# Patient Record
Sex: FEMALE | Race: BLACK OR AFRICAN AMERICAN | NOT HISPANIC OR LATINO | Employment: OTHER | URBAN - METROPOLITAN AREA
[De-identification: names, ages, dates, MRNs, and addresses within clinical notes are randomized per-mention and may not be internally consistent; named-entity substitution may affect disease eponyms.]

---

## 2018-04-19 ENCOUNTER — NURSE TRIAGE (OUTPATIENT)
Dept: ADMINISTRATIVE | Facility: CLINIC | Age: 58
End: 2018-04-19

## 2018-04-20 ENCOUNTER — HOSPITAL ENCOUNTER (INPATIENT)
Facility: HOSPITAL | Age: 58
LOS: 24 days | Discharge: NURSING, REHAB OR LONG-TERM CARE | DRG: 477 | End: 2018-05-14
Attending: EMERGENCY MEDICINE | Admitting: INTERNAL MEDICINE
Payer: MEDICAID

## 2018-04-20 DIAGNOSIS — E87.20 LACTIC ACIDEMIA: ICD-10-CM

## 2018-04-20 DIAGNOSIS — R53.1 WEAKNESS: ICD-10-CM

## 2018-04-20 DIAGNOSIS — J96.01 ACUTE HYPOXEMIC RESPIRATORY FAILURE: ICD-10-CM

## 2018-04-20 DIAGNOSIS — C50.919 METASTATIC BREAST CANCER: ICD-10-CM

## 2018-04-20 DIAGNOSIS — Z17.0 MALIGNANT NEOPLASM OF CENTRAL PORTION OF RIGHT BREAST IN FEMALE, ESTROGEN RECEPTOR POSITIVE: ICD-10-CM

## 2018-04-20 DIAGNOSIS — Z51.5 PALLIATIVE CARE ENCOUNTER: ICD-10-CM

## 2018-04-20 DIAGNOSIS — E43 SEVERE MALNUTRITION: ICD-10-CM

## 2018-04-20 DIAGNOSIS — R07.9 CHEST PAIN: ICD-10-CM

## 2018-04-20 DIAGNOSIS — I50.9 CONGESTIVE HEART FAILURE, UNSPECIFIED HF CHRONICITY, UNSPECIFIED HEART FAILURE TYPE: ICD-10-CM

## 2018-04-20 DIAGNOSIS — I49.9 CARDIAC RHYTHM DISORDER OR DISTURBANCE OR CHANGE: ICD-10-CM

## 2018-04-20 DIAGNOSIS — I50.9 CHF (CONGESTIVE HEART FAILURE): ICD-10-CM

## 2018-04-20 DIAGNOSIS — C50.111 MALIGNANT NEOPLASM OF CENTRAL PORTION OF RIGHT BREAST IN FEMALE, ESTROGEN RECEPTOR POSITIVE: ICD-10-CM

## 2018-04-20 DIAGNOSIS — R00.0 TACHYCARDIA: ICD-10-CM

## 2018-04-20 DIAGNOSIS — C80.1 ADENOCARCINOMA OF UNKNOWN PRIMARY: ICD-10-CM

## 2018-04-20 DIAGNOSIS — C79.51 BONY METASTASIS: ICD-10-CM

## 2018-04-20 DIAGNOSIS — C80.1 CANCER: Primary | ICD-10-CM

## 2018-04-20 DIAGNOSIS — I95.9 HYPOTENSION: ICD-10-CM

## 2018-04-20 DIAGNOSIS — Z99.11 ENCOUNTER FOR WEANING FROM VENTILATOR: ICD-10-CM

## 2018-04-20 DIAGNOSIS — I95.81 POSTPROCEDURAL HYPOTENSION: ICD-10-CM

## 2018-04-20 DIAGNOSIS — I49.9 CARDIAC ARRHYTHMIA, UNSPECIFIED CARDIAC ARRHYTHMIA TYPE: ICD-10-CM

## 2018-04-20 DIAGNOSIS — R57.9 SHOCK: ICD-10-CM

## 2018-04-20 LAB
ALBUMIN SERPL BCP-MCNC: 1.6 G/DL
ALP SERPL-CCNC: 534 U/L
ALT SERPL W/O P-5'-P-CCNC: 22 U/L
ANION GAP SERPL CALC-SCNC: 9 MMOL/L
ANISOCYTOSIS BLD QL SMEAR: SLIGHT
AST SERPL-CCNC: 81 U/L
BACTERIA #/AREA URNS AUTO: NORMAL /HPF
BASOPHILS # BLD AUTO: ABNORMAL K/UL
BASOPHILS NFR BLD: 0 %
BILIRUB SERPL-MCNC: 0.7 MG/DL
BILIRUB UR QL STRIP: NEGATIVE
BNP SERPL-MCNC: 726 PG/ML
BUN SERPL-MCNC: 27 MG/DL
CALCIUM SERPL-MCNC: 8.5 MG/DL
CHLORIDE SERPL-SCNC: 108 MMOL/L
CLARITY UR REFRACT.AUTO: CLEAR
CO2 SERPL-SCNC: 18 MMOL/L
COLOR UR AUTO: YELLOW
CREAT SERPL-MCNC: 1.1 MG/DL
DIFFERENTIAL METHOD: ABNORMAL
EOSINOPHIL # BLD AUTO: ABNORMAL K/UL
EOSINOPHIL NFR BLD: 0 %
ERYTHROCYTE [DISTWIDTH] IN BLOOD BY AUTOMATED COUNT: 15.5 %
EST. GFR  (AFRICAN AMERICAN): >60 ML/MIN/1.73 M^2
EST. GFR  (NON AFRICAN AMERICAN): 56.3 ML/MIN/1.73 M^2
GLUCOSE SERPL-MCNC: 72 MG/DL
GLUCOSE UR QL STRIP: NEGATIVE
HCT VFR BLD AUTO: 36 %
HGB BLD-MCNC: 11.4 G/DL
HGB UR QL STRIP: ABNORMAL
IMM GRANULOCYTES # BLD AUTO: ABNORMAL K/UL
IMM GRANULOCYTES NFR BLD AUTO: ABNORMAL %
INR PPP: 1
KETONES UR QL STRIP: NEGATIVE
LACTATE SERPL-SCNC: 2.8 MMOL/L
LEUKOCYTE ESTERASE UR QL STRIP: ABNORMAL
LYMPHOCYTES # BLD AUTO: ABNORMAL K/UL
LYMPHOCYTES NFR BLD: 22 %
MAGNESIUM SERPL-MCNC: 1.6 MG/DL
MCH RBC QN AUTO: 28.6 PG
MCHC RBC AUTO-ENTMCNC: 31.7 G/DL
MCV RBC AUTO: 91 FL
METAMYELOCYTES NFR BLD MANUAL: 1 %
MICROSCOPIC COMMENT: NORMAL
MONOCYTES # BLD AUTO: ABNORMAL K/UL
MONOCYTES NFR BLD: 6 %
NEUTROPHILS NFR BLD: 68 %
NEUTS BAND NFR BLD MANUAL: 3 %
NITRITE UR QL STRIP: NEGATIVE
NON-SQ EPI CELLS #/AREA URNS AUTO: <1 /HPF
NRBC BLD-RTO: 1 /100 WBC
PH UR STRIP: 5 [PH] (ref 5–8)
PLATELET # BLD AUTO: 76 K/UL
PMV BLD AUTO: ABNORMAL FL
POLYCHROMASIA BLD QL SMEAR: ABNORMAL
POTASSIUM SERPL-SCNC: 5.4 MMOL/L
PROCALCITONIN SERPL IA-MCNC: 0.38 NG/ML
PROT SERPL-MCNC: 6.2 G/DL
PROT UR QL STRIP: NEGATIVE
PROTHROMBIN TIME: 10.6 SEC
RBC # BLD AUTO: 3.98 M/UL
RBC #/AREA URNS AUTO: 1 /HPF (ref 0–4)
SODIUM SERPL-SCNC: 135 MMOL/L
SP GR UR STRIP: 1.01 (ref 1–1.03)
SQUAMOUS #/AREA URNS AUTO: 0 /HPF
URN SPEC COLLECT METH UR: ABNORMAL
UROBILINOGEN UR STRIP-ACNC: NEGATIVE EU/DL
WBC # BLD AUTO: 11.92 K/UL
WBC #/AREA URNS AUTO: 2 /HPF (ref 0–5)

## 2018-04-20 PROCEDURE — 12000002 HC ACUTE/MED SURGE SEMI-PRIVATE ROOM

## 2018-04-20 PROCEDURE — 85007 BL SMEAR W/DIFF WBC COUNT: CPT

## 2018-04-20 PROCEDURE — 99291 CRITICAL CARE FIRST HOUR: CPT

## 2018-04-20 PROCEDURE — 96365 THER/PROPH/DIAG IV INF INIT: CPT

## 2018-04-20 PROCEDURE — 85610 PROTHROMBIN TIME: CPT

## 2018-04-20 PROCEDURE — 99291 CRITICAL CARE FIRST HOUR: CPT | Mod: ,,, | Performed by: EMERGENCY MEDICINE

## 2018-04-20 PROCEDURE — 96361 HYDRATE IV INFUSION ADD-ON: CPT

## 2018-04-20 PROCEDURE — 80053 COMPREHEN METABOLIC PANEL: CPT

## 2018-04-20 PROCEDURE — 83605 ASSAY OF LACTIC ACID: CPT

## 2018-04-20 PROCEDURE — 87040 BLOOD CULTURE FOR BACTERIA: CPT

## 2018-04-20 PROCEDURE — 85027 COMPLETE CBC AUTOMATED: CPT

## 2018-04-20 PROCEDURE — 93010 ELECTROCARDIOGRAM REPORT: CPT | Mod: ,,, | Performed by: INTERNAL MEDICINE

## 2018-04-20 PROCEDURE — 96367 TX/PROPH/DG ADDL SEQ IV INF: CPT

## 2018-04-20 PROCEDURE — 82962 GLUCOSE BLOOD TEST: CPT

## 2018-04-20 PROCEDURE — 83880 ASSAY OF NATRIURETIC PEPTIDE: CPT

## 2018-04-20 PROCEDURE — 81001 URINALYSIS AUTO W/SCOPE: CPT

## 2018-04-20 PROCEDURE — 84145 PROCALCITONIN (PCT): CPT

## 2018-04-20 PROCEDURE — 25000003 PHARM REV CODE 250: Performed by: EMERGENCY MEDICINE

## 2018-04-20 PROCEDURE — 83735 ASSAY OF MAGNESIUM: CPT

## 2018-04-20 PROCEDURE — 96366 THER/PROPH/DIAG IV INF ADDON: CPT

## 2018-04-20 PROCEDURE — 93005 ELECTROCARDIOGRAM TRACING: CPT

## 2018-04-20 RX ORDER — MORPHINE SULFATE 15 MG/1
15 TABLET, FILM COATED, EXTENDED RELEASE ORAL 2 TIMES DAILY
Status: ON HOLD | COMMUNITY
End: 2018-05-14

## 2018-04-20 RX ORDER — MORPHINE SULFATE 15 MG/1
5 TABLET ORAL EVERY 4 HOURS PRN
Status: ON HOLD | COMMUNITY
End: 2018-05-14

## 2018-04-20 RX ORDER — ESCITALOPRAM OXALATE 5 MG/1
5 TABLET ORAL DAILY
Status: ON HOLD | COMMUNITY
End: 2018-05-14 | Stop reason: HOSPADM

## 2018-04-20 RX ORDER — ONDANSETRON 4 MG/1
8 TABLET, FILM COATED ORAL 2 TIMES DAILY
COMMUNITY

## 2018-04-20 RX ADMIN — SODIUM CHLORIDE 1000 ML: 0.9 INJECTION, SOLUTION INTRAVENOUS at 09:04

## 2018-04-20 NOTE — TELEPHONE ENCOUNTER
"  Reason for Disposition   General information question, no triage required and triager able to answer question    Answer Assessment - Initial Assessment Questions  1. REASON FOR CALL or QUESTION: "What is your reason for calling today?" or "How can I best help you?" or "What question do you have that I can help answer?"      Pt's son calling - pt is in hospital in [place]- dx with cancer- he wants her transferred to [facility]    Protocols used: ST INFORMATION ONLY CALL-A-AH    "

## 2018-04-21 PROBLEM — G93.41 ENCEPHALOPATHY, METABOLIC: Status: RESOLVED | Noted: 2018-04-21 | Resolved: 2018-04-21

## 2018-04-21 PROBLEM — G93.41 ENCEPHALOPATHY, METABOLIC: Status: ACTIVE | Noted: 2018-04-21

## 2018-04-21 PROBLEM — I95.9 HYPOTENSION: Status: ACTIVE | Noted: 2018-04-21

## 2018-04-21 PROBLEM — J90 PLEURAL EFFUSION: Status: ACTIVE | Noted: 2018-04-21

## 2018-04-21 PROBLEM — C79.51 BONY METASTASIS: Status: ACTIVE | Noted: 2018-04-21

## 2018-04-21 PROBLEM — C80.1 CANCER: Status: ACTIVE | Noted: 2018-04-21

## 2018-04-21 LAB
ALBUMIN SERPL BCP-MCNC: 1.4 G/DL
ALLENS TEST: ABNORMAL
ALP SERPL-CCNC: 449 U/L
ALT SERPL W/O P-5'-P-CCNC: 18 U/L
ANION GAP SERPL CALC-SCNC: 8 MMOL/L
ANISOCYTOSIS BLD QL SMEAR: SLIGHT
AST SERPL-CCNC: 56 U/L
BASOPHILS # BLD AUTO: ABNORMAL K/UL
BASOPHILS NFR BLD: 0 %
BILIRUB SERPL-MCNC: 0.5 MG/DL
BUN SERPL-MCNC: 24 MG/DL
CALCIUM SERPL-MCNC: 7.4 MG/DL
CHLORIDE SERPL-SCNC: 112 MMOL/L
CO2 SERPL-SCNC: 18 MMOL/L
CREAT SERPL-MCNC: 1 MG/DL
DELSYS: ABNORMAL
DIASTOLIC DYSFUNCTION: NO
DIFFERENTIAL METHOD: ABNORMAL
EOSINOPHIL # BLD AUTO: ABNORMAL K/UL
EOSINOPHIL NFR BLD: 0 %
ERYTHROCYTE [DISTWIDTH] IN BLOOD BY AUTOMATED COUNT: 15.4 %
ERYTHROCYTE [SEDIMENTATION RATE] IN BLOOD BY WESTERGREN METHOD: 28 MM/H
EST. GFR  (AFRICAN AMERICAN): >60 ML/MIN/1.73 M^2
EST. GFR  (NON AFRICAN AMERICAN): >60 ML/MIN/1.73 M^2
ESTIMATED PA SYSTOLIC PRESSURE: 40.21
FLOW: 4
GLUCOSE SERPL-MCNC: 99 MG/DL
HCO3 UR-SCNC: 19.8 MMOL/L (ref 24–28)
HCT VFR BLD AUTO: 34.1 %
HGB BLD-MCNC: 10.4 G/DL
IMM GRANULOCYTES # BLD AUTO: ABNORMAL K/UL
IMM GRANULOCYTES NFR BLD AUTO: ABNORMAL %
LACTATE SERPL-SCNC: 2 MMOL/L
LDH SERPL L TO P-CCNC: 1011 U/L
LYMPHOCYTES # BLD AUTO: ABNORMAL K/UL
LYMPHOCYTES NFR BLD: 21 %
MAGNESIUM SERPL-MCNC: 1.2 MG/DL
MCH RBC QN AUTO: 28.9 PG
MCHC RBC AUTO-ENTMCNC: 30.5 G/DL
MCV RBC AUTO: 95 FL
MODE: ABNORMAL
MONOCYTES # BLD AUTO: ABNORMAL K/UL
MONOCYTES NFR BLD: 2 %
MYELOCYTES NFR BLD MANUAL: 1 %
NEUTROPHILS NFR BLD: 74 %
NEUTS BAND NFR BLD MANUAL: 2 %
NRBC BLD-RTO: 1 /100 WBC
PCO2 BLDA: 30.8 MMHG (ref 35–45)
PH SMN: 7.42 [PH] (ref 7.35–7.45)
PHOSPHATE SERPL-MCNC: 3.2 MG/DL
PLATELET # BLD AUTO: 70 K/UL
PMV BLD AUTO: 13.2 FL
PO2 BLDA: 53 MMHG (ref 40–60)
POC BE: -5 MMOL/L
POC SATURATED O2: 88 % (ref 95–100)
POC TCO2: 21 MMOL/L (ref 24–29)
POCT GLUCOSE: 74 MG/DL (ref 70–110)
POLYCHROMASIA BLD QL SMEAR: ABNORMAL
POTASSIUM SERPL-SCNC: 4.5 MMOL/L
PROT SERPL-MCNC: 5.2 G/DL
RBC # BLD AUTO: 3.6 M/UL
RETIRED EF AND QEF - SEE NOTES: 60 (ref 55–65)
SAMPLE: ABNORMAL
SITE: ABNORMAL
SODIUM SERPL-SCNC: 138 MMOL/L
SP02: 98
TRICUSPID VALVE REGURGITATION: ABNORMAL
WBC # BLD AUTO: 9.96 K/UL

## 2018-04-21 PROCEDURE — 84100 ASSAY OF PHOSPHORUS: CPT

## 2018-04-21 PROCEDURE — 99223 1ST HOSP IP/OBS HIGH 75: CPT | Mod: AI,,, | Performed by: INTERNAL MEDICINE

## 2018-04-21 PROCEDURE — 94761 N-INVAS EAR/PLS OXIMETRY MLT: CPT

## 2018-04-21 PROCEDURE — 25000003 PHARM REV CODE 250: Performed by: HOSPITALIST

## 2018-04-21 PROCEDURE — 99233 SBSQ HOSP IP/OBS HIGH 50: CPT | Mod: ,,, | Performed by: INTERNAL MEDICINE

## 2018-04-21 PROCEDURE — 93306 TTE W/DOPPLER COMPLETE: CPT | Mod: 26,,, | Performed by: INTERNAL MEDICINE

## 2018-04-21 PROCEDURE — 85007 BL SMEAR W/DIFF WBC COUNT: CPT

## 2018-04-21 PROCEDURE — 99900035 HC TECH TIME PER 15 MIN (STAT)

## 2018-04-21 PROCEDURE — 63600175 PHARM REV CODE 636 W HCPCS: Performed by: EMERGENCY MEDICINE

## 2018-04-21 PROCEDURE — 25000003 PHARM REV CODE 250: Performed by: EMERGENCY MEDICINE

## 2018-04-21 PROCEDURE — 83735 ASSAY OF MAGNESIUM: CPT

## 2018-04-21 PROCEDURE — 83605 ASSAY OF LACTIC ACID: CPT

## 2018-04-21 PROCEDURE — 93306 TTE W/DOPPLER COMPLETE: CPT

## 2018-04-21 PROCEDURE — 80053 COMPREHEN METABOLIC PANEL: CPT

## 2018-04-21 PROCEDURE — 25000003 PHARM REV CODE 250: Performed by: INTERNAL MEDICINE

## 2018-04-21 PROCEDURE — 83615 LACTATE (LD) (LDH) ENZYME: CPT

## 2018-04-21 PROCEDURE — 82803 BLOOD GASES ANY COMBINATION: CPT

## 2018-04-21 PROCEDURE — 85027 COMPLETE CBC AUTOMATED: CPT

## 2018-04-21 PROCEDURE — 63600175 PHARM REV CODE 636 W HCPCS: Performed by: INTERNAL MEDICINE

## 2018-04-21 PROCEDURE — 11000001 HC ACUTE MED/SURG PRIVATE ROOM

## 2018-04-21 RX ORDER — SODIUM CHLORIDE 9 MG/ML
INJECTION, SOLUTION INTRAVENOUS CONTINUOUS
Status: DISCONTINUED | OUTPATIENT
Start: 2018-04-21 | End: 2018-04-22

## 2018-04-21 RX ORDER — ACETAMINOPHEN 325 MG/1
650 TABLET ORAL EVERY 4 HOURS PRN
Status: DISCONTINUED | OUTPATIENT
Start: 2018-04-21 | End: 2018-04-25

## 2018-04-21 RX ORDER — SODIUM CHLORIDE 0.9 % (FLUSH) 0.9 %
5 SYRINGE (ML) INJECTION
Status: DISCONTINUED | OUTPATIENT
Start: 2018-04-21 | End: 2018-05-14 | Stop reason: HOSPADM

## 2018-04-21 RX ORDER — ONDANSETRON 8 MG/1
8 TABLET, ORALLY DISINTEGRATING ORAL EVERY 8 HOURS PRN
Status: DISCONTINUED | OUTPATIENT
Start: 2018-04-21 | End: 2018-05-14 | Stop reason: HOSPADM

## 2018-04-21 RX ORDER — HYDROCODONE BITARTRATE AND ACETAMINOPHEN 5; 325 MG/1; MG/1
1 TABLET ORAL EVERY 6 HOURS PRN
Status: DISCONTINUED | OUTPATIENT
Start: 2018-04-21 | End: 2018-04-23

## 2018-04-21 RX ORDER — ENOXAPARIN SODIUM 100 MG/ML
40 INJECTION SUBCUTANEOUS EVERY 24 HOURS
Status: DISCONTINUED | OUTPATIENT
Start: 2018-04-21 | End: 2018-04-24

## 2018-04-21 RX ORDER — MAGNESIUM SULFATE HEPTAHYDRATE 40 MG/ML
2 INJECTION, SOLUTION INTRAVENOUS ONCE
Status: COMPLETED | OUTPATIENT
Start: 2018-04-21 | End: 2018-04-21

## 2018-04-21 RX ADMIN — PIPERACILLIN AND TAZOBACTAM 4.5 G: 4; .5 INJECTION, POWDER, LYOPHILIZED, FOR SOLUTION INTRAVENOUS; PARENTERAL at 12:04

## 2018-04-21 RX ADMIN — VANCOMYCIN HYDROCHLORIDE 1500 MG: 5 INJECTION, POWDER, LYOPHILIZED, FOR SOLUTION INTRAVENOUS at 01:04

## 2018-04-21 RX ADMIN — HYDROCODONE BITARTRATE AND ACETAMINOPHEN 1 TABLET: 5; 325 TABLET ORAL at 04:04

## 2018-04-21 RX ADMIN — VANCOMYCIN HYDROCHLORIDE 1250 MG: 1 INJECTION, POWDER, LYOPHILIZED, FOR SOLUTION INTRAVENOUS at 03:04

## 2018-04-21 RX ADMIN — SODIUM CHLORIDE 1000 ML: 0.9 INJECTION, SOLUTION INTRAVENOUS at 03:04

## 2018-04-21 RX ADMIN — PIPERACILLIN AND TAZOBACTAM 4.5 G: 4; .5 INJECTION, POWDER, LYOPHILIZED, FOR SOLUTION INTRAVENOUS; PARENTERAL at 08:04

## 2018-04-21 RX ADMIN — ENOXAPARIN SODIUM 40 MG: 100 INJECTION SUBCUTANEOUS at 04:04

## 2018-04-21 RX ADMIN — HYDROCODONE BITARTRATE AND ACETAMINOPHEN 1 TABLET: 5; 325 TABLET ORAL at 07:04

## 2018-04-21 RX ADMIN — SODIUM CHLORIDE: 0.9 INJECTION, SOLUTION INTRAVENOUS at 02:04

## 2018-04-21 RX ADMIN — MAGNESIUM SULFATE IN WATER 2 G: 40 INJECTION, SOLUTION INTRAVENOUS at 01:04

## 2018-04-21 RX ADMIN — PIPERACILLIN AND TAZOBACTAM 4.5 G: 4; .5 INJECTION, POWDER, LYOPHILIZED, FOR SOLUTION INTRAVENOUS; PARENTERAL at 04:04

## 2018-04-21 NOTE — H&P
[facility]  Critical Care Medicine  History & Physical    Patient Name: Kerline Grossman  MRN: [id number]  Admission Date: 4/20/2018  Hospital Length of Stay: 0 days  Code Status: Full Code  Attending Physician: Lili Allen MD   Primary Care Provider: No primary care provider on file.   Principal Problem: Hypotension    Subjective:     HPI:  Mrs. Grossman is a 59 yo female who was brought to [facility] ED this afternoon for second opinion regarding recently diagnosed likely metastatic malignancy with unknown primary. Per patient's daughter about a month ago her mother started to gradually decline secondary to diffuse pain. She tehn became progressively more altered and eventually not responsive. She could no longer walk and was urinating on herself. Her family brought her to ED at [facility]. They brought some records from her stay there which show that work up revealed a corrected calcium of 13 and ZACH. She had CT brain, chest and abdomen which showed diffuse lytic lesions and some pathological fractures in her skull, ribs, and hips but no lesion that pointed to primary tumor. Her calcium eventually trended down. SPEP and UPEP were negative. Her daughter states she had 2 BM biopsies which were unrevealing. She had CT guided biopsy of a lesion in her hip on 4/20 as well as diagnostic thoracentesis which removed only 50 ccs from her pleural effusion. Her daughter states the physicians at [facility] were recommending inpatient hospice for her mother as it seemed she most likely had widely metastatic disease with unknown primary tumor however the patient;s children wished to have a definitive diagnoses before making a decision. They initially attempted to transfer to [facility] but were denied and then decided to have her discharged from [facility] and brought her here themselves. They state she had become more interactive and alert int he last few days and was showing signs of improvement. Of note the daughter states  patient was on a morphine gtt at outside hospital and took and MS contin before being discharge. She states she was having some hypotension at OSH which she states the physicians attributed to the morphine gtt.     Critical care medicine was consulted overnight due to hypotension on arrival to ED. Patient had SBP ~ 80 which responded well to 1 L of NS and MAPs were then > 65 so decision initially was to admit to hospital medicine. However, blood pressure then dropped again to systolic of 80s and patient was accepted to critical care medicine.       Hospital/ICU Course:  No notes on file     Past Medical History:   Diagnosis Date    Cancer     Insomnia        Past Surgical History:   Procedure Laterality Date     SECTION      HYSTERECTOMY         Review of patient's allergies indicates:  No Known Allergies    Family History     None        Social History Main Topics    Smoking status: Former Smoker    Smokeless tobacco: Never Used    Alcohol use No    Drug use: No    Sexual activity: Not on file      Review of Systems   Unable to perform ROS: Mental status change     Objective:     Vital Signs (Most Recent):  Temp: 97.5 °F (36.4 °C) (18)  Pulse: 99 (18)  Resp: (!) 22 (18)  BP: 110/60 (18)  SpO2: 97 % (18) Vital Signs (24h Range):  Temp:  [97.5 °F (36.4 °C)-98.4 °F (36.9 °C)] 97.5 °F (36.4 °C)  Pulse:  [] 99  Resp:  [15-29] 22  SpO2:  [91 %-100 %] 97 %  BP: ()/(51-60) 110/60   Weight: 77.1 kg (170 lb)  Body mass index is 27.44 kg/m².      Intake/Output Summary (Last 24 hours) at 18 05  Last data filed at 18 2320   Gross per 24 hour   Intake                0 ml   Output              400 ml   Net             -400 ml       Physical Exam   Constitutional:   Appears ill and lethargic.    HENT:   Head: Normocephalic and atraumatic.   Eyes: EOM are normal. Pupils are equal, round, and reactive to light.   Neck: Normal range of  motion. No JVD present.   Cardiovascular: Normal rate, regular rhythm and normal heart sounds.    No murmur heard.  Pulmonary/Chest: Effort normal and breath sounds normal. No respiratory distress. She has no wheezes.   Abdominal: Soft. She exhibits no distension. There is no guarding.   Musculoskeletal: She exhibits no edema.   Neurological:   Patient is lethargic but is able to answer simple questions. Oriented to person.    Skin: Skin is warm. No erythema.       Vents:     Lines/Drains/Airways     Peripheral Intravenous Line                 Peripheral IV - Single Lumen 04/20/18 2136 Right Antecubital less than 1 day         Peripheral IV - Single Lumen 04/20/18 2205 Left Forearm less than 1 day              Significant Labs:    CBC/Anemia Profile:    Recent Labs  Lab 04/20/18 2152   WBC 11.92   HGB 11.4*   HCT 36.0*   PLT 76*   MCV 91   RDW 15.5*        Chemistries:    Recent Labs  Lab 04/20/18 2152   *   K 5.4*      CO2 18*   BUN 27*   CREATININE 1.1   CALCIUM 8.5*   ALBUMIN 1.6*   PROT 6.2   BILITOT 0.7   ALKPHOS 534*   ALT 22   AST 81*   MG 1.6       Significant Imaging: I have reviewed and interpreted all pertinent imaging results/findings within the past 24 hours.    Assessment/Plan:     Pulmonary   Pleural effusion    Patient with right sided pleural effusion seen on CXR. Possibly malignant.   Will check echo to rule out transudative process secondary to CHF.    She had diagnostic thoracentesis at OSH with 50 ccs removed. Will follow up with outside lab.         Cardiac/Vascular   * Hypotension    Patient found to have MAPS between 60 and 70 upon arrival to ED. She received 1 L of NS with improvements to MAPs > 65.   Likely secondary to combination of dehydration and morphine still wearing off. She may also have an infection although her WBC is normal and she is afebrile  BP now improved s/p 2 L of NS   Continue broad spectrum abx until cultures return.            Oncology   Bony metastasis     Patient with diffuse lytic lesions and pathological fractures throughout her skull, chest and hips which is seen on CT scans from OSH. Workup has not shown primary malignancy.   Will need to follow up of pathology reports from OSH from biopsies of BM and hip as well as cytology from pleural fluid.   Patient's family still want to pursue workup and possible treatment despite the extent of patient;s disease. They also want her to remain full code until they find out more information.            KITTY Ma  Critical Care Medicine  [facility]

## 2018-04-21 NOTE — ED NOTES
"Was about to insert catheter , when I removed the pure wick pt started to pee and states " I couldn't pee with that thing on"  "

## 2018-04-21 NOTE — SUBJECTIVE & OBJECTIVE
Past Medical History:   Diagnosis Date    Cancer     Insomnia        Past Surgical History:   Procedure Laterality Date     SECTION      HYSTERECTOMY         Review of patient's allergies indicates:  No Known Allergies    Family History     None        Social History Main Topics    Smoking status: Former Smoker    Smokeless tobacco: Never Used    Alcohol use No    Drug use: No    Sexual activity: Not on file      Review of Systems   Unable to perform ROS: Mental status change     Objective:     Vital Signs (Most Recent):  Temp: 98.4 °F (36.9 °C) (18 0021)  Pulse: 98 (18 0246)  Resp: 19 (18 0246)  BP: (!) 99/59 (18 0246)  SpO2: 98 % (18 024) Vital Signs (24h Range):  Temp:  [98.4 °F (36.9 °C)] 98.4 °F (36.9 °C)  Pulse:  [] 98  Resp:  [15-29] 19  SpO2:  [91 %-100 %] 98 %  BP: ()/(51-60) 99/59   Weight: 77.1 kg (170 lb)  Body mass index is 27.44 kg/m².    No intake or output data in the 24 hours ending 18 0325    Physical Exam   Constitutional:   Appears ill and lethargic.    HENT:   Head: Normocephalic and atraumatic.   Eyes: EOM are normal. Pupils are equal, round, and reactive to light.   Neck: Normal range of motion. No JVD present.   Cardiovascular: Normal rate, regular rhythm and normal heart sounds.    No murmur heard.  Pulmonary/Chest: Effort normal and breath sounds normal. No respiratory distress. She has no wheezes.   Abdominal: Soft. She exhibits no distension. There is no guarding.   Musculoskeletal: She exhibits no edema.   Neurological:   Patient is lethargic but is able to answer simple questions. Oriented to person.    Skin: Skin is warm. No erythema.       Vents:     Lines/Drains/Airways     Peripheral Intravenous Line                 Peripheral IV - Single Lumen 18 Right Antecubital less than 1 day         Peripheral IV - Single Lumen 18 220 Left Forearm less than 1 day              Significant Labs:    CBC/Anemia  Profile:    Recent Labs  Lab 04/20/18 2152   WBC 11.92   HGB 11.4*   HCT 36.0*   PLT 76*   MCV 91   RDW 15.5*        Chemistries:    Recent Labs  Lab 04/20/18 2152   *   K 5.4*      CO2 18*   BUN 27*   CREATININE 1.1   CALCIUM 8.5*   ALBUMIN 1.6*   PROT 6.2   BILITOT 0.7   ALKPHOS 534*   ALT 22   AST 81*   MG 1.6       Significant Imaging: I have reviewed and interpreted all pertinent imaging results/findings within the past 24 hours.

## 2018-04-21 NOTE — ED NOTES
Pt states she wants to pee but no urine coming out. Bladder scan shows >999. Critical care notified and ordered in and out

## 2018-04-21 NOTE — CARE UPDATE
Spoke with Inez from [facility] Medicine. Patient will be stepped down tomorrow at 7 AM.     Pretty Geiger MD PGY-3

## 2018-04-21 NOTE — SUBJECTIVE & OBJECTIVE
Past Medical History:   Diagnosis Date    Cancer     Insomnia        Past Surgical History:   Procedure Laterality Date     SECTION      HYSTERECTOMY         Review of patient's allergies indicates:  No Known Allergies    Family History     None        Social History Main Topics    Smoking status: Former Smoker    Smokeless tobacco: Never Used    Alcohol use No    Drug use: No    Sexual activity: Not on file      Review of Systems   Unable to perform ROS: Mental status change     Objective:     Vital Signs (Most Recent):  Temp: 97.5 °F (36.4 °C) (18)  Pulse: 99 (18)  Resp: (!) 22 (18)  BP: 110/60 (18)  SpO2: 97 % (18) Vital Signs (24h Range):  Temp:  [97.5 °F (36.4 °C)-98.4 °F (36.9 °C)] 97.5 °F (36.4 °C)  Pulse:  [] 99  Resp:  [15-29] 22  SpO2:  [91 %-100 %] 97 %  BP: ()/(51-60) 110/60   Weight: 77.1 kg (170 lb)  Body mass index is 27.44 kg/m².      Intake/Output Summary (Last 24 hours) at 18 0534  Last data filed at 18 2320   Gross per 24 hour   Intake                0 ml   Output              400 ml   Net             -400 ml       Physical Exam   Constitutional:   Appears ill and lethargic.    HENT:   Head: Normocephalic and atraumatic.   Eyes: EOM are normal. Pupils are equal, round, and reactive to light.   Neck: Normal range of motion. No JVD present.   Cardiovascular: Normal rate, regular rhythm and normal heart sounds.    No murmur heard.  Pulmonary/Chest: Effort normal and breath sounds normal. No respiratory distress. She has no wheezes.   Abdominal: Soft. She exhibits no distension. There is no guarding.   Musculoskeletal: She exhibits no edema.   Neurological:   Patient is lethargic but is able to answer simple questions. Oriented to person.    Skin: Skin is warm. No erythema.       Vents:     Lines/Drains/Airways     Peripheral Intravenous Line                 Peripheral IV - Single Lumen 18 2136 Right  Antecubital less than 1 day         Peripheral IV - Single Lumen 04/20/18 2205 Left Forearm less than 1 day              Significant Labs:    CBC/Anemia Profile:    Recent Labs  Lab 04/20/18  2152   WBC 11.92   HGB 11.4*   HCT 36.0*   PLT 76*   MCV 91   RDW 15.5*        Chemistries:    Recent Labs  Lab 04/20/18 2152   *   K 5.4*      CO2 18*   BUN 27*   CREATININE 1.1   CALCIUM 8.5*   ALBUMIN 1.6*   PROT 6.2   BILITOT 0.7   ALKPHOS 534*   ALT 22   AST 81*   MG 1.6       Significant Imaging: I have reviewed and interpreted all pertinent imaging results/findings within the past 24 hours.

## 2018-04-21 NOTE — ED PROVIDER NOTES
"Encounter Date: 2018    SCRIBE #1 NOTE: I, Feliciano Morin, am scribing for, and in the presence of,  Dr. Winters. I have scribed the entire note.       History     Chief Complaint   Patient presents with    Generalized Body Aches     Pt reports having all over body pain. Pt reports having cancer "all over". Pt reports care at [facility], had fluid taken off lungs this AM. O2 in triage 98%, denies SOB.     Time patient was seen by the provider: 9:35 PM      The patient is a 58 y.o. female former smoker, with co-morbidities including: cancer and insomnia, who presents to the ED with a complaint of generalized pain for the past two weeks. She was admitted to [facility] 11 days ago when her family reports that she was not getting up, was not talking, and was not eating. She had been feeling well until approximately 1 month ago. Her bone marrow biopsy results came back inconclusive, but the working diagnosis was multiple myeloma. The plan at the outside hospital had been for her to go home with hospice care, but the family did not agree with the plan without a diagnosis. The patient's condition then started to improve approximately 1 week ago, when she started eating, talking, laughing, and her vitals were improving. The outside hospital had started her on a morphine drip to keep her comfortable. She was denied a transfer to [facility], so the family had her discharged from the outside hospital 2 hours ago and drove her here.       The history is provided by the patient, a relative and medical records.     Review of patient's allergies indicates:  No Known Allergies  Past Medical History:   Diagnosis Date    Cancer     Insomnia      Past Surgical History:   Procedure Laterality Date     SECTION      HYSTERECTOMY       History reviewed. No pertinent family history.  Social History   Substance Use Topics    Smoking status: Former Smoker    Smokeless tobacco: Never Used    Alcohol use No     Review " of Systems   Constitutional: Positive for appetite change and fatigue. Negative for fever.   HENT: Negative for sore throat.    Eyes: Negative for visual disturbance.   Respiratory: Negative for shortness of breath.    Cardiovascular: Negative for chest pain.   Gastrointestinal: Negative for nausea.   Genitourinary: Negative for dysuria.   Musculoskeletal: Positive for myalgias. Negative for back pain.   Skin: Negative for rash.   Neurological: Negative for weakness.       Physical Exam     Initial Vitals   BP Pulse Resp Temp SpO2   04/20/18 2043 04/20/18 2043 04/20/18 2043 04/20/18 2042 04/20/18 2043   (!) 93/51 89 18 98.4 °F (36.9 °C) 98 %      MAP       04/20/18 2043       65         Physical Exam    Nursing note and vitals reviewed.  Constitutional: No distress.   Lethargic and ill appearing.   HENT:   Head: Normocephalic and atraumatic.   Mucus membranes dry.   Eyes: Conjunctivae and EOM are normal. Pupils are equal, round, and reactive to light.   Neck: Normal range of motion. Neck supple.   Cardiovascular: Normal rate and regular rhythm.   No murmur heard.  Pulmonary/Chest: Breath sounds normal. No respiratory distress. She has no wheezes. She has no rhonchi. She has no rales.   Abdominal: Soft. She exhibits no distension. There is no tenderness. There is no guarding.   Musculoskeletal: Normal range of motion. She exhibits no edema.   Neurological: She is alert and oriented to person, place, and time. No cranial nerve deficit.   Slightly lethargic but arousable to voice. Able to answer simple questions appropriately and to follow commands. No focal weakness.    Skin: Skin is warm and dry. No rash noted.         ED Course   Procedures  Labs Reviewed   CBC W/ AUTO DIFFERENTIAL - Abnormal; Notable for the following:        Result Value    RBC 3.98 (*)     Hemoglobin 11.4 (*)     Hematocrit 36.0 (*)     MCHC 31.7 (*)     RDW 15.5 (*)     Platelets 76 (*)     nRBC 1 (*)     All other components within normal  limits   COMPREHENSIVE METABOLIC PANEL - Abnormal; Notable for the following:     Sodium 135 (*)     Potassium 5.4 (*)     CO2 18 (*)     BUN, Bld 27 (*)     Calcium 8.5 (*)     Albumin 1.6 (*)     Alkaline Phosphatase 534 (*)     AST 81 (*)     eGFR if non  56.3 (*)     All other components within normal limits   LACTIC ACID, PLASMA - Abnormal; Notable for the following:     Lactate (Lactic Acid) 2.8 (*)     All other components within normal limits   URINALYSIS, REFLEX TO URINE CULTURE - Abnormal; Notable for the following:     Occult Blood UA 1+ (*)     Leukocytes, UA Trace (*)     All other components within normal limits    Narrative:     Preferred Collection Type->Urine, Clean Catch   B-TYPE NATRIURETIC PEPTIDE - Abnormal; Notable for the following:      (*)     All other components within normal limits   PROCALCITONIN - Abnormal; Notable for the following:     Procalcitonin 0.38 (*)     All other components within normal limits   COMPREHENSIVE METABOLIC PANEL - Abnormal; Notable for the following:     Chloride 112 (*)     CO2 18 (*)     BUN, Bld 24 (*)     Calcium 7.4 (*)     Total Protein 5.2 (*)     Albumin 1.4 (*)     Alkaline Phosphatase 449 (*)     AST 56 (*)     All other components within normal limits   MAGNESIUM - Abnormal; Notable for the following:     Magnesium 1.2 (*)     All other components within normal limits   LACTATE DEHYDROGENASE - Abnormal; Notable for the following:     LD 1,011 (*)     All other components within normal limits   ISTAT PROCEDURE - Abnormal; Notable for the following:     POC PCO2 30.8 (*)     POC HCO3 19.8 (*)     POC SATURATED O2 88 (*)     POC TCO2 21 (*)     All other components within normal limits   CULTURE, RESPIRATORY   MAGNESIUM   PROTIME-INR   URINALYSIS MICROSCOPIC    Narrative:     Preferred Collection Type->Urine, Clean Catch   LACTIC ACID, PLASMA   PHOSPHORUS   LACTATE DEHYDROGENASE   VANCOMYCIN, TROUGH   POCT GLUCOSE     EKG Readings:  (Independently Interpreted)   Sinus tachycardia. T wave inversions in the anterior leads.          Medical Decision Making:   History:   Old Medical Records: I decided to obtain old medical records.  Old Records Summarized: records from another hospital.       <> Summary of Records: Patient with recent prolonged admission to [facility] in [place]. Her family was trying to have her transferred to [facility] this week but the transfer was denied, so they checked her out of the hospital and brought her here themselves. It seems as though the patient was having a workup for a new onset malignancy. She had CTs of the chest, abdomen, and pelvis on April 13th, that showed innumerable lytic bony lesions consistent with multiple myeloma vs. mets, multiple pathologic subacute rib fractures, and large bilateral pleural effusions. CT head showed multiple lytic lesions in the skull. Patient had a bone marrow biopsy done, but the results are not in the paperwork provided by the family. Patient initially with hypercalcemia that was improved with treatment, and ZACH. There are limited provider notes in the patient's hospital records. There is a note from the  on the 18th of April that they were planning on discharging home with hospice care then it seemed that the patient was showing some signs of clinical improvement so they ultimately declined hospice.  Initial Assessment:   59 yo f, previously healthy, BIB family from [facility] after transfer denied, getting worked up for new onset malignancy (likely MM but other metastatic process possible), on morphine drip today with plans for hospice transfer but family now interested in 2nd opinion as have never received definitive dx.  No acute sx change today and overall family reports condition steadily improving over past week.  Pt c/o pain everywhere.   On exam, BP initially 80s systolic, , afebrile, low O2 sat.    Differential Diagnosis:   Advanced malignancy/MM  ?  "Sepsis vs dehydration today  Independently Interpreted Test(s):   I have ordered and independently interpreted EKG Reading(s) - see prior notes  Clinical Tests:   Lab Tests: Ordered and Reviewed  Radiological Study: Ordered and Reviewed  Medical Tests: Ordered and Reviewed  ED Management:  -labs/cultures  -IVF (gentle hydration as apparently also with h/o CHF?), s/p thoracentesis at OSH  -broad spectrum abx in case sepsis  -anticipate admit - floor vs ICU  Goals of care discussion  -pt with mild delirium during assessment so challenging to get full participation in conversation  -most important to her is learning definitively what is wrong, wants diagnosis, wants to "heal"  -when trying to engage pt in a hope for the best/plan for the worst conversation, reports that she would not consider long-term mechanical ventilation a QOL that is acceptable but seems ambivalent about short-term critical care trial  -for now, will leave full code and would benefit from ongoing d/w palliative care  -ultimately though, feel pt and family will have much difficulty making any EOL decisions until they have more input from oncology about diagnosis, prognosis, potential treatment options            Scribe Attestation:   Scribe #1: I performed the above scribed service and the documentation accurately describes the services I performed. I attest to the accuracy of the note.    Attending Attestation:         Attending Critical Care:   Critical Care Times:   ==============================================================  · Total Critical Care Time - exclusive of procedural time: 45 minutes.  ==============================================================  Critical care was necessary to treat or prevent imminent or life-threatening deterioration of the following conditions: sepsis.   Critical care was time spent personally by me on the following activities: obtaining history from patient or relative, examination of patient, review of " x-rays / CT sent with the patient, review of old charts, development of treatment plan with patient or relative, ordering lab, x-rays, and/or EKG, ordering and performing treatments and interventions, evaluation of patient's response to treatment, discussion with consultants, re-evaluation of patient's conition and end of life discussions.       Attending ED Notes:   11:05 PM  BP now 100s systolic s/p 1 L NS  Pt reports feeling better, asking to eat dinner  Will repeat lactate, if improving, will admit to medicine floor    2:06 AM  VBG, not lactate performed  ICU consulted given labile BPs             Clinical Impression:   The primary encounter diagnosis was Cancer. Diagnoses of Weakness and Shock were also pertinent to this visit.    Disposition:   Disposition: Admitted    I, Dr. Shakira Winters, personally performed the services described in this documentation. All medical record entries made by the scribe were at my direction and in my presence.  I have reviewed the chart and agree that the record reflects my personal performance and is accurate and complete. Shakira Winters MD.  11:04 PM 04/23/2018                        Shakira Winters MD  04/23/18 0825

## 2018-04-21 NOTE — RESIDENT HANDOFF
Handoff     Primary Team: Networked reference to record PCT  Room Number: 3091/3091 A     Patient Name: Kerline Grossman MRN: [id number]     Date of Birth: [id number] Allergies: Patient has no known allergies.     Age: 58 y.o. Admit Date: 4/20/2018     Sex: female  BMI: Body mass index is 27.44 kg/m².     Code Status: Full Code        Reason for Admission: Hypotension    Brief HPI:  Mrs. Grossman is a 57 yo female who was [place] [facility] [place] for second opinion regarding recently diagnosed likely metastatic malignancy with unknown primary. Per patient's daughter about a month ago her mother started to gradually decline. She became progressively more altered and eventually not responsive. She could no longer walk and was urinating on herself. Her family brought her to ED at [facility]. Records from [facility] work up revealed a corrected calcium of 13 and ZACH. She had CT brain, chest and abdomen which showed diffuse lytic lesions and some pathological fractures in her skull, ribs, and hips but no lesion that pointed to primary tumor. Her calcium eventually trended down. SPEP and UPEP were negative. Her daughter states she had a BM biopsies which was unrevealing. She had CT guided biopsy of a lesion in her hip on 4/20 as well as diagnostic thoracentesis which removed only 50 ccs from her pleural effusion. Her daughter states the physicians at [facility] were recommending inpatient hospice for her mother as it seemed she most likely had widely metastatic disease with unknown primary tumor however the patient;s children wished to have a definitive diagnoses before making a decision. They initially attempted to transfer to [facility] but were denied and then decided to have her discharged from [facility] and brought her here themselves. They state she had become more interactive and alert int he last few days and was showing signs of improvement. Of note the daughter states patient was on a morphine gtt at outside hospital and took and MS  contin before being discharge. She states she was having some hypotension at [facility] which she states the physicians attributed to the morphine gtt.      Critical care medicine was consulted in due to hypotension on arrival to ED. Patient had SBP ~ 80 which responded well to 1 L of NS and MAPs were then > 65 so decision initially was to admit to hospital medicine. However, blood pressure then dropped again to systolic of 80s and patient was accepted to critical care medicine. She then received an additional bolus and her MAP is consistently >65. Unclear etiology of hypotension but likely dehydration vs morphine gtt.     Tasks   -follow up pathology reports from OSH   -consider Heme-Onc consult for assistance with prognosis and palliative discussions with family.

## 2018-04-21 NOTE — ASSESSMENT & PLAN NOTE
Patient with right sided pleural effusion seen on CXR. Possibly malignant.   Will check echo to rule out transudative process secondary to CHF.    She had diagnostic thoracentesis at OSH with 50 ccs removed. Will follow up with outside lab.

## 2018-04-21 NOTE — CONSULTS
[facility]  Critical Care Medicine  Consult Note    Patient Name: Kerline Grossman  MRN: [id number]  Admission Date: 4/20/2018  Hospital Length of Stay: 0 days  Code Status: No Order  Attending Physician: No att. providers found   Primary Care Provider: No primary care provider on file.   Principal Problem: <principal problem not specified>    Inpatient consult to Critical Care Medicine  Consult performed by: MIKA DANIELLE  Consult ordered by: LUBNA PATEL  Reason for consult: hypotension        Subjective:     HPI:  Mrs. Grossman is a 59 yo female who was brought to [facility] ED this afternoon for second opinion regarding recently diagnosed likely metastatic malignancy with unknown primary. Per patient's daughter about a month ago her mother started to gradually decline secondary to diffuse pain. She tehn became progressively more altered and eventually not responsive. She could no longer walk and was urinating on herself. Her family brought her to ED at [facility]. They brought some records from her stay there which show that work up revealed a corrected calcium of 13 and ZACH. She had CT brain, chest and abdomen which showed diffuse lytic lesions and some pathological fractures in her skull, ribs, and hips but no lesion that pointed to primary tumor. Her calcium eventually trended down. SPEP and UPEP were negative. Her daughter states she had 2 BM biopsies which were unrevealing. She had CT guided biopsy of a lesion in her hip on 4/20 as well as diagnostic thoracentesis which removed only 50 ccs from her pleural effusion. Her daughter states the physicians at [facility] were recommending inpatient hospice for her mother as it seemed she most likely had widely metastatic disease with unknown primary tumor however the patient;s children wished to have a definitive diagnoses before making a decision. They initially attempted to transfer to [facility] but were denied and then decided to have her discharged  from [facility] and brought her here themselves. They state she had become more interactive and alert int he last few days and was showing signs of improvement. Of note the daughter states patient was on a morphine gtt at outside hospital and took and MS contin before being discharge. She states she was having some hypotension at OSH which she states the physicians attributed to the morphine gtt.     Critical care medicine was consulted overnight due to hypotension on arrival to ED. Patient had SBP ~ 80 which responded well to 1 L of NS and MAPs were then > 65. She had CXR which showed right sided pleural effusion with multiple lytic lesions and pathological rib fractures.        Hospital/ICU Course:  No notes on file    Past Medical History:   Diagnosis Date    Cancer     Insomnia        Past Surgical History:   Procedure Laterality Date     SECTION      HYSTERECTOMY         Review of patient's allergies indicates:  No Known Allergies    Family History     None        Social History Main Topics    Smoking status: Former Smoker    Smokeless tobacco: Never Used    Alcohol use No    Drug use: No    Sexual activity: Not on file      Review of Systems   Unable to perform ROS: Mental status change     Objective:     Vital Signs (Most Recent):  Temp: 98.4 °F (36.9 °C) (18 0021)  Pulse: 98 (18 0246)  Resp: 19 (18 0246)  BP: (!) 99/59 (18 0246)  SpO2: 98 % (18 0246) Vital Signs (24h Range):  Temp:  [98.4 °F (36.9 °C)] 98.4 °F (36.9 °C)  Pulse:  [] 98  Resp:  [15-29] 19  SpO2:  [91 %-100 %] 98 %  BP: ()/(51-60) 99/59   Weight: 77.1 kg (170 lb)  Body mass index is 27.44 kg/m².    No intake or output data in the 24 hours ending 18 0325    Physical Exam   Constitutional:   Appears ill and lethargic.    HENT:   Head: Normocephalic and atraumatic.   Eyes: EOM are normal. Pupils are equal, round, and reactive to light.   Neck: Normal range of motion. No JVD  present.   Cardiovascular: Normal rate, regular rhythm and normal heart sounds.    No murmur heard.  Pulmonary/Chest: Effort normal and breath sounds normal. No respiratory distress. She has no wheezes.   Abdominal: Soft. She exhibits no distension. There is no guarding.   Musculoskeletal: She exhibits no edema.   Neurological:   Patient is lethargic but is able to answer simple questions. Oriented to person.    Skin: Skin is warm. No erythema.       Vents:     Lines/Drains/Airways     Peripheral Intravenous Line                 Peripheral IV - Single Lumen 04/20/18 2136 Right Antecubital less than 1 day         Peripheral IV - Single Lumen 04/20/18 2205 Left Forearm less than 1 day              Significant Labs:    CBC/Anemia Profile:    Recent Labs  Lab 04/20/18 2152   WBC 11.92   HGB 11.4*   HCT 36.0*   PLT 76*   MCV 91   RDW 15.5*        Chemistries:    Recent Labs  Lab 04/20/18 2152   *   K 5.4*      CO2 18*   BUN 27*   CREATININE 1.1   CALCIUM 8.5*   ALBUMIN 1.6*   PROT 6.2   BILITOT 0.7   ALKPHOS 534*   ALT 22   AST 81*   MG 1.6       Significant Imaging: I have reviewed and interpreted all pertinent imaging results/findings within the past 24 hours.    Assessment/Plan:     Cardiac/Vascular   Hypotension    Patient found to have MAPS between 60 and 70 upon arrival to ED. She received 1 L of NS with improvements to MAPs > 65.   Likely secondary to combination of dehydration and morphine still wearing off. She may also have an infection although her WBC is normal and she is afebrile  Will give one more liter of NS.   Continue broad spectrum abx until cultures return.   Patient is currently stable for the floor with MAPS > 65.         Oncology   Bony metastasis    Patient with diffuse lytic lesions and pathological fractures throughout her skull, chest and hips which is seen on CT scans from OSH. Workup has not shown primary malignancy.   Will need to follow up of pathology reports from OSH from  biopsies of BM and hip as well as cytology from pleural fluid.   Patient's family still want to pursue workup and possible treatment despite the extent of patient;s disease. They also want her to remain full code until they find out more information.              Thank you for your consult. Patient is currently hemodynamically stable for the floor. If she is to decompensate in any way and MAPS are consistently below 65 please call [phone] for transfer to MICU.     Discussed with Critical Care Fellow Dr. Simon.      KITTY Ma  Critical Care Medicine  [facility]

## 2018-04-21 NOTE — ED TRIAGE NOTES
Pt was admitted to [facility] 11 days ago and left 2 hrs ago. Patient c/o generalized body aches but more in her sides. Pt was on morphine drip to control her pain and was removed prior to coming here. Denies fever, chestpain       LOC: The patient is a bit lethargic. Oriented x3  APPEARANCE: Pt resting comfortably, in no acute distress, pt is clean and well groomed, clothing properly fastened  SKIN:The skin is warm and dry, color consistent with ethnicity, patient has normal skin turgor and dry mucus membranes  RESPIRATORY:Airway is open and patent, respirations are spontaneous, patient has a normal effort and rate, no accessory muscle use noted.  CARDIAC: fast rate and rhythm, no peripheral edema noted, capillary refill < 3 seconds, bilateral radial pulses 2+.  NEUROLOGIC: PERRLA, facial expression is symmetrical, patient moving all extremities spontaneously, normal sensation in all extremities when touched with a finger.  Follows all commands appropriately  MUSCULOSKELETAL: Patient moving all extremities spontaneously, no obvious swelling or deformities noted.

## 2018-04-21 NOTE — ASSESSMENT & PLAN NOTE
Patient with diffuse lytic lesions and pathological fractures throughout her skull, chest and hips which is seen on CT scans from OSH. Workup has not shown primary malignancy.   Will need to follow up of pathology reports from OSH from biopsies of BM and hip as well as cytology from pleural fluid.   Patient's family still want to pursue workup and possible treatment despite the extent of patient;s disease. They also want her to remain full code until they find out more information.

## 2018-04-21 NOTE — HPI
Mrs. Grossman is a 57 yo female who was brought to [facility] ED this afternoon for second opinion regarding recently diagnosed likely metastatic malignancy with unknown primary. Per patient's daughter about a month ago her mother started to gradually decline secondary to diffuse pain. She tehn became progressively more altered and eventually not responsive. She could no longer walk and was urinating on herself. Her family brought her to ED at [facility]. They brought some records from her stay there which show that work up revealed a corrected calcium of 13 and ZACH. She had CT brain, chest and abdomen which showed diffuse lytic lesions and some pathological fractures in her skull, ribs, and hips but no lesion that pointed to primary tumor. Her calcium eventually trended down. SPEP and UPEP were negative. Her daughter states she had 2 BM biopsies which were unrevealing. She had CT guided biopsy of a lesion in her hip on 4/20 as well as diagnostic thoracentesis which removed only 50 ccs from her pleural effusion. Her daughter states the physicians at [facility]SH were recommending inpatient hospice for her mother as it seemed she most likely had widely metastatic disease with unknown primary tumor however the patient;s children wished to have a definitive diagnoses before making a decision. They initially attempted to transfer to [facility] but were denied and then decided to have her discharged from [facility] and brought her here themselves. They state she had become more interactive and alert int he last few days and was showing signs of improvement. Of note the daughter states patient was on a morphine gtt at outside hospital and took and MS contin before being discharge. She states she was having some hypotension at OSH which she states the physicians attributed to the morphine gtt.     Critical care medicine was consulted overnight due to hypotension on arrival to ED. Patient had SBP ~ 80 which responded well to 1 L of NS and  MAPs were then > 65 so decision initially was to admit to hospital medicine. However, blood pressure then dropped again to systolic of 80s and patient was accepted to critical care medicine.

## 2018-04-21 NOTE — ASSESSMENT & PLAN NOTE
Patient found to have MAPS between 60 and 70 upon arrival to ED. She received 1 L of NS with improvements to MAPs > 65.   Likely secondary to combination of dehydration and morphine still wearing off. She may also have an infection although her WBC is normal and she is afebrile  Will give one more liter of NS.   Continue broad spectrum abx until cultures return.   Patient is currently stable for the floor with MAPS > 65.

## 2018-04-21 NOTE — ASSESSMENT & PLAN NOTE
Patient found to have MAPS between 60 and 70 upon arrival to ED. She received 1 L of NS with improvements to MAPs > 65.   Likely secondary to combination of dehydration and morphine still wearing off. She may also have an infection although her WBC is normal and she is afebrile  BP now improved s/p 2 L of NS   Continue broad spectrum abx until cultures return.

## 2018-04-22 PROBLEM — D64.9 ANEMIA: Status: ACTIVE | Noted: 2018-04-22

## 2018-04-22 PROBLEM — E83.42 HYPOMAGNESEMIA: Status: ACTIVE | Noted: 2018-04-22

## 2018-04-22 PROBLEM — D69.6 THROMBOCYTOPENIA, UNSPECIFIED: Status: ACTIVE | Noted: 2018-04-22

## 2018-04-22 PROBLEM — E83.39 HYPOPHOSPHATEMIA: Status: ACTIVE | Noted: 2018-04-22

## 2018-04-22 LAB
ALBUMIN SERPL BCP-MCNC: 1.4 G/DL
ALP SERPL-CCNC: 419 U/L
ALT SERPL W/O P-5'-P-CCNC: 17 U/L
ANION GAP SERPL CALC-SCNC: 9 MMOL/L
ANISOCYTOSIS BLD QL SMEAR: SLIGHT
AST SERPL-CCNC: 56 U/L
BASOPHILS # BLD AUTO: ABNORMAL K/UL
BASOPHILS NFR BLD: 0 %
BILIRUB SERPL-MCNC: 0.5 MG/DL
BUN SERPL-MCNC: 19 MG/DL
CALCIUM SERPL-MCNC: 7.9 MG/DL
CHLORIDE SERPL-SCNC: 111 MMOL/L
CO2 SERPL-SCNC: 19 MMOL/L
CREAT SERPL-MCNC: 1 MG/DL
DIFFERENTIAL METHOD: ABNORMAL
EOSINOPHIL # BLD AUTO: ABNORMAL K/UL
EOSINOPHIL NFR BLD: 1 %
ERYTHROCYTE [DISTWIDTH] IN BLOOD BY AUTOMATED COUNT: 15.4 %
EST. GFR  (AFRICAN AMERICAN): >60 ML/MIN/1.73 M^2
EST. GFR  (NON AFRICAN AMERICAN): >60 ML/MIN/1.73 M^2
GLUCOSE SERPL-MCNC: 104 MG/DL
HCT VFR BLD AUTO: 30.5 %
HGB BLD-MCNC: 9.9 G/DL
IMM GRANULOCYTES # BLD AUTO: ABNORMAL K/UL
IMM GRANULOCYTES NFR BLD AUTO: ABNORMAL %
LYMPHOCYTES # BLD AUTO: ABNORMAL K/UL
LYMPHOCYTES NFR BLD: 44 %
MAGNESIUM SERPL-MCNC: 1.4 MG/DL
MCH RBC QN AUTO: 28.3 PG
MCHC RBC AUTO-ENTMCNC: 32.5 G/DL
MCV RBC AUTO: 87 FL
METAMYELOCYTES NFR BLD MANUAL: 3 %
MONOCYTES # BLD AUTO: ABNORMAL K/UL
MONOCYTES NFR BLD: 11 %
NEUTROPHILS NFR BLD: 38 %
NEUTS BAND NFR BLD MANUAL: 3 %
NRBC BLD-RTO: 0 /100 WBC
PHOSPHATE SERPL-MCNC: 2.5 MG/DL
PLATELET # BLD AUTO: 122 K/UL
PLATELET BLD QL SMEAR: ABNORMAL
PMV BLD AUTO: 12.8 FL
POLYCHROMASIA BLD QL SMEAR: ABNORMAL
POTASSIUM SERPL-SCNC: 4.9 MMOL/L
PROT SERPL-MCNC: 5.3 G/DL
RBC # BLD AUTO: 3.5 M/UL
SODIUM SERPL-SCNC: 139 MMOL/L
VANCOMYCIN TROUGH SERPL-MCNC: 37.1 UG/ML
WBC # BLD AUTO: 8.08 K/UL

## 2018-04-22 PROCEDURE — 11000001 HC ACUTE MED/SURG PRIVATE ROOM

## 2018-04-22 PROCEDURE — A9585 GADOBUTROL INJECTION: HCPCS | Performed by: HOSPITALIST

## 2018-04-22 PROCEDURE — 63600175 PHARM REV CODE 636 W HCPCS: Performed by: HOSPITALIST

## 2018-04-22 PROCEDURE — G8997 SWALLOW GOAL STATUS: HCPCS | Mod: CH

## 2018-04-22 PROCEDURE — 84100 ASSAY OF PHOSPHORUS: CPT

## 2018-04-22 PROCEDURE — 83735 ASSAY OF MAGNESIUM: CPT

## 2018-04-22 PROCEDURE — 25500020 PHARM REV CODE 255: Performed by: HOSPITALIST

## 2018-04-22 PROCEDURE — 25000003 PHARM REV CODE 250: Performed by: HOSPITALIST

## 2018-04-22 PROCEDURE — 36415 COLL VENOUS BLD VENIPUNCTURE: CPT

## 2018-04-22 PROCEDURE — 63600175 PHARM REV CODE 636 W HCPCS: Performed by: INTERNAL MEDICINE

## 2018-04-22 PROCEDURE — 80053 COMPREHEN METABOLIC PANEL: CPT

## 2018-04-22 PROCEDURE — 25500020 PHARM REV CODE 255: Performed by: STUDENT IN AN ORGANIZED HEALTH CARE EDUCATION/TRAINING PROGRAM

## 2018-04-22 PROCEDURE — G8998 SWALLOW D/C STATUS: HCPCS | Mod: CH

## 2018-04-22 PROCEDURE — 85027 COMPLETE CBC AUTOMATED: CPT

## 2018-04-22 PROCEDURE — 85007 BL SMEAR W/DIFF WBC COUNT: CPT

## 2018-04-22 PROCEDURE — 80202 ASSAY OF VANCOMYCIN: CPT

## 2018-04-22 PROCEDURE — 99233 SBSQ HOSP IP/OBS HIGH 50: CPT | Mod: ,,, | Performed by: HOSPITALIST

## 2018-04-22 PROCEDURE — 25000003 PHARM REV CODE 250: Performed by: INTERNAL MEDICINE

## 2018-04-22 PROCEDURE — G8996 SWALLOW CURRENT STATUS: HCPCS | Mod: CH

## 2018-04-22 PROCEDURE — 92610 EVALUATE SWALLOWING FUNCTION: CPT

## 2018-04-22 RX ORDER — SODIUM,POTASSIUM PHOSPHATES 280-250MG
1 POWDER IN PACKET (EA) ORAL
Status: COMPLETED | OUTPATIENT
Start: 2018-04-22 | End: 2018-04-22

## 2018-04-22 RX ORDER — SODIUM CHLORIDE, SODIUM LACTATE, POTASSIUM CHLORIDE, CALCIUM CHLORIDE 600; 310; 30; 20 MG/100ML; MG/100ML; MG/100ML; MG/100ML
INJECTION, SOLUTION INTRAVENOUS CONTINUOUS
Status: DISCONTINUED | OUTPATIENT
Start: 2018-04-22 | End: 2018-04-25

## 2018-04-22 RX ORDER — GADOBUTROL 604.72 MG/ML
8 INJECTION INTRAVENOUS
Status: COMPLETED | OUTPATIENT
Start: 2018-04-22 | End: 2018-04-22

## 2018-04-22 RX ORDER — AMOXICILLIN 250 MG
2 CAPSULE ORAL DAILY
Status: DISCONTINUED | OUTPATIENT
Start: 2018-04-22 | End: 2018-05-14 | Stop reason: HOSPADM

## 2018-04-22 RX ORDER — MAGNESIUM SULFATE HEPTAHYDRATE 40 MG/ML
2 INJECTION, SOLUTION INTRAVENOUS ONCE
Status: COMPLETED | OUTPATIENT
Start: 2018-04-22 | End: 2018-04-22

## 2018-04-22 RX ADMIN — POTASSIUM & SODIUM PHOSPHATES POWDER PACK 280-160-250 MG 1 PACKET: 280-160-250 PACK at 08:04

## 2018-04-22 RX ADMIN — HYDROCODONE BITARTRATE AND ACETAMINOPHEN 1 TABLET: 5; 325 TABLET ORAL at 08:04

## 2018-04-22 RX ADMIN — PIPERACILLIN AND TAZOBACTAM 4.5 G: 4; .5 INJECTION, POWDER, LYOPHILIZED, FOR SOLUTION INTRAVENOUS; PARENTERAL at 05:04

## 2018-04-22 RX ADMIN — MAGNESIUM SULFATE IN WATER 2 G: 40 INJECTION, SOLUTION INTRAVENOUS at 10:04

## 2018-04-22 RX ADMIN — HYDROCODONE BITARTRATE AND ACETAMINOPHEN 1 TABLET: 5; 325 TABLET ORAL at 10:04

## 2018-04-22 RX ADMIN — POTASSIUM & SODIUM PHOSPHATES POWDER PACK 280-160-250 MG 1 PACKET: 280-160-250 PACK at 05:04

## 2018-04-22 RX ADMIN — ENOXAPARIN SODIUM 40 MG: 100 INJECTION SUBCUTANEOUS at 05:04

## 2018-04-22 RX ADMIN — IOHEXOL 15 ML: 350 INJECTION, SOLUTION INTRAVENOUS at 11:04

## 2018-04-22 RX ADMIN — SODIUM CHLORIDE, POTASSIUM CHLORIDE, SODIUM LACTATE AND CALCIUM CHLORIDE: 600; 310; 30; 20 INJECTION, SOLUTION INTRAVENOUS at 10:04

## 2018-04-22 RX ADMIN — PIPERACILLIN AND TAZOBACTAM 4.5 G: 4; .5 INJECTION, POWDER, LYOPHILIZED, FOR SOLUTION INTRAVENOUS; PARENTERAL at 10:04

## 2018-04-22 RX ADMIN — IOHEXOL 75 ML: 350 INJECTION, SOLUTION INTRAVENOUS at 02:04

## 2018-04-22 RX ADMIN — POTASSIUM & SODIUM PHOSPHATES POWDER PACK 280-160-250 MG 1 PACKET: 280-160-250 PACK at 10:04

## 2018-04-22 RX ADMIN — VANCOMYCIN HYDROCHLORIDE 1250 MG: 1 INJECTION, POWDER, LYOPHILIZED, FOR SOLUTION INTRAVENOUS at 12:04

## 2018-04-22 RX ADMIN — PIPERACILLIN AND TAZOBACTAM 4.5 G: 4; .5 INJECTION, POWDER, LYOPHILIZED, FOR SOLUTION INTRAVENOUS; PARENTERAL at 12:04

## 2018-04-22 RX ADMIN — HYDROCODONE BITARTRATE AND ACETAMINOPHEN 1 TABLET: 5; 325 TABLET ORAL at 12:04

## 2018-04-22 RX ADMIN — GADOBUTROL 8 ML: 604.72 INJECTION INTRAVENOUS at 09:04

## 2018-04-22 NOTE — SUBJECTIVE & OBJECTIVE
"Past Medical History:   Diagnosis Date    Cancer     Insomnia        Past Surgical History:   Procedure Laterality Date     SECTION      HYSTERECTOMY         Review of patient's allergies indicates:  No Known Allergies    Current Facility-Administered Medications   Medication    acetaminophen tablet 650 mg    dextrose 50% injection 25 g    enoxaparin injection 40 mg    hydrocodone-acetaminophen 5-325mg per tablet 1 tablet    lactated ringers infusion    omnipaque oral solution 15 mL    ondansetron disintegrating tablet 8 mg    piperacillin-tazobactam 4.5 g in sodium chloride 0.9% 100 mL IVPB (ready to mix system)    potassium, sodium phosphates 280-160-250 mg packet 1 packet    sodium chloride 0.9% flush 5 mL    vancomycin (VANCOCIN) 1,250 mg in sodium chloride 0.9% 250 mL IVPB     Family History     None        Social History Main Topics    Smoking status: Former Smoker    Smokeless tobacco: Never Used    Alcohol use No    Drug use: No    Sexual activity: Not on file     ROS   Constitutional: negative for fevers  Eyes: no visual changes  ENT: negative for hearing loss  Respiratory: negative for dyspnea  Cardiovascular: negative for chest pain  Gastrointestinal: negative for abdominal pain  Genitourinary: negative for dysuria  Neurological: negative for headaches  Behavioral/Psych: negative for hallucinations  Endocrine: negative for temperature intolerance    Objective:     Vital Signs (Most Recent):  Temp: 97.7 °F (36.5 °C) (18 1242)  Pulse: 101 (18 1242)  Resp: 18 (18 1242)  BP: 110/74 (18 1242)  SpO2: (!) 92 % (18 1242) Vital Signs (24h Range):  Temp:  [97.6 °F (36.4 °C)-99.5 °F (37.5 °C)] 97.7 °F (36.5 °C)  Pulse:  [101-114] 101  Resp:  [18-19] 18  SpO2:  [91 %-96 %] 92 %  BP: ()/(52-75) 110/74     Weight: 77.1 kg (170 lb)  Height: 5' 6" (167.6 cm)  Body mass index is 27.44 kg/m².      Intake/Output Summary (Last 24 hours) at 18 1604  Last " data filed at 04/22/18 0054   Gross per 24 hour   Intake              645 ml   Output                0 ml   Net              645 ml       Ortho/SPM Exam  Vitals: Afebrile.  Vital signs stable.  General: No acute distress.  HEENT: Normocephalic. Atraumatic. Sclera anicteric. No tracheal deviation.  Cardio: Regular rhythm.  Tachycardic  Chest: No increased work of breathing.  Abdominal: Nondistended.  Extremities: No cyanosis.  No clubbing.  No edema.  Palpable pulses.  Skin: No generalized rash.  Neuro: Awake. Alert. Oriented to person, place, time, and situation.  Psych: Normal appearance. Cooperative.  Appropriate mood.  Appropriate affect.      Motor            RIGHT  LEFT  Deltoid(C5)        4/5    4/5  Biceps(C5)         4/5    4/5  Extensor Carpi Radialis Longus(C6)    4/5    4/5   Triceps(C7)       4/5    4/5     Flexor Carpi Radialis(C7)     4/5    4/5  Flexor Digitorum Superficialis(C8)    4/5    4/5  Interossei(T1)       4/5    4/5     Sensation (a=absent, i=impaired, n=normal)       RIGHT  LEFT  C5 dermatome       n     n  C6 dermatome       n     n  C7 dermatome       n     n  C8 dermatome       n     n  T1 dermatome       n     n    Reflexes       RIGHT  LEFT  Triceps        2+     2+  Biceps         2+     2+  Brachioradialis       2+           2+  Hoffmans's       neg    neg    Motor             RIGHT  LEFT  Iliopsoas (L2,3)       4/5    4/5  Quadriceps (L3,4)      3/5    3/5   Tibialis Anterior (L4.5)         4/5    4/5   Extensor Halucis Longus (L5)    5/5    5/5     Gastrocnemius/Soleus (S1)     5/5    5/5  Flexor Hallucis Longus(S2)     5/5    5/5    Sensation (a=absent, i=impaired, n=normal)       RIGHT   LEFT  L2 dermatome       n     n  L3 dermatome       n     n  L4 dermatome       n     n  L5 dermatome       n     n  S1 dermatome       n     n  S2 dermatome       n     n    Reflexes        RIGHT  LEFT  Patellar       1+     1+  Achilles       0     0  Babinski      neg    neg  Clonus           neg    neg    SILT and pinprick perianal; rectal tone normal; voluntary sphincter contraction intact    Significant Labs:   CBC:   Recent Labs  Lab 04/20/18 2152 04/21/18  0610 04/22/18  0446   WBC 11.92 9.96 8.08   HGB 11.4* 10.4* 9.9*   HCT 36.0* 34.1* 30.5*   PLT 76* 70* 122*     CMP:   Recent Labs  Lab 04/20/18 2152 04/21/18  0610 04/22/18  0446   * 138 139   K 5.4* 4.5 4.9    112* 111*   CO2 18* 18* 19*   GLU 72 99 104   BUN 27* 24* 19   CREATININE 1.1 1.0 1.0   CALCIUM 8.5* 7.4* 7.9*   PROT 6.2 5.2* 5.3*   ALBUMIN 1.6* 1.4* 1.4*   BILITOT 0.7 0.5 0.5   ALKPHOS 534* 449* 419*   AST 81* 56* 56*   ALT 22 18 17   ANIONGAP 9 8 9   EGFRNONAA 56.3* >60.0 >60.0     All pertinent labs within the past 24 hours have been reviewed.    Significant Imaging: I have reviewed all pertinent imaging results/findings.     MRI lspine showing diffuse enhancing lesions concerning for metastatic disease without significant canal stenosis.

## 2018-04-22 NOTE — MEDICAL/APP STUDENT
Hospital Medicine  Progress note    Team: [facility]  Admit Date: 4/20/2018  VIGNESH   Code status: Full Code    Principal Problem:  Hypotension    Interval hx:    4/22: Lower back ache, lumbar MRI with contrast done-> Abnormal marrow signal and diffuse nodular enhancing lesions throughout the lumbar spine and sacrum concerning for metastatic disease. CT chest, abdomen, and pelvis with contrast ordered for metastasis. Chest xray showed multiple lytic lesions or pathologic rib fractures. UA was negative. Awaiting results from BC and hip Bx from outside hospital.      ROS     Pain Scale: 7 /10 Lower back pain  Respiratory: no cough or shortness of breath  Cardiovascular: no chest pain or palpitations  Gastrointestinal: no nausea or vomiting, no abdominal pain or change in bowel habits  Behavioral/Psych: no depression or anxiety      PEx  Temp:  [97 °F (36.1 °C)-99.5 °F (37.5 °C)]   Pulse:  []   Resp:  [17-29]   BP: ()/(52-74)   SpO2:  [91 %-97 %]     Intake/Output Summary (Last 24 hours) at 04/22/18 0808  Last data filed at 04/22/18 0054   Gross per 24 hour   Intake             2105 ml   Output             1700 ml   Net              405 ml       General Appearance: no acute distress   Heart: regular rate and rhythm  Respiratory: Normal respiratory effort, no crackles   Abdomen: Soft, non-tender; bowel sounds active  Skin: intact. IV sites ok  Neurologic:  No focal numbness or weakness  Mental status: Alert, oriented x 3, affect appropriate   MSK: left perispinal tender. 4/5 upper limbs B/L, 2/5 lower limbs - patient can walk but has pain on ambulation in legs    Recent Labs  Lab 04/20/18 2152 04/21/18  0610 04/22/18  0446   WBC 11.92 9.96 8.08   HGB 11.4* 10.4* 9.9*   HCT 36.0* 34.1* 30.5*   PLT 76* 70* 122*       Recent Labs  Lab 04/20/18 2152 04/21/18  0610 04/22/18  0446   * 138 139   K 5.4* 4.5 4.9    112* 111*   CO2 18* 18* 19*   BUN 27* 24* 19   CREATININE 1.1 1.0 1.0   GLU  72 99 104   CALCIUM 8.5* 7.4* 7.9*   MG 1.6 1.2* 1.4*   PHOS  --  3.2 2.5*       Recent Labs  Lab 04/20/18  2152 04/21/18  0610 04/22/18  0446   ALKPHOS 534* 449* 419*   ALT 22 18 17   AST 81* 56* 56*   ALBUMIN 1.6* 1.4* 1.4*   PROT 6.2 5.2* 5.3*   BILITOT 0.7 0.5 0.5   INR 1.0  --   --         Recent Labs  Lab 04/21/18  0522   POCTGLUCOSE 74     No results for input(s): CPK, CPKMB, MB, TROPONINI in the last 72 hours.    Scheduled Meds:   enoxaparin  40 mg Subcutaneous Daily    magnesium sulfate IVPB  2 g Intravenous Once    piperacillin-tazobactam (ZOSYN) IVPB  4.5 g Intravenous Q8H    potassium, sodium phosphates  1 packet Oral QID (AC & HS)    vancomycin (VANCOCIN) IVPB  15 mg/kg Intravenous Q12H     Continuous Infusions:   lactated ringers       As Needed:  acetaminophen, dextrose 50%, hydrocodone-acetaminophen 5-325mg, omnipaque, ondansetron, sodium chloride 0.9%    Active Hospital Problems    Diagnosis  POA    *Hypotension [I95.9]  Yes    Anemia [D64.9]  Unknown    Hypomagnesemia [E83.42]  Unknown    Hypophosphatemia [E83.39]  Unknown    Thrombocytopenia, unspecified [D69.6]  Unknown    Bony metastasis [C79.51]  Yes    Cancer [C80.1]  Yes    Pleural effusion [J90]  Yes      Resolved Hospital Problems    Diagnosis Date Resolved POA    Encephalopathy, metabolic [G93.41] 04/21/2018 Yes       Overview Mrs. Grossman is a 59 yo female who was brought to [facility] this afternoon for second opinion regarding recently diagnosed likely metastatic malignancy with unknown primary.      Assessment and Plan for Problems addressed today:    Metastatic cancer of unknown primary  Patient with diffuse lytic lesions and pathological fractures throughout her skull, chest and hips which is seen on CT scans from OSH. Workup has not shown primary malignancy.   Will need to follow up of pathology reports from OSH from biopsies of BM and hip as well as cytology from pleural fluid.   Patient's family still want to pursue  workup and possible treatment despite the extent of patient;s disease. They also want her to remain full code until they find out more information.   - will consult heme onc for assistance with workup  - will also consult palliative care for assistance with goals of care discussion  4/22: MRI lumbar spine showed abnormal marrow signal and diffuse nodular enhancing lesions throughout the lumbar spine and sacrum concerning for metastatic disease.   - will f/u with OSH for bone bx of the hip and blood cultures    Anemia of chronic disease  4/22: Hgb 9.9  - Has been trending downward   - Will monitor with CBC daily    Hypotension  Patient found to have MAPS between 60 and 70 upon arrival to ED. She received 1 L of NS with improvements to MAPs > 65.   Likely secondary to combination of dehydration and morphine still wearing off. She may also have an infection although her WBC is normal and she is afebrile  BP now improved s/p 2 L of NS   Continue broad spectrum abx (Vanc Zosyn) until cultures return.     Pleural effusion  Patient with right sided pleural effusion seen on CXR. Possibly malignant.   Will check echo to rule out transudative process secondary to CHF.    She had diagnostic thoracentesis at OSH with 50 ccs removed. Follow up with outside lab    Hypomagnesemia  4/22: 1.4 - replaced  - Most likely 2/2 chronic disease     Hypophosphatemia   4/22: 2.5 - replaced  - Most likely 2/2 chronic disease    Thrombocytopenia  4/22: 122  - Most likely 2/2 chronic disease    DVT PPx: enoxaparin 40mg        Discharge plan and follow up        Provider    I personally scribed for David Cruz MD on 04/22/2018 at 1:15 PM. Electronically signed by kera Lipscomb III on 04/22/2018 at 1:15 PM

## 2018-04-22 NOTE — CONSULTS
[facility]  Hematology/Oncology  Consult Note    Patient Name: Kerline Grossman  MRN: [id number]  Admission Date: 4/20/2018  Hospital Length of Stay: 0 days  Code Status: Full Code   Attending Provider: Jersey Haney MD  Consulting Provider: Isauro Iyer MD  Primary Care Physician: No primary care provider on file.  Principal Problem:Hypotension    Inpatient consult to Hematology/Oncology  Consult performed by: ISAURO IYER  Consult ordered by: MALDONADO CURTIS        Subjective:     HPI:  History taken from patient who is not a precise historian and review of medical records that arrived with patient (only paper records available without actual imaging uploaded to our system)  57 yo woman with presumed no known past medical history as she doesn't have a PCP aside from psych history on psych meds ([facility] as [facility] is too [facility]) had presented to an OSH with progressive decline over the past month with weakness, which finally acutely worsened to what she described as urinary and fecal incontinence with bilateral lower extremity weakness for a few weeks before finally slipping and falling on her own soil and getting taken to a hospital by her fiance.  From review of records, she was admitted with ZACH and hypercalcemia and multiple lytic lesions of unknown origin.  Based on workup, suspicion may have been multiple myeloma as on 4/10 SPEP (pattern c/w acute phase reaction without M spike), beta 1 globulin 0.3, beta 2 globulin 0.4, kappa 95.9, lambda 82.3, k:l ratio 1.17.  She had a CT head done in 4/12/18 that showed multiple mixed sclerotic and lytic lesions that extend to the dura, extend to the inner and outer table with thin extra cranial soft tissue compoenent.  Mastoid effusion and fluid in her ear was also noted as well as a 4mm CSF hygroma.  4/13 CT chest/abdomen/pelvis without contrast showed numerable lytic lesions, pathologic subacute fracture of ribs, large  "bilateral effusions, but no masses were identified.  There were fluid filled, non dilated loops of small bowel but large bowel did have signs of air fluid levels (no mention of obstruction).  4/13 biopsy of ?right iliac crest, but path report unavailable to me at this time and patient's son reports that it was inconclusive so per the son and the patient, repeat biopsy of left iliac crest was done 4/20 in addition to diagnostic thoracentesis from right chest.  Other workup included 4/15 (ind: thrombocytopenia result: negative for schistocytes), 4/16 PTH-rp (15; WNL for reference range) and 4/17 UPEP negative for M spike.  ROS 2 weeks prior to admission:  Chills, nightsweats, decreased appetite with nausea and vomiting.  Denies early satiety.  Despite pending results of thoracentesis and bone marrow biopsy, son felt he needed another opinion regarding her care and wished for her to be transferred.  When he was told that transfer was denied, she was discharged and family brought the patient over to [facility] for further evaluation. Upon arrival to the ICU she was found to be hypotensive.  Per ICU note, "Patient had SBP ~ 80 which responded well to 1 L of NS and MAPs were then > 65 so decision initially was to admit to hospital medicine. However, blood pressure then dropped again to systolic of 80s and patient was accepted to critical care medicine. "  CXR here shows elevated right hemidiaphragm, bilateral rib fractures, and multiple lytic lesions. Right pleural effusion, and left lung with pleural thickening or parenchymal scarring with subsegmental atelectasis    She currently has pain in her chest bilaterally that is worsened when she tries to reposition herself, but denies fevers, cough, or worsening shortness of breath.  Denies abdominal pain at this time.  Sensation intact in her legs but she feels weak.  Reports during her hospital stay at OSH, she coughed up phlegm with blood tinged sputum.      Past medical history: " no PCP, never had mammogram, colonoscopy, or cervical cancer screening. Only significant for psych history, all medications from [facility] near [facility] (too late to obtain Rx list at this time).    Family history: Mother CAD, father prostate cancer.  13 siblings total with 1 sister breast cancer diagnosed in her 60s that recurred after mastectomy.  One sister and brother with sarcoidosis.  Social history: Former cook.  Denies environmental hazard exposure.  Lives with fiance.  Independent in all ADLs at baseline before recent decline in the past month or two.  Started smoking at age 16 and quit at 33, 0.5 ppd, 8.5 pack years. History of smoking cocaine.    Allergies: NKDA.      Oncology Treatment Plan:   [No treatment plan]    Medications:  Continuous Infusions:   sodium chloride 0.9% 50 mL/hr at 18 1400     Scheduled Meds:   enoxaparin  40 mg Subcutaneous Daily    piperacillin-tazobactam (ZOSYN) IVPB  4.5 g Intravenous Q8H    vancomycin (VANCOCIN) IVPB  15 mg/kg Intravenous Q12H     PRN Meds:acetaminophen, dextrose 50%, hydrocodone-acetaminophen 5-325mg, ondansetron, sodium chloride 0.9%     Review of patient's allergies indicates:  No Known Allergies     Past Medical History:   Diagnosis Date    Cancer     Insomnia      Past Surgical History:   Procedure Laterality Date     SECTION      HYSTERECTOMY       Family History     None        Social History Main Topics    Smoking status: Former Smoker    Smokeless tobacco: Never Used    Alcohol use No    Drug use: No    Sexual activity: Not on file       Review of Systems   Constitutional: Positive for activity change, appetite change, chills and fatigue. Negative for fever.   HENT: Negative for nosebleeds.    Eyes: Negative for visual disturbance.   Respiratory: Positive for cough. Negative for shortness of breath.    Cardiovascular: Positive for chest pain. Negative for leg swelling.   Gastrointestinal:  Positive for nausea and vomiting. Negative for abdominal distention, abdominal pain, blood in stool, constipation and diarrhea.   Genitourinary: Negative for dysuria.   Musculoskeletal: Positive for gait problem.   Skin: Negative for color change and rash.   Neurological: Positive for weakness.   Hematological: Does not bruise/bleed easily.   Psychiatric/Behavioral: Positive for confusion.     Objective:     Vital Signs (Most Recent):  Temp: 98.5 °F (36.9 °C) (04/21/18 2110)  Pulse: (!) 112 (04/21/18 2110)  Resp: 18 (04/21/18 2110)  BP: (!) 93/58 (04/21/18 2110)  SpO2: (!) 91 % (04/21/18 2110) Vital Signs (24h Range):  Temp:  [97 °F (36.1 °C)-98.5 °F (36.9 °C)] 98.5 °F (36.9 °C)  Pulse:  [] 112  Resp:  [15-31] 18  SpO2:  [91 %-100 %] 91 %  BP: ()/(51-67) 93/58     Weight: 77.1 kg (170 lb)  Body mass index is 27.44 kg/m².  Body surface area is 1.89 meters squared.      Intake/Output Summary (Last 24 hours) at 04/21/18 2139  Last data filed at 04/21/18 1600   Gross per 24 hour   Intake             3710 ml   Output             2100 ml   Net             1610 ml       Physical Exam   Constitutional: She appears well-developed.   HENT:   Head: Normocephalic.   Eyes: Pupils are equal, round, and reactive to light. No scleral icterus.   Neck: No tracheal deviation present.   Cardiovascular: Normal rate and regular rhythm.  Exam reveals no gallop and no friction rub.    No murmur heard.  Pulmonary/Chest: Effort normal. No respiratory distress. She has no wheezes. She has no rales. She exhibits tenderness. Right breast exhibits inverted nipple (chronic, several years). Right breast exhibits no mass, no nipple discharge, no skin change and no tenderness. Left breast exhibits no inverted nipple, no mass, no nipple discharge, no skin change and no tenderness.       Diminished breath sounds most at RLL but low air movement throughout    LLL  to palpation   Abdominal: Soft. Bowel sounds are normal. She  exhibits no distension and no mass. There is no tenderness. There is no guarding.   Musculoskeletal: She exhibits no edema.   Lymphadenopathy:     She has no cervical adenopathy.   Neurological: She is alert.   Skin: Skin is warm.   Psychiatric: She has a normal mood and affect.     Breast exam done with her niece as her chaperone.    Significant Labs:   CBC:   Recent Labs  Lab 04/20/18 2152 04/21/18  0610   WBC 11.92 9.96   HGB 11.4* 10.4*   HCT 36.0* 34.1*   PLT 76* 70*    and CMP:   Recent Labs  Lab 04/20/18  2152 04/21/18  0610   * 138   K 5.4* 4.5    112*   CO2 18* 18*   GLU 72 99   BUN 27* 24*   CREATININE 1.1 1.0   CALCIUM 8.5* 7.4*   PROT 6.2 5.2*   ALBUMIN 1.6* 1.4*   BILITOT 0.7 0.5   ALKPHOS 534* 449*   AST 81* 56*   ALT 22 18   ANIONGAP 9 8   EGFRNONAA 56.3* >60.0       Diagnostic Results:  I have reviewed all pertinent imaging results/findings within the past 24 hours.    Assessment/Plan:     Bony metastasis    Numerous lytic lesions in the setting of ZACH and hypercalcemia and the absence of obvious mass concerning for multiple myeloma as the leading differential.  However, cannot exclude metastatic solid malignancy.  Differentials include GI: No history of colonoscopy, nausea, vomiting, decreased po intake, OSH scans without contrast showing some evidence of possible GI obstruction with air fluid levels in large bowel, unexplained elevated hemidiphragm.  Other differentials include breast cancer given her lack of mammograms.  Other conditions such as Paget's disease can present with mixed sclerotic lytic picture in the skull.  -Spoke extensively to the family about the uncertain diagnosis at this point.  Told them pathology results are required before making any clinical decisions regarding prognosis and treatment.  Informed them there are few circumstances where inpatient chemotherapy is required and if this is a malignant condition, most likely treatment is to be done outpatient.  Son  informs me that they did not provide him with CDs of the imaging done.  -Informed them that metastatic disease is usually incurable in most cases, and that the emphasis of treatment would be to improve quality of life and comfort as agents such as chemotherapy can cause lots of morbidity to the patient.  Patient and family understand.  -Repeat imaging.  Given lower extremity weakness with urinary and bowel incontinence, concern for spinal cord involvement if metastatic.  Recommend priority of MRI L spine and if cord is involved, may need dexamethasone, neurosurgical consult, and/or radiation oncology consult.  Patient does not report any worsening progression of her symptoms, but since she has been hospitalized for over a week at OSH, any nerve compromise if found on MRI is potentially permanent.  -Needs CT chest, abdomen, and pelvis with contrast.  -Since bone marrow biopsy has been done likely to eval for MM, will defer at this time.  SPEP, UPEP, b2 microglobulin, and LDH done already, results in HPI.  -If bone marrow negative for plasma cell dyscrasia, next step may be a biopsy of a lytic lesion or most accessible lesion (ie lymph node, liver lesion, etc) if CT suggests a potential primary.          Primary team (ICU) notified of preliminary recs made in this note.  Aware we will staff consult with attending tomorrow.    Thank you for your consult. I will follow-up with patient. Please contact us if you have any additional questions.    Landon Larsen MD  Hematology/Oncology  [facility]    Attending Addendum:  The patient was seen, examined, and discussed on rounds with the team.  I agree with the assessment and plan as outlined for Kerline Grossman.  Patient with what appears to be a malignancy with widespread bone involvement.  We are awaiting complete records from outside hospitalization.  MRI findings today noted.  Will await CT of the chest, abdomen and pelvis.  Check tumor markers.  Discussed  with multiple family members at bedside today.    Marco Pruitt DO, FACP  Hematology & Oncology  [address] [place] [address]  ph. [phone]  Fax. [phone]

## 2018-04-22 NOTE — PT/OT/SLP EVAL
"Speech Language Pathology Evaluation  Bedside Swallow    Patient Name:  Kerline Grossman   MRN:  [id number]  Admitting Diagnosis: Hypotension    Recommendations:                 General Recommendations:  Cognitive-linguistic evaluation  Diet recommendations:  Regular, Thin   Aspiration Precautions: Standard aspiration precautions   General Precautions: Standard, aspiration, fall  Communication strategies:  none    History:     Past Medical History:   Diagnosis Date    Cancer     Insomnia        Past Surgical History:   Procedure Laterality Date     SECTION      HYSTERECTOMY         Social History: Patient lives with family.    Prior diet: reg/thin.    Subjective     "I'm hungry!" Pt required maximum encouragement to participate in minimal po trials    Pain/Comfort:  · Pain Rating 1:  ("hunger pain")  · Pain Addressed 1:  (cont c/o hunger pain)    Objective:     Oral Musculature Evaluation  · Oral Musculature: unable to assess due to poor participation/comprehension    Bedside Swallow Eval:   Consistencies Assessed:  · Thin liquids 1 oz via straw  · Solids small cracker portion x1     Oral Phase:   · WFL  · Excess chewing    Pharyngeal Phase:   · no overt clinical signs/symptoms of aspiration  · no overt clinical signs/symptoms of pharyngeal dysphagia    Compensatory Strategies  · None    Treatment: Education provided on role of SLP, diet recs and overt s/s aspiration.  Family indicated understanding. Pt's nurse alerted re: results and recs. White board updated.      Assessment:     Kerline Grossman is a 58 y.o. female with an SLP diagnosis of oropharyngeal phases of swallow WFL.  Some confusion evident.  SLP to follow.       Goals:    SLP Goals        Problem: SLP Goal    Goal Priority Disciplines Outcome   SLP Goal     SLP Ongoing (interventions implemented as appropriate)   Description:  Speech Language Pathology Goals  Goals expected to be met by   1. Pt will complete speech, language, cognitive evaluation " to determine need for tx.                         Plan:     · Patient to be seen:  4 x/week   · Plan of Care expires:  05/22/18  · Plan of Care reviewed with:  patient, family   · SLP Follow-Up:  Yes       Discharge recommendations:   (pending participation)   Barriers to Discharge:  Safety Awareness      Time Tracking:     SLP Treatment Date:   04/22/18  Speech Start Time:  1213  Speech Stop Time:  1221     Speech Total Time (min):  8 min    Billable Minutes: Eval Swallow and Oral Function 8    ALTAGRACIA Coulter, CCC-SLP  04/22/2018

## 2018-04-22 NOTE — PLAN OF CARE
Problem: Pressure Ulcer Risk (Tutu Scale) (Adult,Obstetrics,Pediatric)  Goal: Skin Integrity  Patient will demonstrate the desired outcomes by discharge/transition of care.   Outcome: Ongoing (interventions implemented as appropriate)   04/22/18 1847   Pressure Ulcer Risk (Tutu Scale) (Adult,Obstetrics,Pediatric)   Skin Integrity making progress toward outcome       Problem: Pain, Acute (Adult)  Goal: Acceptable Pain Control/Comfort Level  Patient will demonstrate the desired outcomes by discharge/transition of care.   Outcome: Ongoing (interventions implemented as appropriate)   04/22/18 1847   Pain, Acute (Adult)   Acceptable Pain Control/Comfort Level making progress toward outcome       Problem: Anxiety (Adult)  Goal: Reduction/Resolution  Patient will demonstrate the desired outcomes by discharge/transition of care.  Outcome: Ongoing (interventions implemented as appropriate)  Emotional support provided   04/22/18 1847   Anxiety (Adult)   Reduction/Resolution making progress toward outcome

## 2018-04-22 NOTE — ASSESSMENT & PLAN NOTE
Numerous lytic lesions in the setting of ZACH and hypercalcemia and the absence of obvious mass concerning for multiple myeloma as the leading differential.  However, cannot exclude metastatic solid malignancy.  Differentials include GI: No history of colonoscopy, nausea, vomiting, decreased po intake, OSH scans without contrast showing some evidence of possible GI obstruction with air fluid levels in large bowel, unexplained elevated hemidiphragm.  Other differentials include breast cancer given her lack of mammograms.  Other conditions such as Paget's disease can present with mixed sclerotic lytic picture in the skull.  -Spoke extensively to the family about the uncertain diagnosis at this point.  Told them pathology results are required before making any clinical decisions regarding prognosis and treatment.  Informed them there are few circumstances where inpatient chemotherapy is required and if this is a malignant condition, most likely treatment is to be done outpatient.  Son informs me that they did not provide him with CDs of the imaging done.  -Informed them that metastatic disease is usually incurable in most cases, and that the emphasis of treatment would be to improve quality of life and comfort as agents such as chemotherapy can cause lots of morbidity to the patient.  Patient and family understand.  -Repeat imaging.  Given lower extremity weakness with urinary and bowel incontinence, concern for spinal cord involvement if metastatic.  Recommend priority of MRI L spine and if cord is involved, may need dexamethasone, neurosurgical consult, and/or radiation oncology consult.  Patient does not report any worsening progression of her symptoms, but since she has been hospitalized for over a week at OSH, any nerve compromise if found on MRI is potentially permanent.  -Needs CT chest, abdomen, and pelvis with contrast.  -Since bone marrow biopsy has been done likely to eval for MM, will defer at this time.   SPEP, UPEP, b2 microglobulin, and LDH done already, results in HPI.  -If bone marrow negative for plasma cell dyscrasia, next step may be a biopsy of a lytic lesion or most accessible lesion (ie lymph node, liver lesion, etc) if CT suggests a potential primary.

## 2018-04-22 NOTE — CONSULTS
"[facility]  Orthopedics  Consult Note    Patient Name: Kerline Grossman  MRN: [id number]  Admission Date: 2018  Hospital Length of Stay: 1 days  Attending Provider: David Cruz MD  Primary Care Provider: No primary care provider on file.    Patient information was obtained from patient, relative(s), past medical records and ER records.     Inpatient consult to Orthopedics  Consult performed by: AYSE AN  Consult ordered by: DAVID CRUZ        Subjective:     Principal Problem:Hypotension    Chief Complaint:   Chief Complaint   Patient presents with    Generalized Body Aches     Pt reports having all over body pain. Pt reports having cancer "all over". Pt reports care at [facility], had fluid taken off lungs this AM. O2 in triage 98%, denies SOB.        HPI: 58F presented to ED 18 for second opinion regarding likely metastatic disease.    Patient was in usual state of health until 3 weeks ago when she begin experiencing diffuse weakness with bowel and bladder incontinence.  This has waxed and waned.  She reports being continent for a few days before becoming incontinent again today.  She reports neck, back, and bilateral thigh pain and multiple falls.  She was taken to OSH and admitted for ZACH, hypercalcemia, multiple lytic lesions of unknown origin.  SPEP and UPEP without M spike.  Reports chills, night sweats, decreased appetite, n/v.  Denies numbness, tingling, radicular symptoms.  Orthopedics consulted for evaluation of multiple lytic lesions.    Past Medical History:   Diagnosis Date    Cancer     Insomnia        Past Surgical History:   Procedure Laterality Date     SECTION      HYSTERECTOMY         Review of patient's allergies indicates:  No Known Allergies    Current Facility-Administered Medications   Medication    acetaminophen tablet 650 mg    dextrose 50% injection 25 g    enoxaparin injection 40 mg    hydrocodone-acetaminophen 5-325mg per " "tablet 1 tablet    lactated ringers infusion    omnipaque oral solution 15 mL    ondansetron disintegrating tablet 8 mg    piperacillin-tazobactam 4.5 g in sodium chloride 0.9% 100 mL IVPB (ready to mix system)    potassium, sodium phosphates 280-160-250 mg packet 1 packet    sodium chloride 0.9% flush 5 mL    vancomycin (VANCOCIN) 1,250 mg in sodium chloride 0.9% 250 mL IVPB     Family History     None        Social History Main Topics    Smoking status: Former Smoker    Smokeless tobacco: Never Used    Alcohol use No    Drug use: No    Sexual activity: Not on file     ROS   Constitutional: negative for fevers  Eyes: no visual changes  ENT: negative for hearing loss  Respiratory: negative for dyspnea  Cardiovascular: negative for chest pain  Gastrointestinal: negative for abdominal pain  Genitourinary: negative for dysuria  Neurological: negative for headaches  Behavioral/Psych: negative for hallucinations  Endocrine: negative for temperature intolerance    Objective:     Vital Signs (Most Recent):  Temp: 97.7 °F (36.5 °C) (04/22/18 1242)  Pulse: 101 (04/22/18 1242)  Resp: 18 (04/22/18 1242)  BP: 110/74 (04/22/18 1242)  SpO2: (!) 92 % (04/22/18 1242) Vital Signs (24h Range):  Temp:  [97.6 °F (36.4 °C)-99.5 °F (37.5 °C)] 97.7 °F (36.5 °C)  Pulse:  [101-114] 101  Resp:  [18-19] 18  SpO2:  [91 %-96 %] 92 %  BP: ()/(52-75) 110/74     Weight: 77.1 kg (170 lb)  Height: 5' 6" (167.6 cm)  Body mass index is 27.44 kg/m².      Intake/Output Summary (Last 24 hours) at 04/22/18 1604  Last data filed at 04/22/18 0054   Gross per 24 hour   Intake              645 ml   Output                0 ml   Net              645 ml       Ortho/SPM Exam  Vitals: Afebrile.  Vital signs stable.  General: No acute distress.  HEENT: Normocephalic. Atraumatic. Sclera anicteric. No tracheal deviation.  Cardio: Regular rhythm.  Tachycardic  Chest: No increased work of breathing.  Abdominal: Nondistended.  Extremities: No cyanosis. "  No clubbing.  No edema.  Palpable pulses.  Skin: No generalized rash.  Neuro: Awake. Alert. Oriented to person, place, time, and situation.  Psych: Normal appearance. Cooperative.  Appropriate mood.  Appropriate affect.      Motor            RIGHT  LEFT  Deltoid(C5)        4/5    4/5  Biceps(C5)         4/5    4/5  Extensor Carpi Radialis Longus(C6)    4/5    4/5   Triceps(C7)       4/5    4/5     Flexor Carpi Radialis(C7)     4/5    4/5  Flexor Digitorum Superficialis(C8)    4/5    4/5  Interossei(T1)       4/5    4/5     Sensation (a=absent, i=impaired, n=normal)       RIGHT  LEFT  C5 dermatome       n     n  C6 dermatome       n     n  C7 dermatome       n     n  C8 dermatome       n     n  T1 dermatome       n     n    Reflexes       RIGHT  LEFT  Triceps        2+     2+  Biceps         2+     2+  Brachioradialis       2+           2+  Hoffmans's       neg    neg    Motor             RIGHT  LEFT  Iliopsoas (L2,3)       4/5    4/5  Quadriceps (L3,4)      3/5    3/5   Tibialis Anterior (L4.5)         4/5    4/5   Extensor Halucis Longus (L5)    5/5    5/5     Gastrocnemius/Soleus (S1)     5/5    5/5  Flexor Hallucis Longus(S2)     5/5    5/5    Sensation (a=absent, i=impaired, n=normal)       RIGHT   LEFT  L2 dermatome       n     n  L3 dermatome       n     n  L4 dermatome       n     n  L5 dermatome       n     n  S1 dermatome       n     n  S2 dermatome       n     n    Reflexes        RIGHT  LEFT  Patellar       1+     1+  Achilles       0     0  Babinski      neg    neg  Clonus          neg    neg    SILT and pinprick perianal; rectal tone normal; voluntary sphincter contraction intact    Significant Labs:   CBC:   Recent Labs  Lab 04/20/18 2152 04/21/18  0610 04/22/18  0446   WBC 11.92 9.96 8.08   HGB 11.4* 10.4* 9.9*   HCT 36.0* 34.1* 30.5*   PLT 76* 70* 122*     CMP:   Recent Labs  Lab 04/20/18 2152 04/21/18  0610 04/22/18  0446   * 138 139   K 5.4* 4.5 4.9    112* 111*   CO2 18* 18* 19*    GLU 72 99 104   BUN 27* 24* 19   CREATININE 1.1 1.0 1.0   CALCIUM 8.5* 7.4* 7.9*   PROT 6.2 5.2* 5.3*   ALBUMIN 1.6* 1.4* 1.4*   BILITOT 0.7 0.5 0.5   ALKPHOS 534* 449* 419*   AST 81* 56* 56*   ALT 22 18 17   ANIONGAP 9 8 9   EGFRNONAA 56.3* >60.0 >60.0     All pertinent labs within the past 24 hours have been reviewed.    Significant Imaging: I have reviewed all pertinent imaging results/findings.     MRI lspine showing diffuse enhancing lesions concerning for metastatic disease without significant canal stenosis.    Assessment/Plan:     Bony metastasis    58 y.o. female with diffuse bony mets of unknown origin    Pain control  PT/OT: NWB BLE for now  DVT PPx: lovenox, FCDs at all times when not ambulating  Abx: vanc, zosyn  Labs: Hb 9.9  Drain: none  Ponce: none    Dispo: Complete metastatic survey (ordered), MRI c/tspine w wo (ordered).  Will discuss results with spine and tumor staff.            Thank you for your consult.     Frank Argueta MD  Orthopedics  [facility]

## 2018-04-22 NOTE — PLAN OF CARE
Problem: SLP Goal  Goal: SLP Goal  Speech Language Pathology Goals  Goals expected to be met by 4/29  1. Pt will complete speech, language, cognitive evaluation to determine need for tx.       Outcome: Ongoing (interventions implemented as appropriate)  Bedside swallow assessment completed.  Rec regular diet with thin liquids with strict aspiration precautions.  SLP to follow to complete speech, language, cognitive evaluation. ALTAGRACIA Coulter, JOHN/SLP  4/22/2018

## 2018-04-22 NOTE — PLAN OF CARE
Problem: Fall Risk (Adult)  Goal: Absence of Falls  Patient will demonstrate the desired outcomes by discharge/transition of care.   Outcome: Ongoing (interventions implemented as appropriate)   04/22/18 1100   Fall Risk (Adult)   Absence of Falls making progress toward outcome       Problem: Patient Care Overview  Goal: Plan of Care Review  Outcome: Ongoing (interventions implemented as appropriate)  Questions answered, concerns addressed. New procedures explained to patient and family.   04/22/18 1100   Coping/Psychosocial   Plan Of Care Reviewed With patient;family       Problem: Coping, Compromised Individual (Adult,Obstetrics,Pediatric)  Goal: Effective Coping  Patient will demonstrate the desired outcomes by discharge/transition of care.   Outcome: Ongoing (interventions implemented as appropriate)  Relaxation techniques promoted.   04/22/18 1100   Coping, Compromised Individual (Adult,Obstetrics,Pediatric)   Effective Coping making progress toward outcome       Problem: Pain, Acute (Adult)  Goal: Acceptable Pain Control/Comfort Level  Patient will demonstrate the desired outcomes by discharge/transition of care.  Outcome: Ongoing (interventions implemented as appropriate)   04/22/18 1100   Pain, Acute (Adult)   Acceptable Pain Control/Comfort Level making progress toward outcome

## 2018-04-22 NOTE — HPI
58F presented to ED 4/20/18 for second opinion regarding likely metastatic disease.    Patient was in usual state of health until 3 weeks ago when she begin experiencing diffuse weakness with bowel and bladder incontinence.  This has waxed and waned.  She reports being continent for a few days before becoming incontinent again today.  She reports neck, back, and bilateral thigh pain and multiple falls.  She was taken to OSH and admitted for ZACH, hypercalcemia, multiple lytic lesions of unknown origin.  SPEP and UPEP without M spike.  Reports chills, night sweats, decreased appetite, n/v.  Denies numbness, tingling, radicular symptoms.  Orthopedics consulted for evaluation of multiple lytic lesions.

## 2018-04-22 NOTE — PROGRESS NOTES
Hospital Medicine  Progress note     Team: [facility]OSP MED B Dr Cruz  Admit Date: 4/20/2018  VIGNESH   Code status: Full Code     Principal Problem:  Hypotension     Interval hx:    4/22: Lower back ache, lumbar MRI with contrast done-> Abnormal marrow signal and diffuse nodular enhancing lesions throughout the lumbar spine and sacrum concerning for metastatic disease. CT chest, abdomen, and pelvis with contrast ordered for metastasis. Chest xray showed multiple lytic lesions or pathologic rib fractures. UA was negative. Awaiting results from BC and hip Bx from outside hospital.  ortho spine consulted      ROS      Pain Scale: 7 /10 Lower back pain  Respiratory: no cough or shortness of breath  Cardiovascular: no chest pain or palpitations  Gastrointestinal: no nausea or vomiting, no abdominal pain or change in bowel habits  Behavioral/Psych: no depression or anxiety        PEx  Temp:  [97 °F (36.1 °C)-99.5 °F (37.5 °C)]   Pulse:  []   Resp:  [17-29]   BP: ()/(52-74)   SpO2:  [91 %-97 %]      Intake/Output Summary (Last 24 hours) at 04/22/18 0808  Last data filed at 04/22/18 0054    Gross per 24 hour   Intake             2105 ml   Output             1700 ml   Net              405 ml         General Appearance: no acute distress   Heart: regular rate and rhythm  Respiratory: Normal respiratory effort, no crackles   Abdomen: Soft, non-tender; bowel sounds active  Skin: intact. IV sites ok  Neurologic:  No focal numbness or weakness  Mental status: Alert, oriented x 3, affect appropriate   MSK: left perispinal tender. 4/5 upper limbs B/L, moving lower extremitis. unable to lift exrtremities off the bed (attributes to pain)     Recent Labs  Lab 04/20/18 2152 04/21/18  0610 04/22/18  0446   WBC 11.92 9.96 8.08   HGB 11.4* 10.4* 9.9*   HCT 36.0* 34.1* 30.5*   PLT 76* 70* 122*         Recent Labs  Lab 04/20/18 2152 04/21/18  0610 04/22/18  0446   * 138 139   K 5.4* 4.5 4.9    112* 111*   CO2 18* 18*  19*   BUN 27* 24* 19   CREATININE 1.1 1.0 1.0   GLU 72 99 104   CALCIUM 8.5* 7.4* 7.9*   MG 1.6 1.2* 1.4*   PHOS  --  3.2 2.5*         Recent Labs  Lab 04/20/18  2152 04/21/18  0610 04/22/18  0446   ALKPHOS 534* 449* 419*   ALT 22 18 17   AST 81* 56* 56*   ALBUMIN 1.6* 1.4* 1.4*   PROT 6.2 5.2* 5.3*   BILITOT 0.7 0.5 0.5   INR 1.0  --   --          Recent Labs  Lab 04/21/18  0522   POCTGLUCOSE 74      No results for input(s): CPK, CPKMB, MB, TROPONINI in the last 72 hours.     Scheduled Meds:   enoxaparin  40 mg Subcutaneous Daily    magnesium sulfate IVPB  2 g Intravenous Once    piperacillin-tazobactam (ZOSYN) IVPB  4.5 g Intravenous Q8H    potassium, sodium phosphates  1 packet Oral QID (AC & HS)    vancomycin (VANCOCIN) IVPB  15 mg/kg Intravenous Q12H      Continuous Infusions:   lactated ringers        As Needed:  acetaminophen, dextrose 50%, hydrocodone-acetaminophen 5-325mg, omnipaque, ondansetron, sodium chloride 0.9%           Active Hospital Problems     Diagnosis   POA    *Hypotension [I95.9]   Yes    Anemia [D64.9]   Unknown    Hypomagnesemia [E83.42]   Unknown    Hypophosphatemia [E83.39]   Unknown    Thrombocytopenia, unspecified [D69.6]   Unknown    Bony metastasis [C79.51]   Yes    Cancer [C80.1]   Yes    Pleural effusion [J90]   Yes       Resolved Hospital Problems     Diagnosis Date Resolved POA    Encephalopathy, metabolic [G93.41] 04/21/2018 Yes         Overview Mrs. Grossman is a 57 yo female who was [place] [facility] [place] for second opinion regarding recently diagnosed likely metastatic malignancy with unknown primary.        Assessment and Plan for Problems addressed today:     Metastatic cancer of unknown primary  Patient with diffuse lytic lesions and pathological fractures throughout her skull, chest and hips which is seen on CT scans from OSH. Workup has not shown primary malignancy.   Will need to follow up of pathology reports from OSH from biopsies of BM and hip as  well as cytology from pleural fluid.   Patient's family still want to pursue workup and possible treatment despite the extent of patient;s disease. They also want her to remain full code until they find out more information.   If bone marrow negative for plasma cell dyscrasia, next step may be a biopsy of a lytic lesion or most accessible lesion (ie lymph node, liver lesion, etc) if CT suggests a potential primary  - will consult heme onc for assistance with workup  - will also consult palliative care for assistance with goals of care discussion  4/22: MRI lumbar spine showed abnormal marrow signal and diffuse nodular enhancing lesions throughout the lumbar spine and sacrum concerning for metastatic disease.   - will f/u with OSH for bone bx of the hip and blood cultures     Anemia of chronic disease  4/22: Hgb 9.9  - Has been trending downward   - Will monitor with CBC daily     Hypotension  Patient found to have MAPS between 60 and 70 upon arrival to ED. She received 1 L of NS with improvements to MAPs > 65.   Likely secondary to combination of dehydration and morphine still wearing off. She may also have an infection although her WBC is normal and she is afebrile  BP now improved s/p 2 L of NS   Continue broad spectrum abx (Vanc Zosyn) until cultures return.      Pleural effusion  Patient with right sided pleural effusion seen on CXR. Possibly malignant.   Will check echo to rule out transudative process secondary to CHF.    She had diagnostic thoracentesis at OSH with 50 ccs removed. Follow up with outside lab     Hypomagnesemia  4/22: 1.4 - replaced  - Most likely 2/2 chronic disease      Hypophosphatemia   4/22: 2.5 - replaced  - Most likely 2/2 chronic disease     Thrombocytopenia  4/22: 122  - Most likely 2/2 chronic disease     DVT PPx: enoxaparin 40mg           Discharge plan and follow up           Provider     I personally scribed for David Cruz MD on 04/22/2018 at 1:15 PM. Electronically signed  by kera Lipscomb III on 04/22/2018 at 1:15 PM  The documentation recorded by the scribe accurately reflects service I personally performed and the decisions made by me.  David Cruz MD  Attending Staff Physician  [facility] Medicine  pager- [phone] Vvskdrnpaka - [phone]

## 2018-04-22 NOTE — NURSING
Call received from lab, vancomycin trough 37.1;  notified and current dose that was infusing discontinued.

## 2018-04-22 NOTE — SUBJECTIVE & OBJECTIVE
Oncology Treatment Plan:   [No treatment plan]    Medications:  Continuous Infusions:   sodium chloride 0.9% 50 mL/hr at 18 1400     Scheduled Meds:   enoxaparin  40 mg Subcutaneous Daily    piperacillin-tazobactam (ZOSYN) IVPB  4.5 g Intravenous Q8H    vancomycin (VANCOCIN) IVPB  15 mg/kg Intravenous Q12H     PRN Meds:acetaminophen, dextrose 50%, hydrocodone-acetaminophen 5-325mg, ondansetron, sodium chloride 0.9%     Review of patient's allergies indicates:  No Known Allergies     Past Medical History:   Diagnosis Date    Cancer     Insomnia      Past Surgical History:   Procedure Laterality Date     SECTION      HYSTERECTOMY       Family History     None        Social History Main Topics    Smoking status: Former Smoker    Smokeless tobacco: Never Used    Alcohol use No    Drug use: No    Sexual activity: Not on file       Review of Systems   Constitutional: Positive for activity change, appetite change, chills and fatigue. Negative for fever.   HENT: Negative for nosebleeds.    Eyes: Negative for visual disturbance.   Respiratory: Positive for cough. Negative for shortness of breath.    Cardiovascular: Positive for chest pain. Negative for leg swelling.   Gastrointestinal: Positive for nausea and vomiting. Negative for abdominal distention, abdominal pain, blood in stool, constipation and diarrhea.   Genitourinary: Negative for dysuria.   Musculoskeletal: Positive for gait problem.   Skin: Negative for color change and rash.   Neurological: Positive for weakness.   Hematological: Does not bruise/bleed easily.   Psychiatric/Behavioral: Positive for confusion.     Objective:     Vital Signs (Most Recent):  Temp: 98.5 °F (36.9 °C) (18)  Pulse: (!) 112 (18)  Resp: 18 (18)  BP: (!) 93/58 (18)  SpO2: (!) 91 % (18) Vital Signs (24h Range):  Temp:  [97 °F (36.1 °C)-98.5 °F (36.9 °C)] 98.5 °F (36.9 °C)  Pulse:  [] 112  Resp:  [15-31]  18  SpO2:  [91 %-100 %] 91 %  BP: ()/(51-67) 93/58     Weight: 77.1 kg (170 lb)  Body mass index is 27.44 kg/m².  Body surface area is 1.89 meters squared.      Intake/Output Summary (Last 24 hours) at 04/21/18 2139  Last data filed at 04/21/18 1600   Gross per 24 hour   Intake             3710 ml   Output             2100 ml   Net             1610 ml       Physical Exam   Constitutional: She appears well-developed.   HENT:   Head: Normocephalic.   Eyes: Pupils are equal, round, and reactive to light. No scleral icterus.   Neck: No tracheal deviation present.   Cardiovascular: Normal rate and regular rhythm.  Exam reveals no gallop and no friction rub.    No murmur heard.  Pulmonary/Chest: Effort normal. No respiratory distress. She has no wheezes. She has no rales. She exhibits tenderness. Right breast exhibits inverted nipple (chronic, several years). Right breast exhibits no mass, no nipple discharge, no skin change and no tenderness. Left breast exhibits no inverted nipple, no mass, no nipple discharge, no skin change and no tenderness.       Diminished breath sounds most at RLL but low air movement throughout    LLL  to palpation   Abdominal: Soft. Bowel sounds are normal. She exhibits no distension and no mass. There is no tenderness. There is no guarding.   Musculoskeletal: She exhibits no edema.   Lymphadenopathy:     She has no cervical adenopathy.   Neurological: She is alert.   Skin: Skin is warm.   Psychiatric: She has a normal mood and affect.     Breast exam done with her niece as her chaperone.    Significant Labs:   CBC:   Recent Labs  Lab 04/20/18 2152 04/21/18  0610   WBC 11.92 9.96   HGB 11.4* 10.4*   HCT 36.0* 34.1*   PLT 76* 70*    and CMP:   Recent Labs  Lab 04/20/18 2152 04/21/18  0610   * 138   K 5.4* 4.5    112*   CO2 18* 18*   GLU 72 99   BUN 27* 24*   CREATININE 1.1 1.0   CALCIUM 8.5* 7.4*   PROT 6.2 5.2*   ALBUMIN 1.6* 1.4*   BILITOT 0.7 0.5   ALKPHOS 534*  449*   AST 81* 56*   ALT 22 18   ANIONGAP 9 8   EGFRNONAA 56.3* >60.0       Diagnostic Results:  I have reviewed all pertinent imaging results/findings within the past 24 hours.

## 2018-04-22 NOTE — HPI
History taken from patient who is not a precise historian and review of medical records that arrived with patient (only paper records available without actual imaging uploaded to our system)  59 yo woman with presumed no known past medical history as she doesn't have a PCP aside from psych history on psych meds ([facility] as [facility]) had presented to an OSH with progressive decline over the past month with weakness, which finally acutely worsened to what she described as urinary and fecal incontinence with bilateral lower extremity weakness for a few weeks before finally slipping and falling on her own soil and getting taken to a hospital by her fiance.  From review of records, she was admitted with ZACH and hypercalcemia and multiple lytic lesions of unknown origin.  Based on workup, suspicion may have been multiple myeloma as on 4/10 SPEP (pattern c/w acute phase reaction without M spike), beta 1 globulin 0.3, beta 2 globulin 0.4, kappa 95.9, lambda 82.3, k:l ratio 1.17.  She had a CT head done in 4/12/18 that showed multiple mixed sclerotic and lytic lesions that extend to the dura, extend to the inner and outer table with thin extra cranial soft tissue compoenent.  Mastoid effusion and fluid in her ear was also noted as well as a 4mm CSF hygroma.  4/13 CT chest/abdomen/pelvis without contrast showed numerable lytic lesions, pathologic subacute fracture of ribs, large bilateral effusions, but no masses were identified.  There were fluid filled, non dilated loops of small bowel but large bowel did have signs of air fluid levels (no mention of obstruction).  4/13 biopsy of ?right iliac crest, but path report unavailable to me at this time and patient's son reports that it was inconclusive so per the son and the patient, repeat biopsy of left iliac crest was done 4/20 in addition to diagnostic thoracentesis from right chest.  Other workup included 4/15 (ind:  "thrombocytopenia result: negative for schistocytes), 4/16 PTH-rp (15; WNL for reference range) and 4/17 UPEP negative for M spike.  ROS 2 weeks prior to admission:  Chills, nightsweats, decreased appetite with nausea and vomiting.  Denies early satiety.  Despite pending results of thoracentesis and bone marrow biopsy, son felt he needed another opinion regarding her care and wished for her to be transferred.  When he was told that transfer was denied, she was discharged and family brought the patient over to [facility] for further evaluation. Upon arrival to the ICU she was found to be hypotensive.  Per ICU note, "Patient had SBP ~ 80 which responded well to 1 L of NS and MAPs were then > 65 so decision initially was to admit to hospital medicine. However, blood pressure then dropped again to systolic of 80s and patient was accepted to critical care medicine. "  CXR here shows elevated right hemidiaphragm, bilateral rib fractures, and multiple lytic lesions. Right pleural effusion, and left lung with pleural thickening or parenchymal scarring with subsegmental atelectasis    She currently has pain in her chest bilaterally that is worsened when she tries to reposition herself, but denies fevers, cough, or worsening shortness of breath.  Denies abdominal pain at this time.  Sensation intact in her legs but she feels weak.  Reports during her hospital stay at OSH, she coughed up phlegm with blood tinged sputum.      Past medical history: no PCP, never had mammogram, colonoscopy, or cervical cancer screening. Only significant for psych history, all medications from [facility] near [facility] (too late to obtain Rx list at this time).    Family history: Mother CAD, father prostate cancer.  13 siblings total with 1 sister breast cancer diagnosed in her 60s that recurred after mastectomy.  One sister and brother with sarcoidosis.  Social history: Former cook.  Denies environmental hazard exposure.  " Lives with fiance.  Independent in all ADLs at baseline before recent decline in the past month or two.  Started smoking at age 16 and quit at 33, 0.5 ppd, 8.5 pack years. History of smoking cocaine.    Allergies: NKDA.

## 2018-04-22 NOTE — PLAN OF CARE
Problem: Patient Care Overview  Goal: Plan of Care Review  Outcome: Ongoing (interventions implemented as appropriate)  Questions answered, concerns addressed; new procedures explained.

## 2018-04-22 NOTE — PT/OT/SLP PROGRESS
Physical Therapy      Patient Name:  Kerline Grossman   MRN:  [id number]    Patient not seen today. Attempt in AM patient with RN drinking contrasts for procedure in PM. Writing therapist unable to return for PM attempt. PT team will attempt evaluation 4/22/18    Jd Markham III, PT   4/22/2018

## 2018-04-23 PROBLEM — C79.51 PAIN DUE TO MALIGNANT NEOPLASM METASTATIC TO BONE: Status: ACTIVE | Noted: 2018-04-23

## 2018-04-23 PROBLEM — Z51.5 PALLIATIVE CARE ENCOUNTER: Status: ACTIVE | Noted: 2018-04-23

## 2018-04-23 PROBLEM — G89.3 PAIN DUE TO MALIGNANT NEOPLASM METASTATIC TO BONE: Status: ACTIVE | Noted: 2018-04-23

## 2018-04-23 LAB
ALBUMIN SERPL BCP-MCNC: 1.4 G/DL
ALP SERPL-CCNC: 418 U/L
ALT SERPL W/O P-5'-P-CCNC: 17 U/L
ANION GAP SERPL CALC-SCNC: 10 MMOL/L
ANISOCYTOSIS BLD QL SMEAR: SLIGHT
AST SERPL-CCNC: 49 U/L
BASOPHILS NFR BLD: 0 %
BILIRUB SERPL-MCNC: 0.5 MG/DL
BUN SERPL-MCNC: 17 MG/DL
CALCIUM SERPL-MCNC: 7.7 MG/DL
CANCER AG125 SERPL-ACNC: 66 U/ML
CANCER AG19-9 SERPL-ACNC: 14 U/ML
CEA SERPL-MCNC: 37.8 NG/ML
CHLORIDE SERPL-SCNC: 107 MMOL/L
CO2 SERPL-SCNC: 20 MMOL/L
CREAT SERPL-MCNC: 1.1 MG/DL
DIFFERENTIAL METHOD: ABNORMAL
EOSINOPHIL NFR BLD: 1 %
ERYTHROCYTE [DISTWIDTH] IN BLOOD BY AUTOMATED COUNT: 15.7 %
EST. GFR  (AFRICAN AMERICAN): >60 ML/MIN/1.73 M^2
EST. GFR  (NON AFRICAN AMERICAN): 56.3 ML/MIN/1.73 M^2
GIANT PLATELETS BLD QL SMEAR: PRESENT
GLUCOSE SERPL-MCNC: 96 MG/DL
HCT VFR BLD AUTO: 31.3 %
HGB BLD-MCNC: 9.9 G/DL
IMM GRANULOCYTES # BLD AUTO: ABNORMAL K/UL
IMM GRANULOCYTES NFR BLD AUTO: ABNORMAL %
LYMPHOCYTES NFR BLD: 27 %
MAGNESIUM SERPL-MCNC: 1.8 MG/DL
MCH RBC QN AUTO: 28.3 PG
MCHC RBC AUTO-ENTMCNC: 31.6 G/DL
MCV RBC AUTO: 89 FL
METAMYELOCYTES NFR BLD MANUAL: 1 %
MONOCYTES NFR BLD: 6 %
NEUTROPHILS NFR BLD: 65 %
NRBC BLD-RTO: 1 /100 WBC
PHOSPHATE SERPL-MCNC: 3.1 MG/DL
PLATELET # BLD AUTO: 165 K/UL
PLATELET BLD QL SMEAR: ABNORMAL
PMV BLD AUTO: 12.4 FL
POLYCHROMASIA BLD QL SMEAR: ABNORMAL
POTASSIUM SERPL-SCNC: 4.2 MMOL/L
PROCALCITONIN SERPL IA-MCNC: 0.34 NG/ML
PROT SERPL-MCNC: 5.4 G/DL
RBC # BLD AUTO: 3.5 M/UL
SODIUM SERPL-SCNC: 137 MMOL/L
TROPONIN I SERPL DL<=0.01 NG/ML-MCNC: 0.02 NG/ML
VANCOMYCIN TROUGH SERPL-MCNC: 21.9 UG/ML
WBC # BLD AUTO: 8.25 K/UL

## 2018-04-23 PROCEDURE — 93005 ELECTROCARDIOGRAM TRACING: CPT

## 2018-04-23 PROCEDURE — 84145 PROCALCITONIN (PCT): CPT

## 2018-04-23 PROCEDURE — 80053 COMPREHEN METABOLIC PANEL: CPT

## 2018-04-23 PROCEDURE — 82378 CARCINOEMBRYONIC ANTIGEN: CPT

## 2018-04-23 PROCEDURE — 25000003 PHARM REV CODE 250: Performed by: HOSPITALIST

## 2018-04-23 PROCEDURE — 80202 ASSAY OF VANCOMYCIN: CPT

## 2018-04-23 PROCEDURE — 86334 IMMUNOFIX E-PHORESIS SERUM: CPT | Mod: 26,,, | Performed by: PATHOLOGY

## 2018-04-23 PROCEDURE — 84134 ASSAY OF PREALBUMIN: CPT

## 2018-04-23 PROCEDURE — 36415 COLL VENOUS BLD VENIPUNCTURE: CPT

## 2018-04-23 PROCEDURE — 84165 PROTEIN E-PHORESIS SERUM: CPT

## 2018-04-23 PROCEDURE — A9585 GADOBUTROL INJECTION: HCPCS | Performed by: HOSPITALIST

## 2018-04-23 PROCEDURE — 63600175 PHARM REV CODE 636 W HCPCS: Performed by: INTERNAL MEDICINE

## 2018-04-23 PROCEDURE — 63600175 PHARM REV CODE 636 W HCPCS: Performed by: HOSPITALIST

## 2018-04-23 PROCEDURE — 86334 IMMUNOFIX E-PHORESIS SERUM: CPT

## 2018-04-23 PROCEDURE — 83520 IMMUNOASSAY QUANT NOS NONAB: CPT | Mod: 59

## 2018-04-23 PROCEDURE — 83735 ASSAY OF MAGNESIUM: CPT

## 2018-04-23 PROCEDURE — 86301 IMMUNOASSAY TUMOR CA 19-9: CPT

## 2018-04-23 PROCEDURE — 11000001 HC ACUTE MED/SURG PRIVATE ROOM

## 2018-04-23 PROCEDURE — 86300 IMMUNOASSAY TUMOR CA 15-3: CPT

## 2018-04-23 PROCEDURE — 25000003 PHARM REV CODE 250: Performed by: INTERNAL MEDICINE

## 2018-04-23 PROCEDURE — 99233 SBSQ HOSP IP/OBS HIGH 50: CPT | Mod: ,,, | Performed by: EMERGENCY MEDICINE

## 2018-04-23 PROCEDURE — 84165 PROTEIN E-PHORESIS SERUM: CPT | Mod: 26,,, | Performed by: PATHOLOGY

## 2018-04-23 PROCEDURE — 99233 SBSQ HOSP IP/OBS HIGH 50: CPT | Mod: ,,, | Performed by: HOSPITALIST

## 2018-04-23 PROCEDURE — 93010 ELECTROCARDIOGRAM REPORT: CPT | Mod: ,,, | Performed by: INTERNAL MEDICINE

## 2018-04-23 PROCEDURE — 86304 IMMUNOASSAY TUMOR CA 125: CPT

## 2018-04-23 PROCEDURE — 25500020 PHARM REV CODE 255: Performed by: HOSPITALIST

## 2018-04-23 PROCEDURE — 84484 ASSAY OF TROPONIN QUANT: CPT

## 2018-04-23 PROCEDURE — 84100 ASSAY OF PHOSPHORUS: CPT

## 2018-04-23 RX ORDER — IBUPROFEN 200 MG
400 TABLET ORAL EVERY 8 HOURS
Status: DISCONTINUED | OUTPATIENT
Start: 2018-04-23 | End: 2018-04-25

## 2018-04-23 RX ORDER — LIDOCAINE 50 MG/G
1 PATCH TOPICAL
Status: DISCONTINUED | OUTPATIENT
Start: 2018-04-23 | End: 2018-05-14 | Stop reason: HOSPADM

## 2018-04-23 RX ORDER — OXYCODONE HYDROCHLORIDE 5 MG/1
10 TABLET ORAL EVERY 6 HOURS PRN
Status: DISCONTINUED | OUTPATIENT
Start: 2018-04-23 | End: 2018-04-23

## 2018-04-23 RX ORDER — GADOBUTROL 604.72 MG/ML
8 INJECTION INTRAVENOUS
Status: COMPLETED | OUTPATIENT
Start: 2018-04-23 | End: 2018-04-23

## 2018-04-23 RX ORDER — OXYCODONE HYDROCHLORIDE 5 MG/1
5 TABLET ORAL EVERY 6 HOURS PRN
Status: DISCONTINUED | OUTPATIENT
Start: 2018-04-23 | End: 2018-04-23

## 2018-04-23 RX ORDER — OXYCODONE HYDROCHLORIDE 5 MG/1
5 TABLET ORAL EVERY 4 HOURS PRN
Status: DISCONTINUED | OUTPATIENT
Start: 2018-04-23 | End: 2018-04-25

## 2018-04-23 RX ORDER — MORPHINE SULFATE 4 MG/ML
3 INJECTION, SOLUTION INTRAMUSCULAR; INTRAVENOUS ONCE
Status: COMPLETED | OUTPATIENT
Start: 2018-04-23 | End: 2018-04-23

## 2018-04-23 RX ADMIN — GADOBUTROL 8 ML: 604.72 INJECTION INTRAVENOUS at 10:04

## 2018-04-23 RX ADMIN — PIPERACILLIN AND TAZOBACTAM 4.5 G: 4; .5 INJECTION, POWDER, LYOPHILIZED, FOR SOLUTION INTRAVENOUS; PARENTERAL at 11:04

## 2018-04-23 RX ADMIN — MORPHINE SULFATE 3 MG: 4 INJECTION INTRAVENOUS at 11:04

## 2018-04-23 RX ADMIN — IBUPROFEN 400 MG: 400 TABLET, FILM COATED ORAL at 09:04

## 2018-04-23 RX ADMIN — ENOXAPARIN SODIUM 40 MG: 100 INJECTION SUBCUTANEOUS at 06:04

## 2018-04-23 RX ADMIN — PIPERACILLIN AND TAZOBACTAM 4.5 G: 4; .5 INJECTION, POWDER, LYOPHILIZED, FOR SOLUTION INTRAVENOUS; PARENTERAL at 02:04

## 2018-04-23 RX ADMIN — PIPERACILLIN AND TAZOBACTAM 4.5 G: 4; .5 INJECTION, POWDER, LYOPHILIZED, FOR SOLUTION INTRAVENOUS; PARENTERAL at 07:04

## 2018-04-23 RX ADMIN — LIDOCAINE 1 PATCH: 50 PATCH CUTANEOUS at 07:04

## 2018-04-23 RX ADMIN — HYDROCODONE BITARTRATE AND ACETAMINOPHEN 1 TABLET: 5; 325 TABLET ORAL at 02:04

## 2018-04-23 RX ADMIN — OXYCODONE HYDROCHLORIDE 5 MG: 5 TABLET ORAL at 07:04

## 2018-04-23 RX ADMIN — OXYCODONE HYDROCHLORIDE 10 MG: 5 TABLET ORAL at 03:04

## 2018-04-23 RX ADMIN — STANDARDIZED SENNA CONCENTRATE AND DOCUSATE SODIUM 2 TABLET: 8.6; 5 TABLET, FILM COATED ORAL at 11:04

## 2018-04-23 NOTE — CONSULTS
Palliative Care Acknowledgement of Consult - 04/23/2018    Consult received. Palliative Care Provider: LEBRON Mena will touch base with team prior to seeing patient. Full consult to follow.    Thank you for allowing us to be a part of the care of this patient.          Kelly Trotter LCSW, ACHP-SW

## 2018-04-23 NOTE — PROGRESS NOTES
Hospital Medicine  Progress note     Team: [facility] Cruz  Admit Date: 4/20/2018  VIGNESH   Code status: Full Code     Principal Problem:  Hypotension     Interval hx:    4/22: Lower back ache, lumbar MRI with contrast done-> Abnormal marrow signal and diffuse nodular enhancing lesions throughout the lumbar spine and sacrum concerning for metastatic disease. CT chest, abdomen, and pelvis with contrast ordered for metastasis. Chest xray showed multiple lytic lesions or pathologic rib fractures. UA was negative. Awaiting results from BC and hip Bx from outside hospital.  ortho spine consulted   4/23: CT Thoracic spine shows diffuse bone marrow abnormality with enhancing osseous lesions. Multilevel neural foraminal narrowing with spinal canal stenosis. Bilateral pleural effusions with compressive atelectasis. Waiting for OSH bone bx of hip and BC results. Pain in neck, chest and lower back not controlled, given 3mg morphine and oxy raised to 5 and 10mg Q6h for 7-10 pain. No weight bearing.troponin and EKG . Cytology from thoracentesis 4/20/18 is negative.Final report from bone marrow pending initial impression is a differentiated metastatic carcinoma      ROS      Pain Scale: 8 /10 Lower back, neck and chest pain  Respiratory: no cough or shortness of breath  Cardiovascular: no chest pain or palpitations  Gastrointestinal: no nausea or vomiting, no abdominal pain or change in bowel habits  Behavioral/Psych: no depression or anxiety        PEx  Temp:  [97.5 °F (36.4 °C)-99 °F (37.2 °C)]   Pulse:  []   Resp:  [18-19]   BP: ()/(50-80)   SpO2:  [90 %-93 %]      Intake/Output Summary (Last 24 hours) at 04/23/18 0712  Last data filed at 04/23/18 0600    Gross per 24 hour   Intake           3557.5 ml   Output             1251 ml   Net           2306.5 ml         General Appearance: no acute distress   Heart: regular rate and rhythm  Respiratory: Normal respiratory effort, no crackles   Abdomen: Soft,  non-tender; bowel sounds active  Skin: intact. IV sites ok  Neurologic:  No focal numbness or weakness  Mental status: Alert, oriented x 3, affect appropriate   MSK: left perispinal tender. 4/5 upper limbs B/L, 4/5 lower limbs - unable to lift LE off the bed (Attributes to pain)      Recent Labs  Lab 04/21/18  0610 04/22/18  0446 04/23/18  0357   WBC 9.96 8.08 8.25   HGB 10.4* 9.9* 9.9*   HCT 34.1* 30.5* 31.3*   PLT 70* 122* 165         Recent Labs  Lab 04/21/18  0610 04/22/18  0446 04/23/18  0357    139 137   K 4.5 4.9 4.2   * 111* 107   CO2 18* 19* 20*   BUN 24* 19 17   CREATININE 1.0 1.0 1.1   GLU 99 104 96   CALCIUM 7.4* 7.9* 7.7*   MG 1.2* 1.4* 1.8   PHOS 3.2 2.5* 3.1         Recent Labs  Lab 04/20/18  2152 04/21/18  0610 04/22/18  0446 04/23/18  0357   ALKPHOS 534* 449* 419* 418*   ALT 22 18 17 17   AST 81* 56* 56* 49*   ALBUMIN 1.6* 1.4* 1.4* 1.4*   PROT 6.2 5.2* 5.3* 5.4*   BILITOT 0.7 0.5 0.5 0.5   INR 1.0  --   --   --          Recent Labs  Lab 04/21/18  0522   POCTGLUCOSE 74      No results for input(s): CPK, CPKMB, MB, TROPONINI in the last 72 hours.     Scheduled Meds:   enoxaparin  40 mg Subcutaneous Daily    piperacillin-tazobactam (ZOSYN) IVPB  4.5 g Intravenous Q8H    senna-docusate 8.6-50 mg  2 tablet Oral Daily    vancomycin (VANCOCIN) IVPB  15 mg/kg Intravenous Q12H      Continuous Infusions:   lactated ringers 50 mL/hr at 04/22/18 1016      As Needed:  acetaminophen, dextrose 50%, hydrocodone-acetaminophen 5-325mg, omnipaque, ondansetron, sodium chloride 0.9%           Active Hospital Problems     Diagnosis   POA    *Hypotension [I95.9]   Yes    Anemia [D64.9]   Yes    Hypomagnesemia [E83.42]   Yes    Hypophosphatemia [E83.39]   Yes    Thrombocytopenia, unspecified [D69.6]   Yes    Bony metastasis [C79.51]   Yes    Cancer [C80.1]   Yes    Pleural effusion [J90]   Yes       Resolved Hospital Problems     Diagnosis Date Resolved POA    Encephalopathy, metabolic [G93.41]  04/21/2018 Yes         Overview Mrs. Grossman is a 57 yo female who was brought to [facility] ED this afternoon for second opinion regarding recently diagnosed likely metastatic malignancy with unknown primary.        Assessment and Plan for Problems addressed today:     Metastatic cancer of unknown primary  Patient with diffuse lytic lesions and pathological fractures throughout her skull, chest and hips which is seen on CT scans from OSH. Workup has not shown primary malignancy.   Will need to follow up of pathology reports from OSH from biopsies of BM and hip as well as cytology from pleural fluid.   Patient's family still want to pursue workup and possible treatment despite the extent of patient;s disease. They also want her to remain full code until they find out more information.   If bone marrow negative for plasma cell dyscrasia, next step may be a biopsy of a lytic lesion or most accessible lesion (ie lymph node, liver lesion, etc) if CT suggests a potential primary  - will consult heme onc for assistance with workup  - will also consult palliative care for assistance with goals of care discussion  4/22: MRI lumbar spine showed abnormal marrow signal and diffuse nodular enhancing lesions throughout the lumbar spine and sacrum concerning for metastatic disease.   - will f/u with OSH for bone bx of the hip and blood cultures  4/23: CT thoracic spine shows diffuse bone marrow abnormality with enhancing osseous lesions. Multilevel neural foraminal narrowing with spinal canal stenosis. Bilateral pleural effusions with compressive atelectasis. Palliative care consulted. and CEA elevated . Cytology from thoracentesis 4/20/18 is negative.Final report from bone marrow pending initial impression is a differentiated metastatic carcinoma     Pain due to malignat neoplasm metastatic to bone - s/p palliative care consult - started on oxycodone 5mg q4h prn, ibuprofen 400mg TID and lidocaine patch 5%     Anemia of chronic  disease  4/22: Hgb 9.9  - Has been trending downward   - Will monitor with CBC daily     Hypotension  Patient found to have MAPS between 60 and 70 upon arrival to ED. She received 1 L of NS with improvements to MAPs > 65.   Likely secondary to combination of dehydration and morphine still wearing off. She may also have an infection although her WBC is normal and she is afebrile  BP now improved s/p 2 L of NS   Continue broad spectrum abx (Zosyn) until cultures return. Vancomycin discontinued      Pleural effusion  Patient with right sided pleural effusion seen on CXR. Possibly malignant.   Will check echo to rule out transudative process secondary to CHF.    She had diagnostic thoracentesis at OSH with 50 ccs removed. Follow up with outside lab     Hypomagnesemia  4/22: 1.4 - replaced  - Most likely 2/2 chronic disease      Hypophosphatemia   4/22: 2.5 - replaced  - Most likely 2/2 chronic disease     Thrombocytopenia  4/22: 122--> 165 resolved   - Most likely 2/2 chronic disease     DVT PPx: enoxaparin 40mg        Discharge plan and follow up           Provider     I personally scribed for David Cruz MD on 04/23/2018 at 12:14 PM. Electronically signed by kera Lipscomb III on 04/23/2018 at 12:14 PM  The documentation recorded by the scribe accurately reflects service I personally performed and the decisions made by me.  David Cruz MD  Attending Staff Physician  [facility] Medicine  pager- [phone]  [employer] - [phone]

## 2018-04-23 NOTE — PT/OT/SLP PROGRESS
Occupational Therapy      Patient Name:  Kerline Grossman   MRN:  [id number]    Patient not seen today secondary to pt in procedures x2 and with fatigue and unable to tolerate after procedures.  Family at bedside.   . Will follow-up as able     Ami Cannon, TOVA  4/23/2018

## 2018-04-23 NOTE — HPI
57 yo woman with medical history significant for chronic mood disorder who presented to OSH with ZACH, hypercalcemia, and lytic lesions concerning for MM.  Workup for MM negative thus far.  Her family wanted to transfer her to [facility], but it was denied, so the family had her discharged and transported her to [facility] themselves.  She arrived hypotensive, which may have been 2/2 opiates (received morphine at OSH).  Hypotension resolved with IVF and only required brief stay under ICU care before being transferred to the floor.    Palliative care consulted for goals of care.  See clinical review 4/23/18 with Dr. Rosario regarding preliminary pathology findings.     She states she is in pain, 8/10, primarily in her back.  When she takes pain medications she says she falls asleep only for 15-20 minutes before waking up with pain 8/10.  Family at bedside confirms she does this throughout the day even without pain medications.  She reports pain level remains at an 8 despite taking meds.    Last filled prescriptions [facility] on [address] 3/2017  paliperidone ER 6mg tabs (2 tabs qAM)  oxcarbazepime 300mg 2 tabs bid  Levothyroxine 25mcg qday  Benztropine 1mg bid  Trazodone 100mg 2 tabs qhs

## 2018-04-23 NOTE — NURSING
returned from test and positioned for comfort. C/o pain. Will medicate. Assessment on going. SCDs applied to ble. Tolerated well.

## 2018-04-23 NOTE — CONSULTS
visited patient in order to respond to palliative care consult.   provided a compassionate presence and prayer support for patient and family.

## 2018-04-23 NOTE — NURSING
Lab drawn and patient refused to go to xray for metastatic survey complete at this time. Assessment on going.

## 2018-04-23 NOTE — NURSING
Patient had just returned from test. Will monitor pulse . Alert , denied chest pain, wanted jello and given jello. Family at bedside. Denied heart issues.

## 2018-04-23 NOTE — NURSING
Patient left unit per stretcher alert and no complaints voiced at this time. Instructed it is for her MRI of her spine. She agreed to go. Respirations even and unlabored. Will continue to monitor on return to unit.

## 2018-04-23 NOTE — PLAN OF CARE
04/23/18 1000   Discharge Assessment   Assessment Type Discharge Planning Assessment   Confirmed/corrected address and phone number on facesheet? Yes   Assessment information obtained from? Caregiver   Expected Length of Stay (days) 3   Communicated expected length of stay with patient/caregiver yes   Prior to hospitilization cognitive status: Alert/Oriented   Prior to hospitalization functional status: Needs Assistance   Current cognitive status: Alert/Oriented   Current Functional Status: Needs Assistance   Lives With child(saritha), adult   Able to Return to Prior Arrangements yes   Is patient able to care for self after discharge? Unable to determine at this time (comments)   Patient's perception of discharge disposition home or selfcare   Readmission Within The Last 30 Days no previous admission in last 30 days   Patient currently being followed by outpatient case management? No   Patient currently receives any other outside agency services? No   Do you have any problems affording any of your prescribed medications? No   Is the patient taking medications as prescribed? yes   Does the patient have transportation home? Yes   Transportation Available family or friend will provide   Discharge Plan A Home with family   Discharge Plan B Hospice/home   Patient/Family In Agreement With Plan yes   Patient off unit for testing. CM met with patient's son and explained role of CM. He states patient had been living in [place] but plan is for her to stay with family in [place] at discharge. Will follow.

## 2018-04-23 NOTE — PT/OT/SLP PROGRESS
Speech Language Pathology      Kerline Grossman  MRN: [id number]    Attempted to see pt this am for continued ST.  Pt away for test upon attempt. Family member present at bedside and reporting good tolerance of diet .  ST will continue to follow as appropriate for further assessment of speech/language/cognitive skills and monitoring of diet tolerance .     ALTAGRACIA Lundberg, CCC-SLP   4/23/2018

## 2018-04-23 NOTE — SUBJECTIVE & OBJECTIVE
Interval History: see hpi    Past Medical History:   Diagnosis Date    Cancer     Insomnia        Past Surgical History:   Procedure Laterality Date     SECTION      HYSTERECTOMY         Review of patient's allergies indicates:  No Known Allergies    Medications:  Continuous Infusions:   lactated ringers 50 mL/hr at 18 1016     Scheduled Meds:   enoxaparin  40 mg Subcutaneous Daily    piperacillin-tazobactam (ZOSYN) IVPB  4.5 g Intravenous Q8H    senna-docusate 8.6-50 mg  2 tablet Oral Daily     PRN Meds:acetaminophen, dextrose 50%, omnipaque, ondansetron, oxyCODONE, oxyCODONE, sodium chloride 0.9%    Family History     None        Social History Main Topics    Smoking status: Former Smoker    Smokeless tobacco: Never Used    Alcohol use No    Drug use: No    Sexual activity: Not on file       Review of Systems   Constitutional: Positive for activity change, appetite change, chills and fatigue. Negative for fever.   HENT: Negative for nosebleeds.    Eyes: Negative for visual disturbance.   Respiratory: Positive for cough. Negative for shortness of breath.    Cardiovascular: Positive for chest pain. Negative for leg swelling.   Gastrointestinal: Negative for abdominal distention, abdominal pain, blood in stool, constipation, diarrhea, nausea and vomiting.   Genitourinary: Negative for dysuria.   Musculoskeletal: Positive for gait problem.   Skin: Negative for color change and rash.   Neurological: Positive for weakness.   Hematological: Does not bruise/bleed easily.   Psychiatric/Behavioral: Positive for confusion.     Objective:     Vital Signs (Most Recent):  Temp: 98.3 °F (36.8 °C) (18 1130)  Pulse: 109 (18 1130)  Resp: 20 (18 1130)  BP: 130/83 (18 1130)  SpO2: 96 % (18 1130) Vital Signs (24h Range):  Temp:  [97.5 °F (36.4 °C)-99 °F (37.2 °C)] 98.3 °F (36.8 °C)  Pulse:  [100-122] 109  Resp:  [18-20] 20  SpO2:  [90 %-96 %] 96 %  BP: ()/(50-83) 130/83      Weight: 77.1 kg (170 lb)  Body mass index is 27.44 kg/m².    Review of Symptoms  Symptom Assessment (ESAS 0-10 scale)   ESAS 0 1 2 3 4 5 6 7 8 9 10   Pain              Dyspnea              Anxiety              Nausea              Depression               Anorexia              Fatigue              Insomnia              Restlessness               Agitation              CAM / Delirium __ --  ___+   Constipation     __ --  ___+   Diarrhea           __ --  ___+  Bowel Management Plan (BMP): Yes    Comments: senna-colace    Pain Assessment: 8/10    OME in 24 hours: 3x 5-325mg hydrocodone-acetaminophen    Performance Status: 50    ECOG Performance Status thGthrthathdtheth:th th5th Physical Exam   Constitutional: She appears well-developed.   HENT:   Head: Normocephalic.   Eyes: Pupils are equal, round, and reactive to light. No scleral icterus.   Neck: No tracheal deviation present.   Cardiovascular: Normal rate and regular rhythm.  Exam reveals no gallop and no friction rub.    No murmur heard.  Pulmonary/Chest: Effort normal. No respiratory distress. She has no wheezes. She has no rales. Right breast exhibits no inverted nipple.   Diminished breath sounds most at RLL but low air movement throughout    LLL  to palpation   Abdominal: Soft. Bowel sounds are normal. She exhibits no distension and no mass. There is no tenderness. There is no guarding.   Musculoskeletal: She exhibits no edema.   Lymphadenopathy:     She has no cervical adenopathy.   Neurological: She is alert.   Skin: Skin is warm.   Psychiatric: She has a normal mood and affect.       Significant Labs:   CBC:   Recent Labs  Lab 04/22/18  0446 04/23/18  0357   WBC 8.08 8.25   HGB 9.9* 9.9*   HCT 30.5* 31.3*   * 165     CMP:   Recent Labs  Lab 04/22/18  0446 04/23/18  0357    137   K 4.9 4.2   * 107   CO2 19* 20*    96   BUN 19 17   CREATININE 1.0 1.1   CALCIUM 7.9* 7.7*   PROT 5.3* 5.4*   ALBUMIN 1.4* 1.4*   BILITOT 0.5 0.5   ALKPHOS  419* 418*   AST 56* 49*   ALT 17 17   ANIONGAP 9 10   EGFRNONAA >60.0 56.3*     CBC:     Recent Labs  Lab 04/23/18 0357   WBC 8.25   HGB 9.9*   HCT 31.3*   MCV 89        BMP:    Recent Labs  Lab 04/23/18 0357   GLU 96      K 4.2      CO2 20*   BUN 17   CREATININE 1.1   CALCIUM 7.7*   MG 1.8     LFT:  Lab Results   Component Value Date    AST 49 (H) 04/23/2018    ALKPHOS 418 (H) 04/23/2018    BILITOT 0.5 04/23/2018     Albumin:   Albumin   Date Value Ref Range Status   04/23/2018 1.4 (L) 3.5 - 5.2 g/dL Final     Protein:   Total Protein   Date Value Ref Range Status   04/23/2018 5.4 (L) 6.0 - 8.4 g/dL Final     Lactic acid:   Lab Results   Component Value Date    LACTATE 2.0 04/21/2018    LACTATE 2.8 (H) 04/20/2018       Significant Imaging: I have reviewed all pertinent imaging results/findings within the past 24 hours.    Advanced Directives::  Living Will: No  LaPOST: No  Do Not Resuscitate Status: Yes; Full code  Medical Power of : No    Decision-Making Capacity: Patient answered questions, Family answered questions    Living Arrangements: Lives with fiance    Psychosocial/Cultural:  Patient's most important priorities:  N/A    Patient's biggest concerns/fears:  N/A  Previous death/end of life care history:  N/A    Patient's goals/hopes:  N/A    Spiritual:     F- Dora and Belief: N/A    I - Importance: N/A  .  C - Community: N/A    A - Address in Care: N/A

## 2018-04-23 NOTE — CARE UPDATE
Rapid Response Follow-up Note    Followed up with patient for proactive rounding.   No acute issues at this time. Vital signs within normal limits  Reviewed plan of care with primary RN.   Please call Rapid Response RN with any questions or concerns at  [phone]

## 2018-04-23 NOTE — ASSESSMENT & PLAN NOTE
Pathology report pending with preliminary concerning for metastatic carcinoma.  Cytology from 4/20 pleural fluid negative.  Prognosis and treatment options pending official diagnosis.  Pain constantly 8/10.  Reports discomfort of posterior portion of upper back when she has to reposition herself.  Known subacute rib fractures on CXR and CT scan.  4 doses of 5-235mg hydrocodone in the past 24 hours.  Received x1 dose 10mg prn oxycodone and x1 dose morphine 3mg.  High risk for oversedation.  -Recommend oxycodone 5mg q4h prn pain for now as she does not appear to be in significant distress.  -Recommend standing ibuprofen 400mg TID and lidocaine patch 5% to rib areas that bother her the most  --Alternatively if pain medications only causing sedation instead of pain relief AND if pain is most bothersome to her in her chest, 2nd line therapy may consider pain management for intercostal block  -continue with senna-colace

## 2018-04-23 NOTE — MEDICAL/APP STUDENT
Hospital Medicine  Progress note    Team: [facility] Cruz  Admit Date: 4/20/2018  VIGNESH   Code status: Full Code    Principal Problem:  Hypotension    Interval hx:    4/22: Lower back ache, lumbar MRI with contrast done-> Abnormal marrow signal and diffuse nodular enhancing lesions throughout the lumbar spine and sacrum concerning for metastatic disease. CT chest, abdomen, and pelvis with contrast ordered for metastasis. Chest xray showed multiple lytic lesions or pathologic rib fractures. UA was negative. Awaiting results from BC and hip Bx from outside hospital.  ortho spine consulted   4/23: CT Thoracic spine shows diffuse bone marrow abnormality with enhancing osseous lesions. Multilevel neural foraminal narrowing with spinal canal stenosis. Bilateral pleural effusions with compressive atelectasis. Waiting for OSH bone bx of hip and BC results. Pain in neck, chest and lower back not controlled, given 3mg morphine and oxy raised to 10mg Q6h for 7-10 pain. No weight bearing.    ROS     Pain Scale: 8 /10 Lower back, neck and chest pain  Respiratory: no cough or shortness of breath  Cardiovascular: no chest pain or palpitations  Gastrointestinal: no nausea or vomiting, no abdominal pain or change in bowel habits  Behavioral/Psych: no depression or anxiety      PEx  Temp:  [97.5 °F (36.4 °C)-99 °F (37.2 °C)]   Pulse:  []   Resp:  [18-19]   BP: ()/(50-80)   SpO2:  [90 %-93 %]     Intake/Output Summary (Last 24 hours) at 04/23/18 0712  Last data filed at 04/23/18 0600   Gross per 24 hour   Intake           3557.5 ml   Output             1251 ml   Net           2306.5 ml       General Appearance: no acute distress   Heart: regular rate and rhythm  Respiratory: Normal respiratory effort, no crackles   Abdomen: Soft, non-tender; bowel sounds active  Skin: intact. IV sites ok  Neurologic:  No focal numbness or weakness  Mental status: Alert, oriented x 3, affect appropriate   MSK: left perispinal  tender. 4/5 upper limbs B/L, 2/5 lower limbs - patient can walk but has pain on ambulation in legs    Recent Labs  Lab 04/21/18  0610 04/22/18  0446 04/23/18  0357   WBC 9.96 8.08 8.25   HGB 10.4* 9.9* 9.9*   HCT 34.1* 30.5* 31.3*   PLT 70* 122* 165       Recent Labs  Lab 04/21/18  0610 04/22/18  0446 04/23/18  0357    139 137   K 4.5 4.9 4.2   * 111* 107   CO2 18* 19* 20*   BUN 24* 19 17   CREATININE 1.0 1.0 1.1   GLU 99 104 96   CALCIUM 7.4* 7.9* 7.7*   MG 1.2* 1.4* 1.8   PHOS 3.2 2.5* 3.1       Recent Labs  Lab 04/20/18 2152 04/21/18  0610 04/22/18  0446 04/23/18  0357   ALKPHOS 534* 449* 419* 418*   ALT 22 18 17 17   AST 81* 56* 56* 49*   ALBUMIN 1.6* 1.4* 1.4* 1.4*   PROT 6.2 5.2* 5.3* 5.4*   BILITOT 0.7 0.5 0.5 0.5   INR 1.0  --   --   --         Recent Labs  Lab 04/21/18  0522   POCTGLUCOSE 74     No results for input(s): CPK, CPKMB, MB, TROPONINI in the last 72 hours.    Scheduled Meds:   enoxaparin  40 mg Subcutaneous Daily    piperacillin-tazobactam (ZOSYN) IVPB  4.5 g Intravenous Q8H    senna-docusate 8.6-50 mg  2 tablet Oral Daily    vancomycin (VANCOCIN) IVPB  15 mg/kg Intravenous Q12H     Continuous Infusions:   lactated ringers 50 mL/hr at 04/22/18 1016     As Needed:  acetaminophen, dextrose 50%, hydrocodone-acetaminophen 5-325mg, omnipaque, ondansetron, sodium chloride 0.9%    Active Hospital Problems    Diagnosis  POA    *Hypotension [I95.9]  Yes    Anemia [D64.9]  Yes    Hypomagnesemia [E83.42]  Yes    Hypophosphatemia [E83.39]  Yes    Thrombocytopenia, unspecified [D69.6]  Yes    Bony metastasis [C79.51]  Yes    Cancer [C80.1]  Yes    Pleural effusion [J90]  Yes      Resolved Hospital Problems    Diagnosis Date Resolved POA    Encephalopathy, metabolic [G93.41] 04/21/2018 Yes       Overview Mrs. Grossman is a 59 yo female who was brought to [facility] ED this afternoon for second opinion regarding recently diagnosed likely metastatic malignancy with unknown  primary.        Assessment and Plan for Problems addressed today:     Metastatic cancer of unknown primary  Patient with diffuse lytic lesions and pathological fractures throughout her skull, chest and hips which is seen on CT scans from OSH. Workup has not shown primary malignancy.   Will need to follow up of pathology reports from OSH from biopsies of BM and hip as well as cytology from pleural fluid.   Patient's family still want to pursue workup and possible treatment despite the extent of patient;s disease. They also want her to remain full code until they find out more information.   If bone marrow negative for plasma cell dyscrasia, next step may be a biopsy of a lytic lesion or most accessible lesion (ie lymph node, liver lesion, etc) if CT suggests a potential primary  - will consult heme onc for assistance with workup  - will also consult palliative care for assistance with goals of care discussion  4/22: MRI lumbar spine showed abnormal marrow signal and diffuse nodular enhancing lesions throughout the lumbar spine and sacrum concerning for metastatic disease.   - will f/u with OSH for bone bx of the hip and blood cultures  4/23: CT thoracic spine shows diffuse bone marrow abnormality with enhancing osseous lesions. Multilevel neural foraminal narrowing with spinal canal stenosis. Bilateral pleural effusions with compressive atelectasis.      Anemia of chronic disease  4/22: Hgb 9.9  - Has been trending downward   - Will monitor with CBC daily     Hypotension  Patient found to have MAPS between 60 and 70 upon arrival to ED. She received 1 L of NS with improvements to MAPs > 65.   Likely secondary to combination of dehydration and morphine still wearing off. She may also have an infection although her WBC is normal and she is afebrile  BP now improved s/p 2 L of NS   Continue broad spectrum abx (Vanc Zosyn) until cultures return.      Pleural effusion  Patient with right sided pleural effusion seen on CXR.  Possibly malignant.   Will check echo to rule out transudative process secondary to CHF.    She had diagnostic thoracentesis at OSH with 50 ccs removed. Follow up with outside lab     Hypomagnesemia  4/22: 1.4 - replaced  - Most likely 2/2 chronic disease      Hypophosphatemia   4/22: 2.5 - replaced  - Most likely 2/2 chronic disease     Thrombocytopenia  4/22: 122  - Most likely 2/2 chronic disease     DVT PPx: enoxaparin 40mg      Discharge plan and follow up        Provider    I personally scribed for David Cruz MD on 04/23/2018 at 12:14 PM. Electronically signed by kera Lipscomb III on 04/23/2018 at 12:14 PM

## 2018-04-23 NOTE — NURSING
Left for xray at this time alert and medicated for pain per patient request. Family going with transport to follow patient down. Assessment on going.

## 2018-04-24 ENCOUNTER — ANESTHESIA EVENT (OUTPATIENT)
Dept: SURGERY | Facility: HOSPITAL | Age: 58
DRG: 477 | End: 2018-04-24
Payer: MEDICAID

## 2018-04-24 PROBLEM — C40.22: Status: ACTIVE | Noted: 2018-04-24

## 2018-04-24 PROBLEM — C40.21: Status: ACTIVE | Noted: 2018-04-24

## 2018-04-24 LAB
ABO + RH BLD: NORMAL
ALBUMIN SERPL BCP-MCNC: 1.5 G/DL
ALBUMIN SERPL ELPH-MCNC: 1.57 G/DL
ALP SERPL-CCNC: 410 U/L
ALPHA1 GLOB SERPL ELPH-MCNC: 0.63 G/DL
ALPHA2 GLOB SERPL ELPH-MCNC: 0.77 G/DL
ALT SERPL W/O P-5'-P-CCNC: 13 U/L
ANION GAP SERPL CALC-SCNC: 10 MMOL/L
ANISOCYTOSIS BLD QL SMEAR: SLIGHT
APTT BLDCRRT: 25.7 SEC
AST SERPL-CCNC: 47 U/L
B-GLOBULIN SERPL ELPH-MCNC: 0.4 G/DL
BASOPHILS # BLD AUTO: ABNORMAL K/UL
BASOPHILS NFR BLD: 0 %
BILIRUB SERPL-MCNC: 0.5 MG/DL
BLD GP AB SCN CELLS X3 SERPL QL: NORMAL
BUN SERPL-MCNC: 16 MG/DL
CALCIUM SERPL-MCNC: 7.8 MG/DL
CANCER AG27-29 SERPL-ACNC: 897 U/ML
CHLORIDE SERPL-SCNC: 107 MMOL/L
CO2 SERPL-SCNC: 21 MMOL/L
CREAT SERPL-MCNC: 1.2 MG/DL
DIFFERENTIAL METHOD: ABNORMAL
EOSINOPHIL # BLD AUTO: ABNORMAL K/UL
EOSINOPHIL NFR BLD: 2 %
ERYTHROCYTE [DISTWIDTH] IN BLOOD BY AUTOMATED COUNT: 15.7 %
EST. GFR  (AFRICAN AMERICAN): 58.4 ML/MIN/1.73 M^2
EST. GFR  (NON AFRICAN AMERICAN): 50.6 ML/MIN/1.73 M^2
GAMMA GLOB SERPL ELPH-MCNC: 1.64 G/DL
GLUCOSE SERPL-MCNC: 68 MG/DL
HCT VFR BLD AUTO: 31.6 %
HGB BLD-MCNC: 10.2 G/DL
HYPOCHROMIA BLD QL SMEAR: ABNORMAL
IMM GRANULOCYTES # BLD AUTO: ABNORMAL K/UL
IMM GRANULOCYTES NFR BLD AUTO: ABNORMAL %
INR PPP: 0.9
INTERPRETATION SERPL IFE-IMP: NORMAL
KAPPA LC SER QL IA: 9.55 MG/DL
KAPPA LC/LAMBDA SER IA: 1.09
LAMBDA LC SER QL IA: 8.76 MG/DL
LYMPHOCYTES # BLD AUTO: ABNORMAL K/UL
LYMPHOCYTES NFR BLD: 18 %
MAGNESIUM SERPL-MCNC: 1.5 MG/DL
MCH RBC QN AUTO: 28.6 PG
MCHC RBC AUTO-ENTMCNC: 32.3 G/DL
MCV RBC AUTO: 89 FL
METAMYELOCYTES NFR BLD MANUAL: 2 %
MONOCYTES # BLD AUTO: ABNORMAL K/UL
MONOCYTES NFR BLD: 3 %
NEUTROPHILS NFR BLD: 75 %
NRBC BLD-RTO: 1 /100 WBC
PHOSPHATE SERPL-MCNC: 3.1 MG/DL
PLATELET # BLD AUTO: 202 K/UL
PLATELET BLD QL SMEAR: ABNORMAL
PMV BLD AUTO: 12 FL
POTASSIUM SERPL-SCNC: 4.1 MMOL/L
PREALB SERPL-MCNC: 4 MG/DL
PROCALCITONIN SERPL IA-MCNC: 0.51 NG/ML
PROT SERPL-MCNC: 5 G/DL
PROT SERPL-MCNC: 5.7 G/DL
PROTHROMBIN TIME: 9.9 SEC
RBC # BLD AUTO: 3.57 M/UL
SODIUM SERPL-SCNC: 138 MMOL/L
TRANSFERRIN SERPL-MCNC: 128 MG/DL
TROPONIN I SERPL DL<=0.01 NG/ML-MCNC: 0.01 NG/ML
TROPONIN I SERPL DL<=0.01 NG/ML-MCNC: 0.02 NG/ML
TROPONIN I SERPL DL<=0.01 NG/ML-MCNC: <0.006 NG/ML
WBC # BLD AUTO: 8.26 K/UL

## 2018-04-24 PROCEDURE — 25000003 PHARM REV CODE 250: Performed by: HOSPITALIST

## 2018-04-24 PROCEDURE — 85027 COMPLETE CBC AUTOMATED: CPT

## 2018-04-24 PROCEDURE — 84145 PROCALCITONIN (PCT): CPT

## 2018-04-24 PROCEDURE — 97166 OT EVAL MOD COMPLEX 45 MIN: CPT

## 2018-04-24 PROCEDURE — 83735 ASSAY OF MAGNESIUM: CPT

## 2018-04-24 PROCEDURE — 86901 BLOOD TYPING SEROLOGIC RH(D): CPT

## 2018-04-24 PROCEDURE — 85007 BL SMEAR W/DIFF WBC COUNT: CPT

## 2018-04-24 PROCEDURE — 85730 THROMBOPLASTIN TIME PARTIAL: CPT

## 2018-04-24 PROCEDURE — 80053 COMPREHEN METABOLIC PANEL: CPT

## 2018-04-24 PROCEDURE — 85610 PROTHROMBIN TIME: CPT

## 2018-04-24 PROCEDURE — 93005 ELECTROCARDIOGRAM TRACING: CPT

## 2018-04-24 PROCEDURE — 86920 COMPATIBILITY TEST SPIN: CPT

## 2018-04-24 PROCEDURE — 11000001 HC ACUTE MED/SURG PRIVATE ROOM

## 2018-04-24 PROCEDURE — 84466 ASSAY OF TRANSFERRIN: CPT

## 2018-04-24 PROCEDURE — 99233 SBSQ HOSP IP/OBS HIGH 50: CPT | Mod: 57,,, | Performed by: ORTHOPAEDIC SURGERY

## 2018-04-24 PROCEDURE — 63600175 PHARM REV CODE 636 W HCPCS: Performed by: HOSPITALIST

## 2018-04-24 PROCEDURE — 84100 ASSAY OF PHOSPHORUS: CPT

## 2018-04-24 PROCEDURE — 63600175 PHARM REV CODE 636 W HCPCS: Performed by: INTERNAL MEDICINE

## 2018-04-24 PROCEDURE — 84484 ASSAY OF TROPONIN QUANT: CPT

## 2018-04-24 PROCEDURE — 99233 SBSQ HOSP IP/OBS HIGH 50: CPT | Mod: ,,, | Performed by: HOSPITALIST

## 2018-04-24 PROCEDURE — 99233 SBSQ HOSP IP/OBS HIGH 50: CPT | Mod: ,,, | Performed by: EMERGENCY MEDICINE

## 2018-04-24 PROCEDURE — 25000003 PHARM REV CODE 250: Performed by: INTERNAL MEDICINE

## 2018-04-24 PROCEDURE — 97162 PT EVAL MOD COMPLEX 30 MIN: CPT

## 2018-04-24 PROCEDURE — 36415 COLL VENOUS BLD VENIPUNCTURE: CPT

## 2018-04-24 PROCEDURE — 93010 ELECTROCARDIOGRAM REPORT: CPT | Mod: ,,, | Performed by: INTERNAL MEDICINE

## 2018-04-24 RX ORDER — CETIRIZINE HYDROCHLORIDE 5 MG/1
5 TABLET ORAL ONCE
Status: COMPLETED | OUTPATIENT
Start: 2018-04-24 | End: 2018-04-24

## 2018-04-24 RX ORDER — MAGNESIUM SULFATE HEPTAHYDRATE 40 MG/ML
2 INJECTION, SOLUTION INTRAVENOUS ONCE
Status: COMPLETED | OUTPATIENT
Start: 2018-04-24 | End: 2018-04-24

## 2018-04-24 RX ADMIN — OXYCODONE HYDROCHLORIDE 5 MG: 5 TABLET ORAL at 07:04

## 2018-04-24 RX ADMIN — IBUPROFEN 400 MG: 400 TABLET, FILM COATED ORAL at 05:04

## 2018-04-24 RX ADMIN — PIPERACILLIN AND TAZOBACTAM 4.5 G: 4; .5 INJECTION, POWDER, LYOPHILIZED, FOR SOLUTION INTRAVENOUS; PARENTERAL at 07:04

## 2018-04-24 RX ADMIN — STANDARDIZED SENNA CONCENTRATE AND DOCUSATE SODIUM 2 TABLET: 8.6; 5 TABLET, FILM COATED ORAL at 08:04

## 2018-04-24 RX ADMIN — SODIUM CHLORIDE, POTASSIUM CHLORIDE, SODIUM LACTATE AND CALCIUM CHLORIDE: 600; 310; 30; 20 INJECTION, SOLUTION INTRAVENOUS at 03:04

## 2018-04-24 RX ADMIN — OXYCODONE HYDROCHLORIDE 5 MG: 5 TABLET ORAL at 08:04

## 2018-04-24 RX ADMIN — PIPERACILLIN AND TAZOBACTAM 4.5 G: 4; .5 INJECTION, POWDER, LYOPHILIZED, FOR SOLUTION INTRAVENOUS; PARENTERAL at 12:04

## 2018-04-24 RX ADMIN — MAGNESIUM SULFATE IN WATER 2 G: 40 INJECTION, SOLUTION INTRAVENOUS at 08:04

## 2018-04-24 RX ADMIN — OXYCODONE HYDROCHLORIDE 5 MG: 5 TABLET ORAL at 03:04

## 2018-04-24 RX ADMIN — CETIRIZINE HYDROCHLORIDE 5 MG: 5 TABLET, FILM COATED ORAL at 05:04

## 2018-04-24 RX ADMIN — PIPERACILLIN AND TAZOBACTAM 4.5 G: 4; .5 INJECTION, POWDER, LYOPHILIZED, FOR SOLUTION INTRAVENOUS; PARENTERAL at 03:04

## 2018-04-24 RX ADMIN — LIDOCAINE 1 PATCH: 50 PATCH CUTANEOUS at 05:04

## 2018-04-24 RX ADMIN — IBUPROFEN 400 MG: 400 TABLET, FILM COATED ORAL at 01:04

## 2018-04-24 NOTE — PLAN OF CARE
Problem: Occupational Therapy Goal  Goal: Occupational Therapy Goal  Goals to be met by: 5/10/18    Patient will increase functional independence with ADLs by performing:    UE Dressing with [place].  Grooming while seated with Set-up Assistance.  Toileting from bedside commode with Contact Guard Assistance for hygiene and clothing management.   Sitting at edge of bed x10 minutes with Supervision.    Outcome: Ongoing (interventions implemented as appropriate)  Evaluation complete and goals set.  Cont with POC  Ami Cannon, OT  4/24/2018

## 2018-04-24 NOTE — PROGRESS NOTES
"[facility]  Palliative Medicine  Progress Note    Patient Name: Kerline Grossman  MRN: [id number]  Admission Date: 4/20/2018  Hospital Length of Stay: 3 days  Code Status: Full Code   Attending Provider: David Cruz MD  Consulting Provider: Landon Larsen MD  Primary Care Physician: Primary Doctor No  Principal Problem:Hypotension    Patient information was obtained from patient and spouse/SO.      Assessment/Plan:     Palliative care encounter    Pathology report pending with preliminary concerning for metastatic carcinoma.  Cytology from 4/20 pleural fluid negative.  Prognosis and treatment options pending official diagnosis.  Pain constantly 8/10.  Reports discomfort of posterior portion of upper back when she has to reposition herself.  Known subacute rib fractures on CXR and CT scan.  4 doses of 5-235mg hydrocodone in the past 24 hours.  Received x1 dose 10mg prn oxycodone and x1 dose morphine 3mg.  High risk for oversedation.  -Recommend continuing oxycodone 5mg q4h prn pain for now as she does not appear to be in significant distress.  -Recommend  Continuing standing ibuprofen 400mg TID and lidocaine patch 5% to rib areas that bother her the most  --Patient may prefer lidocaine patch on during day and off at night.  --Alternatively if pain medications only causing sedation instead of pain relief AND if pain is most bothersome to her in her chest, 2nd line therapy may consider pain management for intercostal block  -continue with senna-colace  -Will follow up with pathology from OSH            I will follow-up with patient. Please contact us if you have any additional questions.    Subjective:     Chief Complaint:   Chief Complaint   Patient presents with    Generalized Body Aches     Pt reports having all over body pain. Pt reports having cancer "all over". Pt reports care at [facility], had fluid taken off lungs this AM. O2 in triage 98%, denies SOB.       HPI:   57 yo woman with medical " history significant for chronic mood disorder who presented to OSH with ZACH, hypercalcemia, and lytic lesions concerning for MM.  Workup for MM negative thus far.  Her family wanted to transfer her to [facility], but it was denied, so the family had her discharged and transported her to [facility] themselves.  She arrived hypotensive, which may have been 2/2 opiates (received morphine at OSH).  Hypotension resolved with IVF and only required brief stay under ICU care before being transferred to the floor.    Palliative care consulted for goals of care.  See clinical review 4/23/18 with Dr. Rosario regarding preliminary pathology findings.     She states she is in pain, 8/10, primarily in her back.  When she takes pain medications she says she falls asleep only for 15-20 minutes before waking up with pain 8/10.  Family at bedside confirms she does this throughout the day even without pain medications.  She reports pain level remains at an 8 despite taking meds.    Last filled prescriptions [facility] on [address] 3/2017  paliperidone ER 6mg tabs (2 tabs qAM)  oxcarbazepime 300mg 2 tabs bid  Levothyroxine 25mcg qday  Benztropine 1mg bid  Trazodone 100mg 2 tabs qhs    Hospital Course:  No notes on file    Interval History: Appears more alert and comfortable today.  She says she feels better with the lidocaine patch and is able to move around better.  Still reports sleeping shortly after getting oxycodone but wakes up again in 20 minutes.  She says her pain is better but still rates it an 8/10.  Reports working with rehab for mobilization.    Medications:  Continuous Infusions:   lactated ringers 50 mL/hr at 04/24/18 0348     Scheduled Meds:   enoxaparin  40 mg Subcutaneous Daily    ibuprofen  400 mg Oral Q8H    lidocaine  1 patch Transdermal Q24H    piperacillin-tazobactam (ZOSYN) IVPB  4.5 g Intravenous Q8H    senna-docusate 8.6-50 mg  2 tablet Oral Daily     PRN Meds:acetaminophen, dextrose 50%,  omnipaque, ondansetron, oxyCODONE, sodium chloride 0.9%    Objective:     Vital Signs (Most Recent):  Temp: 97.6 °F (36.4 °C) (04/24/18 1127)  Pulse: 103 (04/24/18 1127)  Resp: 20 (04/24/18 1127)  BP: 133/85 (04/24/18 1127)  SpO2: (!) 89 % (04/24/18 1127) Vital Signs (24h Range):  Temp:  [97.6 °F (36.4 °C)-99 °F (37.2 °C)] 97.6 °F (36.4 °C)  Pulse:  [103-121] 103  Resp:  [16-20] 20  SpO2:  [89 %-95 %] 89 %  BP: (106-133)/(62-85) 133/85     Weight: 75.3 kg (166 lb 0.1 oz)  Body mass index is 26.79 kg/m².    Review of Symptoms  Symptom Assessment (ESAS 0-10 scale)  ESAS 0 1 2 3 4 5 6 7 8 9 10   Pain              Dyspnea              Anxiety              Nausea              Depression               Anorexia              Fatigue              Insomnia              Restlessness               Agitation              CAM / Delirium __ --  ___+   Constipation     __ --  ___+   Diarrhea           __ --  ___+  Bowel Management Plan (BMP): yes    Comments: senna-docusate    Pain Assessment: 8/10    OME in 24 hours: 3x hydrocodone-acet, oxycodone 25mg (total)    Performance Status: 50    ECOG Performance Status thGthrthathdtheth:th th5th Physical Exam   Constitutional: She appears well-developed.   HENT:   Head: Normocephalic.   Eyes: Pupils are equal, round, and reactive to light. No scleral icterus.   Neck: No tracheal deviation present.   Cardiovascular: Normal rate and regular rhythm.  Exam reveals no gallop and no friction rub.    No murmur heard.  Pulmonary/Chest: Effort normal. No respiratory distress. She has no wheezes. She has no rales. Right breast exhibits no inverted nipple.   Diminished breath sounds most at RLL but low air movement throughout    LLL  to palpation   Abdominal: Soft. Bowel sounds are normal. She exhibits no distension and no mass. There is no tenderness. There is no guarding.   Musculoskeletal: She exhibits no edema.   Lymphadenopathy:     She has no cervical adenopathy.   Neurological: She is alert.    Skin: Skin is warm.   Psychiatric: She has a normal mood and affect.       Significant Labs:   CBC:   Recent Labs  Lab 04/23/18  0357 04/24/18  0510   WBC 8.25 8.26   HGB 9.9* 10.2*   HCT 31.3* 31.6*    202     CMP:   Recent Labs  Lab 04/23/18  0357 04/24/18  0510    138   K 4.2 4.1    107   CO2 20* 21*   GLU 96 68*   BUN 17 16   CREATININE 1.1 1.2   CALCIUM 7.7* 7.8*   PROT 5.4* 5.7*   ALBUMIN 1.4* 1.5*   BILITOT 0.5 0.5   ALKPHOS 418* 410*   AST 49* 47*   ALT 17 13   ANIONGAP 10 10   EGFRNONAA 56.3* 50.6*     CBC:     Recent Labs  Lab 04/24/18  0510   WBC 8.26   HGB 10.2*   HCT 31.6*   MCV 89        BMP:    Recent Labs  Lab 04/24/18  0510   GLU 68*      K 4.1      CO2 21*   BUN 16   CREATININE 1.2   CALCIUM 7.8*   MG 1.5*     LFT:  Lab Results   Component Value Date    AST 47 (H) 04/24/2018    ALKPHOS 410 (H) 04/24/2018    BILITOT 0.5 04/24/2018     Albumin:   Albumin   Date Value Ref Range Status   04/24/2018 1.5 (L) 3.5 - 5.2 g/dL Final     Protein:   Total Protein   Date Value Ref Range Status   04/24/2018 5.7 (L) 6.0 - 8.4 g/dL Final     Lactic acid:   Lab Results   Component Value Date    LACTATE 2.0 04/21/2018    LACTATE 2.8 (H) 04/20/2018       Significant Imaging: I have reviewed all pertinent imaging results/findings within the past 24 hours.    Advanced Directives::  Living Will: No  LaPOST: No  Do Not Resuscitate Status: Yes  Medical Power of : No    Decision-Making Capacity: Patient answered questions, Family answered questions    Living Arrangements: Lives with spouse    Psychosocial/Cultural:  Patient's most important priorities:  n/a    Patient's biggest concerns/fears:  n/a    Previous death/end of life care history:  n/a    Patient's goals/hopes:  n/a    Spiritual:     F- Dora and Belief: n/a    I - Importance: n/a  .  C - Community: n/a    A - Address in Care: n/a      > 50% of 30 min visit spent in chart review, face to face discussion of goals of  care,  symptom assessment, coordination of care and emotional support.    Landon Larsen MD  Palliative Medicine  [facility]

## 2018-04-24 NOTE — PT/OT/SLP EVAL
Occupational Therapy   Evaluation    Name: Kerline Grossman  MRN: [id number]  Admitting Diagnosis:  Hypotension      Recommendations:     Discharge Recommendations: nursing facility, skilled  Discharge Equipment Recommendations:   (TBD)  Barriers to discharge:  Inaccessible home environment (2 story home )    History:     Occupational Profile:  Living Environment: Pt lives with spouse in 2 story house.  Pt concerned about her ability to get up stairs   Previous level of function: Pt I with self care and home management   Equipment Owned:  none  Assistance upon Discharge: Pt spouse and family available to provide assist     Past Medical History:   Diagnosis Date    Cancer     Insomnia        Past Surgical History:   Procedure Laterality Date     SECTION      HYSTERECTOMY         Subjective     Chief Complaint: pain with sitting   Patient/Family stated goals: return to home   Communicated with: RN prior to session.  Pain/Comfort:  · Pain Rating 1: 8/10 (worse with sitting edge of bed )  · Location 1: back    Patients cultural, spiritual, Jain conflicts given the current situation: none stated     Objective:     Patient found with:      General Precautions: Standard, fall   Orthopedic Precautions: (NWB for now)   Braces: N/A     Occupational Performance:    Bed Mobility:    · Patient completed Rolling/Turning to Left with  maximal assistance  · Patient completed Scooting/Bridging with maximal assistance  · Patient completed Supine to Sit with maximal assistance  · Patient completed Sit to Supine with moderate assistance    Functional Mobility/Transfers:  · Functional Mobility: Pt unable to tolerate    Activities of Daily Living:  · Pt with poor tolerance of self care and pain and weakness inhibiting performance    Cognitive/Visual Perceptual:  Cognitive/Psychosocial Skills:     -       Oriented to: Person, Place, Time and Situation   -       Follows Commands/attention:Follows two-step commands  -        "Communication: clear/fluent  -       Memory: No Deficits noted  -       Safety awareness/insight to disability: intact   -       Mood/Affect/Coping skills/emotional control: Anxious    Physical Exam:  Upper Extremity Range of Motion:     -       Right Upper Extremity: WFL  -       Left Upper Extremity: WFL  Upper Extremity Strength:    -       Right Upper Extremity: WFL  -       Left Upper Extremity: WFL    Patient left supine with all lines intact    AMPAC 6 Click:  AMPAC Total Score: 15    Treatment & Education:  Evaluation completed.  Pt educated on safety, role of OT, importance of increased participation in self care for gains , expectations for participation, expectations for gains, POC, energy conservation, caregiver strain. White board updated.   Education:    Assessment:     Kerline Grossman is a 58 y.o. female with a medical diagnosis of Hypotension.  She presents with pain and poor tolerance of bed mobility.  Anxiety noted and patient provided with deep breathing and calming techniques for improved tolerance of functional tasks   Performance deficits affecting function are pain, impaired self care skills, impaired functional mobilty, decreased lower extremity function.      Rehab Prognosis:  Fair ; patient would benefit from acute skilled OT services to address these deficits and reach maximum level of function.         Clinical Decision Makin.  OT Mod:  "Pt evaluation falls under moderate complexity for evaluation coding due to identification of 3-5 performance deficits noted as stated above. Eval required Min/Mod assistance to complete on this date and detailed assessment(s) were utilized. Moreover, an expanded review of history and occupational profile obtained with additional review of cognitive, physical and psychosocial hx."     Plan:     Patient to be seen 4 x/week to address the above listed problems via self-care/home management, therapeutic activities, therapeutic exercises  · Plan of Care " Expires: 05/24/18  · Plan of Care Reviewed with: patient    This Plan of care has been discussed with the patient who was involved in its development and understands and is in agreement with the identified goals and treatment plan    GOALS:    Occupational Therapy Goals        Problem: Occupational Therapy Goal    Goal Priority Disciplines Outcome Interventions   Occupational Therapy Goal     OT, PT/OT Ongoing (interventions implemented as appropriate)    Description:  Goals to be met by: 5/10/18    Patient will increase functional independence with ADLs by performing:    UE Dressing with [place].  Grooming while seated with Set-up Assistance.  Toileting from bedside commode with Contact Guard Assistance for hygiene and clothing management.   Sitting at edge of bed x10 minutes with Supervision.                      Time Tracking:     OT Date of Treatment: 04/24/18  OT Start Time: 0830  OT Stop Time: 0848  OT Total Time (min): 18 min    Billable Minutes:Evaluation 18    Ami Cannon OT  4/24/2018

## 2018-04-24 NOTE — PLAN OF CARE
Problem: Patient Care Overview  Goal: Plan of Care Review  Plan is for surgery in am to do left leg to stablize leg. Coping adequately , good family support. Skin dry and new orders for itching. Received zyertec for itching. Medicated for pain . Tolerating well. Does not get out of bed. Fall risk.safety maintained.

## 2018-04-24 NOTE — SUBJECTIVE & OBJECTIVE
"Principal Problem:Hypotension    Principal Orthopedic Problem: B femur impending fractures    Interval History: Patient seen and examined at bedside.  No acute events overnight.  Pain controlled.    Review of patient's allergies indicates:  No Known Allergies    Current Facility-Administered Medications   Medication    acetaminophen tablet 650 mg    dextrose 50% injection 25 g    enoxaparin injection 40 mg    ibuprofen tablet 400 mg    lactated ringers infusion    lidocaine 5 % patch 1 patch    omnipaque oral solution 15 mL    ondansetron disintegrating tablet 8 mg    oxyCODONE immediate release tablet 5 mg    piperacillin-tazobactam 4.5 g in sodium chloride 0.9% 100 mL IVPB (ready to mix system)    senna-docusate 8.6-50 mg per tablet 2 tablet    sodium chloride 0.9% flush 5 mL     Objective:     Vital Signs (Most Recent):  Temp: 97.9 °F (36.6 °C) (04/24/18 1154)  Pulse: 104 (04/24/18 1154)  Resp: 20 (04/24/18 1154)  BP: 130/79 (04/24/18 1154)  SpO2: (!) 84 % (04/24/18 1154) Vital Signs (24h Range):  Temp:  [97.6 °F (36.4 °C)-99 °F (37.2 °C)] 97.9 °F (36.6 °C)  Pulse:  [103-121] 104  Resp:  [16-20] 20  SpO2:  [84 %-95 %] 84 %  BP: (106-133)/(62-85) 130/79     Weight: 75.3 kg (166 lb 0.1 oz)  Height: 5' 6" (167.6 cm)  Body mass index is 26.79 kg/m².      Intake/Output Summary (Last 24 hours) at 04/24/18 1513  Last data filed at 04/24/18 1500   Gross per 24 hour   Intake             1240 ml   Output                0 ml   Net             1240 ml       Ortho/SPM Exam  AAOx4  NAD  RRR  No increased WOB  SILT and motor intact T/SP/DP  WWP extremities  FCDs in place and functioning    Significant Labs:   CBC:   Recent Labs  Lab 04/23/18  0357 04/24/18  0510   WBC 8.25 8.26   HGB 9.9* 10.2*   HCT 31.3* 31.6*    202     CMP:   Recent Labs  Lab 04/23/18  0357 04/24/18  0510    138   K 4.2 4.1    107   CO2 20* 21*   GLU 96 68*   BUN 17 16   CREATININE 1.1 1.2   CALCIUM 7.7* 7.8*   PROT 5.4* 5.7* "   ALBUMIN 1.4* 1.5*   BILITOT 0.5 0.5   ALKPHOS 418* 410*   AST 49* 47*   ALT 17 13   ANIONGAP 10 10   EGFRNONAA 56.3* 50.6*     All pertinent labs within the past 24 hours have been reviewed.    Significant Imaging: I have reviewed all pertinent imaging results/findings.

## 2018-04-24 NOTE — PT/OT/SLP PROGRESS
Speech Language Pathology      Kerline Grossman  MRN: [id number]    Patient not seen today secondary to Unavailable (In discussion with team regarding diagnoses and POC). Will follow-up tomorrow, 4/25.    ALTAGRACIA Coulter, CCC-SLP

## 2018-04-24 NOTE — PT/OT/SLP EVAL
"Physical Therapy Evaluation    Patient Name:  Kerline Grossman   MRN:  [id number]    Recommendations:     Discharge Recommendations:  nursing facility, skilled   Discharge Equipment Recommendations:  (TBD)   Barriers to discharge: None    Assessment:     Kerline Grossman is a 58 y.o. female admitted with a medical diagnosis of Hypotension.  She presents with the following impairments/functional limitations:  weakness, impaired endurance, gait instability, decreased coordination, decreased safety awareness, impaired coordination, impaired cardiopulmonary response to activity, decreased upper extremity function, decreased lower extremity function, impaired balance. Pt with significant pain with movement and prolonged sitting with incr anxiety demonstrated with attempt for progression with mobility. Pt reports being (I) prior to admit. Appropriate for SNF placement to assist with maximizing abilities to assist with decr of caregiver burden and (I) with activity prior to discharge home. Per recent orthopedic note, pt with (B) femur fractures with plans for surgical intervention for L femur.     Rehab Prognosis:  Fair; patient would benefit from acute skilled PT services to address these deficits and reach maximum level of function.      Recent Surgery: * No surgery found *      Plan:     During this hospitalization, patient to be seen 4 x/week to address the above listed problems via gait training, therapeutic activities, therapeutic exercises, neuromuscular re-education  · Plan of Care Expires:  05/24/18   Plan of Care Reviewed with: patient    Subjective     Communicated with nsg prior to session.  Patient found supine in bed upon PT entry to room, agreeable to evaluation.      Chief Complaint: pain with movement  Patient comments/goals: "I just can't do it right now" per pt during session.   Pain/Comfort:  · Pain Rating 1: 8/10  · Location - Side 1: Bilateral  · Location - Orientation 1: lower  · Location 1: back  · Pain " Addressed 1: Distraction, Reposition, Nurse notified  · Pain Rating Post-Intervention 1: 8/10    Patients cultural, spiritual, Voodoo conflicts given the current situation:      Living Environment:  Pt lives with spouse in 2 story home c/ bed and bathroom on 2nd level; reports able to live on 1st level if needed. (I) with mobility and self care prior to admit. Owns no DME. Family and spouse able to assist. .    Objective:     Patient found with: peripheral IV     General Precautions: Standard, fall   Orthopedic Precautions:RLE non weight bearing, LLE non weight bearing ((B) LE NWB )   Braces: N/A       Exams:  Cognitive Exam  Patient is oriented to Person, Place, Time and Situation and follows 100% of one-step commands; noticeably anxious with EOB activity   Fine Motor Coordination Did not assess   Postural Exam Patient presented with the following abnormalities:    -       Rounded shoulders  -       Forward head   Sensation    -       Intact  light/touch (B) LE   Skin Integrity/Edema     -       Skin integrity: Thin  -       Edema: None noted in (B) LE   R LE ROM WFL   R LE Strength  4/5 hip flexion, knee ext/flex and ankle DF   L LE ROM WFL   L LE Strength  4/5 hip flexion, knee ext/flex and ankle DF       Functional Mobility  Bed Mobility  Scooting: contact guard assistance  Supine to Sit: maximal assistance   Sit to Supine: moderate assistance   Attempted scooting along edge of bed with pt unable to perform due to incr pain         Balance   Static Sitting contact guard assistance   Dynamic Sitting contact guard assistance         AM-PAC 6 CLICK MOBILITY  Total Score:12       Therapeutic Activities and Exercises:    PT educated pt on the following  - role of PT  - PT POC (including frequency and duration while in hospital)  - discharge recommendation (SNF) and equipment needs (TBD)  - level of assistance currently req (2 person via drawsheet transfer)and safety precautions with [facility] staff   All questions and  concerns answered and addressed. White board updated with pertinent information. Nsg notified.       Patient left supine with all lines intact and call button in reach.    GOALS:    Physical Therapy Goals        Problem: Physical Therapy Goal    Goal Priority Disciplines Outcome Goal Variances Interventions   Physical Therapy Goal     PT/OT, PT Ongoing (interventions implemented as appropriate)     Description:  Goals to be met by: 10 days ( 5/3)    Patient will increase functional independence with mobility by performin. Supine to sit with Stand-by Assistance  2. Sit to supine with Stand-by Assistance  3. Sit to stand transfer with Minimal Assistance using RW  4. Gait  x 10 feet with Minimal Assistance using Rolling Walker.   5. Lower extremity exercise program x20 reps per handout, with independence to improve muscular strength and endurance.                       History:     Past Medical History:   Diagnosis Date    Cancer     Insomnia        Past Surgical History:   Procedure Laterality Date     SECTION      HYSTERECTOMY         Clinical Decision Making:     History  Co-morbidities and personal factors that may impact the plan of care Examination  Body Structures and Functions, activity limitations and participation restrictions that may impact the plan of care Clinical Presentation   Decision Making/ Complexity Score   Co-morbidities:   [x] Time since onset of injury / illness / exacerbation  [x] Status of current condition  []Patient's cognitive status and safety concerns    [x] Multiple Medical Problems (see med hx)  Personal Factors:   [] Patient's age  [] Prior Level of function   [] Patient's home situation (environment and family support)  [] Patient's level of motivation  [x] Expected progression of patient      HISTORY:(criteria)    [] 07805 - no personal factors/history    [] 67591 - has 1-2 personal factor/comorbidity     [x] 03653 - has >3 personal factor/comorbidity     Body  Regions:  [] Objective examination findings  [] Head     []  Neck  [x] Trunk   [] Upper Extremity  [x] Lower Extremity    Body Systems:  [x] For communication ability, affect, cognition, language, and learning style: the assessment of the ability to make needs known, consciousness, orientation (person, place, and time), expected emotional /behavioral responses, and learning preferences (eg, learning barriers, education  needs)  [x] For the neuromuscular system: a general assessment of gross coordinated movement (eg, balance, gait, locomotion, transfers, and transitions) and motor function  (motor control and motor learning)  [] For the musculoskeletal system: the assessment of gross symmetry, gross range of motion, gross strength, height, and weight  [] For the integumentary system: the assessment of pliability(texture), presence of scar formation, skin color, and skin integrity  [x] For cardiovascular/pulmonary system: the assessment of heart rate, respiratory rate, blood pressure, and edema     Activity limitations:    [] Patient's cognitive status and saf ety concerns          [x] Status of current condition      [] Weight bearing restriction  [] Cardiopulmunary Restriction    Participation Restrictions:   [] Goals and goal agreement with the patient     [] Rehab potential (prognosis) and probable outcome      Examination of Body System: (criteria)    [] 83791 - addressing 1-2 elements    [] 64790 - addressing a total of 3 or more elements     [x] 32886 -  Addressing a total of 4 or more elements         Clinical Presentation: (criteria)  Evolving - 81721     On examination of body system using standardized tests and measures patient presents with 3 or more elements from any of the following: body structures and functions, activity limitations, and/or participation restrictions.  Leading to a clinical presentation that is considered evolving with changing characteristics                              Clinical  Decision Making  (Eval Complexity):  Moderate - 36141     Time Tracking:     PT Received On: 04/24/18  PT Start Time: 0830     PT Stop Time: 0855  PT Total Time (min): 25 min     Billable Minutes: Evaluation 25      Dorothy Escalera, PT , DPT  04/24/2018

## 2018-04-24 NOTE — SUBJECTIVE & OBJECTIVE
Interval History: Appears more alert and comfortable today.  She says she feels better with the lidocaine patch and is able to move around better.  Still reports sleeping shortly after getting oxycodone but wakes up again in 20 minutes.  She says her pain is better but still rates it an 8/10.  Reports working with rehab for mobilization.    Medications:  Continuous Infusions:   lactated ringers 50 mL/hr at 04/24/18 0348     Scheduled Meds:   enoxaparin  40 mg Subcutaneous Daily    ibuprofen  400 mg Oral Q8H    lidocaine  1 patch Transdermal Q24H    piperacillin-tazobactam (ZOSYN) IVPB  4.5 g Intravenous Q8H    senna-docusate 8.6-50 mg  2 tablet Oral Daily     PRN Meds:acetaminophen, dextrose 50%, omnipaque, ondansetron, oxyCODONE, sodium chloride 0.9%    Objective:     Vital Signs (Most Recent):  Temp: 97.6 °F (36.4 °C) (04/24/18 1127)  Pulse: 103 (04/24/18 1127)  Resp: 20 (04/24/18 1127)  BP: 133/85 (04/24/18 1127)  SpO2: (!) 89 % (04/24/18 1127) Vital Signs (24h Range):  Temp:  [97.6 °F (36.4 °C)-99 °F (37.2 °C)] 97.6 °F (36.4 °C)  Pulse:  [103-121] 103  Resp:  [16-20] 20  SpO2:  [89 %-95 %] 89 %  BP: (106-133)/(62-85) 133/85     Weight: 75.3 kg (166 lb 0.1 oz)  Body mass index is 26.79 kg/m².    Review of Symptoms  Symptom Assessment (ESAS 0-10 scale)  ESAS 0 1 2 3 4 5 6 7 8 9 10   Pain              Dyspnea              Anxiety              Nausea              Depression               Anorexia              Fatigue              Insomnia              Restlessness               Agitation              CAM / Delirium __ --  ___+   Constipation     __ --  ___+   Diarrhea           __ --  ___+  Bowel Management Plan (BMP): yes    Comments: senna-docusate    Pain Assessment: 8/10    OME in 24 hours: 3x hydrocodone-acet, oxycodone 25mg (total)    Performance Status: 50    ECOG Performance Status thGthrthathdtheth:th th5th Physical Exam   Constitutional: She appears well-developed.   HENT:   Head: Normocephalic.   Eyes: Pupils are  equal, round, and reactive to light. No scleral icterus.   Neck: No tracheal deviation present.   Cardiovascular: Normal rate and regular rhythm.  Exam reveals no gallop and no friction rub.    No murmur heard.  Pulmonary/Chest: Effort normal. No respiratory distress. She has no wheezes. She has no rales. Right breast exhibits no inverted nipple.   Diminished breath sounds most at RLL but low air movement throughout    LLL  to palpation   Abdominal: Soft. Bowel sounds are normal. She exhibits no distension and no mass. There is no tenderness. There is no guarding.   Musculoskeletal: She exhibits no edema.   Lymphadenopathy:     She has no cervical adenopathy.   Neurological: She is alert.   Skin: Skin is warm.   Psychiatric: She has a normal mood and affect.       Significant Labs:   CBC:   Recent Labs  Lab 04/23/18 0357 04/24/18  0510   WBC 8.25 8.26   HGB 9.9* 10.2*   HCT 31.3* 31.6*    202     CMP:   Recent Labs  Lab 04/23/18  0357 04/24/18  0510    138   K 4.2 4.1    107   CO2 20* 21*   GLU 96 68*   BUN 17 16   CREATININE 1.1 1.2   CALCIUM 7.7* 7.8*   PROT 5.4* 5.7*   ALBUMIN 1.4* 1.5*   BILITOT 0.5 0.5   ALKPHOS 418* 410*   AST 49* 47*   ALT 17 13   ANIONGAP 10 10   EGFRNONAA 56.3* 50.6*     CBC:     Recent Labs  Lab 04/24/18  0510   WBC 8.26   HGB 10.2*   HCT 31.6*   MCV 89        BMP:    Recent Labs  Lab 04/24/18  0510   GLU 68*      K 4.1      CO2 21*   BUN 16   CREATININE 1.2   CALCIUM 7.8*   MG 1.5*     LFT:  Lab Results   Component Value Date    AST 47 (H) 04/24/2018    ALKPHOS 410 (H) 04/24/2018    BILITOT 0.5 04/24/2018     Albumin:   Albumin   Date Value Ref Range Status   04/24/2018 1.5 (L) 3.5 - 5.2 g/dL Final     Protein:   Total Protein   Date Value Ref Range Status   04/24/2018 5.7 (L) 6.0 - 8.4 g/dL Final     Lactic acid:   Lab Results   Component Value Date    LACTATE 2.0 04/21/2018    LACTATE 2.8 (H) 04/20/2018       Significant Imaging: I have  reviewed all pertinent imaging results/findings within the past 24 hours.    Advanced Directives::  Living Will: No  LaPOST: No  Do Not Resuscitate Status: Yes  Medical Power of : No    Decision-Making Capacity: Patient answered questions, Family answered questions    Living Arrangements: Lives with spouse    Psychosocial/Cultural:  Patient's most important priorities:  n/a    Patient's biggest concerns/fears:  n/a    Previous death/end of life care history:  n/a    Patient's goals/hopes:  n/a    Spiritual:     F- Dora and Belief: n/a    I - Importance: n/a  .  C - Community: n/a    A - Address in Care: n/a

## 2018-04-24 NOTE — ANESTHESIA PREPROCEDURE EVALUATION
2018  Kerline Grossman is a 58 y.o., female who presented from OSH for second opinion about widely metastatic malignancy of unknown primary origin. Pt is currently scheduled for below listed procedure but has not had opportunity to discuss options with orthopedic surgery and so currently does not wish to proceed with surgery until things have been explained for fully.  Patient has also been reporting chest pain that has been ongoing for the past 3 days that is relieved by opiate medication.  Serial EKGs show one result with t wave inversions that have since resolved.  Troponin's have remained normal throughout admission. Unclear on etiology of chest pain. May be due to metatstatic disease.    Pt and family have also been having discussions with palliative care but refuse to make patient comfort care only until a firm diagnosis is made, pt has undergone biopsy with path reports pending.    Pre-operative evaluation for IM NAILING OF FEMUR (Left)    LDA:  22G R forearm    Past Surgical History:   Procedure Laterality Date     SECTION      HYSTERECTOMY           Vital Signs Range (Last 24H):  Temp:  [36.4 °C (97.6 °F)-37.2 °C (99 °F)]   Pulse:  [103-121]   Resp:  [16-20]   BP: (106-133)/(62-85)   SpO2:  [84 %-95 %]       CBC:     Recent Labs  Lab 1810 18  0510   WBC 11.92 9.96 8.08 8.25 8.26   RBC 3.98* 3.60* 3.50* 3.50* 3.57*   HGB 11.4* 10.4* 9.9* 9.9* 10.2*   HCT 36.0* 34.1* 30.5* 31.3* 31.6*   PLT 76* 70* 122* 165 202   MCV 91 95 87 89 89   MCH 28.6 28.9 28.3 28.3 28.6   MCHC 31.7* 30.5* 32.5 31.6* 32.3       CMP:   Recent Labs  Lab 18  0610 186 18  0357 18  0510   * 138 139 137 138   K 5.4* 4.5 4.9 4.2 4.1    112* 111* 107 107   CO2 18* 18* 19* 20* 21*   BUN 27* 24* 19 17 16    CREATININE 1.1 1.0 1.0 1.1 1.2   GLU 72 99 104 96 68*   MG 1.6 1.2* 1.4* 1.8 1.5*   PHOS  --  3.2 2.5* 3.1 3.1   CALCIUM 8.5* 7.4* 7.9* 7.7* 7.8*   ALBUMIN 1.6* 1.4* 1.4* 1.4* 1.5*   PROT 6.2 5.2* 5.3* 5.4* 5.7*   ALKPHOS 534* 449* 419* 418* 410*   ALT 22 18 17 17 13   AST 81* 56* 56* 49* 47*   BILITOT 0.7 0.5 0.5 0.5 0.5       INR:    Recent Labs  Lab 18  2152 18  1549   INR 1.0 0.9   APTT  --  25.7         Diagnostic Studies:      EK18:  Nonspecific T wave abnormality  Abnormal ECG  When compared with ECG of 2018 12:37,  T wave inversion no longer evident in Lateral leads    2D Echo:  18:  CONCLUSIONS     1 - Normal left ventricular systolic function (EF 60-65%).     2 - Right ventricular enlargement with moderately depressed systolic function.     3 - Normal left ventricular diastolic function.     4 - The estimated PA systolic pressure is 40 mmHg.     5 - Low central venous pressure.     Anesthesia Evaluation    I have reviewed the Patient Summary Reports.     I have reviewed the Medications.     Review of Systems  Anesthesia Hx:  No problems with previous Anesthesia    Cardiovascular:   Denies MI.  Denies CAD.    Denies CABG/stent.     Pulmonary:  Pulmonary Normal    Renal/:  Renal/ Normal     Hepatic/GI:  Hepatic/GI Normal    Neurological:  Neurology Normal    Endocrine:  Endocrine Normal        Physical Exam  General:  Obesity    Airway/Jaw/Neck:  Airway Findings: Mouth Opening: Small, but > 3cm Tongue: Large  Mallampati: III  Improves to II with phonation.     Eyes/Ears/Nose:  EYES/EARS/NOSE FINDINGS: Normal   Dental:  Dental Findings: Periodontal disease, Severe    Chest/Lungs:  Chest/Lungs Findings: Normal Respiratory Rate     Heart/Vascular:  Heart Findings: Rate: Normal  Rhythm: Regular Rhythm        Mental Status:  Mental Status Findings: Normal        Anesthesia Plan  Type of Anesthesia, risks & benefits discussed:  Anesthesia Type:  general, regional  Patient's  Preference:   Intra-op Monitoring Plan: standard ASA monitors  Intra-op Monitoring Plan Comments:   Post Op Pain Control Plan:   Post Op Pain Control Plan Comments:   Induction:   IV  Beta Blocker:  Patient is not currently on a Beta-Blocker (No further documentation required).       Informed Consent: Patient understands risks and agrees with Anesthesia plan.  Questions answered. Anesthesia consent signed with patient.  ASA Score: 4     Day of Surgery Review of History & Physical:    H&P update referred to the surgeon.         Ready For Surgery From Anesthesia Perspective.

## 2018-04-24 NOTE — PLAN OF CARE
Problem: Pain, Acute (Adult)  Goal: Acceptable Pain Control/Comfort Level  Patient will demonstrate the desired outcomes by discharge/transition of care.   Outcome: Ongoing (interventions implemented as appropriate)  Care plan discussed with pt and family. Plan for pain management discussed with pt, denies pain at this time. Frequent position adjustments pencouraged for comfort and minimization of skin breakdown. Pt encouraged to  turn every 2 hours, heels elevated off of mattress. Pt remains free from skin breakdown this shift. Fall precautions explained to pt. Pt verbalized  understanding of precautions and safety instructions. Bed in low, locked position, call light within reach, bed alarm active and audible, family remains at bedside. Personal items within a safe distance for reach. Pt remains free from falls this shift.

## 2018-04-24 NOTE — ASSESSMENT & PLAN NOTE
Pathology report pending with preliminary concerning for metastatic carcinoma.  Cytology from 4/20 pleural fluid negative.  Prognosis and treatment options pending official diagnosis.  Pain constantly 8/10.  Reports discomfort of posterior portion of upper back when she has to reposition herself.  Known subacute rib fractures on CXR and CT scan.  4 doses of 5-235mg hydrocodone in the past 24 hours.  Received x1 dose 10mg prn oxycodone and x1 dose morphine 3mg.  High risk for oversedation.  -Recommend continuing oxycodone 5mg q4h prn pain for now as she does not appear to be in significant distress.  -Recommend  Continuing standing ibuprofen 400mg TID and lidocaine patch 5% to rib areas that bother her the most  --Patient may prefer lidocaine patch on during day and off at night.  --Alternatively if pain medications only causing sedation instead of pain relief AND if pain is most bothersome to her in her chest, 2nd line therapy may consider pain management for intercostal block  -continue with senna-colace  -Will follow up with pathology from OSH

## 2018-04-24 NOTE — PROGRESS NOTES
"Pt c/o chest pain, denies radiation to shoulder and back. States "it feels like a crook in my neck but it isn't". Denied chills, no diaphoresis at this time. Vitals are as charted.  Boston Charles NP notified of new onset of chest pain. Labs and EKG ordered. Will continue to monitor   "

## 2018-04-24 NOTE — NURSING
Called to room and patient complaining of itching. Noted very dry skin. Patient instructed to try not to scratch and call placed to physician . Awaiting return call..

## 2018-04-24 NOTE — MEDICAL/APP STUDENT
Hospital Medicine  Progress note    Team: [facility] MED B Dr Cruz  Admit Date: 4/20/2018  VIGNESH   Code status: Full Code    Principal Problem:  Hypotension    Interval hx:    4/22: Lower back ache, lumbar MRI with contrast done-> Abnormal marrow signal and diffuse nodular enhancing lesions throughout the lumbar spine and sacrum concerning for metastatic disease. CT chest, abdomen, and pelvis with contrast ordered for metastasis. Chest xray showed multiple lytic lesions or pathologic rib fractures. UA was negative. Awaiting results from BC and hip Bx from outside hospital.  ortho spine consulted   4/23: CT Thoracic spine shows diffuse bone marrow abnormality with enhancing osseous lesions. Multilevel neural foraminal narrowing with spinal canal stenosis. Bilateral pleural effusions with compressive atelectasis. Waiting for OSH bone bx of hip and BC results. Pain in neck, chest and lower back not controlled, given 3mg morphine and oxy raised to 5 and 10mg Q6h for 7-10 pain. No weight bearing.troponin and EKG . Cytology from thoracentesis 4/20/18 is negative.Final report from bone marrow pending initial impression is a differentiated metastatic carcinoma   4/24: Xray metastatic survey shows lytic lesions of skull, lytic spine and rib lesions with pathologic fractures. Diffuse lytic lesions throughout entire central axial red marrow skeleton. Lytic lesions of proximal long bones and pathological fractures of ribs and right superior and inferior pubic ramus. Waiting on OSH final bx. Ortho considering seeing patient for femoral b/l lytic lesions and worry of pathological fracture, currently non-ambulatory. Pain has been well controlled today.      ROS     Pain Scale: 2 /10 Lower back, neck and chest pain  Respiratory: no cough or shortness of breath  Cardiovascular: no chest pain or palpitations  Gastrointestinal: no nausea or vomiting, no abdominal pain or change in bowel habits  Behavioral/Psych: no depression or  anxiety      PEx  Temp:  [97.8 °F (36.6 °C)-99 °F (37.2 °C)]   Pulse:  [109-121]   Resp:  [16-20]   BP: (106-130)/(62-83)   SpO2:  [92 %-96 %]     Intake/Output Summary (Last 24 hours) at 04/24/18 0810  Last data filed at 04/23/18 1830   Gross per 24 hour   Intake              580 ml   Output                0 ml   Net              580 ml       General Appearance: no acute distress   Heart: regular rate and rhythm  Respiratory: Normal respiratory effort, no crackles   Abdomen: Soft, non-tender; bowel sounds active  Skin: intact. IV sites ok  Neurologic:  No focal numbness or weakness  Mental status: Alert, oriented x 3, affect appropriate   MSK: left perispinal tender. 4/5 upper limbs B/L, 2/5 lower limbs - patient can walk but has pain on ambulation in legs    Recent Labs  Lab 04/22/18 0446 04/23/18  0357 04/24/18  0510   WBC 8.08 8.25 8.26   HGB 9.9* 9.9* 10.2*   HCT 30.5* 31.3* 31.6*   * 165 202       Recent Labs  Lab 04/22/18 0446 04/23/18  0357 04/24/18  0510    137 138   K 4.9 4.2 4.1   * 107 107   CO2 19* 20* 21*   BUN 19 17 16   CREATININE 1.0 1.1 1.2    96 68*   CALCIUM 7.9* 7.7* 7.8*   MG 1.4* 1.8 1.5*   PHOS 2.5* 3.1 3.1       Recent Labs  Lab 04/20/18 2152 04/22/18 0446 04/23/18  0357 04/24/18  0510   ALKPHOS 534*  < > 419* 418* 410*   ALT 22  < > 17 17 13   AST 81*  < > 56* 49* 47*   ALBUMIN 1.6*  < > 1.4* 1.4* 1.5*   PROT 6.2  < > 5.3* 5.4* 5.7*   BILITOT 0.7  < > 0.5 0.5 0.5   INR 1.0  --   --   --   --    < > = values in this interval not displayed.     Recent Labs  Lab 04/21/18  0522   POCTGLUCOSE 74     Recent Labs      04/23/18   1251  04/24/18   0646   TROPONINI  0.023  0.018       Scheduled Meds:   enoxaparin  40 mg Subcutaneous Daily    ibuprofen  400 mg Oral Q8H    lidocaine  1 patch Transdermal Q24H    magnesium sulfate IVPB  2 g Intravenous Once    piperacillin-tazobactam (ZOSYN) IVPB  4.5 g Intravenous Q8H    senna-docusate 8.6-50 mg  2 tablet Oral Daily      Continuous Infusions:   lactated ringers 50 mL/hr at 04/24/18 0348     As Needed:  acetaminophen, dextrose 50%, omnipaque, ondansetron, oxyCODONE, sodium chloride 0.9%    Active Hospital Problems    Diagnosis  POA    *Hypotension [I95.9]  Yes    Palliative care encounter [Z51.5]  Not Applicable    Pain due to malignant neoplasm metastatic to bone [G89.3, C79.51]  Yes    Anemia [D64.9]  Yes    Hypomagnesemia [E83.42]  Yes    Hypophosphatemia [E83.39]  Yes    Thrombocytopenia, unspecified [D69.6]  Yes    Bony metastasis [C79.51]  Yes    Cancer [C80.1]  Yes    Pleural effusion [J90]  Yes      Resolved Hospital Problems    Diagnosis Date Resolved POA    Encephalopathy, metabolic [G93.41] 04/21/2018 Yes       Overview Mrs. Grossman is a 57 yo female who was brought to [facility] ED this afternoon for second opinion regarding recently diagnosed likely metastatic malignancy with unknown primary.        Assessment and Plan for Problems addressed today:     Metastatic cancer of unknown primary  Patient with diffuse lytic lesions and pathological fractures throughout her skull, chest and hips which is seen on CT scans from OSH. Workup has not shown primary malignancy.   Will need to follow up of pathology reports from OSH from biopsies of BM and hip as well as cytology from pleural fluid.   Patient's family still want to pursue workup and possible treatment despite the extent of patient;s disease. They also want her to remain full code until they find out more information.   If bone marrow negative for plasma cell dyscrasia, next step may be a biopsy of a lytic lesion or most accessible lesion (ie lymph node, liver lesion, etc) if CT suggests a potential primary  - will consult heme onc for assistance with workup  - will also consult palliative care for assistance with goals of care discussion  4/22: MRI lumbar spine showed abnormal marrow signal and diffuse nodular enhancing lesions throughout the lumbar spine and  sacrum concerning for metastatic disease.   - will f/u with OSH for bone bx of the hip and blood cultures  4/23: CT thoracic spine shows diffuse bone marrow abnormality with enhancing osseous lesions. Multilevel neural foraminal narrowing with spinal canal stenosis. Bilateral pleural effusions with compressive atelectasis.    4/24: Xray metastatic survey shows lytic lesions of skull, lytic spine and rib lesions with pathologic fractures. Diffuse lytic lesions throughout entire central axial red marrow skeleton. Lytic lesions of proximal long bones and pathological fractures of ribs and right superior and inferior pubic ramus.     Anemia of chronic disease  4/22: Hgb 9.9  - Has been trending downward   - Will monitor with CBC daily     Hypotension  Patient found to have MAPS between 60 and 70 upon arrival to ED. She received 1 L of NS with improvements to MAPs > 65.   Likely secondary to combination of dehydration and morphine still wearing off. She may also have an infection although her WBC is normal and she is afebrile  BP now improved s/p 2 L of NS   Continue broad spectrum abx (Vanc Zosyn) until cultures return.      Pleural effusion  Patient with right sided pleural effusion seen on CXR. Possibly malignant.   Will check echo to rule out transudative process secondary to CHF.    She had diagnostic thoracentesis at OSH with 50 ccs removed. Follow up with outside lab     Hypomagnesemia  4/22: 1.4 - replaced  - Most likely 2/2 chronic disease  4/24: 1.5 - replaced      Hypophosphatemia   4/22: 2.5 - replaced  - Most likely 2/2 chronic disease     Thrombocytopenia  4/22: 122  - Most likely 2/2 chronic disease     DVT PPx: enoxaparin 40mg      Discharge plan and follow up        Provider    I personally scribed for David Cruz MD on 04/24/2018 at 1:22 PM. Electronically signed by kera Lipscomb III on 04/24/2018 at 1:22 PM

## 2018-04-24 NOTE — PLAN OF CARE
Problem: Physical Therapy Goal  Goal: Physical Therapy Goal  Goals to be met by: 10 days ( 5/3)    Patient will increase functional independence with mobility by performin. Supine to sit with Stand-by Assistance  2. Sit to supine with Stand-by Assistance  3. Sit to stand transfer with Minimal Assistance using RW  4. Gait  x 10 feet with Minimal Assistance using Rolling Walker.   5. Lower extremity exercise program x20 reps per handout, with independence to improve muscular strength and endurance.     Outcome: Ongoing (interventions implemented as appropriate)  PT evaluation completed. POC initiated.    Dorothy Escalera, PT, DPT  2018

## 2018-04-24 NOTE — NURSING
Orthopedic physicians at bedside and explaining options to patient and  and sons . Assessment on going.

## 2018-04-24 NOTE — PROGRESS NOTES
Hospital Medicine  Progress note     Team: [facility]P MED B Dr Cruz  Admit Date: 4/20/2018  VIGNESH   Code status: Full Code     Principal Problem:  Hypotension     Interval hx:    4/22: Lower back ache, lumbar MRI with contrast done-> Abnormal marrow signal and diffuse nodular enhancing lesions throughout the lumbar spine and sacrum concerning for metastatic disease. CT chest, abdomen, and pelvis with contrast ordered for metastasis. Chest xray showed multiple lytic lesions or pathologic rib fractures. UA was negative. Awaiting results from BC and hip Bx from outside hospital.  ortho spine consulted   4/23: CT Thoracic spine shows diffuse bone marrow abnormality with enhancing osseous lesions. Multilevel neural foraminal narrowing with spinal canal stenosis. Bilateral pleural effusions with compressive atelectasis. Waiting for OSH bone bx of hip and BC results. Pain in neck, chest and lower back not controlled, given 3mg morphine and oxy raised to 5 and 10mg Q6h for 7-10 pain. No weight bearing.troponin and EKG . Cytology from thoracentesis 4/20/18 is negative.Final report from bone marrow pending initial impression is a differentiated metastatic carcinoma   4/24: Xray metastatic survey shows lytic lesions of skull, lytic spine and rib lesions with pathologic fractures. Diffuse lytic lesions throughout entire central axial red marrow skeleton. Lytic lesions of proximal long bones and pathological fractures of ribs and right superior and inferior pubic ramus. Waiting on OSH final bx. Ortho considering seeing patient for femoral b/l lytic lesions and worry of pathological fracture, currently non-ambulatory. Pain has been well controlled today.      ROS      Pain Scale: 2 /10 Lower back, neck and chest pain  Respiratory: no cough or shortness of breath  Cardiovascular: no chest pain or palpitations  Gastrointestinal: no nausea or vomiting, no abdominal pain or change in bowel habits  Behavioral/Psych: no depression or  anxiety        PEx  Temp:  [97.8 °F (36.6 °C)-99 °F (37.2 °C)]   Pulse:  [109-121]   Resp:  [16-20]   BP: (106-130)/(62-83)   SpO2:  [92 %-96 %]      Intake/Output Summary (Last 24 hours) at 04/24/18 0810  Last data filed at 04/23/18 1830    Gross per 24 hour   Intake              580 ml   Output                0 ml   Net              580 ml         General Appearance: no acute distress   Heart: regular rate and rhythm  Respiratory: Normal respiratory effort, no crackles   Abdomen: Soft, non-tender; bowel sounds active  Skin: intact. IV sites ok  Neurologic:  No focal numbness or weakness  Mental status: Alert, oriented x 3, affect appropriate   MSK: left perispinal tender. 4/5 upper limbs B/L, 2/5 lower limbs - patient can walk but has pain on ambulation in legs     Recent Labs  Lab 04/22/18 0446 04/23/18  0357 04/24/18  0510   WBC 8.08 8.25 8.26   HGB 9.9* 9.9* 10.2*   HCT 30.5* 31.3* 31.6*   * 165 202         Recent Labs  Lab 04/22/18 0446 04/23/18  0357 04/24/18  0510    137 138   K 4.9 4.2 4.1   * 107 107   CO2 19* 20* 21*   BUN 19 17 16   CREATININE 1.0 1.1 1.2    96 68*   CALCIUM 7.9* 7.7* 7.8*   MG 1.4* 1.8 1.5*   PHOS 2.5* 3.1 3.1         Recent Labs  Lab 04/20/18 2152 04/22/18 0446 04/23/18  0357 04/24/18  0510   ALKPHOS 534*  < > 419* 418* 410*   ALT 22  < > 17 17 13   AST 81*  < > 56* 49* 47*   ALBUMIN 1.6*  < > 1.4* 1.4* 1.5*   PROT 6.2  < > 5.3* 5.4* 5.7*   BILITOT 0.7  < > 0.5 0.5 0.5   INR 1.0  --   --   --   --    < > = values in this interval not displayed.      Recent Labs  Lab 04/21/18  0522   POCTGLUCOSE 74           Recent Labs      04/23/18   1251  04/24/18   0646   TROPONINI  0.023  0.018         Scheduled Meds:   enoxaparin  40 mg Subcutaneous Daily    ibuprofen  400 mg Oral Q8H    lidocaine  1 patch Transdermal Q24H    magnesium sulfate IVPB  2 g Intravenous Once    piperacillin-tazobactam (ZOSYN) IVPB  4.5 g Intravenous Q8H    senna-docusate 8.6-50 mg   2 tablet Oral Daily      Continuous Infusions:   lactated ringers 50 mL/hr at 04/24/18 0348      As Needed:  acetaminophen, dextrose 50%, omnipaque, ondansetron, oxyCODONE, sodium chloride 0.9%           Active Hospital Problems     Diagnosis   POA    *Hypotension [I95.9]   Yes    Palliative care encounter [Z51.5]   Not Applicable    Pain due to malignant neoplasm metastatic to bone [G89.3, C79.51]   Yes    Anemia [D64.9]   Yes    Hypomagnesemia [E83.42]   Yes    Hypophosphatemia [E83.39]   Yes    Thrombocytopenia, unspecified [D69.6]   Yes    Bony metastasis [C79.51]   Yes    Cancer [C80.1]   Yes    Pleural effusion [J90]   Yes       Resolved Hospital Problems     Diagnosis Date Resolved POA    Encephalopathy, metabolic [G93.41] 04/21/2018 Yes         Overview Mrs. Grossman is a 57 yo female who was brought to [facility] ED this afternoon for second opinion regarding recently diagnosed likely metastatic malignancy with unknown primary.        Assessment and Plan for Problems addressed today:     Metastatic cancer of unknown primary  Patient with diffuse lytic lesions and pathological fractures throughout her skull, chest and hips which is seen on CT scans from OSH. Workup has not shown primary malignancy.   Will need to follow up of pathology reports from OSH from biopsies of BM and hip as well as cytology from pleural fluid.   Patient's family still want to pursue workup and possible treatment despite the extent of patient;s disease. They also want her to remain full code until they find out more information.   If bone marrow negative for plasma cell dyscrasia, next step may be a biopsy of a lytic lesion or most accessible lesion (ie lymph node, liver lesion, etc) if CT suggests a potential primary  - will consult heme onc for assistance with workup  - will also consult palliative care for assistance with goals of care discussion  4/22: MRI lumbar spine showed abnormal marrow signal and diffuse nodular enhancing  lesions throughout the lumbar spine and sacrum concerning for metastatic disease.   - will f/u with OSH for bone bx of the hip and blood cultures  4/23: CT thoracic spine shows diffuse bone marrow abnormality with enhancing osseous lesions. Multilevel neural foraminal narrowing with spinal canal stenosis. Bilateral pleural effusions with compressive atelectasis.    4/24: Xray metastatic survey shows lytic lesions of skull, lytic spine and rib lesions with pathologic fractures. Diffuse lytic lesions throughout entire central axial red marrow skeleton. Lytic lesions of proximal long bones and pathological fractures of ribs and right superior and inferior pubic ramus. multiples lytic lesions femur per orthopedics which need to be operated to prevent pathological fractures.GOC discussion later in the week when path report available      Anemia of chronic disease  4/22: Hgb 9.9  - Has been trending downward   - Will monitor with CBC daily     Hypotension  Patient found to have MAPS between 60 and 70 upon arrival to ED. She received 1 L of NS with improvements to MAPs > 65.   Likely secondary to combination of dehydration and morphine still wearing off. She may also have an infection although her WBC is normal and she is afebrile  BP now improved s/p 2 L of NS   Continue broad spectrum abx (Vanc Zosyn) until cultures return.      Pleural effusion  Patient with right sided pleural effusion seen on CXR. Possibly malignant. cytology negative  Will check echo to rule out transudative process secondary to CHF.    She had diagnostic thoracentesis at OSH with 50 ccs removed. Follow up with outside lab     Hypomagnesemia  4/22: 1.4 - replaced  - Most likely 2/2 chronic disease  4/24: 1.5 - replaced      Hypophosphatemia   4/22: 2.5 - replaced  - Most likely 2/2 chronic disease     Thrombocytopenia  4/22: 122  - Most likely 2/2 chronic disease     DVT PPx: enoxaparin 40mg        Discharge plan and follow  up           Provider     I personally scribed for David Cruz MD on 04/24/2018 at 1:22 PM. Electronically signed by kera Lipscomb III on 04/24/2018 at 1:22 PM  The documentation recorded by the scribe accurately reflects service I personally performed and the decisions made by me.  David Cruz MD  Attending Staff Physician  [facility] Medicine  pager- [phone]  [facility] - [phone]

## 2018-04-24 NOTE — PLAN OF CARE
Problem: Patient Care Overview  Goal: Plan of Care Review  Patient alert and can make needs known. Does not like to turn . Wedge provided and she allowed change in positioning. Had multiple test today. Went for MRI of cervical and thoracic spine and later today she had xray metastatic survey complete done. Was medicated for pain numerous times through out the day. Had changes to medications for pain today. On lactated ringers at 50 ml / hour. Tolerating it well. Poor appetite but family has her eating foods from outside like pizza and popeyes chicken . Does tolerate fluids well. o2 at 2 liters per n/c. Vitals stable. Will continue to monitor. Post xray hr was up in 120 range but did settle down once in bed and positioned for comfort. Assessment on going.

## 2018-04-24 NOTE — ASSESSMENT & PLAN NOTE
58 y.o. female with B femur impending fx    Pain control  PT/OT: bed rest  DVT PPx: hold lovenox preop, FCDs at all times when not ambulating  Abx: zosyn  Labs: Hb 10.2  Drain: none  Ponce: none    Dispo: OR tomorrow for L femur CMN.  R/B/A discussed with patient and family.  They understand and wish to proceed.  NPO MN

## 2018-04-25 ENCOUNTER — ANESTHESIA (OUTPATIENT)
Dept: SURGERY | Facility: HOSPITAL | Age: 58
DRG: 477 | End: 2018-04-25
Payer: MEDICAID

## 2018-04-25 ENCOUNTER — SURGERY (OUTPATIENT)
Age: 58
End: 2018-04-25

## 2018-04-25 PROBLEM — E43 SEVERE MALNUTRITION: Status: ACTIVE | Noted: 2018-04-25

## 2018-04-25 LAB
ALBUMIN SERPL BCP-MCNC: 1.4 G/DL
ALBUMIN SERPL BCP-MCNC: 1.6 G/DL
ALLENS TEST: ABNORMAL
ALLENS TEST: ABNORMAL
ALP SERPL-CCNC: 340 U/L
ALP SERPL-CCNC: 411 U/L
ALT SERPL W/O P-5'-P-CCNC: 13 U/L
ALT SERPL W/O P-5'-P-CCNC: 16 U/L
ANION GAP SERPL CALC-SCNC: 10 MMOL/L
ANION GAP SERPL CALC-SCNC: 11 MMOL/L
ANION GAP SERPL CALC-SCNC: 19 MMOL/L
ANISOCYTOSIS BLD QL SMEAR: SLIGHT
AST SERPL-CCNC: 53 U/L
AST SERPL-CCNC: 65 U/L
BACTERIA #/AREA URNS AUTO: ABNORMAL /HPF
BASOPHILS # BLD AUTO: ABNORMAL K/UL
BASOPHILS NFR BLD: 0 %
BILIRUB SERPL-MCNC: 0.4 MG/DL
BILIRUB SERPL-MCNC: 0.5 MG/DL
BILIRUB UR QL STRIP: NEGATIVE
BUN SERPL-MCNC: 17 MG/DL
BUN SERPL-MCNC: 17 MG/DL
BUN SERPL-MCNC: 21 MG/DL
BURR CELLS BLD QL SMEAR: ABNORMAL
CALCIUM SERPL-MCNC: 7.5 MG/DL
CALCIUM SERPL-MCNC: 8 MG/DL
CALCIUM SERPL-MCNC: 8.1 MG/DL
CHLORIDE SERPL-SCNC: 109 MMOL/L
CHLORIDE SERPL-SCNC: 111 MMOL/L
CHLORIDE SERPL-SCNC: 111 MMOL/L
CLARITY UR REFRACT.AUTO: CLEAR
CO2 SERPL-SCNC: 11 MMOL/L
CO2 SERPL-SCNC: 20 MMOL/L
CO2 SERPL-SCNC: 21 MMOL/L
COLOR UR AUTO: ABNORMAL
CREAT SERPL-MCNC: 1.3 MG/DL
CREAT SERPL-MCNC: 1.3 MG/DL
CREAT SERPL-MCNC: 1.5 MG/DL
DELSYS: ABNORMAL
DELSYS: ABNORMAL
DIFFERENTIAL METHOD: ABNORMAL
EOSINOPHIL # BLD AUTO: ABNORMAL K/UL
EOSINOPHIL NFR BLD: 0 %
EOSINOPHIL NFR BLD: 1 %
EOSINOPHIL NFR BLD: 3 %
ERYTHROCYTE [DISTWIDTH] IN BLOOD BY AUTOMATED COUNT: 15.5 %
ERYTHROCYTE [DISTWIDTH] IN BLOOD BY AUTOMATED COUNT: 15.7 %
ERYTHROCYTE [DISTWIDTH] IN BLOOD BY AUTOMATED COUNT: 16.1 %
ERYTHROCYTE [SEDIMENTATION RATE] IN BLOOD BY WESTERGREN METHOD: 12 MM/H
ERYTHROCYTE [SEDIMENTATION RATE] IN BLOOD BY WESTERGREN METHOD: 12 MM/H
EST. GFR  (AFRICAN AMERICAN): 44.6 ML/MIN/1.73 M^2
EST. GFR  (AFRICAN AMERICAN): 53 ML/MIN/1.73 M^2
EST. GFR  (AFRICAN AMERICAN): 53 ML/MIN/1.73 M^2
EST. GFR  (NON AFRICAN AMERICAN): 38.7 ML/MIN/1.73 M^2
EST. GFR  (NON AFRICAN AMERICAN): 46 ML/MIN/1.73 M^2
EST. GFR  (NON AFRICAN AMERICAN): 46 ML/MIN/1.73 M^2
ETCO2: 27
FIO2: 100
FIO2: 40
GLUCOSE SERPL-MCNC: 132 MG/DL
GLUCOSE SERPL-MCNC: 251 MG/DL
GLUCOSE SERPL-MCNC: 85 MG/DL
GLUCOSE SERPL-MCNC: 99 MG/DL (ref 70–110)
GLUCOSE UR QL STRIP: NEGATIVE
HCO3 UR-SCNC: 12.8 MMOL/L (ref 24–28)
HCO3 UR-SCNC: 18.7 MMOL/L (ref 24–28)
HCO3 UR-SCNC: 19.5 MMOL/L (ref 24–28)
HCT VFR BLD AUTO: 26 %
HCT VFR BLD AUTO: 30.4 %
HCT VFR BLD AUTO: 34.4 %
HCT VFR BLD CALC: 28 %PCV (ref 36–54)
HGB BLD-MCNC: 10.8 G/DL
HGB BLD-MCNC: 8 G/DL
HGB BLD-MCNC: 9.4 G/DL
HGB UR QL STRIP: NEGATIVE
HYPOCHROMIA BLD QL SMEAR: ABNORMAL
IMM GRANULOCYTES # BLD AUTO: ABNORMAL K/UL
IMM GRANULOCYTES NFR BLD AUTO: ABNORMAL %
KETONES UR QL STRIP: NEGATIVE
LACTATE SERPL-SCNC: 11.5 MMOL/L
LACTATE SERPL-SCNC: 3.9 MMOL/L
LEUKOCYTE ESTERASE UR QL STRIP: ABNORMAL
LYMPHOCYTES # BLD AUTO: ABNORMAL K/UL
LYMPHOCYTES NFR BLD: 14 %
LYMPHOCYTES NFR BLD: 5 %
LYMPHOCYTES NFR BLD: 6 %
MAGNESIUM SERPL-MCNC: 1.5 MG/DL
MAGNESIUM SERPL-MCNC: 1.5 MG/DL
MAGNESIUM SERPL-MCNC: 1.7 MG/DL
MCH RBC QN AUTO: 28.4 PG
MCH RBC QN AUTO: 28.5 PG
MCH RBC QN AUTO: 29.2 PG
MCHC RBC AUTO-ENTMCNC: 30.8 G/DL
MCHC RBC AUTO-ENTMCNC: 30.9 G/DL
MCHC RBC AUTO-ENTMCNC: 31.4 G/DL
MCV RBC AUTO: 91 FL
MCV RBC AUTO: 92 FL
MCV RBC AUTO: 95 FL
METAMYELOCYTES NFR BLD MANUAL: 1 %
METAMYELOCYTES NFR BLD MANUAL: 1 %
MICROSCOPIC COMMENT: ABNORMAL
MIN VOL: 9
MODE: ABNORMAL
MODE: ABNORMAL
MONOCYTES # BLD AUTO: ABNORMAL K/UL
MONOCYTES NFR BLD: 1 %
MONOCYTES NFR BLD: 1 %
MONOCYTES NFR BLD: 4 %
MYELOCYTES NFR BLD MANUAL: 1 %
MYELOCYTES NFR BLD MANUAL: 2 %
NEUTROPHILS NFR BLD: 73 %
NEUTROPHILS NFR BLD: 88 %
NEUTROPHILS NFR BLD: 92 %
NEUTS BAND NFR BLD MANUAL: 1 %
NEUTS BAND NFR BLD MANUAL: 3 %
NEUTS BAND NFR BLD MANUAL: 3 %
NITRITE UR QL STRIP: NEGATIVE
NRBC BLD-RTO: 1 /100 WBC
NRBC BLD-RTO: 1 /100 WBC
NRBC BLD-RTO: 2 /100 WBC
OVALOCYTES BLD QL SMEAR: ABNORMAL
PATHOLOGIST INTERPRETATION IFE: NORMAL
PATHOLOGIST INTERPRETATION SPE: NORMAL
PCO2 BLDA: 26.6 MMHG (ref 35–45)
PCO2 BLDA: 36.1 MMHG (ref 35–45)
PCO2 BLDA: 37 MMHG (ref 35–45)
PEEP: 5
PEEP: 5
PH SMN: 7.29 [PH] (ref 7.35–7.45)
PH SMN: 7.31 [PH] (ref 7.35–7.45)
PH SMN: 7.34 [PH] (ref 7.35–7.45)
PH UR STRIP: 7 [PH] (ref 5–8)
PHOSPHATE SERPL-MCNC: 3.4 MG/DL
PHOSPHATE SERPL-MCNC: 4.7 MG/DL
PHOSPHATE SERPL-MCNC: 5 MG/DL
PIP: 24
PLATELET # BLD AUTO: 178 K/UL
PLATELET # BLD AUTO: 244 K/UL
PLATELET # BLD AUTO: 253 K/UL
PLATELET BLD QL SMEAR: ABNORMAL
PMV BLD AUTO: 11.1 FL
PMV BLD AUTO: 11.4 FL
PMV BLD AUTO: 11.8 FL
PO2 BLDA: 119 MMHG (ref 80–100)
PO2 BLDA: 301 MMHG (ref 80–100)
PO2 BLDA: 65 MMHG (ref 80–100)
POC BE: -14 MMOL/L
POC BE: -6 MMOL/L
POC BE: -8 MMOL/L
POC IONIZED CALCIUM: 1.08 MMOL/L (ref 1.06–1.42)
POC SATURATED O2: 100 % (ref 95–100)
POC SATURATED O2: 91 % (ref 95–100)
POC SATURATED O2: 98 % (ref 95–100)
POC TCO2: 14 MMOL/L (ref 23–27)
POC TCO2: 20 MMOL/L (ref 23–27)
POC TCO2: 21 MMOL/L (ref 23–27)
POCT GLUCOSE: 238 MG/DL (ref 70–110)
POIKILOCYTOSIS BLD QL SMEAR: SLIGHT
POLYCHROMASIA BLD QL SMEAR: ABNORMAL
POTASSIUM BLD-SCNC: 4.4 MMOL/L (ref 3.5–5.1)
POTASSIUM SERPL-SCNC: 3.9 MMOL/L
POTASSIUM SERPL-SCNC: 4.2 MMOL/L
POTASSIUM SERPL-SCNC: 4.5 MMOL/L
PROT SERPL-MCNC: 4.6 G/DL
PROT SERPL-MCNC: 5.9 G/DL
PROT UR QL STRIP: NEGATIVE
RBC # BLD AUTO: 2.74 M/UL
RBC # BLD AUTO: 3.31 M/UL
RBC # BLD AUTO: 3.79 M/UL
RBC #/AREA URNS AUTO: 1 /HPF (ref 0–4)
SAMPLE: ABNORMAL
SITE: ABNORMAL
SITE: ABNORMAL
SODIUM BLD-SCNC: 143 MMOL/L (ref 136–145)
SODIUM SERPL-SCNC: 141 MMOL/L
SP GR UR STRIP: 1.01 (ref 1–1.03)
SP02: 100
SP02: 99
SQUAMOUS #/AREA URNS AUTO: 1 /HPF
URN SPEC COLLECT METH UR: ABNORMAL
UROBILINOGEN UR STRIP-ACNC: NEGATIVE EU/DL
VT: 500
VT: 500
WBC # BLD AUTO: 15.94 K/UL
WBC # BLD AUTO: 19.43 K/UL
WBC # BLD AUTO: 9.65 K/UL
WBC #/AREA URNS AUTO: 2 /HPF (ref 0–5)
YEAST UR QL AUTO: ABNORMAL

## 2018-04-25 PROCEDURE — 99233 SBSQ HOSP IP/OBS HIGH 50: CPT | Mod: ,,, | Performed by: SURGERY

## 2018-04-25 PROCEDURE — 88305 TISSUE EXAM BY PATHOLOGIST: CPT | Mod: 26,,, | Performed by: PATHOLOGY

## 2018-04-25 PROCEDURE — 63600175 PHARM REV CODE 636 W HCPCS: Performed by: ANESTHESIOLOGY

## 2018-04-25 PROCEDURE — 99900026 HC AIRWAY MAINTENANCE (STAT)

## 2018-04-25 PROCEDURE — C1713 ANCHOR/SCREW BN/BN,TIS/BN: HCPCS | Performed by: ORTHOPAEDIC SURGERY

## 2018-04-25 PROCEDURE — 36620 INSERTION CATHETER ARTERY: CPT | Mod: 59,,, | Performed by: ANESTHESIOLOGY

## 2018-04-25 PROCEDURE — 36000710: Performed by: ORTHOPAEDIC SURGERY

## 2018-04-25 PROCEDURE — 81001 URINALYSIS AUTO W/SCOPE: CPT

## 2018-04-25 PROCEDURE — 0QH906Z INSERTION OF INTRAMEDULLARY INTERNAL FIXATION DEVICE INTO LEFT FEMORAL SHAFT, OPEN APPROACH: ICD-10-PCS | Performed by: ORTHOPAEDIC SURGERY

## 2018-04-25 PROCEDURE — 27800517 HC TRAY,EPIDURAL-CONTINUOUS: Performed by: ANESTHESIOLOGY

## 2018-04-25 PROCEDURE — 88311 DECALCIFY TISSUE: CPT | Mod: 26,,, | Performed by: PATHOLOGY

## 2018-04-25 PROCEDURE — 63600175 PHARM REV CODE 636 W HCPCS: Performed by: NURSE ANESTHETIST, CERTIFIED REGISTERED

## 2018-04-25 PROCEDURE — 83735 ASSAY OF MAGNESIUM: CPT | Mod: 91

## 2018-04-25 PROCEDURE — 37000009 HC ANESTHESIA EA ADD 15 MINS: Performed by: ORTHOPAEDIC SURGERY

## 2018-04-25 PROCEDURE — 25000003 PHARM REV CODE 250

## 2018-04-25 PROCEDURE — 25000003 PHARM REV CODE 250: Performed by: HOSPITALIST

## 2018-04-25 PROCEDURE — D9220A PRA ANESTHESIA: Mod: ANES,,, | Performed by: ANESTHESIOLOGY

## 2018-04-25 PROCEDURE — 80053 COMPREHEN METABOLIC PANEL: CPT

## 2018-04-25 PROCEDURE — 99900035 HC TECH TIME PER 15 MIN (STAT)

## 2018-04-25 PROCEDURE — 27187 REINFORCE HIP BONES: CPT | Mod: LT,,, | Performed by: ORTHOPAEDIC SURGERY

## 2018-04-25 PROCEDURE — 85007 BL SMEAR W/DIFF WBC COUNT: CPT

## 2018-04-25 PROCEDURE — 25000003 PHARM REV CODE 250: Performed by: NURSE ANESTHETIST, CERTIFIED REGISTERED

## 2018-04-25 PROCEDURE — 25000003 PHARM REV CODE 250: Performed by: STUDENT IN AN ORGANIZED HEALTH CARE EDUCATION/TRAINING PROGRAM

## 2018-04-25 PROCEDURE — 27100019 HC AMBU BAG ADULT/PED: Performed by: NURSE ANESTHETIST, CERTIFIED REGISTERED

## 2018-04-25 PROCEDURE — 93010 ELECTROCARDIOGRAM REPORT: CPT | Mod: 76,,, | Performed by: INTERNAL MEDICINE

## 2018-04-25 PROCEDURE — 27200966 HC CLOSED SUCTION SYSTEM

## 2018-04-25 PROCEDURE — 71000033 HC RECOVERY, INTIAL HOUR: Performed by: ORTHOPAEDIC SURGERY

## 2018-04-25 PROCEDURE — 0QB70ZX EXCISION OF LEFT UPPER FEMUR, OPEN APPROACH, DIAGNOSTIC: ICD-10-PCS | Performed by: ORTHOPAEDIC SURGERY

## 2018-04-25 PROCEDURE — 27000221 HC OXYGEN, UP TO 24 HOURS

## 2018-04-25 PROCEDURE — C1769 GUIDE WIRE: HCPCS | Performed by: ORTHOPAEDIC SURGERY

## 2018-04-25 PROCEDURE — 20000000 HC ICU ROOM

## 2018-04-25 PROCEDURE — 63600175 PHARM REV CODE 636 W HCPCS: Performed by: INTERNAL MEDICINE

## 2018-04-25 PROCEDURE — 93010 ELECTROCARDIOGRAM REPORT: CPT | Mod: ,,, | Performed by: INTERNAL MEDICINE

## 2018-04-25 PROCEDURE — 93005 ELECTROCARDIOGRAM TRACING: CPT

## 2018-04-25 PROCEDURE — 36000711: Performed by: ORTHOPAEDIC SURGERY

## 2018-04-25 PROCEDURE — 88341 IMHCHEM/IMCYTCHM EA ADD ANTB: CPT | Mod: 26,,, | Performed by: PATHOLOGY

## 2018-04-25 PROCEDURE — 83605 ASSAY OF LACTIC ACID: CPT

## 2018-04-25 PROCEDURE — 94002 VENT MGMT INPAT INIT DAY: CPT

## 2018-04-25 PROCEDURE — 84100 ASSAY OF PHOSPHORUS: CPT | Mod: 91

## 2018-04-25 PROCEDURE — 76942 ECHO GUIDE FOR BIOPSY: CPT | Performed by: ANESTHESIOLOGY

## 2018-04-25 PROCEDURE — 94761 N-INVAS EAR/PLS OXIMETRY MLT: CPT

## 2018-04-25 PROCEDURE — 82803 BLOOD GASES ANY COMBINATION: CPT

## 2018-04-25 PROCEDURE — 88305 TISSUE EXAM BY PATHOLOGIST: CPT | Performed by: PATHOLOGY

## 2018-04-25 PROCEDURE — 88342 IMHCHEM/IMCYTCHM 1ST ANTB: CPT | Performed by: PATHOLOGY

## 2018-04-25 PROCEDURE — C1751 CATH, INF, PER/CENT/MIDLINE: HCPCS | Performed by: NURSE ANESTHETIST, CERTIFIED REGISTERED

## 2018-04-25 PROCEDURE — D9220A PRA ANESTHESIA: Mod: CRNA,,, | Performed by: NURSE ANESTHETIST, CERTIFIED REGISTERED

## 2018-04-25 PROCEDURE — 71000039 HC RECOVERY, EACH ADD'L HOUR: Performed by: ORTHOPAEDIC SURGERY

## 2018-04-25 PROCEDURE — 88342 IMHCHEM/IMCYTCHM 1ST ANTB: CPT | Mod: 26,,, | Performed by: PATHOLOGY

## 2018-04-25 PROCEDURE — 27201423 OPTIME MED/SURG SUP & DEVICES STERILE SUPPLY: Performed by: ORTHOPAEDIC SURGERY

## 2018-04-25 PROCEDURE — 64448 NJX AA&/STRD FEM NRV NFS IMG: CPT | Performed by: ANESTHESIOLOGY

## 2018-04-25 PROCEDURE — 64999 UNLISTED PX NERVOUS SYSTEM: CPT | Mod: 59,LT,, | Performed by: ANESTHESIOLOGY

## 2018-04-25 PROCEDURE — 63600175 PHARM REV CODE 636 W HCPCS: Performed by: STUDENT IN AN ORGANIZED HEALTH CARE EDUCATION/TRAINING PROGRAM

## 2018-04-25 PROCEDURE — 25500020 PHARM REV CODE 255: Performed by: HOSPITALIST

## 2018-04-25 PROCEDURE — 02HV33Z INSERTION OF INFUSION DEVICE INTO SUPERIOR VENA CAVA, PERCUTANEOUS APPROACH: ICD-10-PCS | Performed by: SURGERY

## 2018-04-25 PROCEDURE — 37799 UNLISTED PX VASCULAR SURGERY: CPT

## 2018-04-25 PROCEDURE — 27200677 HC TRANSDUCER MONITOR KIT SINGLE: Performed by: NURSE ANESTHETIST, CERTIFIED REGISTERED

## 2018-04-25 PROCEDURE — 36415 COLL VENOUS BLD VENIPUNCTURE: CPT

## 2018-04-25 PROCEDURE — 83605 ASSAY OF LACTIC ACID: CPT | Mod: 91

## 2018-04-25 PROCEDURE — 20245 BONE BIOPSY OPEN DEEP: CPT | Mod: 51,,, | Performed by: ORTHOPAEDIC SURGERY

## 2018-04-25 PROCEDURE — 25000003 PHARM REV CODE 250: Performed by: ANESTHESIOLOGY

## 2018-04-25 PROCEDURE — 85027 COMPLETE CBC AUTOMATED: CPT | Mod: 91

## 2018-04-25 PROCEDURE — 27100025 HC TUBING, SET FLUID WARMER: Performed by: NURSE ANESTHETIST, CERTIFIED REGISTERED

## 2018-04-25 PROCEDURE — 84100 ASSAY OF PHOSPHORUS: CPT

## 2018-04-25 PROCEDURE — 80048 BASIC METABOLIC PNL TOTAL CA: CPT

## 2018-04-25 PROCEDURE — 83735 ASSAY OF MAGNESIUM: CPT

## 2018-04-25 PROCEDURE — 80053 COMPREHEN METABOLIC PANEL: CPT | Mod: 91

## 2018-04-25 PROCEDURE — 37000008 HC ANESTHESIA 1ST 15 MINUTES: Performed by: ORTHOPAEDIC SURGERY

## 2018-04-25 DEVICE — IMPLANTABLE DEVICE: Type: IMPLANTABLE DEVICE | Site: HIP | Status: FUNCTIONAL

## 2018-04-25 DEVICE — SCREW LOCK T25 5.0X38MM: Type: IMPLANTABLE DEVICE | Site: HIP | Status: FUNCTIONAL

## 2018-04-25 RX ORDER — SODIUM CHLORIDE 9 MG/ML
INJECTION, SOLUTION INTRAVENOUS CONTINUOUS PRN
Status: DISCONTINUED | OUTPATIENT
Start: 2018-04-25 | End: 2018-04-25

## 2018-04-25 RX ORDER — ROPIVACAINE HYDROCHLORIDE 5 MG/ML
INJECTION, SOLUTION EPIDURAL; INFILTRATION; PERINEURAL
Status: DISPENSED
Start: 2018-04-25 | End: 2018-04-25

## 2018-04-25 RX ORDER — OXYCODONE HYDROCHLORIDE 5 MG/1
10 TABLET ORAL
Status: DISCONTINUED | OUTPATIENT
Start: 2018-04-25 | End: 2018-04-25

## 2018-04-25 RX ORDER — FENTANYL CITRATE-0.9 % NACL/PF 10 MCG/ML
25 PLASTIC BAG, INJECTION (ML) INTRAVENOUS CONTINUOUS
Status: DISCONTINUED | OUTPATIENT
Start: 2018-04-25 | End: 2018-04-26

## 2018-04-25 RX ORDER — INSULIN ASPART 100 [IU]/ML
1-10 INJECTION, SOLUTION INTRAVENOUS; SUBCUTANEOUS EVERY 6 HOURS PRN
Status: DISCONTINUED | OUTPATIENT
Start: 2018-04-25 | End: 2018-05-14 | Stop reason: HOSPADM

## 2018-04-25 RX ORDER — METOPROLOL TARTRATE 1 MG/ML
INJECTION, SOLUTION INTRAVENOUS
Status: COMPLETED
Start: 2018-04-25 | End: 2018-04-25

## 2018-04-25 RX ORDER — FENTANYL CITRATE 50 UG/ML
INJECTION, SOLUTION INTRAMUSCULAR; INTRAVENOUS
Status: COMPLETED
Start: 2018-04-25 | End: 2018-04-26

## 2018-04-25 RX ORDER — HYDROMORPHONE HYDROCHLORIDE 1 MG/ML
1 INJECTION, SOLUTION INTRAMUSCULAR; INTRAVENOUS; SUBCUTANEOUS
Status: DISCONTINUED | OUTPATIENT
Start: 2018-04-25 | End: 2018-04-26

## 2018-04-25 RX ORDER — HYDROMORPHONE HYDROCHLORIDE 1 MG/ML
0.5 INJECTION, SOLUTION INTRAMUSCULAR; INTRAVENOUS; SUBCUTANEOUS
Status: DISCONTINUED | OUTPATIENT
Start: 2018-04-25 | End: 2018-04-25

## 2018-04-25 RX ORDER — ENOXAPARIN SODIUM 100 MG/ML
40 INJECTION SUBCUTANEOUS EVERY 24 HOURS
Status: DISCONTINUED | OUTPATIENT
Start: 2018-04-26 | End: 2018-04-30

## 2018-04-25 RX ORDER — ROPIVACAINE HYDROCHLORIDE 2 MG/ML
10 INJECTION, SOLUTION EPIDURAL; INFILTRATION; PERINEURAL CONTINUOUS
Status: DISCONTINUED | OUTPATIENT
Start: 2018-04-25 | End: 2018-04-30

## 2018-04-25 RX ORDER — OXYCODONE HYDROCHLORIDE 5 MG/1
5 TABLET ORAL
Status: DISCONTINUED | OUTPATIENT
Start: 2018-04-25 | End: 2018-04-25

## 2018-04-25 RX ORDER — HYDROMORPHONE HYDROCHLORIDE 1 MG/ML
0.2 INJECTION, SOLUTION INTRAMUSCULAR; INTRAVENOUS; SUBCUTANEOUS
Status: DISCONTINUED | OUTPATIENT
Start: 2018-04-25 | End: 2018-04-26

## 2018-04-25 RX ORDER — HYDROMORPHONE HYDROCHLORIDE 1 MG/ML
0.5 INJECTION, SOLUTION INTRAMUSCULAR; INTRAVENOUS; SUBCUTANEOUS
Status: DISCONTINUED | OUTPATIENT
Start: 2018-04-25 | End: 2018-04-26

## 2018-04-25 RX ORDER — OXYCODONE HYDROCHLORIDE 5 MG/1
15 TABLET ORAL
Status: DISCONTINUED | OUTPATIENT
Start: 2018-04-25 | End: 2018-04-25

## 2018-04-25 RX ORDER — HYDROMORPHONE HYDROCHLORIDE 1 MG/ML
0.2 INJECTION, SOLUTION INTRAMUSCULAR; INTRAVENOUS; SUBCUTANEOUS EVERY 5 MIN PRN
Status: DISCONTINUED | OUTPATIENT
Start: 2018-04-25 | End: 2018-04-25 | Stop reason: HOSPADM

## 2018-04-25 RX ORDER — SODIUM CHLORIDE 0.9 % (FLUSH) 0.9 %
3 SYRINGE (ML) INJECTION
Status: DISCONTINUED | OUTPATIENT
Start: 2018-04-25 | End: 2018-05-14 | Stop reason: HOSPADM

## 2018-04-25 RX ORDER — LIDOCAINE HCL/PF 100 MG/5ML
SYRINGE (ML) INTRAVENOUS
Status: DISCONTINUED | OUTPATIENT
Start: 2018-04-25 | End: 2018-04-25

## 2018-04-25 RX ORDER — GLUCAGON 1 MG
1 KIT INJECTION
Status: DISCONTINUED | OUTPATIENT
Start: 2018-04-25 | End: 2018-05-14 | Stop reason: HOSPADM

## 2018-04-25 RX ORDER — ONDANSETRON 2 MG/ML
INJECTION INTRAMUSCULAR; INTRAVENOUS
Status: DISCONTINUED | OUTPATIENT
Start: 2018-04-25 | End: 2018-04-25

## 2018-04-25 RX ORDER — FENTANYL CITRATE 50 UG/ML
50 INJECTION, SOLUTION INTRAMUSCULAR; INTRAVENOUS
Status: DISCONTINUED | OUTPATIENT
Start: 2018-04-26 | End: 2018-04-29

## 2018-04-25 RX ORDER — CEFAZOLIN SODIUM 1 G/3ML
2 INJECTION, POWDER, FOR SOLUTION INTRAMUSCULAR; INTRAVENOUS
Status: COMPLETED | OUTPATIENT
Start: 2018-04-25 | End: 2018-04-26

## 2018-04-25 RX ORDER — ACETAMINOPHEN 10 MG/ML
1000 INJECTION, SOLUTION INTRAVENOUS EVERY 6 HOURS
Status: DISPENSED | OUTPATIENT
Start: 2018-04-25 | End: 2018-04-27

## 2018-04-25 RX ORDER — ACETAMINOPHEN 10 MG/ML
1000 INJECTION, SOLUTION INTRAVENOUS ONCE
Status: DISCONTINUED | OUTPATIENT
Start: 2018-04-25 | End: 2018-04-25

## 2018-04-25 RX ORDER — ROCURONIUM BROMIDE 10 MG/ML
INJECTION, SOLUTION INTRAVENOUS
Status: DISCONTINUED | OUTPATIENT
Start: 2018-04-25 | End: 2018-04-25

## 2018-04-25 RX ORDER — POLYETHYLENE GLYCOL 3350 17 G/17G
17 POWDER, FOR SOLUTION ORAL DAILY
Status: DISCONTINUED | OUTPATIENT
Start: 2018-04-25 | End: 2018-05-14 | Stop reason: HOSPADM

## 2018-04-25 RX ORDER — GLYCOPYRROLATE 0.2 MG/ML
INJECTION INTRAMUSCULAR; INTRAVENOUS
Status: DISCONTINUED | OUTPATIENT
Start: 2018-04-25 | End: 2018-04-25

## 2018-04-25 RX ORDER — FENTANYL CITRATE 50 UG/ML
INJECTION, SOLUTION INTRAMUSCULAR; INTRAVENOUS
Status: DISCONTINUED | OUTPATIENT
Start: 2018-04-25 | End: 2018-04-25

## 2018-04-25 RX ORDER — BISACODYL 10 MG
10 SUPPOSITORY, RECTAL RECTAL DAILY PRN
Status: DISCONTINUED | OUTPATIENT
Start: 2018-04-25 | End: 2018-05-14 | Stop reason: HOSPADM

## 2018-04-25 RX ORDER — FENTANYL CITRATE 50 UG/ML
100 INJECTION, SOLUTION INTRAMUSCULAR; INTRAVENOUS SEE ADMIN INSTRUCTIONS
Status: DISCONTINUED | OUTPATIENT
Start: 2018-04-25 | End: 2018-04-27

## 2018-04-25 RX ORDER — PHENYLEPHRINE HYDROCHLORIDE 10 MG/ML
INJECTION INTRAVENOUS
Status: DISCONTINUED | OUTPATIENT
Start: 2018-04-25 | End: 2018-04-25

## 2018-04-25 RX ORDER — CEFAZOLIN SODIUM 1 G/3ML
2 INJECTION, POWDER, FOR SOLUTION INTRAMUSCULAR; INTRAVENOUS
Status: COMPLETED | OUTPATIENT
Start: 2018-04-25 | End: 2018-04-25

## 2018-04-25 RX ORDER — PROPOFOL 10 MG/ML
VIAL (ML) INTRAVENOUS
Status: DISCONTINUED | OUTPATIENT
Start: 2018-04-25 | End: 2018-04-25

## 2018-04-25 RX ORDER — ONDANSETRON 2 MG/ML
4 INJECTION INTRAMUSCULAR; INTRAVENOUS EVERY 12 HOURS PRN
Status: DISCONTINUED | OUTPATIENT
Start: 2018-04-25 | End: 2018-04-26

## 2018-04-25 RX ORDER — SODIUM CHLORIDE, SODIUM LACTATE, POTASSIUM CHLORIDE, CALCIUM CHLORIDE 600; 310; 30; 20 MG/100ML; MG/100ML; MG/100ML; MG/100ML
INJECTION, SOLUTION INTRAVENOUS CONTINUOUS
Status: DISCONTINUED | OUTPATIENT
Start: 2018-04-25 | End: 2018-04-27

## 2018-04-25 RX ORDER — HYDROMORPHONE HYDROCHLORIDE 1 MG/ML
INJECTION, SOLUTION INTRAMUSCULAR; INTRAVENOUS; SUBCUTANEOUS
Status: DISPENSED
Start: 2018-04-25 | End: 2018-04-26

## 2018-04-25 RX ORDER — NOREPINEPHRINE BITARTRATE/D5W 4MG/250ML
0.05 PLASTIC BAG, INJECTION (ML) INTRAVENOUS CONTINUOUS
Status: DISCONTINUED | OUTPATIENT
Start: 2018-04-25 | End: 2018-04-27

## 2018-04-25 RX ORDER — NEOSTIGMINE METHYLSULFATE 1 MG/ML
INJECTION, SOLUTION INTRAVENOUS
Status: DISCONTINUED | OUTPATIENT
Start: 2018-04-25 | End: 2018-04-25

## 2018-04-25 RX ORDER — ESMOLOL HYDROCHLORIDE 10 MG/ML
INJECTION INTRAVENOUS
Status: DISCONTINUED | OUTPATIENT
Start: 2018-04-25 | End: 2018-04-25

## 2018-04-25 RX ORDER — ROPIVACAINE HYDROCHLORIDE 2 MG/ML
8 INJECTION, SOLUTION EPIDURAL; INFILTRATION; PERINEURAL CONTINUOUS
Status: DISCONTINUED | OUTPATIENT
Start: 2018-04-25 | End: 2018-04-25

## 2018-04-25 RX ORDER — ACETAMINOPHEN 500 MG
1000 TABLET ORAL EVERY 6 HOURS
Status: DISCONTINUED | OUTPATIENT
Start: 2018-04-26 | End: 2018-04-25

## 2018-04-25 RX ORDER — MUPIROCIN 20 MG/G
OINTMENT TOPICAL
Status: DISCONTINUED | OUTPATIENT
Start: 2018-04-25 | End: 2018-04-25 | Stop reason: HOSPADM

## 2018-04-25 RX ORDER — SODIUM CHLORIDE 0.9 % (FLUSH) 0.9 %
3 SYRINGE (ML) INJECTION
Status: DISCONTINUED | OUTPATIENT
Start: 2018-04-25 | End: 2018-04-25

## 2018-04-25 RX ORDER — PROPOFOL 10 MG/ML
10 INJECTION, EMULSION INTRAVENOUS CONTINUOUS
Status: DISCONTINUED | OUTPATIENT
Start: 2018-04-25 | End: 2018-04-26

## 2018-04-25 RX ORDER — MIDAZOLAM HYDROCHLORIDE 1 MG/ML
2 INJECTION INTRAMUSCULAR; INTRAVENOUS SEE ADMIN INSTRUCTIONS
Status: DISCONTINUED | OUTPATIENT
Start: 2018-04-25 | End: 2018-04-27

## 2018-04-25 RX ADMIN — LIDOCAINE HYDROCHLORIDE 80 MG: 20 INJECTION, SOLUTION INTRAVENOUS at 11:04

## 2018-04-25 RX ADMIN — PIPERACILLIN AND TAZOBACTAM 4.5 G: 4; .5 INJECTION, POWDER, LYOPHILIZED, FOR SOLUTION INTRAVENOUS; PARENTERAL at 03:04

## 2018-04-25 RX ADMIN — PHENYLEPHRINE HYDROCHLORIDE 200 MCG: 10 INJECTION INTRAVENOUS at 01:04

## 2018-04-25 RX ADMIN — SODIUM CHLORIDE 1 MCG/KG/MIN: 9 INJECTION, SOLUTION INTRAVENOUS at 02:04

## 2018-04-25 RX ADMIN — INSULIN ASPART 4 UNITS: 100 INJECTION, SOLUTION INTRAVENOUS; SUBCUTANEOUS at 10:04

## 2018-04-25 RX ADMIN — SODIUM CHLORIDE, SODIUM GLUCONATE, SODIUM ACETATE, POTASSIUM CHLORIDE, MAGNESIUM CHLORIDE, SODIUM PHOSPHATE, DIBASIC, AND POTASSIUM PHOSPHATE: .53; .5; .37; .037; .03; .012; .00082 INJECTION, SOLUTION INTRAVENOUS at 01:04

## 2018-04-25 RX ADMIN — SODIUM CHLORIDE, SODIUM GLUCONATE, SODIUM ACETATE, POTASSIUM CHLORIDE, MAGNESIUM CHLORIDE, SODIUM PHOSPHATE, DIBASIC, AND POTASSIUM PHOSPHATE: .53; .5; .37; .037; .03; .012; .00082 INJECTION, SOLUTION INTRAVENOUS at 12:04

## 2018-04-25 RX ADMIN — ROCURONIUM BROMIDE 10 MG: 10 INJECTION, SOLUTION INTRAVENOUS at 01:04

## 2018-04-25 RX ADMIN — FENTANYL CITRATE 25 MCG: 50 INJECTION INTRAMUSCULAR; INTRAVENOUS at 10:04

## 2018-04-25 RX ADMIN — PHENYLEPHRINE HYDROCHLORIDE 200 MCG: 10 INJECTION INTRAVENOUS at 11:04

## 2018-04-25 RX ADMIN — Medication 0.04 MCG/KG/MIN: at 10:04

## 2018-04-25 RX ADMIN — METOPROLOL TARTRATE: 5 INJECTION, SOLUTION INTRAVENOUS at 11:04

## 2018-04-25 RX ADMIN — MUPIROCIN: 20 OINTMENT TOPICAL at 10:04

## 2018-04-25 RX ADMIN — NEOSTIGMINE METHYLSULFATE 5 MG: 1 INJECTION INTRAVENOUS at 02:04

## 2018-04-25 RX ADMIN — PHENYLEPHRINE HYDROCHLORIDE 200 MCG: 10 INJECTION INTRAVENOUS at 12:04

## 2018-04-25 RX ADMIN — PHENYLEPHRINE HYDROCHLORIDE 200 MCG: 10 INJECTION INTRAVENOUS at 02:04

## 2018-04-25 RX ADMIN — PHENYLEPHRINE HYDROCHLORIDE 100 MCG: 10 INJECTION INTRAVENOUS at 01:04

## 2018-04-25 RX ADMIN — ESMOLOL HYDROCHLORIDE 10 MG: 10 INJECTION INTRAVENOUS at 01:04

## 2018-04-25 RX ADMIN — FENTANYL CITRATE 50 MCG: 50 INJECTION, SOLUTION INTRAMUSCULAR; INTRAVENOUS at 01:04

## 2018-04-25 RX ADMIN — PHENYLEPHRINE HYDROCHLORIDE 100 MCG: 10 INJECTION INTRAVENOUS at 12:04

## 2018-04-25 RX ADMIN — FENTANYL CITRATE 150 MCG: 50 INJECTION, SOLUTION INTRAMUSCULAR; INTRAVENOUS at 11:04

## 2018-04-25 RX ADMIN — PHENYLEPHRINE HYDROCHLORIDE 300 MCG: 10 INJECTION INTRAVENOUS at 02:04

## 2018-04-25 RX ADMIN — OXYCODONE HYDROCHLORIDE 5 MG: 5 TABLET ORAL at 09:04

## 2018-04-25 RX ADMIN — EPINEPHRINE 0.18 MCG/KG/MIN: 1 INJECTION INTRAMUSCULAR; INTRAVENOUS; SUBCUTANEOUS at 06:04

## 2018-04-25 RX ADMIN — PHENYLEPHRINE HYDROCHLORIDE 100 MCG: 10 INJECTION INTRAVENOUS at 02:04

## 2018-04-25 RX ADMIN — IBUPROFEN 400 MG: 400 TABLET, FILM COATED ORAL at 05:04

## 2018-04-25 RX ADMIN — PROPOFOL 70 MG: 10 INJECTION, EMULSION INTRAVENOUS at 11:04

## 2018-04-25 RX ADMIN — ROCURONIUM BROMIDE 10 MG: 10 INJECTION, SOLUTION INTRAVENOUS at 12:04

## 2018-04-25 RX ADMIN — ROCURONIUM BROMIDE 40 MG: 10 INJECTION, SOLUTION INTRAVENOUS at 11:04

## 2018-04-25 RX ADMIN — SODIUM CHLORIDE, SODIUM LACTATE, POTASSIUM CHLORIDE, AND CALCIUM CHLORIDE 1000 ML: .6; .31; .03; .02 INJECTION, SOLUTION INTRAVENOUS at 10:04

## 2018-04-25 RX ADMIN — PROPOFOL 40 MCG/KG/MIN: 10 INJECTION, EMULSION INTRAVENOUS at 05:04

## 2018-04-25 RX ADMIN — EPINEPHRINE 0.04 MCG/KG/MIN: 1 INJECTION INTRAMUSCULAR; INTRAVENOUS; SUBCUTANEOUS at 03:04

## 2018-04-25 RX ADMIN — PROPOFOL 20 MCG/KG/MIN: 10 INJECTION, EMULSION INTRAVENOUS at 04:04

## 2018-04-25 RX ADMIN — MIDAZOLAM HYDROCHLORIDE 1 MG: 1 INJECTION, SOLUTION INTRAMUSCULAR; INTRAVENOUS at 10:04

## 2018-04-25 RX ADMIN — EPINEPHRINE 0.16 MCG/KG/MIN: 1 INJECTION INTRAMUSCULAR; INTRAVENOUS; SUBCUTANEOUS at 05:04

## 2018-04-25 RX ADMIN — SODIUM CHLORIDE, POTASSIUM CHLORIDE, SODIUM LACTATE AND CALCIUM CHLORIDE: 600; 310; 30; 20 INJECTION, SOLUTION INTRAVENOUS at 02:04

## 2018-04-25 RX ADMIN — CEFAZOLIN 2 G: 330 INJECTION, POWDER, FOR SOLUTION INTRAMUSCULAR; INTRAVENOUS at 12:04

## 2018-04-25 RX ADMIN — Medication 0.2 MG: at 03:04

## 2018-04-25 RX ADMIN — IOHEXOL 75 ML: 350 INJECTION, SOLUTION INTRAVENOUS at 08:04

## 2018-04-25 RX ADMIN — SODIUM CHLORIDE, SODIUM GLUCONATE, SODIUM ACETATE, POTASSIUM CHLORIDE, MAGNESIUM CHLORIDE, SODIUM PHOSPHATE, DIBASIC, AND POTASSIUM PHOSPHATE: .53; .5; .37; .037; .03; .012; .00082 INJECTION, SOLUTION INTRAVENOUS at 02:04

## 2018-04-25 RX ADMIN — ACETAMINOPHEN 1000 MG: 10 INJECTION, SOLUTION INTRAVENOUS at 07:04

## 2018-04-25 RX ADMIN — PROPOFOL 10 MCG/KG/MIN: 10 INJECTION, EMULSION INTRAVENOUS at 04:04

## 2018-04-25 RX ADMIN — OXYCODONE HYDROCHLORIDE 5 MG: 5 TABLET ORAL at 03:04

## 2018-04-25 RX ADMIN — ONDANSETRON 4 MG: 2 INJECTION INTRAMUSCULAR; INTRAVENOUS at 02:04

## 2018-04-25 RX ADMIN — GLYCOPYRROLATE 0.6 MG: 0.2 INJECTION, SOLUTION INTRAMUSCULAR; INTRAVENOUS at 02:04

## 2018-04-25 RX ADMIN — SODIUM CHLORIDE: 0.9 INJECTION, SOLUTION INTRAVENOUS at 11:04

## 2018-04-25 RX ADMIN — SODIUM CHLORIDE, SODIUM LACTATE, POTASSIUM CHLORIDE, AND CALCIUM CHLORIDE: .6; .31; .03; .02 INJECTION, SOLUTION INTRAVENOUS at 06:04

## 2018-04-25 RX ADMIN — ROPIVACAINE HYDROCHLORIDE 10 ML/HR: 2 INJECTION, SOLUTION EPIDURAL; INFILTRATION at 04:04

## 2018-04-25 RX ADMIN — SODIUM CHLORIDE, SODIUM LACTATE, POTASSIUM CHLORIDE, AND CALCIUM CHLORIDE 1000 ML: .6; .31; .03; .02 INJECTION, SOLUTION INTRAVENOUS at 06:04

## 2018-04-25 NOTE — ANESTHESIA PROCEDURE NOTES
L SIFI Catheter    Patient location during procedure: pre-op   Block not for primary anesthetic.  Reason for block: at surgeon's request and post-op pain management   Post-op Pain Location: L Hip  Start time: 4/25/2018 10:32 AM  Timeout: 4/25/2018 10:30 AM   End time: 4/25/2018 10:41 AM  Staffing  Anesthesiologist: MALLIKA SCANLON  Performed: anesthesiologist   Preanesthetic Checklist  Completed: patient identified, site marked, surgical consent, pre-op evaluation, timeout performed, IV checked, risks and benefits discussed and monitors and equipment checked  Peripheral Block  Patient position: supine  Prep: ChloraPrep and site prepped and draped  Patient monitoring: heart rate, cardiac monitor, continuous pulse ox, continuous capnometry and frequent blood pressure checks  Block type: fascia iliaca (Suprainguinal fascia iliaca)  Laterality: left  Injection technique: continuous  Needle  Needle type: Tuohy   Needle gauge: 17 G  Needle length: 3.5 in  Needle localization: anatomical landmarks and ultrasound guidance  Catheter type: spring wound  Catheter size: 19 G  Test dose: lidocaine 1.5% with Epi 1-to-200,000 and negative   -ultrasound image captured on disc.  Assessment  Injection assessment: negative aspiration, negative parasthesia and local visualized surrounding nerve  Paresthesia pain: none  Heart rate change: no  Slow fractionated injection: yes  Medications:  Bolus administered: 40 mL of 0.25 ropivacaine  Epinephrine added: 3.75 mcg/mL (1/300,000)  Additional Notes  VSS.  [facility] RN monitoring vitals throughout procedure.  Patient tolerated procedure well.

## 2018-04-25 NOTE — PROGRESS NOTES
Son and daughter at bedside speaking with Dr Torre. Patient continues in stable condition. WIll continue to monitor

## 2018-04-25 NOTE — PROGRESS NOTES
[facility]  Orthopedics  Progress Note    Attg Note:  Patient seen and examined.  I agree with the above assessment and plan.  Patient has metastatic CA in multiple bony locations with bilateral femoral involvement.  I had a long discussion with the patient and family.  At present a primary is not known.  The left femur has significant involvement with a great deal of cortical lysis in the posterior aspect of the shaft.  The right also meets Mirels criteria for fixation.  I will proceed with the left first and take a bone biospy of the lesion area at surgery, with staged fixation of the right a few days later to get her out of bed and mobilized.  Nutrition is poor with prealbumin of 4.  The risks, benefits and alternatives to surgery were discussed with the patient at great length.  These include bleeding, infection, vessel/nerve damage, pain, numbness, tingling, complex regional pain syndrome, hardware/surgical failure, need for further surgery, malunion, nonunion, DVT, PE, arthritis and death.  I have also discussed with patient and family at length that if the lysis continues it could also erode around the nail.  Patient and family state an understanding and wishes to proceed with surgery.   All questions were answered.  No guarantees were implied or stated.  Informed consent was obtained.      dJ Cabrera MD      Patient Name: Kerline Grossman  MRN: [id number]  Admission Date: 4/20/2018  Hospital Length of Stay: 3 days  Attending Provider: David Cruz MD  Primary Care Provider: Primary Doctor No  Follow-up For: Procedure(s) (LRB):  IM NAILING OF FEMUR (Left)    Post-Operative Day:    Subjective:     Principal Problem:Hypotension    Principal Orthopedic Problem: B femur impending fractures    Interval History: Patient seen and examined at bedside.  No acute events overnight.  Pain controlled.    Review of patient's allergies indicates:  No Known Allergies    Current Facility-Administered  "Medications   Medication    acetaminophen tablet 650 mg    dextrose 50% injection 25 g    enoxaparin injection 40 mg    ibuprofen tablet 400 mg    lactated ringers infusion    lidocaine 5 % patch 1 patch    omnipaque oral solution 15 mL    ondansetron disintegrating tablet 8 mg    oxyCODONE immediate release tablet 5 mg    piperacillin-tazobactam 4.5 g in sodium chloride 0.9% 100 mL IVPB (ready to mix system)    senna-docusate 8.6-50 mg per tablet 2 tablet    sodium chloride 0.9% flush 5 mL     Objective:     Vital Signs (Most Recent):  Temp: 97.9 °F (36.6 °C) (04/24/18 1154)  Pulse: 104 (04/24/18 1154)  Resp: 20 (04/24/18 1154)  BP: 130/79 (04/24/18 1154)  SpO2: (!) 84 % (04/24/18 1154) Vital Signs (24h Range):  Temp:  [97.6 °F (36.4 °C)-99 °F (37.2 °C)] 97.9 °F (36.6 °C)  Pulse:  [103-121] 104  Resp:  [16-20] 20  SpO2:  [84 %-95 %] 84 %  BP: (106-133)/(62-85) 130/79     Weight: 75.3 kg (166 lb 0.1 oz)  Height: 5' 6" (167.6 cm)  Body mass index is 26.79 kg/m².      Intake/Output Summary (Last 24 hours) at 04/24/18 1513  Last data filed at 04/24/18 1500   Gross per 24 hour   Intake             1240 ml   Output                0 ml   Net             1240 ml       Ortho/SPM Exam  AAOx4  NAD  RRR  No increased WOB  SILT and motor intact T/SP/DP  WWP extremities  FCDs in place and functioning    Significant Labs:   CBC:   Recent Labs  Lab 04/23/18  0357 04/24/18  0510   WBC 8.25 8.26   HGB 9.9* 10.2*   HCT 31.3* 31.6*    202     CMP:   Recent Labs  Lab 04/23/18  0357 04/24/18  0510    138   K 4.2 4.1    107   CO2 20* 21*   GLU 96 68*   BUN 17 16   CREATININE 1.1 1.2   CALCIUM 7.7* 7.8*   PROT 5.4* 5.7*   ALBUMIN 1.4* 1.5*   BILITOT 0.5 0.5   ALKPHOS 418* 410*   AST 49* 47*   ALT 17 13   ANIONGAP 10 10   EGFRNONAA 56.3* 50.6*     All pertinent labs within the past 24 hours have been reviewed.    Significant Imaging: I have reviewed all pertinent imaging results/findings.    Assessment/Plan: "     Bony metastasis    58 y.o. female with B femur impending fx    Pain control  PT/OT: bed rest  DVT PPx: hold lovenox preop, FCDs at all times when not ambulating  Abx: zosyn  Labs: Hb 10.2  Drain: none  Ponce: none    Dispo: OR tomorrow for L femur CMN.  R/B/A discussed with patient and family.  They understand and wish to proceed.  NPO RYLAN Argueta MD  Orthopedics  [facility]

## 2018-04-25 NOTE — TRANSFER OF CARE
"Anesthesia Transfer of Care Note    Patient: Kerline Grossman    Procedure(s) Performed: Procedure(s) (LRB):  IM NAILING OF FEMUR (Left)    Patient location: PACU    Anesthesia Type: general    Transport from OR: Upon arrival to PACU/ICU, patient attached to ventilator and auscultated to confirm bilateral breath sounds and adequate TV    Post pain: adequate analgesia    Post assessment: no apparent anesthetic complications    Post vital signs: stable    Level of consciousness: sedated    Nausea/Vomiting: no nausea/vomiting    Complications: other (see comments)    Transfer of care protocol was followedComments: Pt to PACU w/ epinephrine drip d/t to unstable BP      Last vitals:   Visit Vitals  /66   Pulse (!) 125   Temp 36.9 °C (98.5 °F) (Oral)   Resp (!) 30   Ht 5' 6" (1.676 m)   Wt 68.6 kg (151 lb 3.8 oz)   SpO2 100%   Breastfeeding? No   BMI 24.41 kg/m²     "

## 2018-04-25 NOTE — PLAN OF CARE
Problem: Physical Therapy Goal  Goal: Physical Therapy Goal  Goals to be met by: 10 days ( 5/3)    Patient will increase functional independence with mobility by performin. Supine to sit with Stand-by Assistance  2. Sit to supine with Stand-by Assistance  3. Sit to stand transfer with Minimal Assistance using RW  4. Gait  x 10 feet with Minimal Assistance using Rolling Walker.   5. Lower extremity exercise program x20 reps per handout, with independence to improve muscular strength and endurance.      Outcome: Outcome(s) achieved Date Met: 18  Pt underwent surgery on . Discontinued from acute PT services. Will need new orders when medically appropriate.    Dorothy Escalera, PT, DPT  2018

## 2018-04-25 NOTE — SUBJECTIVE & OBJECTIVE
"Principal Problem:Cancer    Principal Orthopedic Problem: B femur impending fractures    Interval History: Patient seen and examined at bedside.  No acute events overnight.  Pain controlled.    Review of patient's allergies indicates:  No Known Allergies    Current Facility-Administered Medications   Medication    acetaminophen tablet 650 mg    dextrose 50% injection 25 g    ibuprofen tablet 400 mg    lactated ringers infusion    lidocaine 5 % patch 1 patch    omnipaque oral solution 15 mL    ondansetron disintegrating tablet 8 mg    oxyCODONE immediate release tablet 5 mg    piperacillin-tazobactam 4.5 g in sodium chloride 0.9% 100 mL IVPB (ready to mix system)    senna-docusate 8.6-50 mg per tablet 2 tablet    sodium chloride 0.9% flush 5 mL    white petrolatum 41 % ointment     Objective:     Vital Signs (Most Recent):  Temp: 98.3 °F (36.8 °C) (04/24/18 1956)  Pulse: (!) 115 (04/24/18 1956)  Resp: 18 (04/24/18 1956)  BP: 129/72 (04/24/18 1956)  SpO2: (!) 92 % (04/24/18 1956) Vital Signs (24h Range):  Temp:  [97.6 °F (36.4 °C)-98.3 °F (36.8 °C)] 98.3 °F (36.8 °C)  Pulse:  [103-115] 115  Resp:  [18-20] 18  SpO2:  [84 %-95 %] 92 %  BP: (116-133)/(68-85) 129/72     Weight: 68.6 kg (151 lb 3.8 oz)  Height: 5' 6" (167.6 cm)  Body mass index is 24.41 kg/m².      Intake/Output Summary (Last 24 hours) at 04/25/18 0554  Last data filed at 04/24/18 1830   Gross per 24 hour   Intake             1250 ml   Output                0 ml   Net             1250 ml       Ortho/SPM Exam    AAOx4  NAD  RRR  No increased WOB  SILT and motor intact T/SP/DP  WWP extremities  FCDs in place and functioning    Significant Labs:   CBC:     Recent Labs  Lab 04/24/18  0510   WBC 8.26   HGB 10.2*   HCT 31.6*        CMP:     Recent Labs  Lab 04/24/18  0510      K 4.1      CO2 21*   GLU 68*   BUN 16   CREATININE 1.2   CALCIUM 7.8*   PROT 5.7*   ALBUMIN 1.5*   BILITOT 0.5   ALKPHOS 410*   AST 47*   ALT 13   ANIONGAP " 10   EGFRNONAA 50.6*     All pertinent labs within the past 24 hours have been reviewed.    Significant Imaging: I have reviewed all pertinent imaging results/findings.

## 2018-04-25 NOTE — PROGRESS NOTES
Critical lab called to this nurse of Lactic Acid of 3.9. Results given to MD Yelitza with SICU team. See new orders. Will carry out and continue to monitor

## 2018-04-25 NOTE — ANESTHESIA PROCEDURE NOTES
Arterial    Diagnosis: Metastatic Cancer    Patient location during procedure: done in OR  Procedure start time: 4/25/2018 2:37 PM  Timeout: 4/25/2018 2:36 PM  Procedure end time: 4/25/2018 2:38 PM  Staffing  Anesthesiologist: MARY OCONNOR  Performed: anesthesiologist   Anesthesiologist was present at the time of the procedure.  Preanesthetic Checklist  Completed: patient identified, site marked, surgical consent, pre-op evaluation, timeout performed, IV checked, risks and benefits discussed, monitors and equipment checked and anesthesia consent givenArterial  Skin Prep: chlorhexidine gluconate  Local Infiltration: none  Orientation: right  Location: radial  Catheter Size: 20 G  Catheter placement by Anatomical landmarks. Heme positive aspiration all ports.Insertion Attempts: 1  Assessment  Dressing: secured with tape and tegaderm  Patient: Tolerated well

## 2018-04-25 NOTE — SUBJECTIVE & OBJECTIVE
Medications:  Continuous Infusions:   lactated ringers 50 mL/hr at 04/25/18 0241     Scheduled Meds:   ibuprofen  400 mg Oral Q8H    lidocaine  1 patch Transdermal Q24H    ropivacaine (PF) 5 mg/ml (0.5%)        senna-docusate 8.6-50 mg  2 tablet Oral Daily     PRN Meds:acetaminophen, dextrose 50%, omnipaque, ondansetron, oxyCODONE, sodium chloride 0.9%, white petrolatum     Review of Systems   Constitutional: Positive for appetite change and fatigue. Negative for chills and fever.   HENT: Positive for trouble swallowing.    Respiratory: Negative for chest tightness and wheezing.    Cardiovascular: Negative for chest pain and palpitations.   Gastrointestinal: Positive for abdominal pain. Negative for vomiting.   Musculoskeletal: Positive for back pain.   Neurological: Positive for weakness (RUE. Also generalized.).     Objective:     Vital Signs (Most Recent):  Temp: 98.6 °F (37 °C) (04/25/18 0839)  Pulse: (!) 114 (04/25/18 0839)  Resp: 20 (04/25/18 0839)  BP: 130/82 (04/25/18 0839)  SpO2: (!) 89 % (04/25/18 0841) Vital Signs (24h Range):  Temp:  [97.6 °F (36.4 °C)-98.6 °F (37 °C)] 98.6 °F (37 °C)  Pulse:  [103-115] 114  Resp:  [18-20] 20  SpO2:  [84 %-95 %] 89 %  BP: (116-133)/(68-85) 130/82     Weight: 68.6 kg (151 lb 3.8 oz)  Body mass index is 24.41 kg/m².  Body surface area is 1.79 meters squared.      Intake/Output Summary (Last 24 hours) at 04/25/18 1003  Last data filed at 04/24/18 1830   Gross per 24 hour   Intake             1250 ml   Output                0 ml   Net             1250 ml     Physical Exam   Constitutional: She is oriented to person, place, and time. She appears well-developed. No distress.   HENT:   Head: Normocephalic and atraumatic.   Eyes: Conjunctivae and EOM are normal.   Cardiovascular: S1 normal and S2 normal.  Tachycardia present.    Pulmonary/Chest: No accessory muscle usage. Tachypnea noted.   Abdominal: Soft. There is tenderness (mild epigastric).   Musculoskeletal: She  exhibits tenderness (L knee). She exhibits no edema.   Neurological: She is alert and oriented to person, place, and time.   Skin: Skin is warm and dry.   Nursing note and vitals reviewed.    Significant Labs:   Recent Results (from the past 24 hour(s))   Lactic acid, plasma    Collection Time: 04/26/18 11:37 AM   Result Value Ref Range    Lactate (Lactic Acid) 2.1 0.5 - 2.2 mmol/L   CBC auto differential    Collection Time: 04/26/18 11:37 AM   Result Value Ref Range    WBC 11.86 3.90 - 12.70 K/uL    RBC 3.47 (L) 4.00 - 5.40 M/uL    Hemoglobin 9.8 (L) 12.0 - 16.0 g/dL    Hematocrit 30.3 (L) 37.0 - 48.5 %    MCV 87 82 - 98 fL    MCH 28.2 27.0 - 31.0 pg    MCHC 32.3 32.0 - 36.0 g/dL    RDW 16.9 (H) 11.5 - 14.5 %    Platelets 152 150 - 350 K/uL    MPV 12.0 9.2 - 12.9 fL    Immature Granulocytes CANCELED 0.0 - 0.5 %    Immature Grans (Abs) CANCELED 0.00 - 0.04 K/uL    nRBC 2 (A) 0 /100 WBC    Gran% 78.0 (H) 38.0 - 73.0 %    Lymph% 11.0 (L) 18.0 - 48.0 %    Mono% 5.0 4.0 - 15.0 %    Eosinophil% 2.0 0.0 - 8.0 %    Basophil% 0.0 0.0 - 1.9 %    Bands 4.0 %    Platelet Estimate Appears normal     Aniso Slight     Poik Slight     Poly Occasional     Ovalocytes Occasional     Thad Cells Occasional     Schistocytes Present     Differential Method Manual    POCT glucose    Collection Time: 04/26/18 11:53 AM   Result Value Ref Range    POCT Glucose 79 70 - 110 mg/dL   Basic metabolic panel    Collection Time: 04/26/18  1:01 PM   Result Value Ref Range    Sodium 138 136 - 145 mmol/L    Potassium 4.1 3.5 - 5.1 mmol/L    Chloride 111 (H) 95 - 110 mmol/L    CO2 17 (L) 23 - 29 mmol/L    Glucose 150 (H) 70 - 110 mg/dL    BUN, Bld 17 6 - 20 mg/dL    Creatinine 1.2 0.5 - 1.4 mg/dL    Calcium 7.8 (L) 8.7 - 10.5 mg/dL    Anion Gap 10 8 - 16 mmol/L    eGFR if African American 58.4 (A) >60 mL/min/1.73 m^2    eGFR if non African American 50.6 (A) >60 mL/min/1.73 m^2   Magnesium    Collection Time: 04/26/18  1:01 PM   Result Value Ref Range     Magnesium 1.9 1.6 - 2.6 mg/dL   Phosphorus    Collection Time: 04/26/18  1:01 PM   Result Value Ref Range    Phosphorus 3.2 2.7 - 4.5 mg/dL   POCT glucose    Collection Time: 04/26/18  1:08 PM   Result Value Ref Range    POCT Glucose 152 (H) 70 - 110 mg/dL   Lactic acid, plasma    Collection Time: 04/26/18  2:35 PM   Result Value Ref Range    Lactate (Lactic Acid) 4.2 (HH) 0.5 - 2.2 mmol/L   ISTAT PROCEDURE    Collection Time: 04/26/18  2:36 PM   Result Value Ref Range    POC PH 7.438 7.35 - 7.45    POC PCO2 23.2 (LL) 35 - 45 mmHg    POC PO2 43 (LL) 80 - 100 mmHg    POC HCO3 15.7 (L) 24 - 28 mmol/L    POC BE -8 -2 to 2 mmol/L    POC SATURATED O2 82 (L) 95 - 100 %    POC TCO2 16 (L) 23 - 27 mmol/L    Time Notifed: 1441     Sample ARTERIAL     Site [facility]     Allens Test N/A     DelSys Nasal Can     Mode SPONT     Flow 2    Lactic acid, plasma    Collection Time: 04/26/18  6:02 PM   Result Value Ref Range    Lactate (Lactic Acid) 3.4 (H) 0.5 - 2.2 mmol/L   POCT glucose    Collection Time: 04/26/18  6:10 PM   Result Value Ref Range    POCT Glucose 108 70 - 110 mg/dL   ISTAT PROCEDURE    Collection Time: 04/26/18  6:12 PM   Result Value Ref Range    POC PH 7.470 (H) 7.35 - 7.45    POC PCO2 24.4 (LL) 35 - 45 mmHg    POC PO2 42 (LL) 80 - 100 mmHg    POC HCO3 17.8 (L) 24 - 28 mmol/L    POC BE -6 -2 to 2 mmol/L    POC SATURATED O2 82 (L) 95 - 100 %    POC TCO2 19 (L) 23 - 27 mmol/L    Verbal Result Readback Performed Yes     Provider Credentials: MD     Provider Notified: SPIER     Time Notifed: 1818     Sample ARTERIAL     Site Jocelyn/[facility]     Allens Test N/A     DelSys Nasal Can     Mode SPONT     Flow 2     FiO2 30     Sp02 94    Lactic acid, plasma    Collection Time: 04/26/18  9:40 PM   Result Value Ref Range    Lactate (Lactic Acid) 2.8 (H) 0.5 - 2.2 mmol/L   Comprehensive metabolic panel    Collection Time: 04/26/18  9:40 PM   Result Value Ref Range    Sodium 140 136 - 145 mmol/L    Potassium 4.0 3.5 - 5.1 mmol/L     Chloride 112 (H) 95 - 110 mmol/L    CO2 19 (L) 23 - 29 mmol/L    Glucose 98 70 - 110 mg/dL    BUN, Bld 16 6 - 20 mg/dL    Creatinine 1.1 0.5 - 1.4 mg/dL    Calcium 7.6 (L) 8.7 - 10.5 mg/dL    Total Protein 4.3 (L) 6.0 - 8.4 g/dL    Albumin 1.3 (L) 3.5 - 5.2 g/dL    Total Bilirubin 0.3 0.1 - 1.0 mg/dL    Alkaline Phosphatase 351 (H) 55 - 135 U/L    AST 69 (H) 10 - 40 U/L    ALT 10 10 - 44 U/L    Anion Gap 9 8 - 16 mmol/L    eGFR if African American >60.0 >60 mL/min/1.73 m^2    eGFR if non  56.3 (A) >60 mL/min/1.73 m^2   Magnesium    Collection Time: 04/26/18  9:40 PM   Result Value Ref Range    Magnesium 1.8 1.6 - 2.6 mg/dL   POCT glucose    Collection Time: 04/27/18 12:31 AM   Result Value Ref Range    POCT Glucose 103 70 - 110 mg/dL   Lactic acid, plasma    Collection Time: 04/27/18  3:55 AM   Result Value Ref Range    Lactate (Lactic Acid) 1.8 0.5 - 2.2 mmol/L   CBC with Automated Differential    Collection Time: 04/27/18  3:55 AM   Result Value Ref Range    WBC 12.26 3.90 - 12.70 K/uL    RBC 3.17 (L) 4.00 - 5.40 M/uL    Hemoglobin 8.9 (L) 12.0 - 16.0 g/dL    Hematocrit 27.6 (L) 37.0 - 48.5 %    MCV 87 82 - 98 fL    MCH 28.1 27.0 - 31.0 pg    MCHC 32.2 32.0 - 36.0 g/dL    RDW 17.4 (H) 11.5 - 14.5 %    Platelets 154 150 - 350 K/uL    MPV 12.1 9.2 - 12.9 fL    Immature Granulocytes CANCELED 0.0 - 0.5 %    Immature Grans (Abs) CANCELED 0.00 - 0.04 K/uL    Lymph # CANCELED 1.0 - 4.8 K/uL    Mono # CANCELED 0.3 - 1.0 K/uL    Eos # CANCELED 0.0 - 0.5 K/uL    Baso # CANCELED 0.00 - 0.20 K/uL    nRBC 3 (A) 0 /100 WBC    Gran% 57.0 38.0 - 73.0 %    Lymph% 26.0 18.0 - 48.0 %    Mono% 7.0 4.0 - 15.0 %    Eosinophil% 1.0 0.0 - 8.0 %    Basophil% 0.0 0.0 - 1.9 %    Bands 4.0 %    Metamyelocytes 3.0 %    Myelocytes 2.0 %    Aniso Slight     Poik Slight     Poly Occasional     Ovalocytes Occasional     [place] Cells Occasional     Differential Method Manual    Comprehensive Metabolic Panel (CMP)    Collection Time:  04/27/18  3:55 AM   Result Value Ref Range    Sodium 141 136 - 145 mmol/L    Potassium 4.0 3.5 - 5.1 mmol/L    Chloride 114 (H) 95 - 110 mmol/L    CO2 17 (L) 23 - 29 mmol/L    Glucose 79 70 - 110 mg/dL    BUN, Bld 15 6 - 20 mg/dL    Creatinine 1.1 0.5 - 1.4 mg/dL    Calcium 7.6 (L) 8.7 - 10.5 mg/dL    Total Protein 4.3 (L) 6.0 - 8.4 g/dL    Albumin 1.3 (L) 3.5 - 5.2 g/dL    Total Bilirubin 0.3 0.1 - 1.0 mg/dL    Alkaline Phosphatase 353 (H) 55 - 135 U/L    AST 68 (H) 10 - 40 U/L    ALT 8 (L) 10 - 44 U/L    Anion Gap 10 8 - 16 mmol/L    eGFR if African American >60.0 >60 mL/min/1.73 m^2    eGFR if non  56.3 (A) >60 mL/min/1.73 m^2   Magnesium    Collection Time: 04/27/18  3:55 AM   Result Value Ref Range    Magnesium 1.6 1.6 - 2.6 mg/dL   Phosphorus    Collection Time: 04/27/18  3:55 AM   Result Value Ref Range    Phosphorus 3.0 2.7 - 4.5 mg/dL   POCT glucose    Collection Time: 04/27/18  5:38 AM   Result Value Ref Range    POCT Glucose 70 70 - 110 mg/dL     Diagnostic Results:  CXR 04/27/18:  Endotracheal tube is no longer noted.  Blunting of the right costophrenic sulcus appears slightly more pronounced on this examination than on the previous study referenced above, which may relate to a minimally increased volume of pleural fluid on this side, but overall, allowing for a poorer inspiratory depth on the current examination, there has been no significant interval change in the appearance of the chest since 04/26/2018.  Numerous osseous abnormalities have been delineated on the report of an osseous metastatic survey performed 04/23/2018.

## 2018-04-25 NOTE — ASSESSMENT & PLAN NOTE
58 y.o. female with B femur impending fx    Pain control  PT/OT: bed rest  DVT PPx: hold lovenox preop, FCDs at all times when not ambulating  Abx: zosyn  Labs: Hb pending  Drain: none  Ponce: none    Dispo: OR today for L femur CMN.  R/B/A discussed with patient and family.  They understand and wish to proceed.  NPO MN

## 2018-04-25 NOTE — PT/OT/SLP DISCHARGE
Physical Therapy Discharge Summary    Name: Kerline Grossman  MRN: [id number]   Principal Problem: Cancer     Patient Discharged from acute Physical Therapy on 2018.  Please refer to prior PT noted date on 2018 for functional status.     Assessment:     Patient appropriate for care in another setting.    Objective:     GOALS:    Physical Therapy Goals        Problem: Physical Therapy Goal    Goal Priority Disciplines Outcome Goal Variances Interventions   Physical Therapy Goal     PT/OT, PT Ongoing (interventions implemented as appropriate)     Description:  Goals to be met by: 10 days ( 5/3)    Patient will increase functional independence with mobility by performin. Supine to sit with Stand-by Assistance  2. Sit to supine with Stand-by Assistance  3. Sit to stand transfer with Minimal Assistance using RW  4. Gait  x 10 feet with Minimal Assistance using Rolling Walker.   5. Lower extremity exercise program x20 reps per handout, with independence to improve muscular strength and endurance.                       Reasons for Discontinuation of Therapy Services  Patient is unable to continue work toward goals because of medical or psychosocial complications.    Surgery on  of L femur    Plan:     Patient Discharged to: [facility].    Dorothy Escalera, PT, DPT  2018

## 2018-04-25 NOTE — PROGRESS NOTES
"[facility]  Orthopedics  Progress Note    Patient Name: Kerline Grossman  MRN: [id number]  Admission Date: 4/20/2018  Hospital Length of Stay: 4 days  Attending Provider: David Cruz MD  Primary Care Provider: Primary Doctor No  Follow-up For: Procedure(s) (LRB):  IM NAILING OF FEMUR (Left)    Post-Operative Day:    Subjective:     Principal Problem:Cancer    Principal Orthopedic Problem: B femur impending fractures    Interval History: Patient seen and examined at bedside.  No acute events overnight.  Pain controlled.    Review of patient's allergies indicates:  No Known Allergies    Current Facility-Administered Medications   Medication    acetaminophen tablet 650 mg    dextrose 50% injection 25 g    ibuprofen tablet 400 mg    lactated ringers infusion    lidocaine 5 % patch 1 patch    omnipaque oral solution 15 mL    ondansetron disintegrating tablet 8 mg    oxyCODONE immediate release tablet 5 mg    piperacillin-tazobactam 4.5 g in sodium chloride 0.9% 100 mL IVPB (ready to mix system)    senna-docusate 8.6-50 mg per tablet 2 tablet    sodium chloride 0.9% flush 5 mL    white petrolatum 41 % ointment     Objective:     Vital Signs (Most Recent):  Temp: 98.3 °F (36.8 °C) (04/24/18 1956)  Pulse: (!) 115 (04/24/18 1956)  Resp: 18 (04/24/18 1956)  BP: 129/72 (04/24/18 1956)  SpO2: (!) 92 % (04/24/18 1956) Vital Signs (24h Range):  Temp:  [97.6 °F (36.4 °C)-98.3 °F (36.8 °C)] 98.3 °F (36.8 °C)  Pulse:  [103-115] 115  Resp:  [18-20] 18  SpO2:  [84 %-95 %] 92 %  BP: (116-133)/(68-85) 129/72     Weight: 68.6 kg (151 lb 3.8 oz)  Height: 5' 6" (167.6 cm)  Body mass index is 24.41 kg/m².      Intake/Output Summary (Last 24 hours) at 04/25/18 0554  Last data filed at 04/24/18 1830   Gross per 24 hour   Intake             1250 ml   Output                0 ml   Net             1250 ml       Ortho/SPM Exam    AAOx4  NAD  RRR  No increased WOB  SILT and motor intact T/SP/DP  WWP extremities  FCDs " in place and functioning    Significant Labs:   CBC:     Recent Labs  Lab 04/24/18  0510   WBC 8.26   HGB 10.2*   HCT 31.6*        CMP:     Recent Labs  Lab 04/24/18  0510      K 4.1      CO2 21*   GLU 68*   BUN 16   CREATININE 1.2   CALCIUM 7.8*   PROT 5.7*   ALBUMIN 1.5*   BILITOT 0.5   ALKPHOS 410*   AST 47*   ALT 13   ANIONGAP 10   EGFRNONAA 50.6*     All pertinent labs within the past 24 hours have been reviewed.    Significant Imaging: I have reviewed all pertinent imaging results/findings.    Assessment/Plan:     Bony metastasis    58 y.o. female with B femur impending fx    Pain control  PT/OT: bed rest  DVT PPx: hold lovenox preop, FCDs at all times when not ambulating  Abx: zosyn  Labs: Hb pending  Drain: none  Ponce: none    Dispo: OR today for L femur CMN.  R/B/A discussed with patient and family.  They understand and wish to proceed.  NPO RYLAN Argueta MD  Orthopedics  [facility]

## 2018-04-25 NOTE — PROGRESS NOTES
1515 Patient admitted to PACU and placed on ESPRIT vent. Changed from SIMV mode to ASSIST CONTROL per Dr. Pelayo. Please refer to flowsheet for settings and changes

## 2018-04-25 NOTE — BRIEF OP NOTE
BRIEF OP NOTE    Preop Dx: Metastatic cancer with multiple lytic lesions and impending fracture left femur    Postop Dx: Metastatic cancer with lytic lesions and impending fracture left femur    Procedure: Cephalomedullary nail fixation of left femur    Open biopsy, deep, left femur    Surgeon: Jd Cabrera M.D.    Asst:  Frank Elizondo M.D    Anesthesia: GETA    EBL:  200cc    IVF:  2000cc crystalloid    Implants: Synthes TFNa [id number] with 95mm hip screw and 2 distal IL screws    Specimens: Bone for pathology    Findings: Dificult proximal shaft to bipass.  Stable fixation.      Dispo:  To PACU/SICU intubated/stable     Dict#  [id number]

## 2018-04-25 NOTE — PROGRESS NOTES
[facility]  Hematology/Oncology  Progress Note    Patient Name: Kerline Grossman  Admission Date: 4/20/2018  Hospital Length of Stay: 4 days  Code Status: Full Code     Subjective:     Interval History: No acute events overnight. Plan for CMN with orthopedics for pathologic fracture prophylaxis this morning.    Medications:  Continuous Infusions:   lactated ringers 50 mL/hr at 04/25/18 0241     Scheduled Meds:   ibuprofen  400 mg Oral Q8H    lidocaine  1 patch Transdermal Q24H    ropivacaine (PF) 5 mg/ml (0.5%)        senna-docusate 8.6-50 mg  2 tablet Oral Daily     PRN Meds:acetaminophen, dextrose 50%, omnipaque, ondansetron, oxyCODONE, sodium chloride 0.9%, white petrolatum     Review of Systems  Objective:     Vital Signs (Most Recent):  Temp: 98.6 °F (37 °C) (04/25/18 0839)  Pulse: (!) 114 (04/25/18 0839)  Resp: 20 (04/25/18 0839)  BP: 130/82 (04/25/18 0839)  SpO2: (!) 89 % (04/25/18 0841) Vital Signs (24h Range):  Temp:  [97.6 °F (36.4 °C)-98.6 °F (37 °C)] 98.6 °F (37 °C)  Pulse:  [103-115] 114  Resp:  [18-20] 20  SpO2:  [84 %-95 %] 89 %  BP: (116-133)/(68-85) 130/82     Weight: 68.6 kg (151 lb 3.8 oz)  Body mass index is 24.41 kg/m².  Body surface area is 1.79 meters squared.      Intake/Output Summary (Last 24 hours) at 04/25/18 1003  Last data filed at 04/24/18 1830   Gross per 24 hour   Intake             1250 ml   Output                0 ml   Net             1250 ml     Physical Exam   Constitutional: She is oriented to person, place, and time. She appears well-developed. No distress.   HENT:   Head: Normocephalic and atraumatic.   Eyes: Conjunctivae and EOM are normal.   Cardiovascular: Normal rate and intact distal pulses.    Pulmonary/Chest: Effort normal. No respiratory distress.   Neurological: She is alert and oriented to person, place, and time.   Skin: Skin is warm and dry.   Nursing note and vitals reviewed.    Significant Labs:   Recent Results (from the past 24 hour(s))    Troponin I    Collection Time: 04/24/18 12:00 PM   Result Value Ref Range    Troponin I <0.006 0.000 - 0.026 ng/mL   Transferrin    Collection Time: 04/24/18  3:49 PM   Result Value Ref Range    Transferrin 128 (L) 200 - 375 mg/dL   Type & Screen    Collection Time: 04/24/18  3:49 PM   Result Value Ref Range    Group & Rh O POS     Indirect Mikaela NEG    APTT    Collection Time: 04/24/18  3:49 PM   Result Value Ref Range    aPTT 25.7 21.0 - 32.0 sec   Procalcitonin    Collection Time: 04/24/18  3:49 PM   Result Value Ref Range    Procalcitonin 0.51 (H) <0.25 ng/mL   Protime-INR    Collection Time: 04/24/18  3:49 PM   Result Value Ref Range    Prothrombin Time 9.9 9.0 - 12.5 sec    INR 0.9 0.8 - 1.2   Troponin I    Collection Time: 04/24/18  6:07 PM   Result Value Ref Range    Troponin I 0.011 0.000 - 0.026 ng/mL   Urinalysis    Collection Time: 04/25/18  1:44 AM   Result Value Ref Range    Specimen UA Urine, Unspecified     Color, UA Straw Yellow, Straw, Theresa    Appearance, UA Clear Clear    pH, UA 7.0 5.0 - 8.0    Specific Gravity, UA 1.010 1.005 - 1.030    Protein, UA Negative Negative    Glucose, UA Negative Negative    Ketones, UA Negative Negative    Bilirubin (UA) Negative Negative    Occult Blood UA Negative Negative    Nitrite, UA Negative Negative    Urobilinogen, UA Negative <2.0 EU/dL    Leukocytes, UA Trace (A) Negative   Urinalysis Microscopic    Collection Time: 04/25/18  1:44 AM   Result Value Ref Range    RBC, UA 1 0 - 4 /hpf    WBC, UA 2 0 - 5 /hpf    Bacteria, UA Occasional None-Occ /hpf    Yeast, UA Occasional (A) None    Squam Epithel, UA 1 /hpf    Microscopic Comment SEE COMMENT    CBC with Automated Differential    Collection Time: 04/25/18  4:43 AM   Result Value Ref Range    WBC 9.65 3.90 - 12.70 K/uL    RBC 3.79 (L) 4.00 - 5.40 M/uL    Hemoglobin 10.8 (L) 12.0 - 16.0 g/dL    Hematocrit 34.4 (L) 37.0 - 48.5 %    MCV 91 82 - 98 fL    MCH 28.5 27.0 - 31.0 pg    MCHC 31.4 (L) 32.0 - 36.0 g/dL     RDW 15.5 (H) 11.5 - 14.5 %    Platelets 253 150 - 350 K/uL    MPV 11.4 9.2 - 12.9 fL    Immature Granulocytes CANCELED 0.0 - 0.5 %    Immature Grans (Abs) CANCELED 0.00 - 0.04 K/uL    Lymph # CANCELED 1.0 - 4.8 K/uL    Mono # CANCELED 0.3 - 1.0 K/uL    Eos # CANCELED 0.0 - 0.5 K/uL    Baso # CANCELED 0.00 - 0.20 K/uL    nRBC 1 (A) 0 /100 WBC    Gran% 92.0 (H) 38.0 - 73.0 %    Lymph% 5.0 (L) 18.0 - 48.0 %    Mono% 1.0 (L) 4.0 - 15.0 %    Eosinophil% 0.0 0.0 - 8.0 %    Basophil% 0.0 0.0 - 1.9 %    Bands 1.0 %    Metamyelocytes 1.0 %    Aniso Slight     Poik Slight     Poly Occasional     Hypo Occasional     Ovalocytes Occasional     Ferron Cells Occasional     Differential Method Manual    Comprehensive Metabolic Panel (CMP)    Collection Time: 04/25/18  4:43 AM   Result Value Ref Range    Sodium 141 136 - 145 mmol/L    Potassium 4.2 3.5 - 5.1 mmol/L    Chloride 109 95 - 110 mmol/L    CO2 21 (L) 23 - 29 mmol/L    Glucose 85 70 - 110 mg/dL    BUN, Bld 17 6 - 20 mg/dL    Creatinine 1.3 0.5 - 1.4 mg/dL    Calcium 8.1 (L) 8.7 - 10.5 mg/dL    Total Protein 5.9 (L) 6.0 - 8.4 g/dL    Albumin 1.6 (L) 3.5 - 5.2 g/dL    Total Bilirubin 0.4 0.1 - 1.0 mg/dL    Alkaline Phosphatase 411 (H) 55 - 135 U/L    AST 53 (H) 10 - 40 U/L    ALT 13 10 - 44 U/L    Anion Gap 11 8 - 16 mmol/L    eGFR if African American 53.0 (A) >60 mL/min/1.73 m^2    eGFR if non  46.0 (A) >60 mL/min/1.73 m^2   Magnesium    Collection Time: 04/25/18  4:43 AM   Result Value Ref Range    Magnesium 1.7 1.6 - 2.6 mg/dL   Phosphorus    Collection Time: 04/25/18  4:43 AM   Result Value Ref Range    Phosphorus 3.4 2.7 - 4.5 mg/dL     Diagnostic Results:  No new imaging this morning.    Assessment/Plan:     Bony metastasis    - Outside pathology from [facility] obtained demonstrating moderately differentiated adenocarcinoma. Differential includes GI, lung, breast as potential sources; with previously collected bloodwork and significantly elevated CA 27-29,  high suspicion for breast malignancy as primary source at this time.  - Will contact [facility] regarding further pathology staining for hormone receptors including ER, SD, HER2; they may not have enough tissue to perform further immunohistochemical staining.  - Discussion again with patient's family regarding the emphasis on treatment rather than curative therapy given her diffuse metastasis.          JAMES Holly MD   PGY-3  [phone]    Attending addendum:  Patient in operating room at time of rounds.  Agree with ER, SD and HER-2 testing.  We'll follow-up with patient tomorrow.

## 2018-04-25 NOTE — PT/OT/SLP PROGRESS
Speech Language Pathology      Kerline Grossman  MRN: [id number]    Patient not seen today secondary to Unavailable (Comment), Other (Comment) (off the floor for procedure). Will follow-up at a later date and time as pt is available.    Carolina Blanco, JOHN-SLP   4/25/2018

## 2018-04-25 NOTE — PROGRESS NOTES
Dr Becerra at bedside to perform central line. Procedure explained to patient. Will continue to monitor

## 2018-04-25 NOTE — ASSESSMENT & PLAN NOTE
· Biopsy obtained from left femur suggestive of metastatic carcinoma likely of breast origin with weakly positive ER/OK+. Findings similar to outside path.   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 Her overall prognosis is extremely poor now averaging about 2.5 months, but more realistically weeks.  · Intrathecal chemotherapy and systemic hormonal therapy would not have a significant impact on survival and might actually be harmful.  · Would focus on quality of life and comfort measures at this time.   · An extensive discussion was had with the family today, one son and one daughter were present in which  Ms. Grossman participated to the best of her ability  · Ms. Grossman gave the impression of being amenable to hospice care and did express a preference for home hospice, however the family needs to discuss whether or not they will be able to meet her needs.  · Overall impression of meeting is that they are considering hospice but at this point are still weighing inpatient vs outpatient.  · Will ask case management to provide more information on inpatient and outpatient hospice services.  · Appreciate orthopedics and palliative care teams' assistance.  · Recommend case management provide hospice information to assist family in locating a provider or facility if they so choose.

## 2018-04-26 PROBLEM — J96.01 ACUTE HYPOXEMIC RESPIRATORY FAILURE: Status: ACTIVE | Noted: 2018-04-26

## 2018-04-26 PROBLEM — I50.810 RIGHT HEART FAILURE: Status: ACTIVE | Noted: 2018-04-26

## 2018-04-26 LAB
ALBUMIN SERPL BCP-MCNC: 1.2 G/DL
ALBUMIN SERPL BCP-MCNC: 1.3 G/DL
ALLENS TEST: ABNORMAL
ALP SERPL-CCNC: 300 U/L
ALP SERPL-CCNC: 351 U/L
ALT SERPL W/O P-5'-P-CCNC: 10 U/L
ALT SERPL W/O P-5'-P-CCNC: 14 U/L
ANION GAP SERPL CALC-SCNC: 10 MMOL/L
ANION GAP SERPL CALC-SCNC: 9 MMOL/L
ANION GAP SERPL CALC-SCNC: 9 MMOL/L
ANISOCYTOSIS BLD QL SMEAR: SLIGHT
ANISOCYTOSIS BLD QL SMEAR: SLIGHT
AST SERPL-CCNC: 61 U/L
AST SERPL-CCNC: 69 U/L
BACTERIA BLD CULT: NORMAL
BACTERIA BLD CULT: NORMAL
BASOPHILS # BLD AUTO: ABNORMAL K/UL
BASOPHILS NFR BLD: 0 %
BASOPHILS NFR BLD: 0 %
BILIRUB SERPL-MCNC: 0.3 MG/DL
BILIRUB SERPL-MCNC: 0.4 MG/DL
BLD PROD TYP BPU: NORMAL
BLD PROD TYP BPU: NORMAL
BLOOD UNIT EXPIRATION DATE: NORMAL
BLOOD UNIT EXPIRATION DATE: NORMAL
BLOOD UNIT TYPE CODE: 5100
BLOOD UNIT TYPE CODE: 5100
BLOOD UNIT TYPE: NORMAL
BLOOD UNIT TYPE: NORMAL
BUN SERPL-MCNC: 16 MG/DL
BUN SERPL-MCNC: 17 MG/DL
BUN SERPL-MCNC: 18 MG/DL
BURR CELLS BLD QL SMEAR: ABNORMAL
CALCIUM SERPL-MCNC: 7.6 MG/DL
CALCIUM SERPL-MCNC: 7.7 MG/DL
CALCIUM SERPL-MCNC: 7.8 MG/DL
CHLORIDE SERPL-SCNC: 111 MMOL/L
CHLORIDE SERPL-SCNC: 112 MMOL/L
CHLORIDE SERPL-SCNC: 112 MMOL/L
CO2 SERPL-SCNC: 17 MMOL/L
CO2 SERPL-SCNC: 19 MMOL/L
CO2 SERPL-SCNC: 19 MMOL/L
CODING SYSTEM: NORMAL
CODING SYSTEM: NORMAL
CREAT SERPL-MCNC: 1.1 MG/DL
CREAT SERPL-MCNC: 1.2 MG/DL
CREAT SERPL-MCNC: 1.2 MG/DL
DELSYS: ABNORMAL
DIFFERENTIAL METHOD: ABNORMAL
DIFFERENTIAL METHOD: ABNORMAL
DISPENSE STATUS: NORMAL
DISPENSE STATUS: NORMAL
EOSINOPHIL # BLD AUTO: ABNORMAL K/UL
EOSINOPHIL NFR BLD: 2 %
EOSINOPHIL NFR BLD: 3 %
ERYTHROCYTE [DISTWIDTH] IN BLOOD BY AUTOMATED COUNT: 16 %
ERYTHROCYTE [DISTWIDTH] IN BLOOD BY AUTOMATED COUNT: 16.9 %
ERYTHROCYTE [SEDIMENTATION RATE] IN BLOOD BY WESTERGREN METHOD: 12 MM/H
EST. GFR  (AFRICAN AMERICAN): 58.4 ML/MIN/1.73 M^2
EST. GFR  (AFRICAN AMERICAN): 58.4 ML/MIN/1.73 M^2
EST. GFR  (AFRICAN AMERICAN): >60 ML/MIN/1.73 M^2
EST. GFR  (NON AFRICAN AMERICAN): 50.6 ML/MIN/1.73 M^2
EST. GFR  (NON AFRICAN AMERICAN): 50.6 ML/MIN/1.73 M^2
EST. GFR  (NON AFRICAN AMERICAN): 56.3 ML/MIN/1.73 M^2
FIO2: 30
FIO2: 40
FLOW: 2
FLOW: 2
GLUCOSE SERPL-MCNC: 150 MG/DL
GLUCOSE SERPL-MCNC: 57 MG/DL
GLUCOSE SERPL-MCNC: 98 MG/DL
HCO3 UR-SCNC: 15.7 MMOL/L (ref 24–28)
HCO3 UR-SCNC: 17.8 MMOL/L (ref 24–28)
HCO3 UR-SCNC: 19.3 MMOL/L (ref 24–28)
HCT VFR BLD AUTO: 21.8 %
HCT VFR BLD AUTO: 30.3 %
HGB BLD-MCNC: 6.7 G/DL
HGB BLD-MCNC: 9.8 G/DL
HYPOCHROMIA BLD QL SMEAR: ABNORMAL
IMM GRANULOCYTES # BLD AUTO: ABNORMAL K/UL
IMM GRANULOCYTES # BLD AUTO: ABNORMAL K/UL
IMM GRANULOCYTES NFR BLD AUTO: ABNORMAL %
IMM GRANULOCYTES NFR BLD AUTO: ABNORMAL %
LACTATE SERPL-SCNC: 1.6 MMOL/L
LACTATE SERPL-SCNC: 2.1 MMOL/L
LACTATE SERPL-SCNC: 2.5 MMOL/L
LACTATE SERPL-SCNC: 2.5 MMOL/L
LACTATE SERPL-SCNC: 2.8 MMOL/L
LACTATE SERPL-SCNC: 3.4 MMOL/L
LACTATE SERPL-SCNC: 4.2 MMOL/L
LACTATE SERPL-SCNC: 5.9 MMOL/L
LYMPHOCYTES # BLD AUTO: ABNORMAL K/UL
LYMPHOCYTES NFR BLD: 11 %
LYMPHOCYTES NFR BLD: 12 %
MAGNESIUM SERPL-MCNC: 1.4 MG/DL
MAGNESIUM SERPL-MCNC: 1.8 MG/DL
MAGNESIUM SERPL-MCNC: 1.9 MG/DL
MCH RBC QN AUTO: 28.2 PG
MCH RBC QN AUTO: 28.8 PG
MCHC RBC AUTO-ENTMCNC: 30.7 G/DL
MCHC RBC AUTO-ENTMCNC: 32.3 G/DL
MCV RBC AUTO: 87 FL
MCV RBC AUTO: 94 FL
METAMYELOCYTES NFR BLD MANUAL: 2 %
MIN VOL: 11
MODE: ABNORMAL
MONOCYTES # BLD AUTO: ABNORMAL K/UL
MONOCYTES NFR BLD: 1 %
MONOCYTES NFR BLD: 5 %
MYELOCYTES NFR BLD MANUAL: 1 %
NEUTROPHILS NFR BLD: 78 %
NEUTROPHILS NFR BLD: 81 %
NEUTS BAND NFR BLD MANUAL: 4 %
NRBC BLD-RTO: 2 /100 WBC
NRBC BLD-RTO: 2 /100 WBC
NUM UNITS TRANS PACKED RBC: NORMAL
NUM UNITS TRANS PACKED RBC: NORMAL
OVALOCYTES BLD QL SMEAR: ABNORMAL
PCO2 BLDA: 23.2 MMHG (ref 35–45)
PCO2 BLDA: 24.4 MMHG (ref 35–45)
PCO2 BLDA: 30.7 MMHG (ref 35–45)
PEEP: 5
PH SMN: 7.41 [PH] (ref 7.35–7.45)
PH SMN: 7.44 [PH] (ref 7.35–7.45)
PH SMN: 7.47 [PH] (ref 7.35–7.45)
PHOSPHATE SERPL-MCNC: 3.2 MG/DL
PHOSPHATE SERPL-MCNC: 3.9 MG/DL
PIP: 23
PLATELET # BLD AUTO: 143 K/UL
PLATELET # BLD AUTO: 152 K/UL
PLATELET BLD QL SMEAR: ABNORMAL
PLATELET BLD QL SMEAR: ABNORMAL
PMV BLD AUTO: 11.5 FL
PMV BLD AUTO: 12 FL
PO2 BLDA: 117 MMHG (ref 80–100)
PO2 BLDA: 42 MMHG (ref 80–100)
PO2 BLDA: 43 MMHG (ref 80–100)
POC BE: -5 MMOL/L
POC BE: -6 MMOL/L
POC BE: -8 MMOL/L
POC SATURATED O2: 82 % (ref 95–100)
POC SATURATED O2: 82 % (ref 95–100)
POC SATURATED O2: 99 % (ref 95–100)
POC TCO2: 16 MMOL/L (ref 23–27)
POC TCO2: 19 MMOL/L (ref 23–27)
POC TCO2: 20 MMOL/L (ref 23–27)
POCT GLUCOSE: 108 MG/DL (ref 70–110)
POCT GLUCOSE: 152 MG/DL (ref 70–110)
POCT GLUCOSE: 164 MG/DL (ref 70–110)
POCT GLUCOSE: 46 MG/DL (ref 70–110)
POCT GLUCOSE: 62 MG/DL (ref 70–110)
POCT GLUCOSE: 79 MG/DL (ref 70–110)
POIKILOCYTOSIS BLD QL SMEAR: SLIGHT
POLYCHROMASIA BLD QL SMEAR: ABNORMAL
POLYCHROMASIA BLD QL SMEAR: ABNORMAL
POTASSIUM SERPL-SCNC: 4 MMOL/L
POTASSIUM SERPL-SCNC: 4.1 MMOL/L
POTASSIUM SERPL-SCNC: 4.5 MMOL/L
PROT SERPL-MCNC: 4.1 G/DL
PROT SERPL-MCNC: 4.3 G/DL
PROVIDER CREDENTIALS: ABNORMAL
PROVIDER NOTIFIED: ABNORMAL
RBC # BLD AUTO: 2.33 M/UL
RBC # BLD AUTO: 3.47 M/UL
SAMPLE: ABNORMAL
SCHISTOCYTES BLD QL SMEAR: PRESENT
SITE: ABNORMAL
SODIUM SERPL-SCNC: 138 MMOL/L
SODIUM SERPL-SCNC: 140 MMOL/L
SODIUM SERPL-SCNC: 140 MMOL/L
SP02: 100
SP02: 94
TIME NOTIFIED: 1441
TIME NOTIFIED: 1818
TROPONIN I SERPL DL<=0.01 NG/ML-MCNC: 0.03 NG/ML
VERBAL RESULT READBACK PERFORMED: YES
VT: 500
WBC # BLD AUTO: 10.48 K/UL
WBC # BLD AUTO: 11.86 K/UL

## 2018-04-26 PROCEDURE — 99231 SBSQ HOSP IP/OBS SF/LOW 25: CPT | Mod: ,,, | Performed by: ANESTHESIOLOGY

## 2018-04-26 PROCEDURE — 99233 SBSQ HOSP IP/OBS HIGH 50: CPT | Mod: ,,, | Performed by: EMERGENCY MEDICINE

## 2018-04-26 PROCEDURE — 99900035 HC TECH TIME PER 15 MIN (STAT)

## 2018-04-26 PROCEDURE — 93010 ELECTROCARDIOGRAM REPORT: CPT | Mod: 76,,, | Performed by: INTERNAL MEDICINE

## 2018-04-26 PROCEDURE — 83605 ASSAY OF LACTIC ACID: CPT | Mod: 91

## 2018-04-26 PROCEDURE — 20000000 HC ICU ROOM

## 2018-04-26 PROCEDURE — 93005 ELECTROCARDIOGRAM TRACING: CPT

## 2018-04-26 PROCEDURE — 80048 BASIC METABOLIC PNL TOTAL CA: CPT

## 2018-04-26 PROCEDURE — 84484 ASSAY OF TROPONIN QUANT: CPT

## 2018-04-26 PROCEDURE — P9016 RBC LEUKOCYTES REDUCED: HCPCS

## 2018-04-26 PROCEDURE — 97802 MEDICAL NUTRITION INDIV IN: CPT

## 2018-04-26 PROCEDURE — 84100 ASSAY OF PHOSPHORUS: CPT | Mod: 91

## 2018-04-26 PROCEDURE — 25000003 PHARM REV CODE 250: Performed by: STUDENT IN AN ORGANIZED HEALTH CARE EDUCATION/TRAINING PROGRAM

## 2018-04-26 PROCEDURE — 36430 TRANSFUSION BLD/BLD COMPNT: CPT

## 2018-04-26 PROCEDURE — 27000221 HC OXYGEN, UP TO 24 HOURS

## 2018-04-26 PROCEDURE — 63600175 PHARM REV CODE 636 W HCPCS: Performed by: STUDENT IN AN ORGANIZED HEALTH CARE EDUCATION/TRAINING PROGRAM

## 2018-04-26 PROCEDURE — 82803 BLOOD GASES ANY COMBINATION: CPT

## 2018-04-26 PROCEDURE — 63600175 PHARM REV CODE 636 W HCPCS

## 2018-04-26 PROCEDURE — 80053 COMPREHEN METABOLIC PANEL: CPT

## 2018-04-26 PROCEDURE — 85007 BL SMEAR W/DIFF WBC COUNT: CPT

## 2018-04-26 PROCEDURE — 93010 ELECTROCARDIOGRAM REPORT: CPT | Mod: ,,, | Performed by: INTERNAL MEDICINE

## 2018-04-26 PROCEDURE — 37799 UNLISTED PX VASCULAR SURGERY: CPT

## 2018-04-26 PROCEDURE — 94003 VENT MGMT INPAT SUBQ DAY: CPT

## 2018-04-26 PROCEDURE — 63600175 PHARM REV CODE 636 W HCPCS: Performed by: ANESTHESIOLOGY

## 2018-04-26 PROCEDURE — 83735 ASSAY OF MAGNESIUM: CPT | Mod: 91

## 2018-04-26 PROCEDURE — 36600 WITHDRAWAL OF ARTERIAL BLOOD: CPT

## 2018-04-26 PROCEDURE — 25000003 PHARM REV CODE 250: Performed by: INTERNAL MEDICINE

## 2018-04-26 PROCEDURE — 94799 UNLISTED PULMONARY SVC/PX: CPT

## 2018-04-26 PROCEDURE — 85027 COMPLETE CBC AUTOMATED: CPT | Mod: 91

## 2018-04-26 PROCEDURE — 25000003 PHARM REV CODE 250: Performed by: HOSPITALIST

## 2018-04-26 PROCEDURE — 84100 ASSAY OF PHOSPHORUS: CPT

## 2018-04-26 PROCEDURE — 99233 SBSQ HOSP IP/OBS HIGH 50: CPT | Mod: ,,, | Performed by: SURGERY

## 2018-04-26 PROCEDURE — 99233 SBSQ HOSP IP/OBS HIGH 50: CPT | Mod: ,,, | Performed by: INTERNAL MEDICINE

## 2018-04-26 RX ORDER — HYDROCODONE BITARTRATE AND ACETAMINOPHEN 500; 5 MG/1; MG/1
TABLET ORAL
Status: DISCONTINUED | OUTPATIENT
Start: 2018-04-26 | End: 2018-04-27

## 2018-04-26 RX ORDER — OXYCODONE HYDROCHLORIDE 5 MG/1
5 TABLET ORAL EVERY 6 HOURS PRN
Status: DISCONTINUED | OUTPATIENT
Start: 2018-04-26 | End: 2018-04-30

## 2018-04-26 RX ORDER — MAGNESIUM SULFATE HEPTAHYDRATE 40 MG/ML
2 INJECTION, SOLUTION INTRAVENOUS ONCE
Status: COMPLETED | OUTPATIENT
Start: 2018-04-26 | End: 2018-04-26

## 2018-04-26 RX ORDER — OXYCODONE AND ACETAMINOPHEN 10; 325 MG/1; MG/1
1 TABLET ORAL EVERY 6 HOURS PRN
Status: DISCONTINUED | OUTPATIENT
Start: 2018-04-26 | End: 2018-04-26

## 2018-04-26 RX ORDER — LORAZEPAM 0.5 MG/1
0.5 TABLET ORAL ONCE
Status: COMPLETED | OUTPATIENT
Start: 2018-04-26 | End: 2018-04-26

## 2018-04-26 RX ORDER — METOPROLOL TARTRATE 1 MG/ML
5 INJECTION, SOLUTION INTRAVENOUS ONCE
Status: COMPLETED | OUTPATIENT
Start: 2018-04-26 | End: 2018-04-26

## 2018-04-26 RX ADMIN — OXYCODONE HYDROCHLORIDE 5 MG: 5 TABLET ORAL at 11:04

## 2018-04-26 RX ADMIN — MAGNESIUM SULFATE IN WATER 2 G: 40 INJECTION, SOLUTION INTRAVENOUS at 03:04

## 2018-04-26 RX ADMIN — LORAZEPAM 0.5 MG: 0.5 TABLET ORAL at 03:04

## 2018-04-26 RX ADMIN — ACETAMINOPHEN 1000 MG: 10 INJECTION, SOLUTION INTRAVENOUS at 12:04

## 2018-04-26 RX ADMIN — ENOXAPARIN SODIUM 40 MG: 100 INJECTION SUBCUTANEOUS at 04:04

## 2018-04-26 RX ADMIN — ACETAMINOPHEN 1000 MG: 10 INJECTION, SOLUTION INTRAVENOUS at 05:04

## 2018-04-26 RX ADMIN — FENTANYL CITRATE 50 MCG: 50 INJECTION, SOLUTION INTRAMUSCULAR; INTRAVENOUS at 12:04

## 2018-04-26 RX ADMIN — LORAZEPAM 0.5 MG: 0.5 TABLET ORAL at 08:04

## 2018-04-26 RX ADMIN — SODIUM CHLORIDE, SODIUM LACTATE, POTASSIUM CHLORIDE, AND CALCIUM CHLORIDE 1000 ML: .6; .31; .03; .02 INJECTION, SOLUTION INTRAVENOUS at 08:04

## 2018-04-26 RX ADMIN — ACETAMINOPHEN 1000 MG: 10 INJECTION, SOLUTION INTRAVENOUS at 06:04

## 2018-04-26 RX ADMIN — METOROPROLOL TARTRATE 5 MG: 5 INJECTION, SOLUTION INTRAVENOUS at 11:04

## 2018-04-26 RX ADMIN — SODIUM CHLORIDE, SODIUM LACTATE, POTASSIUM CHLORIDE, AND CALCIUM CHLORIDE: .6; .31; .03; .02 INJECTION, SOLUTION INTRAVENOUS at 12:04

## 2018-04-26 RX ADMIN — ROPIVACAINE HYDROCHLORIDE 10 ML/HR: 2 INJECTION, SOLUTION EPIDURAL; INFILTRATION at 08:04

## 2018-04-26 RX ADMIN — MAGNESIUM SULFATE IN WATER 2 G: 40 INJECTION, SOLUTION INTRAVENOUS at 09:04

## 2018-04-26 RX ADMIN — Medication 25 MCG/HR: at 04:04

## 2018-04-26 RX ADMIN — OXYCODONE HYDROCHLORIDE 5 MG: 5 TABLET ORAL at 02:04

## 2018-04-26 RX ADMIN — CEFAZOLIN 2 G: 330 INJECTION, POWDER, FOR SOLUTION INTRAMUSCULAR; INTRAVENOUS at 04:04

## 2018-04-26 RX ADMIN — SODIUM CHLORIDE, SODIUM LACTATE, POTASSIUM CHLORIDE, AND CALCIUM CHLORIDE 1000 ML: .6; .31; .03; .02 INJECTION, SOLUTION INTRAVENOUS at 12:04

## 2018-04-26 RX ADMIN — DEXTROSE MONOHYDRATE 25 G: 25 INJECTION, SOLUTION INTRAVENOUS at 06:04

## 2018-04-26 RX ADMIN — CEFAZOLIN 2 G: 330 INJECTION, POWDER, FOR SOLUTION INTRAMUSCULAR; INTRAVENOUS at 12:04

## 2018-04-26 RX ADMIN — SODIUM CHLORIDE, SODIUM LACTATE, POTASSIUM CHLORIDE, AND CALCIUM CHLORIDE 1000 ML: .6; .31; .03; .02 INJECTION, SOLUTION INTRAVENOUS at 03:04

## 2018-04-26 RX ADMIN — LIDOCAINE 1 PATCH: 50 PATCH CUTANEOUS at 05:04

## 2018-04-26 NOTE — SUBJECTIVE & OBJECTIVE
Principal Problem:Adenocarcinoma of unknown primary    Principal Orthopedic Problem: B femur impending fractures    Interval History: Patient seen and examined at bedside.  No acute events overnight.  Pain controlled.  No PT yesterday.  OR yesterday for prophylactic L CMN.  Intraop pressor and oxygen requirements led to SICU admission.  Intraop JAMILA showing RV dysfunction.  CTA chest negative for PE.  Still intubated.  Off pressors.  Minimal vent settings.  Lactate 11 overnight responded to hydration - now at 2.5.    Review of patient's allergies indicates:  No Known Allergies    Current Facility-Administered Medications   Medication    0.9%  NaCl infusion (for blood administration)    acetaminophen (10 mg/mL) injection 1,000 mg    bisacodyl suppository 10 mg    ceFAZolin injection 2 g    dextrose 50% injection 12.5 g    dextrose 50% injection 25 g    enoxaparin injection 40 mg    EPINEPHrine (ADRENALIN) 4 mg in sodium chloride 0.9% 250 mL infusion    fentaNYL 2500 mcg in 0.9% sodium chloride 250 mL infusion premix (titrating)    fentaNYL injection 100 mcg    fentaNYL injection 50 mcg    glucagon (human recombinant) injection 1 mg    HYDROmorphone injection 0.2 mg    HYDROmorphone injection 0.5 mg    HYDROmorphone injection 1 mg    insulin aspart U-100 pen 1-10 Units    lactated ringers infusion    lidocaine 5 % patch 1 patch    midazolam (VERSED) 1 mg/mL injection 2 mg    norepinephrine 4 mg in dextrose 5% 250 mL infusion (premix) (titrating)    omnipaque oral solution 15 mL    ondansetron disintegrating tablet 8 mg    ondansetron injection 4 mg    polyethylene glycol packet 17 g    promethazine (PHENERGAN) 6.25 mg in dextrose 5 % 50 mL IVPB    propofol (DIPRIVAN) 10 mg/mL infusion    ropivacaine (PF) 2 mg/ml (0.2%) infusion    senna-docusate 8.6-50 mg per tablet 2 tablet    sodium chloride 0.9% flush 3 mL    sodium chloride 0.9% flush 3 mL    sodium chloride 0.9% flush 5 mL    white  "petrolatum 41 % ointment     Objective:     Vital Signs (Most Recent):  Temp: 98.7 °F (37.1 °C) (04/25/18 2115)  Pulse: 104 (04/26/18 0422)  Resp: 18 (04/26/18 0422)  BP: 105/63 (04/25/18 2215)  SpO2: 100 % (04/26/18 0422) Vital Signs (24h Range):  Temp:  [98.5 °F (36.9 °C)-98.9 °F (37.2 °C)] 98.7 °F (37.1 °C)  Pulse:  [100-139] 104  Resp:  [15-36] 18  SpO2:  [89 %-100 %] 100 %  BP: ()/(51-82) 105/63  Arterial Line BP: ()/(49-64) 117/57     Weight: 68.6 kg (151 lb 3.8 oz)  Height: 5' 6" (167.6 cm)  Body mass index is 24.41 kg/m².      Intake/Output Summary (Last 24 hours) at 04/26/18 0549  Last data filed at 04/26/18 0400   Gross per 24 hour   Intake             2000 ml   Output             1355 ml   Net              645 ml       Ortho/SPM Exam    AAOx4  NAD  RRR  No increased WOB  SILT and motor intact T/SP/DP  WWP extremities  FCDs in place and functioning    Significant Labs:   CBC:     Recent Labs  Lab 04/25/18  1541 04/25/18  2145 04/26/18  0300   WBC 15.94* 19.43* 10.48   HGB 9.4* 8.0* 6.7*   HCT 30.4* 26.0* 21.8*    178 143*     CMP:     Recent Labs  Lab 04/25/18  0443 04/25/18  1541 04/25/18  2145 04/26/18  0300    141 141 140   K 4.2 4.5 3.9 4.5    111* 111* 112*   CO2 21* 20* 11* 19*   GLU 85 132* 251* 57*   BUN 17 17 21* 18   CREATININE 1.3 1.3 1.5* 1.2   CALCIUM 8.1* 7.5* 8.0* 7.7*   PROT 5.9*  --  4.6* 4.1*   ALBUMIN 1.6*  --  1.4* 1.2*   BILITOT 0.4  --  0.5 0.4   ALKPHOS 411*  --  340* 300*   AST 53*  --  65* 61*   ALT 13  --  16 14   ANIONGAP 11 10 19* 9   EGFRNONAA 46.0* 46.0* 38.7* 50.6*     All pertinent labs within the past 24 hours have been reviewed.    Significant Imaging: I have reviewed all pertinent imaging results/findings.  "

## 2018-04-26 NOTE — ANESTHESIA POSTPROCEDURE EVALUATION
"Anesthesia Post Evaluation    Patient: Kerline Grossman    Procedure(s) Performed: Procedure(s) (LRB):  IM NAILING OF FEMUR (Left)    Final Anesthesia Type: general  Patient location during evaluation: ICU  Patient participation: Yes- Able to Participate  Level of consciousness: awake and alert  Post-procedure vital signs: reviewed and stable  Pain management: adequate  Airway patency: patent  PONV status at discharge: No PONV  Anesthetic complications: yes  Perioperative Events: hypotension requiring unaticipated pressor infusion and unplanned ICU admission  Yolanda-operative Events Comments: Ms Grossman developed intraoperative hypotension, requiring increasing amounts of phenylephrine, received 2L IV fluid intraop.  JAMILA was performed to assess hypotension refractory to fluid and pressor administration.  Read in conjunction with cardiac anesthesia (Dr. Hernandez), showing severe right venticular dilatation worse than baseline moderate RV dysfunction on 2D echo.  Epinephrine infusion was begun, she remained intubated and sedated, and SICU was consulted for postop management.  Her condition improved overnight and she was extubated today with no apparent adverse sequelae.  Cardiovascular status: blood pressure returned to baseline, stable and tachycardic  Respiratory status: unassisted  Hydration status: euvolemic  Follow-up not needed.        Visit Vitals  BP (!) 114/59   Pulse (!) 126   Temp 36.7 °C (98 °F) (Oral)   Resp (!) 37   Ht 5' 6" (1.676 m)   Wt 68.6 kg (151 lb 3.8 oz)   SpO2 100%   Breastfeeding? No   BMI 24.41 kg/m²       Pain/Yoko Score: Pain Assessment Performed: Yes (4/26/2018 10:00 AM)  Presence of Pain: denies (4/26/2018 10:00 AM)  Pain Rating Prior to Med Admin: 8 (4/26/2018  2:33 PM)  Pain Rating Post Med Admin: 0 (4/26/2018 12:49 PM)  Yoko Score: 4 (4/25/2018  5:30 PM)      "

## 2018-04-26 NOTE — PT/OT/SLP PROGRESS
Speech Language Pathology  Discharge      Kerline Grossman  MRN: [id number]    Patient not seen today secondary to currently intubated.  Please re-consult SLP services as appropriate when extubated.     ALTAGRACIA Coulter, CCC-SLP

## 2018-04-26 NOTE — ANESTHESIA POST-OP PAIN MANAGEMENT
Acute Pain Service Progress Note    Kerline Grossman is a 58 y.o., female, [id number]. Pt with metastatic cancer with unknown primary. Underwent below surgery yesterday. Pt had hypotensive episode in OR. JAMILA showed worsening of already depressed RV function. Concern for pulmonary embolus. CTA negative. Left on vent overnight. Plan to extubate today.    Surgery:  IM NAILING OF FEMUR (Left )    Post Op Day #: 1    Catheter type: perineural  SIFI    Infusion type: Ropivacaine 0.2%  10 basal    Problem List:    Active Hospital Problems    Diagnosis  POA    *Adenocarcinoma of unknown primary [C80.1]  Yes    Right heart failure [I50.810]  Yes    Shock [R57.9]  Unknown    Tachycardia [R00.0]  Unknown    Encounter for weaning from ventilator [Z99.11]  Not Applicable    Lactic acidemia [E87.2]  Unknown    Severe malnutrition [E43]  Yes    Cancer of left femur [C40.22]  Yes    Cancer of right femur [C40.21]  Yes    Palliative care encounter [Z51.5]  Not Applicable    Pain due to malignant neoplasm metastatic to bone [G89.3, C79.51]  Yes    Anemia [D64.9]  Yes    Hypomagnesemia [E83.42]  Yes    Hypophosphatemia [E83.39]  Yes    Thrombocytopenia, unspecified [D69.6]  Yes    Hypotension [I95.9]  Yes    Bony metastasis [C79.51]  Yes     Xray metastatic survey shows lytic lesions of skull, lytic spine and rib lesions with pathologic fractures. Diffuse lytic lesions throughout entire central axial red marrow skeleton. Lytic lesions of proximal long bones and pathological fractures of ribs and right superior and inferior pubic ramus.      Pleural effusion [J90]  Yes      Resolved Hospital Problems    Diagnosis Date Resolved POA    Encephalopathy, metabolic [G93.41] 04/21/2018 Yes       Subjective:     General appearance of sedated on ventilator   Pain with rest: 5    Numbers   Pain with movement: 8    Numbers   Side Effects    1. Pruritis No    2. Nausea No    3. Motor Blockade No, 0=Ability to raise lower extremities  off bed    4. Sedation No, 1=awake and alert    Objective:     Catheter site clean, dry, intact      Vitals   Vitals:    04/26/18 0935   BP:    Pulse: 93   Resp: 16   Temp: 36.4 °C (97.5 °F)        Labs    Admission on 04/20/2018   No results displayed because visit has over 200 results.           Meds   Current Facility-Administered Medications   Medication Dose Route Frequency Provider Last Rate Last Dose    0.9%  NaCl infusion (for blood administration)   Intravenous Q24H PRN Reginald Romero MD        acetaminophen (10 mg/mL) injection 1,000 mg  1,000 mg Intravenous Q6H Atif Valencia  mL/hr at 04/26/18 0535 1,000 mg at 04/26/18 0535    bisacodyl suppository 10 mg  10 mg Rectal Daily PRN Frank Argueta MD        ceFAZolin injection 2 g  2 g Intravenous Q8H Frank Argueta MD   2 g at 04/26/18 0428    dextrose 50% injection 12.5 g  12.5 g Intravenous PRN Reginald Romero MD        dextrose 50% injection 25 g  25 g Intravenous PRN KITTY Delatorre   25 g at 04/26/18 0627    enoxaparin injection 40 mg  40 mg Subcutaneous Daily Frank Argueta MD        EPINEPHrine (ADRENALIN) 4 mg in sodium chloride 0.9% 250 mL infusion  0.05 mcg/kg/min Intravenous Continuous Han Pelayo MD   Stopped at 04/25/18 2215    fentaNYL 2500 mcg in 0.9% sodium chloride 250 mL infusion premix (titrating)  25 mcg/hr Intravenous Continuous Reginald Romero MD   Stopped at 04/26/18 0800    fentaNYL injection 100 mcg  100 mcg Intravenous See admin instructions Franky Rodriguez MD   25 mcg at 04/25/18 1045    fentaNYL injection 50 mcg  50 mcg Intravenous Q2H PRN Reginald Romero MD   50 mcg at 04/26/18 0000    glucagon (human recombinant) injection 1 mg  1 mg Intramuscular PRN Reginald Romero MD        insulin aspart U-100 pen 1-10 Units  1-10 Units Subcutaneous Q6H PRN Reginald Romero MD   4 Units at 04/25/18 2227    lactated ringers infusion   Intravenous Continuous Tom Torre  mL/hr at 04/26/18 0900      lidocaine 5 % patch 1 patch  1  patch Transdermal Q24H David Cruz MD   1 patch at 04/24/18 1752    magnesium sulfate 2g in water 50mL IVPB (premix)  2 g Intravenous Once Keenan Licea MD   2 g at 04/26/18 0941    midazolam (VERSED) 1 mg/mL injection 2 mg  2 mg Intravenous See admin instructions Franky Rodriguez MD   1 mg at 04/25/18 1040    norepinephrine 4 mg in dextrose 5% 250 mL infusion (premix) (titrating)  0.05 mcg/kg/min Intravenous Continuous Reginald Romero MD   Stopped at 04/26/18 0000    omnipaque oral solution 15 mL  15 mL Oral PRN Celena Wright MD   15 mL at 04/22/18 1116    ondansetron disintegrating tablet 8 mg  8 mg Oral Q8H PRN KITTY Delatorre        polyethylene glycol packet 17 g  17 g Oral Daily Frank Argueta MD        promethazine (PHENERGAN) 6.25 mg in dextrose 5 % 50 mL IVPB  6.25 mg Intravenous Q6H PRN Atif Valencia MD        propofol (DIPRIVAN) 10 mg/mL infusion  10 mcg/kg/min Intravenous Continuous Han Pelayo MD   Stopped at 04/26/18 0800    ropivacaine (PF) 2 mg/ml (0.2%) infusion  10 mL/hr Perineural Continuous Atif Valencia MD 10 mL/hr at 04/26/18 0859 10 mL/hr at 04/26/18 0859    senna-docusate 8.6-50 mg per tablet 2 tablet  2 tablet Oral Daily David Cruz MD   2 tablet at 04/24/18 0857    sodium chloride 0.9% flush 3 mL  3 mL Intravenous PRN Han Pelayo MD        sodium chloride 0.9% flush 3 mL  3 mL Intravenous PRN Han Pelayo MD        sodium chloride 0.9% flush 5 mL  5 mL Intravenous PRN KITTY Delatorre        white petrolatum 41 % ointment   Topical (Top) PRN David Cruz MD            Anticoagulant dose lovenox at 40.    Assessment:     Pain control difficult to assess d/t sedation    Plan:     Monitor for pain until weaned from ventilator. Then will convert oxys to PO.    Atif Valencia MD  PGY-4 Anesthesiology  P: [phone]

## 2018-04-26 NOTE — PROGRESS NOTES
[facility]  Orthopedics  Progress Note    Patient Name: Kerline Grossman  MRN: [id number]  Admission Date: 4/20/2018  Hospital Length of Stay: 5 days  Attending Provider: David Cruz MD  Primary Care Provider: Primary Doctor No  Follow-up For: Procedure(s) (LRB):  IM NAILING OF FEMUR (Left)    Post-Operative Day: 1 Day Post-Op  Subjective:     Principal Problem:Adenocarcinoma of unknown primary    Principal Orthopedic Problem: B femur impending fractures    Interval History: Patient seen and examined at bedside.  No acute events overnight.  Pain controlled.  No PT yesterday.  OR yesterday for prophylactic L CMN.  Intraop pressor and oxygen requirements led to SICU admission.  Intraop JAMILA showing RV dysfunction.  CTA chest negative for PE.  Still intubated.  Off pressors.  Minimal vent settings.  Lactate 11 overnight responded to hydration - now at 2.5.    Review of patient's allergies indicates:  No Known Allergies    Current Facility-Administered Medications   Medication    0.9%  NaCl infusion (for blood administration)    acetaminophen (10 mg/mL) injection 1,000 mg    bisacodyl suppository 10 mg    ceFAZolin injection 2 g    dextrose 50% injection 12.5 g    dextrose 50% injection 25 g    enoxaparin injection 40 mg    EPINEPHrine (ADRENALIN) 4 mg in sodium chloride 0.9% 250 mL infusion    fentaNYL 2500 mcg in 0.9% sodium chloride 250 mL infusion premix (titrating)    fentaNYL injection 100 mcg    fentaNYL injection 50 mcg    glucagon (human recombinant) injection 1 mg    HYDROmorphone injection 0.2 mg    HYDROmorphone injection 0.5 mg    HYDROmorphone injection 1 mg    insulin aspart U-100 pen 1-10 Units    lactated ringers infusion    lidocaine 5 % patch 1 patch    midazolam (VERSED) 1 mg/mL injection 2 mg    norepinephrine 4 mg in dextrose 5% 250 mL infusion (premix) (titrating)    omnipaque oral solution 15 mL    ondansetron disintegrating tablet 8 mg    ondansetron  "injection 4 mg    polyethylene glycol packet 17 g    promethazine (PHENERGAN) 6.25 mg in dextrose 5 % 50 mL IVPB    propofol (DIPRIVAN) 10 mg/mL infusion    ropivacaine (PF) 2 mg/ml (0.2%) infusion    senna-docusate 8.6-50 mg per tablet 2 tablet    sodium chloride 0.9% flush 3 mL    sodium chloride 0.9% flush 3 mL    sodium chloride 0.9% flush 5 mL    white petrolatum 41 % ointment     Objective:     Vital Signs (Most Recent):  Temp: 98.7 °F (37.1 °C) (04/25/18 2115)  Pulse: 104 (04/26/18 0422)  Resp: 18 (04/26/18 0422)  BP: 105/63 (04/25/18 2215)  SpO2: 100 % (04/26/18 0422) Vital Signs (24h Range):  Temp:  [98.5 °F (36.9 °C)-98.9 °F (37.2 °C)] 98.7 °F (37.1 °C)  Pulse:  [100-139] 104  Resp:  [15-36] 18  SpO2:  [89 %-100 %] 100 %  BP: ()/(51-82) 105/63  Arterial Line BP: ()/(49-64) 117/57     Weight: 68.6 kg (151 lb 3.8 oz)  Height: 5' 6" (167.6 cm)  Body mass index is 24.41 kg/m².      Intake/Output Summary (Last 24 hours) at 04/26/18 0549  Last data filed at 04/26/18 0400   Gross per 24 hour   Intake             2000 ml   Output             1355 ml   Net              645 ml       Ortho/SPM Exam    AAOx4  NAD  RRR  No increased WOB  SILT and motor intact T/SP/DP  WWP extremities  FCDs in place and functioning    Significant Labs:   CBC:     Recent Labs  Lab 04/25/18  1541 04/25/18  2145 04/26/18  0300   WBC 15.94* 19.43* 10.48   HGB 9.4* 8.0* 6.7*   HCT 30.4* 26.0* 21.8*    178 143*     CMP:     Recent Labs  Lab 04/25/18  0443 04/25/18  1541 04/25/18  2145 04/26/18  0300    141 141 140   K 4.2 4.5 3.9 4.5    111* 111* 112*   CO2 21* 20* 11* 19*   GLU 85 132* 251* 57*   BUN 17 17 21* 18   CREATININE 1.3 1.3 1.5* 1.2   CALCIUM 8.1* 7.5* 8.0* 7.7*   PROT 5.9*  --  4.6* 4.1*   ALBUMIN 1.6*  --  1.4* 1.2*   BILITOT 0.4  --  0.5 0.4   ALKPHOS 411*  --  340* 300*   AST 53*  --  65* 61*   ALT 13  --  16 14   ANIONGAP 11 10 19* 9   EGFRNONAA 46.0* 46.0* 38.7* 50.6*     All pertinent " labs within the past 24 hours have been reviewed.    Significant Imaging: I have reviewed all pertinent imaging results/findings.    Assessment/Plan:     Bony metastasis    58 y.o. female POD1 s/p L CMN    Pain control  PT/OT: bed rest  DVT PPx: lovenox, FCDs at all times when not ambulating  Abx: postop Ancef complete  Labs: Hb 6.7, lactate 2.5, troponin 0.027  Drain: none  Ponce: none    Dispo: wean to extubate today; continue ICU care              Frank Argueta MD  Orthopedics  [facility]    Attg Note:  I agree with the above assessment and plan.  Improving.  Will discuss plans with patient and family regarding right femur.    Jd Cabrera MD

## 2018-04-26 NOTE — H&P
[facility]  Critical Care - Surgery  History & Physical    Patient Name: Kerline Grossman  MRN: [id number]  Admission Date: 4/20/2018  Code Status: Full Code  Attending Physician: David Cruz MD   Primary Care Provider: Primary Doctor No   Principal Problem: Adenocarcinoma of unknown primary    Subjective:     HPI:  Mrs. Grossman is a 57 yo female admitted to MICU on 4/20/18 for second opinion regarding recently diagnosed likely metastatic malignancy with unknown primary. Per patient's daughter about a month ago her mother started to gradually decline secondary to diffuse pain. She tehn became progressively more altered and eventually not responsive. She could no longer walk and was urinating on herself. Her family brought her to ED at [facility]. They brought some records from her stay there which show that work up revealed a corrected calcium of 13 and ZACH. She had CT brain, chest and abdomen which showed diffuse lytic lesions and some pathological fractures in her skull, ribs, and hips but no lesion that pointed to primary tumor. Her calcium eventually trended down. SPEP and UPEP were negative. Her daughter states she had 2 BM biopsies which were unrevealing. She had CT guided biopsy of a lesion in her hip on 4/20 as well as diagnostic thoracentesis which removed only 50 ccs from her pleural effusion. Her daughter states the physicians at [facility] were recommending inpatient hospice for her mother as it seemed she most likely had widely metastatic disease with unknown primary tumor however the patient's children wished to have a definitive diagnoses before making a decision.   She has metastatic lesions to the bilateral femurs with concern for impending fracture. On 4/25/18, she was taken to the OR by Ortho for nailing of the left femur. Approximately 1 hour into the case, she became hypotensive requirement increasing amounts of vasopressor support. JAMILA was done in the OR by anesthesia and  reportedly showed signs of right heart strain. Decision was made to leave her intubated and obtain stat CTA to rule out PE. Patient was seen in the PACU intubated, sedated, and on 0.2 of epi.     Hospital/ICU Course:  No notes on file    Follow-up For: Procedure(s) (LRB):  IM NAILING OF FEMUR (Left)    Post-Operative Day: Day of Surgery     Past Medical History:   Diagnosis Date    Cancer     Insomnia        Past Surgical History:   Procedure Laterality Date     SECTION      HYSTERECTOMY         Review of patient's allergies indicates:  No Known Allergies    Family History     None        Social History Main Topics    Smoking status: Former Smoker    Smokeless tobacco: Never Used    Alcohol use No    Drug use: No    Sexual activity: Not on file      Review of Systems   Unable to perform ROS: Intubated     Objective:     Vital Signs (Most Recent):  Temp: 98.7 °F (37.1 °C) (18)  Pulse: (!) 135 (18)  Resp: (!) 31 (18)  BP: 130/73 (18)  SpO2: 100 % (18) Vital Signs (24h Range):  Temp:  [98.5 °F (36.9 °C)-98.9 °F (37.2 °C)] 98.7 °F (37.1 °C)  Pulse:  [100-139] 135  Resp:  [15-36] 31  SpO2:  [89 %-100 %] 100 %  BP: ()/(51-82) 130/73  Arterial Line BP: ()/(49-64) 127/64     Weight: 68.6 kg (151 lb 3.8 oz)  Body mass index is 24.41 kg/m².      Intake/Output Summary (Last 24 hours) at 18 2141  Last data filed at 18 1458   Gross per 24 hour   Intake             2000 ml   Output              100 ml   Net             1900 ml       Physical Exam   Constitutional: She appears well-developed and well-nourished.   HENT:   Head: Normocephalic and atraumatic.   ETT in place   Eyes: Pupils are equal, round, and reactive to light.   Neck: Normal range of motion. Neck supple.   Cardiovascular: Regular rhythm.    Tachycardic   Pulmonary/Chest:   Mechanically ventilated   Abdominal: Soft. She exhibits no distension. There is no tenderness.  There is no guarding.   Genitourinary:   Genitourinary Comments: Ponce in place   Neurological:   Sedated  Alert and follows commands off sedation   Skin: Skin is warm and dry.   Psychiatric:   Sedated     Vents:  Vent Mode: A/C (04/25/18 2113)  Ventilator Initiated: Yes (04/25/18 1517)  Set Rate: 12 bmp (04/25/18 2113)  Vt Set: 500 mL (04/25/18 2113)  Pressure Support: 0 cmH20 (04/25/18 2113)  PEEP/CPAP: 5 cmH20 (04/25/18 2113)  Oxygen Concentration (%): 40 (04/25/18 2115)  Peak Airway Pressure: 24 cmH2O (04/25/18 2113)  Plateau Pressure: 0 cmH20 (04/25/18 2113)  Total Ve: 14.3 mL (04/25/18 2113)  F/VT Ratio<105 (RSBI): (!) 52.31 (04/25/18 1948)    Lines/Drains/Airways     Airway                 Airway - Non-Surgical 04/25/18 1200 Endotracheal Tube less than 1 day          Epidural Line                 Perineural Analgesia/Anesthesia Assessment (using dermatomes) Epidural 04/25/18 1032 less than 1 day          Arterial Line                 Arterial Line 04/25/18 1443 Right Radial less than 1 day          Peripheral Intravenous Line                 Peripheral IV - Single Lumen 04/24/18 1446 Right Forearm 1 day         Peripheral IV - Single Lumen 04/25/18 1204 Left Hand less than 1 day                Significant Labs:    CBC/Anemia Profile:    Recent Labs  Lab 04/24/18  0510 04/25/18  0443 04/25/18  1445 04/25/18  1541   WBC 8.26 9.65  --  15.94*   HGB 10.2* 10.8*  --  9.4*   HCT 31.6* 34.4* 28* 30.4*    253  --  244   MCV 89 91  --  92   RDW 15.7* 15.5*  --  15.7*        Chemistries:    Recent Labs  Lab 04/24/18  0510 04/25/18  0443 04/25/18  1541    141 141   K 4.1 4.2 4.5    109 111*   CO2 21* 21* 20*   BUN 16 17 17   CREATININE 1.2 1.3 1.3   CALCIUM 7.8* 8.1* 7.5*   ALBUMIN 1.5* 1.6*  --    PROT 5.7* 5.9*  --    BILITOT 0.5 0.4  --    ALKPHOS 410* 411*  --    ALT 13 13  --    AST 47* 53*  --    MG 1.5* 1.7 1.5*   PHOS 3.1 3.4 5.0*       Recent Lab Results       04/25/18  1640 04/25/18  1559  04/25/18  1541 04/25/18  1445 04/25/18  0443      Immature Granulocytes   CANCELED  Comment:  Result canceled by the ancillary  CANCELED  Comment:  Result canceled by the ancillary     Immature Grans (Abs)   CANCELED  Comment:  Mild elevation in immature granulocytes is non specific and   can be seen in a variety of conditions including stress response,   acute inflammation, trauma and pregnancy. Correlation with other   laboratory and clinical findings is essential.    Result canceled by the ancillary    CANCELED  Comment:  Mild elevation in immature granulocytes is non specific and   can be seen in a variety of conditions including stress response,   acute inflammation, trauma and pregnancy. Correlation with other   laboratory and clinical findings is essential.    Result canceled by the ancillary       Albumin     1.6(L)     Alkaline Phosphatase     411(H)     Allens Test N/A         ALT     13     Anion Gap   10  11     Aniso     Slight     Appearance, UA          AST     53(H)     Bacteria, UA          BANDS   3.0  1.0     Baso #     CANCELED  Comment:  Result canceled by the ancillary     Basophil%   0.0  0.0     Bilirubin (UA)          Total Bilirubin     0.4  Comment:  For infants and newborns, interpretation of results should be based  on gestational age, weight and in agreement with clinical  observations.  Premature Infant recommended reference ranges:  Up to 24 hours.............<8.0 mg/dL  Up to 48 hours............<12.0 mg/dL  3-5 days..................<15.0 mg/dL  6-29 days.................<15.0 mg/dL       Site Jocelyn/UAC         BUN, Bld   17  17     Thad Cells     Occasional     Calcium   7.5(L)  8.1(L)     Chloride   111(H)  109     CO2   20(L)  21(L)     Color, UA          Creatinine   1.3  1.3     DelSys Adult Vent         Differential Method   Manual  Manual     eGFR if    53.0(A)  53.0(A)     eGFR if non    46.0  Comment:  Calculation used to obtain the estimated  glomerular filtration  rate (eGFR) is the CKD-EPI equation.   (A)  46.0  Comment:  Calculation used to obtain the estimated glomerular filtration  rate (eGFR) is the CKD-EPI equation.   (A)     Eos #     CANCELED  Comment:  Result canceled by the ancillary     Eosinophil%   3.0  0.0     ETCO2 27         FiO2 100         Glucose   132(H)  85     Glucose, UA          Gran%   73.0  92.0(H)     Hematocrit   30.4(L)  34.4(L)     Hemoglobin   9.4(L)  10.8(L)     Hypo     Occasional     Ketones, UA          Lactate, Jesus Alberto  3.9  Comment:  Falsely low lactic acid results can be found in samples   containing >=13.0 mg/dL total bilirubin and/or >=3.5 mg/dL   direct bilirubin.  *Critical value -   Results called to and read back by:Lawanda Montoya RN  ()        Leukocytes, UA          Lymph #     CANCELED  Comment:  Result canceled by the ancillary     Lymph%   14.0(L)  5.0(L)     Magnesium   1.5(L)  1.7     MCH   28.4  28.5     MCHC   30.9(L)  31.4(L)     MCV   92  91     Metamyelocytes   1.0  1.0     Microscopic Comment          Mode AC/PRVC         Mono #     CANCELED  Comment:  Result canceled by the ancillary     Mono%   4.0  1.0(L)     MPV   11.1  11.4     Myelocytes   2.0       Nitrite, UA          nRBC   1(A)  1(A)     Occult Blood UA          Ovalocytes     Occasional     PEEP 5         pH, UA          Phosphorus   5.0(H)  3.4     Platelet Estimate   Appears normal       Platelets   244  253     POC BE -6   -8      POC Glucose    99      POC HCO3 19.5(L)   18.7(L)      POC Hematocrit    28(L)      POC Ionized Calcium    1.08      POC PCO2 36.1   37.0      POC PH 7.340(L)   7.311(L)      POC PO2 301(H)   65(L)      POC Potassium    4.4      POC SATURATED O2 100   91(L)      POC Sodium    143      POC TCO2 21(L)   20(L)      Poik     Slight     Poly     Occasional     Potassium   4.5  4.2     Total Protein     5.9(L)     Protein, UA          Rate 12         RBC   3.31(L)  3.79(L)     RBC, UA          RDW   15.7(H)   15.5(H)     Sample ARTERIAL   ARTERIAL      Sodium   141  141     Sp02 99         Specific Gravity, UA          Specimen UA          Squam Epithel, UA          Urobilinogen, UA          Vt 500         WBC, UA          WBC   15.94(H)  9.65     Yeast, UA                      04/25/18  0144      Immature Granulocytes      Immature Grans (Abs)      Albumin      Alkaline Phosphatase      Allens Test      ALT      Anion Gap      Aniso      Appearance, UA Clear     AST      Bacteria, UA Occasional     BANDS      Baso #      Basophil%      Bilirubin (UA) Negative     Total Bilirubin      Site      BUN, Bld      Richmond Cells      Calcium      Chloride      CO2      Color, UA Straw     Creatinine      DelSys      Differential Method      eGFR if       eGFR if non       Eos #      Eosinophil%      ETCO2      FiO2      Glucose      Glucose, UA Negative     Gran%      Hematocrit      Hemoglobin      Hypo      Ketones, UA Negative     Lactate, Jesus Alberto      Leukocytes, UA Trace(A)     Lymph #      Lymph%      Magnesium      MCH      MCHC      MCV      Metamyelocytes      Microscopic Comment SEE COMMENT  Comment:  Other formed elements not mentioned in the report are not   present in the microscopic examination.        Mode      Mono #      Mono%      MPV      Myelocytes      Nitrite, UA Negative     nRBC      Occult Blood UA Negative     Ovalocytes      PEEP      pH, UA 7.0     Phosphorus      Platelet Estimate      Platelets      POC BE      POC Glucose      POC HCO3      POC Hematocrit      POC Ionized Calcium      POC PCO2      POC PH      POC PO2      POC Potassium      POC SATURATED O2      POC Sodium      POC TCO2      Poik      Poly      Potassium      Total Protein      Protein, UA Negative  Comment:  Recommend a 24 hour urine protein or a urine   protein/creatinine ratio if globulin induced proteinuria is  clinically suspected.       Rate      RBC      RBC, UA 1     RDW      Sample      Sodium    "   Sp02      Specific Gravity, UA 1.010     Specimen UA Urine, Unspecified     Squam Epithel, UA 1     Urobilinogen, UA Negative     Vt      WBC, UA 2     WBC      Yeast, UA Occasional(A)           Significant Imaging: I have reviewed all pertinent imaging results/findings within the past 24 hours.    Assessment/Plan:     * Adenocarcinoma of unknown primary    58 year old female with widely metastatic cancer of unknown origin s/p left femur IMN 4/25/18    Neuro:   - Sedation: Fentanyl, Propofol gtt  - Daily sedation vacation  - Intermittently confused at baseline per prior notes     Pulmonary:   - Intubated  Vent Mode: A/C  Oxygen Concentration (%):  [] 40  Resp Rate Total:  [22.4 br/min-32.1 br/min] 28 br/min  Vt Set:  [500 mL] 500 mL  PEEP/CPAP:  [5 cmH20] 5 cmH20  Pressure Support:  [0 cmH20-10 cmH20] 0 cmH20  Mean Airway Pressure:  [10.3 lmH07-95 cmH20] 11 cmH20  - Wean vent  - ABGs PRN  - Chlorhexidine  - Daily CXRs  - STAT CTA negative for PE. Awaiting final radiology read     Cardiac:  - Currently on epi @ 0.2  - Echo from 4/21 shows normal EF and "right ventricle is enlarged measuring 4.6 cm at the base in the apical right ventricle-focused view. Global right ventricular systolic function appears moderately depressed"  - Given negative CTA chest, sinus tachycardia, lactate 3.9, her hemodynamic instability could be from hypovolemic shock. Will proceed with fluid resuscitation and switch to Norepinephrine  - Wean pressors  - FloTrac    Renal:   - BUN/Cr 17/1.3  - Rosario     Infectious Disease:   - Patient initially on broad spectrum abx  - Cultures have been negative  - Will hold off on restarting abx at this time besides Ancef for surgical ppx  - Trend WBC     Hematology/Oncology:  - Widely metastatic lesions of unknown primary  - Heme/onc consulted  - Trend CBC     Endocrine:  - SSI     Fluids/Electrolytes/Nutrition/GI:   - NPO  - LR @ 125cc/hr  - Trend CMP  - Replace Lytes PRN     Pain Management::   - " Fentanyl gtt     Dispo:  Continue SICU care. PE workup             Critical care was time spent personally by me on the following activities: development of treatment plan with patient or surrogate and bedside caregivers, discussions with consultants, evaluation of patient's response to treatment, examination of patient, ordering and performing treatments and interventions, ordering and review of laboratory studies, ordering and review of radiographic studies, pulse oximetry, re-evaluation of patient's condition.  This critical care time did not overlap with that of any other provider or involve time for any procedures.     Reginald Romero MD  Critical Care - Surgery  [facility]

## 2018-04-26 NOTE — ASSESSMENT & PLAN NOTE
- Likely secondary to chronic disease.  - With precipitous drop of hemoglobin to 6.7 on 4/25 -->4/26 likely from post-operative blood loss and transfused 2U PRBC .  - Now stable at 9.8 today. No evidence of blood loss.

## 2018-04-26 NOTE — PROCEDURES
"Kerline Grossman is a 58 y.o. female patient.    Temp: 98.9 °F (37.2 °C) (04/25/18 1815)  Pulse: (!) 139 (04/25/18 1900)  Resp: (!) 26 (04/25/18 1845)  BP: (!) 91/54 (04/25/18 1900)  SpO2: 100 % (04/25/18 1900)  Weight: 68.6 kg (151 lb 3.8 oz) (04/25/18 0519)  Height: 5' 6" (167.6 cm) (04/20/18 2043)       Central Line  Date/Time: 4/25/2018 7:04 PM  Location procedure was performed: [facility] 2ND [facility] PACU  Performed by: LAURY FUENTES  Pre-operative Diagnosis: Shock  Consent Done: Yes  Time out: Immediately prior to procedure a "time out" was called to verify the correct patient, procedure, equipment, support staff and site/side marked as required.  Indications: hemodynamic monitoring  Anesthesia: local infiltration and general anesthesia    Anesthesia:  Local Anesthetic: lidocaine 1% without epinephrine  Anesthetic total: 5 mL  Preparation: skin prepped with ChloraPrep  Skin prep agent dried: skin prep agent completely dried prior to procedure  Sterile barriers: all five maximum sterile barriers used - cap, mask, sterile gown, sterile gloves, and large sterile sheet  Hand hygiene: hand hygiene performed prior to central venous catheter insertion  Location details: right internal jugular  Catheter type: triple lumen  Catheter size: 8 Fr  Catheter Length: 14cm    Ultrasound guidance: yes  Vessel Caliber: large, patent, compressibility normal  Vascular Doppler: not done  Needle advanced into vessel with real time Ultrasound guidance.  Guidewire confirmed in vessel.  Image recorded and saved.  Sterile sheath used.  Manometry: Yes  Number of attempts: 2  Assessment: placement verified by x-ray  Complications: none  Estimated blood loss (mL): 2  Specimens: No  Implants: No  Post-procedure: line sutured,  chlorhexidine patch,  sterile dressing applied and blood return through all ports  Complications: No          Laury Gray  4/25/2018  "

## 2018-04-26 NOTE — ASSESSMENT & PLAN NOTE
Cardiology consulted for R heart failure. Patient is still intubated and sedated, however, is currently off pressors with MAP 60-65. CTA showed no signs of PE, although does not exclude chronic thromboembolism (-V/Q if high suspicion). CXR showed b/l atelectasis and pleural effusion, but without pulmonary edema. EKG showed no signs of acute MI, some nonspecific T-wave abnormalities in V2-V3, however, does not continue into V4-V6, which can be within normal spectrum for her. Troponin negative. Mild pericardial fluid. Mild cardiomegaly. LA 11.5, now 1.6.     ECHO on 4/21 showing RV enlargement (4.6 cm at base) with global, moderate RV depression. Normal LV systolic fxn with EF 60-65%. JAMILA during surgery showing similar results. No need for repeat ECHO at this juncture. CVP prior to surgery was 3, patient was likely volume down. When patient became hypotensive, phenylephrine increased coronary perfusion initially and aided to increase BP, but likely increased patients afterload then increasing stress on Right heart requiring more and more phenylephrine.     -With metastatic cancer, patient likely to require further procedures. Would recommend giving fluids and consider medications to optimize preload with goal CVP of 8-12 prior to surgery, and would consider using low dose epinephrine gtt, as epinephrine would be a good choice for RHF. Cardiology happy to assist with any direct questions in future procedures.   -Recommend cardiology f/u in [facility] clinic for RHF mgmt.  -Continue IV rescusitation   -Mg 1.4. Cont to monitor electrolytes (maintain K>4.0; Mg>2.0)

## 2018-04-26 NOTE — ASSESSMENT & PLAN NOTE
- Appears to be persistent throughout her hospital stay and at one point during admission up to 11 which resolved to 2.5 with IVF.  - With tachycardia but afebrile and without leukocytosis.  - Likely from underlying malignancy.  - Trend today 1.6 --> 2.1 --> 4.2 --> 3.4

## 2018-04-26 NOTE — PROGRESS NOTES
Pt aaox3, following commands. Moves all extremities.  Denies any numbness, to LLE.  See flow sheet for complete assessment.

## 2018-04-26 NOTE — SUBJECTIVE & OBJECTIVE
Past Medical History:   Diagnosis Date    Bony metastasis 2018    Xray metastatic survey shows lytic lesions of skull, lytic spine and rib lesions with pathologic fractures. Diffuse lytic lesions throughout entire central axial red marrow skeleton. Lytic lesions of proximal long bones and pathological fractures of ribs and right superior and inferior pubic ramus.    Cancer     Insomnia     Right heart failure 2018       Past Surgical History:   Procedure Laterality Date     SECTION      HYSTERECTOMY         Review of patient's allergies indicates:  No Known Allergies    Family History     None        Social History Main Topics    Smoking status: Former Smoker    Smokeless tobacco: Never Used    Alcohol use No    Drug use: No    Sexual activity: Not on file      Review of Systems   Constitutional: Positive for activity change, appetite change, chills and fatigue. Negative for fever.   HENT: Negative for nosebleeds.    Eyes: Negative for visual disturbance.   Respiratory: Negative for cough and shortness of breath.    Cardiovascular: Negative for chest pain and leg swelling.   Gastrointestinal: Negative for abdominal distention, abdominal pain, blood in stool, constipation, diarrhea, nausea and vomiting.   Genitourinary: Negative for dysuria.   Musculoskeletal: Positive for arthralgias and myalgias.   Skin: Negative for color change and rash.   Neurological: Positive for weakness.   Hematological: Does not bruise/bleed easily.   Psychiatric/Behavioral: Negative for confusion.     Objective:     Vital Signs (Most Recent):  Temp: 98 °F (36.7 °C) (18 1500)  Pulse: (!) 139 (18 181)  Resp: (!) 28 (18)  BP: 114/71 (18 1800)  SpO2: (!) 94 % (18) Vital Signs (24h Range):  Temp:  [97.5 °F (36.4 °C)-98.7 °F (37.1 °C)] 98 °F (36.7 °C)  Pulse:  [] 139  Resp:  [13-46] 28  SpO2:  [91 %-100 %] 94 %  BP: ()/(51-77) 114/71  Arterial Line BP:  ()/(49-77) 153/70   Weight: 68.6 kg (151 lb 3.8 oz)  Body mass index is 24.41 kg/m².      Intake/Output Summary (Last 24 hours) at 04/26/18 1840  Last data filed at 04/26/18 1800   Gross per 24 hour   Intake          8170.82 ml   Output             3860 ml   Net          4310.82 ml       Physical Exam   Constitutional: She is oriented to person, place, and time. She appears well-developed and well-nourished. No distress.   HENT:   Head: Normocephalic and atraumatic.   Mouth/Throat: No oropharyngeal exudate.   Eyes: EOM are normal. Pupils are equal, round, and reactive to light. No scleral icterus.   Neck: Normal range of motion. Neck supple. No JVD present.   Cardiovascular: Regular rhythm and intact distal pulses.    Tachycardic in 120-130s    Pulmonary/Chest: Effort normal and breath sounds normal. No respiratory distress. She has no wheezes. She has no rales.   Abdominal: Soft. Bowel sounds are normal. She exhibits no distension. There is no tenderness.   Musculoskeletal: She exhibits tenderness. She exhibits no edema.   Left knee at site of surgery.    Neurological: She is alert and oriented to person, place, and time.   Skin: Skin is warm and dry. Capillary refill takes less than 2 seconds. She is not diaphoretic. No erythema.     Lines/Drains/Airways     Central Venous Catheter Line                 Percutaneous Central Line Insertion/Assessment - triple lumen  04/25/18 2154 right internal jugular less than 1 day          Drain                 Urethral Catheter 04/25/18 2115 less than 1 day          Epidural Line                 Perineural Analgesia/Anesthesia Assessment (using dermatomes) Epidural 04/25/18 1032 1 day          Arterial Line                 Arterial Line 04/25/18 1443 Right Radial 1 day          Peripheral Intravenous Line                 Peripheral IV - Single Lumen 04/24/18 1446 Right Forearm 2 days         Peripheral IV - Single Lumen 04/25/18 1204 Left Hand 1 day               Significant Labs:    CBC/Anemia Profile:    Recent Labs  Lab 04/25/18  2145 04/26/18  0300 04/26/18  1137   WBC 19.43* 10.48 11.86   HGB 8.0* 6.7* 9.8*   HCT 26.0* 21.8* 30.3*    143* 152   MCV 95 94 87   RDW 16.1* 16.0* 16.9*        Chemistries:    Recent Labs  Lab 04/25/18  0443  04/25/18 2145 04/26/18  0300 04/26/18  1301     < > 141 140 138   K 4.2  < > 3.9 4.5 4.1     < > 111* 112* 111*   CO2 21*  < > 11* 19* 17*   BUN 17  < > 21* 18 17   CREATININE 1.3  < > 1.5* 1.2 1.2   CALCIUM 8.1*  < > 8.0* 7.7* 7.8*   ALBUMIN 1.6*  --  1.4* 1.2*  --    PROT 5.9*  --  4.6* 4.1*  --    BILITOT 0.4  --  0.5 0.4  --    ALKPHOS 411*  --  340* 300*  --    ALT 13  --  16 14  --    AST 53*  --  65* 61*  --    MG 1.7  < > 1.5* 1.4* 1.9   PHOS 3.4  < > 4.7* 3.9 3.2   < > = values in this interval not displayed.       4/26/2018 14:36   POC PH 7.438   POC PCO2 23.2 (LL)   POC PO2 43 (LL)     Significant Imaging:     CTA Chest Non-Coronary Impression       No pulmonary thromboembolism to the segmental level.  No evidence of right heart strain or pulmonary infarction.    Multiple right upper lobe pulmonary micro nodules measuring up to 0.4 cm.  Metastatic disease cannot be excluded.  Recommend further evaluation as clinically indicated.    Innumerable lytic osseous lesions concerning for metastatic disease.    Grossly stable small bilateral pleural effusions with associated compressive atelectasis.    Additional findings as above.    Electronically signed by resident: Marco Jones  Date: 04/25/2018  Time: 21:20    Electronically signed by: Han Melo MD  Date: 04/25/2018  Time: 21:58           CXR 1 View FINDINGS:  Endotracheal tube above the mandy.  Central venous catheter in the SVC.  Mild cardiomegaly.  Bibasal atelectatic changes and probably associated pleural effusion.  The lung apices are clear       Electronically signed by: Boston Shrestha MD  Date: 04/26/2018  Time: 08:40

## 2018-04-26 NOTE — ASSESSMENT & PLAN NOTE
58 y.o. female POD1 s/p L CMN    Pain control  PT/OT: bed rest  DVT PPx: lovenox, FCDs at all times when not ambulating  Abx: postop Ancef complete  Labs: Hb 6.7, lactate 2.5, troponin 0.027  Drain: none  Ponce: none    Dispo: wean to extubate today; continue ICU care

## 2018-04-26 NOTE — SUBJECTIVE & OBJECTIVE
Interval History/Significant Events: CTA last night negative for PE. Upon arrival to SICU, CVP was 3 and was in sinus tach up to 140. Lactate 3.9. Epi discontinued and patient was started on Levo gtt. Levo gtt quickly weaned off with fluid resuscitation. On minimal vent settings this morning. Lactate peaked at 11 overnight, now normalized.     Follow-up For: Procedure(s) (LRB):  IM NAILING OF FEMUR (Left)    Post-Operative Day: 1 Day Post-Op    Objective:     Vital Signs (Most Recent):  Temp: 97.5 °F (36.4 °C) (04/26/18 0935)  Pulse: 93 (04/26/18 0935)  Resp: 16 (04/26/18 0935)  BP: 110/68 (04/26/18 0900)  SpO2: 100 % (04/26/18 0935) Vital Signs (24h Range):  Temp:  [97.5 °F (36.4 °C)-98.9 °F (37.2 °C)] 97.5 °F (36.4 °C)  Pulse:  [] 93  Resp:  [13-36] 16  SpO2:  [98 %-100 %] 100 %  BP: ()/(51-80) 110/68  Arterial Line BP: ()/(49-74) 133/74     Weight: 68.6 kg (151 lb 3.8 oz)  Body mass index is 24.41 kg/m².      Intake/Output Summary (Last 24 hours) at 04/26/18 1006  Last data filed at 04/26/18 0935   Gross per 24 hour   Intake          6117.82 ml   Output             1870 ml   Net          4247.82 ml       Physical Exam   Constitutional: She appears well-developed and well-nourished.   HENT:   Head: Normocephalic and atraumatic.   ETT in place   Eyes: Pupils are equal, round, and reactive to light.   Neck: Normal range of motion. Neck supple.   Cardiovascular: Regular rhythm.    Tachycardic   Pulmonary/Chest:   Mechanically ventilated   Abdominal: Soft. She exhibits no distension. There is no tenderness. There is no guarding.   Genitourinary:   Genitourinary Comments: Ponce in place   Neurological:   Sedated  Alert and follows commands off sedation   Skin: Skin is warm and dry.   Psychiatric:   Sedated       Vents:  Vent Mode: Spont (04/26/18 0853)  Ventilator Initiated: Yes (04/25/18 1517)  Set Rate: 0 bmp (04/26/18 0853)  Vt Set: 500 mL (04/26/18 0853)  Pressure Support: 10 cmH20 (04/26/18  0853)  PEEP/CPAP: 5 cmH20 (04/26/18 0853)  Oxygen Concentration (%): 40 (04/26/18 0935)  Peak Airway Pressure: 16 cmH2O (04/26/18 0853)  Plateau Pressure: 15 cmH20 (04/26/18 0853)  Total Ve: 8.19 mL (04/26/18 0853)  F/VT Ratio<105 (RSBI): (!) 29.8 (04/26/18 0853)    Lines/Drains/Airways     Central Venous Catheter Line                 Percutaneous Central Line Insertion/Assessment - triple lumen  04/25/18 2154 right internal jugular less than 1 day          Drain                 Urethral Catheter 04/25/18 2115 less than 1 day          Epidural Line                 Perineural Analgesia/Anesthesia Assessment (using dermatomes) Epidural 04/25/18 1032 less than 1 day          Arterial Line                 Arterial Line 04/25/18 1443 Right Radial less than 1 day          Peripheral Intravenous Line                 Peripheral IV - Single Lumen 04/24/18 1446 Right Forearm 1 day         Peripheral IV - Single Lumen 04/25/18 1204 Left Hand less than 1 day                Significant Labs:    CBC/Anemia Profile:    Recent Labs  Lab 04/25/18  1541 04/25/18  2145 04/26/18  0300   WBC 15.94* 19.43* 10.48   HGB 9.4* 8.0* 6.7*   HCT 30.4* 26.0* 21.8*    178 143*   MCV 92 95 94   RDW 15.7* 16.1* 16.0*        Chemistries:    Recent Labs  Lab 04/25/18  0443 04/25/18  1541 04/25/18  2145 04/26/18  0300    141 141 140   K 4.2 4.5 3.9 4.5    111* 111* 112*   CO2 21* 20* 11* 19*   BUN 17 17 21* 18   CREATININE 1.3 1.3 1.5* 1.2   CALCIUM 8.1* 7.5* 8.0* 7.7*   ALBUMIN 1.6*  --  1.4* 1.2*   PROT 5.9*  --  4.6* 4.1*   BILITOT 0.4  --  0.5 0.4   ALKPHOS 411*  --  340* 300*   ALT 13  --  16 14   AST 53*  --  65* 61*   MG 1.7 1.5* 1.5* 1.4*   PHOS 3.4 5.0* 4.7* 3.9       Recent Lab Results       04/26/18  0643 04/26/18  0622 04/26/18  0621 04/26/18  0618 04/26/18  0422      Immature Granulocytes          Immature Grans (Abs)          Albumin          Alkaline Phosphatase          Allens Test     N/A     ALT          Anion Gap           Aniso          AST          BANDS          Baso #          Basophil%          Total Bilirubin          Site     Jocelyn/UAC     BUN, Bld          Calcium          Chloride          CO2          Creatinine          DelSys     Adult Vent     Differential Method          eGFR if           eGFR if non           Eos #          Eosinophil%          ETCO2          FiO2     40     Glucose          Gran%          Hematocrit          Hemoglobin          Hypo          Lactate, Jesus Alberto  1.6  Comment:  Falsely low lactic acid results can be found in samples   containing >=13.0 mg/dL total bilirubin and/or >=3.5 mg/dL   direct bilirubin.          Lymph #          Lymph%          Magnesium          MCH          MCHC          MCV          Metamyelocytes          Min Vol     11     Mode     AC/PRVC     Mono #          Mono%          MPV          Myelocytes          nRBC          PEEP     5     Phosphorus          PiP     23     Platelet Estimate          Platelets          POC BE     -5     POC Glucose          POC HCO3     19.3(L)     POC Hematocrit          POC Ionized Calcium          POC PCO2     30.7(L)     POC PH     7.406     POC PO2     117(H)     POC Potassium          POC SATURATED O2     99     POC Sodium          POC TCO2     20(L)     POCT Glucose 164(H)  46(LL) 62(L)      Poly          Potassium          Total Protein          Rate     12     RBC          RDW          Sample     ARTERIAL     Sodium          Sp02     100     Troponin I          Vt     500     WBC                      04/26/18  0300 04/26/18  0058 04/25/18  2332 04/25/18  2154 04/25/18  2145      Immature Granulocytes CANCELED  Comment:  Result canceled by the ancillary    CANCELED  Comment:  Result canceled by the ancillary     Immature Grans (Abs) CANCELED  Comment:  Mild elevation in immature granulocytes is non specific and   can be seen in a variety of conditions including stress response,   acute inflammation,  trauma and pregnancy. Correlation with other   laboratory and clinical findings is essential.    Result canceled by the ancillary      CANCELED  Comment:  Mild elevation in immature granulocytes is non specific and   can be seen in a variety of conditions including stress response,   acute inflammation, trauma and pregnancy. Correlation with other   laboratory and clinical findings is essential.    Result canceled by the ancillary       Albumin 1.2(L)    1.4(L)     Alkaline Phosphatase 300(H)    340(H)     Allens Test   N/A       ALT 14    16     Anion Gap 9    19(H)     Aniso Slight         AST 61(H)    65(H)     BANDS     3.0     Baso # CANCELED  Comment:  Result canceled by the ancillary         Basophil% 0.0    0.0     Total Bilirubin 0.4  Comment:  For infants and newborns, interpretation of results should be based  on gestational age, weight and in agreement with clinical  observations.  Premature Infant recommended reference ranges:  Up to 24 hours.............<8.0 mg/dL  Up to 48 hours............<12.0 mg/dL  3-5 days..................<15.0 mg/dL  6-29 days.................<15.0 mg/dL      0.5  Comment:  For infants and newborns, interpretation of results should be based  on gestational age, weight and in agreement with clinical  observations.  Premature Infant recommended reference ranges:  Up to 24 hours.............<8.0 mg/dL  Up to 48 hours............<12.0 mg/dL  3-5 days..................<15.0 mg/dL  6-29 days.................<15.0 mg/dL       [facility]       BUN, Bld 18    21(H)     Calcium 7.7(L)    8.0(L)     Chloride 112(H)    111(H)     CO2 19(L)    11(L)     Creatinine 1.2    1.5(H)     DelSys   Adult Vent       Differential Method Manual    Manual     eGFR if  58.4(A)    44.6(A)     eGFR if non African American 50.6  Comment:  Calculation used to obtain the estimated glomerular filtration  rate (eGFR) is the CKD-EPI equation.   (A)    38.7  Comment:  Calculation used to  obtain the estimated glomerular filtration  rate (eGFR) is the CKD-EPI equation.   (A)     Eos # CANCELED  Comment:  Result canceled by the ancillary         Eosinophil% 3.0    1.0     ETCO2          FiO2   40       Glucose 57(L)    251(H)     Gran% 81.0(H)    88.0(H)     Hematocrit 21.8(L)    26.0(L)     Hemoglobin 6.7(L)    8.0(L)     Hypo Occasional         Lactate, Jesus Alberto 2.5  Comment:  Falsely low lactic acid results can be found in samples   containing >=13.0 mg/dL total bilirubin and/or >=3.5 mg/dL   direct bilirubin.  (H) 5.9  Comment:  Falsely low lactic acid results can be found in samples   containing >=13.0 mg/dL total bilirubin and/or >=3.5 mg/dL   direct bilirubin.  *Critical value -   Results called to and read back by:byron garduno rn  ()   11.5  Comment:  Falsely low lactic acid results can be found in samples   containing >=13.0 mg/dL total bilirubin and/or >=3.5 mg/dL   direct bilirubin.  *Critical value -   Results called to and read back by:Byron Garduno RN  ()      2.5  Comment:  Falsely low lactic acid results can be found in samples   containing >=13.0 mg/dL total bilirubin and/or >=3.5 mg/dL   direct bilirubin.  (H)         Lymph # CANCELED  Comment:  Result canceled by the ancillary         Lymph% 12.0(L)    6.0(L)     Magnesium 1.4(L)    1.5(L)     MCH 28.8    29.2     MCHC 30.7(L)    30.8(L)     MCV 94    95     Metamyelocytes 2.0         Min Vol   9       Mode   AC/PRVC       Mono # CANCELED  Comment:  Result canceled by the ancillary         Mono% 1.0(L)    1.0(L)     MPV 11.5    11.8     Myelocytes 1.0    1.0     nRBC 2(A)    2(A)     PEEP   5       Phosphorus 3.9    4.7(H)     PiP   24       Platelet Estimate Decreased(A)         Platelets 143(L)    178     POC BE   -14       POC Glucose          POC HCO3   12.8(L)       POC Hematocrit          POC Ionized Calcium          POC PCO2   26.6(LL)       POC PH   7.290(LL)       POC PO2   119(H)       POC Potassium          POC  SATURATED O2   98       POC Sodium          POC TCO2   14(L)       POCT Glucose    238(H)      Poly Occasional         Potassium 4.5    3.9     Total Protein 4.1(L)    4.6(L)     Rate   12       RBC 2.33(L)    2.74(L)     RDW 16.0(H)    16.1(H)     Sample   ARTERIAL       Sodium 140    141     Sp02   100       Troponin I 0.027  Comment:  The reference interval for Troponin I represents the 99th percentile   cutoff   for our facility and is consistent with 3rd generation assay   performance.  (H)         Vt   500       WBC 10.48    19.43(H)                 04/25/18  1640 04/25/18  1559 04/25/18  1541 04/25/18  1445      Immature Granulocytes   CANCELED  Comment:  Result canceled by the ancillary      Immature Grans (Abs)   CANCELED  Comment:  Mild elevation in immature granulocytes is non specific and   can be seen in a variety of conditions including stress response,   acute inflammation, trauma and pregnancy. Correlation with other   laboratory and clinical findings is essential.    Result canceled by the ancillary        Albumin         Alkaline Phosphatase         Allens Test N/A        ALT         Anion Gap   10      Aniso         AST         BANDS   3.0      Baso #         Basophil%   0.0      Total Bilirubin         Site Jocelyn/UAC        BUN, Bld   17      Calcium   7.5(L)      Chloride   111(H)      CO2   20(L)      Creatinine   1.3      DelSys Adult Vent        Differential Method   Manual      eGFR if    53.0(A)      eGFR if non    46.0  Comment:  Calculation used to obtain the estimated glomerular filtration  rate (eGFR) is the CKD-EPI equation.   (A)      Eos #         Eosinophil%   3.0      ETCO2 27        FiO2 100        Glucose   132(H)      Gran%   73.0      Hematocrit   30.4(L)      Hemoglobin   9.4(L)      Hypo         Lactate, Jesus Alberto  3.9  Comment:  Falsely low lactic acid results can be found in samples   containing >=13.0 mg/dL total bilirubin and/or >=3.5 mg/dL    direct bilirubin.  *Critical value -   Results called to and read back by:Lawanda Montoya RN  ()       Lymph #         Lymph%   14.0(L)      Magnesium   1.5(L)      MCH   28.4      MCHC   30.9(L)      MCV   92      Metamyelocytes   1.0      Min Vol         Mode AC/PRVC        Mono #         Mono%   4.0      MPV   11.1      Myelocytes   2.0      nRBC   1(A)      PEEP 5        Phosphorus   5.0(H)      PiP         Platelet Estimate   Appears normal      Platelets   244      POC BE -6   -8     POC Glucose    99     POC HCO3 19.5(L)   18.7(L)     POC Hematocrit    28(L)     POC Ionized Calcium    1.08     POC PCO2 36.1   37.0     POC PH 7.340(L)   7.311(L)     POC PO2 301(H)   65(L)     POC Potassium    4.4     POC SATURATED O2 100   91(L)     POC Sodium    143     POC TCO2 21(L)   20(L)     POCT Glucose         Poly         Potassium   4.5      Total Protein         Rate 12        RBC   3.31(L)      RDW   15.7(H)      Sample ARTERIAL   ARTERIAL     Sodium   141      Sp02 99        Troponin I         Vt 500        WBC   15.94(H)            Significant Imaging:  I have reviewed all pertinent imaging results/findings within the past 24 hours.

## 2018-04-26 NOTE — OP NOTE
DATE OF PROCEDURE:  04/25/2018    PREOPERATIVE DIAGNOSES:  Metastatic cancer with multiple lytic lesions and   impending fracture, left femur.    POSTOPERATIVE DIAGNOSES:  Metastatic cancer with multiple lytic lesions and   impending fracture, left femur.    PROCEDURES PERFORMED:  1.  Cephalomedullary nail fixation of left femur.  2.  Open biopsy, deep left femur.    SURGEON:  Jd Cabrera M.D.    ASSISTANT:  Frank Argueta M.D. (RES)    ANESTHESIA:  General endotracheal.    ESTIMATED BLOOD LOSS:  200 mL.    IV FLUIDS:  2000 mL of crystalloid.    IMPLANTS:  Synthes trochanteric fixation ____ advanced 4 x 14 mm with 95 mm hip   screw and two distal interlocking screws.    SPECIMENS:  Bone for Pathology.    INDICATIONS FOR PROCEDURE:  The patient is a 58-year-old female who was   transferred from an outside hospital, in which she was found to have either   metastatic or primary bone tumor at multiple sites and hypotension.  The   patient's hypotension had been treated and she had been doing well with that and   she has impending fractures on both the left and right femur with multiple   lytic lesions.  I had a long discussion with the patient, the patient's family   about options.  I am afraid to weightbear her at this time and discussed doing   staged fixation of the left and right femur.  The risks, benefits, and   alternatives of the surgery were discussed with the patient and family at great   length prior to going to the Operating Room.  Informed consent was obtained.  In   addition, the patient had a biopsy done at an outside hospital and those final   results are pending, but meanwhile, I am going to take another biopsy in the   Operating Room today.    PROCEDURE IN DETAIL:  The patient was identified in the preoperative holding   area and the site was marked.  Regional analgesia was performed.  The patient   was wheeled in the Operating Room and general endotracheal anesthesia was   induced in the patient's  hospital bed.  Preoperative antibiotics were   administered.  The patient was then placed on the [place] fracture table with all   bony prominences well padded and both lower extremities in traction boots.  Left   lower extremity was prepped and draped in a sterile fashion.  A timeout was   undertaken to confirm the patient, site, surgery, surgeon, and administration of   preoperative antibiotics.  All agreed and proceeded.    I made a starting incision proximal to the greater trochanter and placed a   guidewire going from greater to lesser trochanter.  We used the opening reamer   and removed these two items.  I then placed a pituitary rongeur down to the   proximal shaft of the bone to try and get a sample of the bone here.  The   patient had lesions that were lytic, largely in the posterior cortex, but also   had some newer bone formation within the canal.  I could not get anything with   the pituitary rongeur and at this point in time, took a cannula and inserted   this down there and hit it with a mallet in order to go through this area and   got a very good core of bone from inside the shaft for Pathology.  I then took   the hand reamers in order to open this area up, so that I could get a guide spencer   down.  I then placed the guide spencer distally and measured and a 400 mm nail was   appropriate.  I then reamed starting with the end cutting reamer at 8.5   sequentially up to 15.5 mm and placed a 14 mm x 4 mm nail down.  This had a good   central position in the canal on distal lateral fluoroscopy.    We then moved proximal and made a separate incision and placed a guidewire for   the hip screw in the appropriate position in the femoral head, measured this,   reamed it, and placed the hip screw.  I then turned my attention to distal   interlocking.  The patient did have a lesion fairly distal several centimeters   above the patella.  I placed an interlocking screw through the nail above that   lesion and one below  that lesion.    The wounds were copiously irrigated with normal saline solution.  Deep fascia   was closed with 0 Vicryl suture, subcutaneous tissue with 3-0 Vicryl suture, and   skin with 3-0 nylon suture.  Sterile dressings were applied.  The patient did   have a recurrence of her hypotension that she had before and some tachycardia   during the case and needed some pressors.  At this point, Anesthesia elected to   do a JAMILA intraoperatively and she did have issues known from prior   echocardiogram with right ventricular dysfunction.  At this point, after   discussion, they elected to leave her intubated and she was on a small amount of   epinephrine drip and we brought her to the Postoperative Care Unit intubated   with plans for going to the SICU overnight.    All instruments and sponge counts were reported correct at the end of the case.    PLAN FOR THE PATIENT:  We will see how she does in the SICU and see how her   overall physiology is.  I was more worried about the left femur breaking but I   do still have concerns about the right, which needs Mirels' criteria for   fixation and she does have an intertrochanteric lesion on that side.      FABRICE/DAVID  dd: 04/25/2018 16:45:23 (CDT)  td: 04/25/2018 19:12:21 (CDT)  Doc ID   #[id number]  Job ID #[id number]    CC:

## 2018-04-26 NOTE — SUBJECTIVE & OBJECTIVE
Follow-up For: Procedure(s) (LRB):  IM NAILING OF FEMUR (Left)    Post-Operative Day: Day of Surgery     Past Medical History:   Diagnosis Date    Cancer     Insomnia        Past Surgical History:   Procedure Laterality Date     SECTION      HYSTERECTOMY         Review of patient's allergies indicates:  No Known Allergies    Family History     None        Social History Main Topics    Smoking status: Former Smoker    Smokeless tobacco: Never Used    Alcohol use No    Drug use: No    Sexual activity: Not on file      Review of Systems   Unable to perform ROS: Intubated     Objective:     Vital Signs (Most Recent):  Temp: 98.7 °F (37.1 °C) (18)  Pulse: (!) 135 (18)  Resp: (!) 31 (18)  BP: 130/73 (18)  SpO2: 100 % (18) Vital Signs (24h Range):  Temp:  [98.5 °F (36.9 °C)-98.9 °F (37.2 °C)] 98.7 °F (37.1 °C)  Pulse:  [100-139] 135  Resp:  [15-36] 31  SpO2:  [89 %-100 %] 100 %  BP: ()/(51-82) 130/73  Arterial Line BP: ()/(49-64) 127/64     Weight: 68.6 kg (151 lb 3.8 oz)  Body mass index is 24.41 kg/m².      Intake/Output Summary (Last 24 hours) at 18 2141  Last data filed at 18 1458   Gross per 24 hour   Intake             2000 ml   Output              100 ml   Net             1900 ml       Physical Exam   Constitutional: She appears well-developed and well-nourished.   HENT:   Head: Normocephalic and atraumatic.   ETT in place   Eyes: Pupils are equal, round, and reactive to light.   Neck: Normal range of motion. Neck supple.   Cardiovascular: Regular rhythm.    Tachycardic   Pulmonary/Chest:   Mechanically ventilated   Abdominal: Soft. She exhibits no distension. There is no tenderness. There is no guarding.   Genitourinary:   Genitourinary Comments: Ponce in place   Neurological:   Sedated  Alert and follows commands off sedation   Skin: Skin is warm and dry.   Psychiatric:   Sedated     Vents:  Vent Mode: A/C (18  2113)  Ventilator Initiated: Yes (04/25/18 1517)  Set Rate: 12 bmp (04/25/18 2113)  Vt Set: 500 mL (04/25/18 2113)  Pressure Support: 0 cmH20 (04/25/18 2113)  PEEP/CPAP: 5 cmH20 (04/25/18 2113)  Oxygen Concentration (%): 40 (04/25/18 2115)  Peak Airway Pressure: 24 cmH2O (04/25/18 2113)  Plateau Pressure: 0 cmH20 (04/25/18 2113)  Total Ve: 14.3 mL (04/25/18 2113)  F/VT Ratio<105 (RSBI): (!) 52.31 (04/25/18 1948)    Lines/Drains/Airways     Airway                 Airway - Non-Surgical 04/25/18 1200 Endotracheal Tube less than 1 day          Epidural Line                 Perineural Analgesia/Anesthesia Assessment (using dermatomes) Epidural 04/25/18 1032 less than 1 day          Arterial Line                 Arterial Line 04/25/18 1443 Right Radial less than 1 day          Peripheral Intravenous Line                 Peripheral IV - Single Lumen 04/24/18 1446 Right Forearm 1 day         Peripheral IV - Single Lumen 04/25/18 1204 Left Hand less than 1 day                Significant Labs:    CBC/Anemia Profile:    Recent Labs  Lab 04/24/18  0510 04/25/18  0443 04/25/18  1445 04/25/18  1541   WBC 8.26 9.65  --  15.94*   HGB 10.2* 10.8*  --  9.4*   HCT 31.6* 34.4* 28* 30.4*    253  --  244   MCV 89 91  --  92   RDW 15.7* 15.5*  --  15.7*        Chemistries:    Recent Labs  Lab 04/24/18  0510 04/25/18  0443 04/25/18  1541    141 141   K 4.1 4.2 4.5    109 111*   CO2 21* 21* 20*   BUN 16 17 17   CREATININE 1.2 1.3 1.3   CALCIUM 7.8* 8.1* 7.5*   ALBUMIN 1.5* 1.6*  --    PROT 5.7* 5.9*  --    BILITOT 0.5 0.4  --    ALKPHOS 410* 411*  --    ALT 13 13  --    AST 47* 53*  --    MG 1.5* 1.7 1.5*   PHOS 3.1 3.4 5.0*       Recent Lab Results       04/25/18  1640 04/25/18  1559 04/25/18  1541 04/25/18  1445 04/25/18  0443      Immature Granulocytes   CANCELED  Comment:  Result canceled by the ancillary  CANCELED  Comment:  Result canceled by the ancillary     Immature Grans (Abs)   CANCELED  Comment:  Mild  elevation in immature granulocytes is non specific and   can be seen in a variety of conditions including stress response,   acute inflammation, trauma and pregnancy. Correlation with other   laboratory and clinical findings is essential.    Result canceled by the ancillary    CANCELED  Comment:  Mild elevation in immature granulocytes is non specific and   can be seen in a variety of conditions including stress response,   acute inflammation, trauma and pregnancy. Correlation with other   laboratory and clinical findings is essential.    Result canceled by the ancillary       Albumin     1.6(L)     Alkaline Phosphatase     411(H)     Allens Test N/A         ALT     13     Anion Gap   10  11     Aniso     Slight     Appearance, UA          AST     53(H)     Bacteria, UA          BANDS   3.0  1.0     Baso #     CANCELED  Comment:  Result canceled by the ancillary     Basophil%   0.0  0.0     Bilirubin (UA)          Total Bilirubin     0.4  Comment:  For infants and newborns, interpretation of results should be based  on gestational age, weight and in agreement with clinical  observations.  Premature Infant recommended reference ranges:  Up to 24 hours.............<8.0 mg/dL  Up to 48 hours............<12.0 mg/dL  3-5 days..................<15.0 mg/dL  6-29 days.................<15.0 mg/dL       Site Jocelyn/UAC         BUN, Bld   17  17     [place] Cells     Occasional     Calcium   7.5(L)  8.1(L)     Chloride   111(H)  109     CO2   20(L)  21(L)     Color, UA          Creatinine   1.3  1.3     [facility] Adult Vent         Differential Method   Manual  Manual     eGFR if    53.0(A)  53.0(A)     eGFR if non    46.0  Comment:  Calculation used to obtain the estimated glomerular filtration  rate (eGFR) is the CKD-EPI equation.   (A)  46.0  Comment:  Calculation used to obtain the estimated glomerular filtration  rate (eGFR) is the CKD-EPI equation.   (A)     Eos #     CANCELED  Comment:  Result  canceled by the ancillary     Eosinophil%   3.0  0.0     ETCO2 27         FiO2 100         Glucose   132(H)  85     Glucose, UA          Gran%   73.0  92.0(H)     Hematocrit   30.4(L)  34.4(L)     Hemoglobin   9.4(L)  10.8(L)     Hypo     Occasional     Ketones, UA          Lactate, Jesus Alberto  3.9  Comment:  Falsely low lactic acid results can be found in samples   containing >=13.0 mg/dL total bilirubin and/or >=3.5 mg/dL   direct bilirubin.  *Critical value -   Results called to and read back by:Lawanda Montoya RN  ()        Leukocytes, UA          Lymph #     CANCELED  Comment:  Result canceled by the ancillary     Lymph%   14.0(L)  5.0(L)     Magnesium   1.5(L)  1.7     MCH   28.4  28.5     MCHC   30.9(L)  31.4(L)     MCV   92  91     Metamyelocytes   1.0  1.0     Microscopic Comment          Mode AC/PRVC         Mono #     CANCELED  Comment:  Result canceled by the ancillary     Mono%   4.0  1.0(L)     MPV   11.1  11.4     Myelocytes   2.0       Nitrite, UA          nRBC   1(A)  1(A)     Occult Blood UA          Ovalocytes     Occasional     PEEP 5         pH, UA          Phosphorus   5.0(H)  3.4     Platelet Estimate   Appears normal       Platelets   244  253     POC BE -6   -8      POC Glucose    99      POC HCO3 19.5(L)   18.7(L)      POC Hematocrit    28(L)      POC Ionized Calcium    1.08      POC PCO2 36.1   37.0      POC PH 7.340(L)   7.311(L)      POC PO2 301(H)   65(L)      POC Potassium    4.4      POC SATURATED O2 100   91(L)      POC Sodium    143      POC TCO2 21(L)   20(L)      Poik     Slight     Poly     Occasional     Potassium   4.5  4.2     Total Protein     5.9(L)     Protein, UA          Rate 12         RBC   3.31(L)  3.79(L)     RBC, UA          RDW   15.7(H)  15.5(H)     Sample ARTERIAL   ARTERIAL      Sodium   141  141     Sp02 99         Specific Gravity, UA          Specimen UA          Squam Epithel, UA          Urobilinogen, UA          Vt 500         WBC, UA          WBC   15.94(H)   9.65     Yeast, UA                      04/25/18  0144      Immature Granulocytes      Immature Grans (Abs)      Albumin      Alkaline Phosphatase      Allens Test      ALT      Anion Gap      Aniso      Appearance, UA Clear     AST      Bacteria, UA Occasional     BANDS      Baso #      Basophil%      Bilirubin (UA) Negative     Total Bilirubin      Site      BUN, Bld      Thad Cells      Calcium      Chloride      CO2      Color, UA Straw     Creatinine      DelSys      Differential Method      eGFR if       eGFR if non       Eos #      Eosinophil%      ETCO2      FiO2      Glucose      Glucose, UA Negative     Gran%      Hematocrit      Hemoglobin      Hypo      Ketones, UA Negative     Lactate, Jesus Alberto      Leukocytes, UA Trace(A)     Lymph #      Lymph%      Magnesium      MCH      MCHC      MCV      Metamyelocytes      Microscopic Comment SEE COMMENT  Comment:  Other formed elements not mentioned in the report are not   present in the microscopic examination.        Mode      Mono #      Mono%      MPV      Myelocytes      Nitrite, UA Negative     nRBC      Occult Blood UA Negative     Ovalocytes      PEEP      pH, UA 7.0     Phosphorus      Platelet Estimate      Platelets      POC BE      POC Glucose      POC HCO3      POC Hematocrit      POC Ionized Calcium      POC PCO2      POC PH      POC PO2      POC Potassium      POC SATURATED O2      POC Sodium      POC TCO2      Poik      Poly      Potassium      Total Protein      Protein, UA Negative  Comment:  Recommend a 24 hour urine protein or a urine   protein/creatinine ratio if globulin induced proteinuria is  clinically suspected.       Rate      RBC      RBC, UA 1     RDW      Sample      Sodium      Sp02      Specific Gravity, UA 1.010     Specimen UA Urine, Unspecified     Squam Epithel, UA 1     Urobilinogen, UA Negative     Vt      WBC, UA 2     WBC      Yeast, UA Occasional(A)           Significant Imaging: I have reviewed  all pertinent imaging results/findings within the past 24 hours.

## 2018-04-26 NOTE — ASSESSMENT & PLAN NOTE
"- Followed by hem/onc consult here. Per their note: "Outside pathology from [facility] obtained demonstrating moderately differentiated adenocarcinoma. Differential includes GI, lung, breast as potential sources; with previously collected bloodwork and significantly elevated CA 27-29, high suspicion for breast malignancy as primary source at this time."  "

## 2018-04-26 NOTE — PROGRESS NOTES
"[facility]  Palliative Medicine  Progress Note    Patient Name: Kerline Grossman  MRN: [id number]  Admission Date: 4/20/2018  Hospital Length of Stay: 5 days  Code Status: Full Code   Attending Provider: David Cruz MD  Consulting Provider: Shakira Winters MD  Primary Care Physician: Primary Doctor No  Principal Problem:Adenocarcinoma of unknown primary    Patient information was obtained from patient.      Assessment/Plan:     No new Assessment & Plan notes have been filed under this hospital service since the last note was generated.  Service: Palliative Medicine    59 yo f, advanced metastatic adenocarcinoma (likely breast? Though pathology reports still pending), admitted to [facility] [place] 2nd opinion, pain management, now s/p OR yesterday for femur nail in setting of lytic lesion, immediate post-op course c/b hypotension/need for pressors.  Pt now HD stable, off pressors/vent, in SICU    Encounter for palliative care  30 min GOC with pt, son at bedside, [facility] care SW  -pt expressing frustration about wanting to eat, wanting her bed turned, wanting to be "out of this room" and "get out of here" - but when attempts were made to explore her goals further in the context of her illness, she would just respond that she is hungry and wants to eat  -I reinforced to her that the restrictions about activity/eating, etc that she's experiencing are bc the medical team is focused on her safety and are doing everything they can to keep her alive as long as possible; I explained that if her goals were to shift towards maximizing her comfort and QOL, those restrictions would ease up a bit  -pt quiet in response to these options, suspect that she's ambivalent about what to do at this point and what to prioritize; definitely suspect that when complete info available from oncology standpoint, will allow her to move forward with decision making    3:43 PM  40 min GOC with pt, 2 sons at bedside  -Pt says " "that Hector Grossman ([phone]) one of her sons present at the bedside, should be her HCPOA  -paperwork not completed today bc at end of conversation, pt tearful/angry, did not want to discuss further  -tried to engage son/HCPOA and pt in discussion about basic ACP, given tenuous clinical status and experience overnight  -pt fluctuates between saying that if she dies "just let me go" and then later said that she wants to "fight" and be kept alive on machines bc she believes in God and wants a miracle  -endorses that what's most important to her are her children/grandchildren, spending time with them  -we discussed the use of LST (mechanical ventilation, resuscitation) should her condition worsen.  Ultimately said that she's not ready to make these kinds of decisions  -pt encouraged to discuss these issues further with her son Hector who was at bedside    I will follow-up with patient. Please contact us if you have any additional questions.    Subjective:     Chief Complaint:   Chief Complaint   Patient presents with    Generalized Body Aches     Pt reports having all over body pain. Pt reports having cancer "all over". Pt reports care at [facility], had fluid taken off lungs this AM. O2 in triage 98%, denies SOB.       HPI:   59 yo woman with medical history significant for chronic mood disorder who presented to OSH with ZACH, hypercalcemia, and lytic lesions concerning for MM.  Workup for MM negative thus far.  Her family wanted to transfer her to [facility], but it was denied, so the family had her discharged and transported her to [facility] themselves.  She arrived hypotensive, which may have been 2/2 opiates (received morphine at OSH).  Hypotension resolved with IVF and only required brief stay under ICU care before being transferred to the floor.    Palliative care consulted for goals of care.  See clinical review 4/23/18 with Dr. Rosario regarding preliminary pathology findings.     She states she is in pain, 8/10, " primarily in her back.  When she takes pain medications she says she falls asleep only for 15-20 minutes before waking up with pain 8/10.  Family at bedside confirms she does this throughout the day even without pain medications.  She reports pain level remains at an 8 despite taking meds.    Last filled prescriptions [facility] on [address] and [address] 3/2017  paliperidone ER 6mg tabs (2 tabs qAM)  oxcarbazepime 300mg 2 tabs bid  Levothyroxine 25mcg qday  Benztropine 1mg bid  Trazodone 100mg 2 tabs qhs    Hospital Course:  No notes on file    No new subjective & objective note has been filed under this hospital service since the last note was generated.    Interval history:  -to OR yesterday for L femur pin in setting of high risk lytic lesion  -significant hypotension intra-op and post-op requiring pressors, persistent vent support  -CTA negative for PE  -weaned off pressors last night, lactate trended down, extubated this am      > 50% of 60 min visit spent in chart review, face to face discussion of goals of care,  symptom assessment, coordination of care and emotional support.    Shakira Winters MD  Palliative Medicine  [facility]  [phone]

## 2018-04-26 NOTE — PLAN OF CARE
Problem: Patient Care Overview  Goal: Plan of Care Review  Outcome: Ongoing (interventions implemented as appropriate)  Recommendations     1. When/if medically feasible, ADAT to regular (texture per SLP). Add Boost Plus ONS to aid in caloric intake.   2. If unable to advance diet, initiate enteral nutrition. Recommend Isosource 1.5 @ 55 mL/hr to meet 96% EEN, 100% EPN.   3. RD to monitor & follow-up.

## 2018-04-26 NOTE — ASSESSMENT & PLAN NOTE
"58 year old female with widely metastatic cancer of unknown origin s/p left femur IMN 4/25/18    Neuro:   - Sedation: Fentanyl, Propofol gtt  - Sedation off this morning for SBT. Following commands  - Intermittently confused at baseline per prior notes     Pulmonary:   - Intubated  Vent Mode: Spont  Oxygen Concentration (%):  [] 40  Resp Rate Total:  [13 br/min-32.1 br/min] 18 br/min  Vt Set:  [500 mL] 500 mL  PEEP/CPAP:  [5 cmH20] 5 cmH20  Pressure Support:  [0 cmH20-10 cmH20] 10 cmH20  Mean Airway Pressure:  [8.9 vcW62-48 cmH20] 8.9 cmH20  - Wean vent  - ABGs PRN  - STAT CTA negative for PE   - Wean to extubate today     Cardiac:  - Epi and levo weaned off. Received 3L LR boluses overnight  - Echo from 4/21 shows normal EF and "right ventricle is enlarged measuring 4.6 cm at the base in the apical right ventricle-focused view. Global right ventricular systolic function appears moderately depressed"  -Lactate normalized    Renal:   - BUN/Cr 18/1.2  - Ponce     Infectious Disease:   - Patient initially on broad spectrum abx  - Cultures have been negative  - Will hold off on restarting abx at this time besides Ancef for surgical ppx  - Trend WBC     Hematology/Oncology:  - Widely metastatic lesions of unknown primary  - Heme/onc consulted  - Trend CBC     Endocrine:  - SSI     Fluids/Electrolytes/Nutrition/GI:   - NPO  - LR @ 125cc/hr  - Trend CMP  - Replace Lytes PRN   - Consider bedside swallow before advancing diet    Pain Management::   - perineural SIFI, PRN Fentanyl     Dispo:  Extubate and possible stepdown to IM today.        "

## 2018-04-26 NOTE — CONSULTS
[facility]  Cardiology  Consult Note    Patient Name: Kerline Grossman  MRN: [id number]  Admission Date: 4/20/2018  Hospital Length of Stay: 5 days  Code Status: Full Code   Attending Provider: David Cruz MD   Consulting Provider: Barbara Wallis MD  Primary Care Physician: Primary Doctor No  Principal Problem:Adenocarcinoma of unknown primary    Patient information was obtained from patient, relative(s), past medical records and ER records.     Inpatient consult to Cardiology  Consult performed by: BARBARA WALLIS  Consult ordered by: AYSE AN  Reason for consult: Right heart failure        Subjective:     Chief Complaint:  Consulted for Right Heart Failure    HPI:   Mrs. Grossman is a 57 yo female admitted to MICU on 4/20/18 for second opinion regarding recently diagnosed likely metastatic malignancy with unknown primary. Per patient's daughter about a month ago her mother started to gradually decline secondary to diffuse pain. She tehn became progressively more altered and eventually not responsive. She could no longer walk and was urinating on herself. Her family brought her to ED at [facility]. They brought some records from her stay there which show that work up revealed a corrected calcium of 13 and ZACH. She had CT brain, chest and abdomen which showed diffuse lytic lesions and some pathological fractures in her skull, ribs, and hips but no lesion that pointed to primary tumor. Her calcium eventually trended down. SPEP and UPEP were negative. Her daughter states she had 2 BM biopsies which were unrevealing. She had CT guided biopsy of a lesion in her hip on 4/20 as well as diagnostic thoracentesis which removed only 50 ccs from her pleural effusion. Her daughter states the physicians at [facility] were recommending inpatient hospice for her mother as it seemed she most likely had widely metastatic disease with unknown primary tumor however the patient's children wished to have a definitive  diagnoses before making a decision. She has metastatic lesions to the bilateral femurs with concern for impending fracture.     On 18, she was taken to the OR by Ortho for nailing of the left femur. Approximately 1 hour into the case, she became hypotensive requirement increasing amounts of vasopressor support. JAMILA was done in the OR by anesthesia and reportedly showed signs of right heart strain. Decision was made to leave her intubated and obtain stat CTA to rule out PE. Patient was seen in the PACU intubated, sedated, and on 0.2 of epi.     Cardiology consulted for R heart failure. Patient is still intubated and sedated, however, is currently off pressors with MAP 60-65. CTA showed no signs of PE. CXR showed b/l atelectasis and pleural effusion, but without pulmonary edema. ECHO on  showing RV enlargement (4.6 cm at base) with global, moderate RV depression. Normal LV systolic fxn with EF 60-65%. EKG showed no signs of acute MI, troponin negative.     Past Medical History:   Diagnosis Date    Bony metastasis 2018    Xray metastatic survey shows lytic lesions of skull, lytic spine and rib lesions with pathologic fractures. Diffuse lytic lesions throughout entire central axial red marrow skeleton. Lytic lesions of proximal long bones and pathological fractures of ribs and right superior and inferior pubic ramus.    Cancer     Insomnia        Past Surgical History:   Procedure Laterality Date     SECTION      HYSTERECTOMY         Review of patient's allergies indicates:  No Known Allergies    No current facility-administered medications on file prior to encounter.      Current Outpatient Prescriptions on File Prior to Encounter   Medication Sig    LURASIDONE HCL (LATUDA ORAL) Take by mouth.    quetiapine (SEROQUEL XR) 400 MG Tb24 Take by mouth once daily.     Family History     None        Social History Main Topics    Smoking status: Former Smoker    Smokeless tobacco: Never Used     Alcohol use No    Drug use: No    Sexual activity: Not on file     Review of Systems   Unable to perform ROS: intubated     Objective:     Vital Signs (Most Recent):  Temp: 97.6 °F (36.4 °C) (04/26/18 0700)  Pulse: 98 (04/26/18 0853)  Resp: 16 (04/26/18 0853)  BP: (!) 88/57 (04/26/18 0800)  SpO2: 100 % (04/26/18 0853) Vital Signs (24h Range):  Temp:  [97.6 °F (36.4 °C)-98.9 °F (37.2 °C)] 97.6 °F (36.4 °C)  Pulse:  [] 98  Resp:  [13-36] 16  SpO2:  [89 %-100 %] 100 %  BP: ()/(51-80) 88/57  Arterial Line BP: ()/(49-72) 97/52     Weight: 68.6 kg (151 lb 3.8 oz)  Body mass index is 24.41 kg/m².    SpO2: 100 %  O2 Device (Oxygen Therapy): ventilator    Vent Mode: Spont  Oxygen Concentration (%):  [] 40  Resp Rate Total:  [13 br/min-32.1 br/min] 22 br/min  Vt Set:  [500 mL] 500 mL  PEEP/CPAP:  [5 cmH20] 5 cmH20  Pressure Support:  [0 cmH20-10 cmH20] 10 cmH20  Mean Airway Pressure:  [8.9 oaV72-84 cmH20] 8.9 cmH20      Intake/Output Summary (Last 24 hours) at 04/26/18 0902  Last data filed at 04/26/18 0600   Gross per 24 hour   Intake          5867.82 ml   Output             1645 ml   Net          4222.82 ml       Lines/Drains/Airways     Central Venous Catheter Line                 Percutaneous Central Line Insertion/Assessment - triple lumen  04/25/18 2154 right internal jugular less than 1 day          Drain                 Urethral Catheter 04/25/18 2115 less than 1 day          Airway                 Airway - Non-Surgical 04/25/18 1200 Endotracheal Tube less than 1 day          Epidural Line                 Perineural Analgesia/Anesthesia Assessment (using dermatomes) Epidural 04/25/18 1032 less than 1 day          Arterial Line                 Arterial Line 04/25/18 1443 Right Radial less than 1 day          Peripheral Intravenous Line                 Peripheral IV - Single Lumen 04/24/18 1446 Right Forearm 1 day         Peripheral IV - Single Lumen 04/25/18 1204 Left Hand less than 1 day                 Physical Exam   Constitutional: She appears well-developed and well-nourished.   HENT:   Head: Atraumatic.   Eyes: No scleral icterus.   Neck:   Unable to assess JVD with CVC in place   Cardiovascular: Normal rate.    No murmur heard.  HR wnl on exam  S1 and S2 clear  No murmur appreciated   Pulmonary/Chest:   Intubated. See vent settings.    Musculoskeletal: She exhibits no edema.   Neurological:   Sedated with propofol and fentanyl   Skin:   Cool limbs.   Nursing note reviewed.      Significant Labs:   CMP     Recent Labs  Lab 04/25/18  0443 04/25/18  1541 04/25/18  2145 04/26/18  0300    141 141 140   K 4.2 4.5 3.9 4.5    111* 111* 112*   CO2 21* 20* 11* 19*   GLU 85 132* 251* 57*   BUN 17 17 21* 18   CREATININE 1.3 1.3 1.5* 1.2   CALCIUM 8.1* 7.5* 8.0* 7.7*   PROT 5.9*  --  4.6* 4.1*   ALBUMIN 1.6*  --  1.4* 1.2*   BILITOT 0.4  --  0.5 0.4   ALKPHOS 411*  --  340* 300*   AST 53*  --  65* 61*   ALT 13  --  16 14   ANIONGAP 11 10 19* 9   ESTGFRAFRICA 53.0* 53.0* 44.6* 58.4*   EGFRNONAA 46.0* 46.0* 38.7* 50.6*       Recent Results (from the past 24 hour(s))   ISTAT PROCEDURE    Collection Time: 04/25/18  2:45 PM   Result Value Ref Range    POC PH 7.311 (L) 7.35 - 7.45    POC PCO2 37.0 35 - 45 mmHg    POC PO2 65 (L) 80 - 100 mmHg    POC HCO3 18.7 (L) 24 - 28 mmol/L    POC BE -8 -2 to 2 mmol/L    POC SATURATED O2 91 (L) 95 - 100 %    POC Glucose 99 70 - 110 mg/dL    POC Sodium 143 136 - 145 mmol/L    POC Potassium 4.4 3.5 - 5.1 mmol/L    POC TCO2 20 (L) 23 - 27 mmol/L    POC Ionized Calcium 1.08 1.06 - 1.42 mmol/L    POC Hematocrit 28 (L) 36 - 54 %PCV    Sample ARTERIAL    CBC auto differential    Collection Time: 04/25/18  3:41 PM   Result Value Ref Range    WBC 15.94 (H) 3.90 - 12.70 K/uL    RBC 3.31 (L) 4.00 - 5.40 M/uL    Hemoglobin 9.4 (L) 12.0 - 16.0 g/dL    Hematocrit 30.4 (L) 37.0 - 48.5 %    MCV 92 82 - 98 fL    MCH 28.4 27.0 - 31.0 pg    MCHC 30.9 (L) 32.0 - 36.0 g/dL    RDW 15.7 (H)  11.5 - 14.5 %    Platelets 244 150 - 350 K/uL    MPV 11.1 9.2 - 12.9 fL    Immature Granulocytes CANCELED 0.0 - 0.5 %    Immature Grans (Abs) CANCELED 0.00 - 0.04 K/uL    nRBC 1 (A) 0 /100 WBC    Gran% 73.0 38.0 - 73.0 %    Lymph% 14.0 (L) 18.0 - 48.0 %    Mono% 4.0 4.0 - 15.0 %    Eosinophil% 3.0 0.0 - 8.0 %    Basophil% 0.0 0.0 - 1.9 %    Bands 3.0 %    Metamyelocytes 1.0 %    Myelocytes 2.0 %    Platelet Estimate Appears normal     Differential Method Manual    Basic metabolic panel    Collection Time: 04/25/18  3:41 PM   Result Value Ref Range    Sodium 141 136 - 145 mmol/L    Potassium 4.5 3.5 - 5.1 mmol/L    Chloride 111 (H) 95 - 110 mmol/L    CO2 20 (L) 23 - 29 mmol/L    Glucose 132 (H) 70 - 110 mg/dL    BUN, Bld 17 6 - 20 mg/dL    Creatinine 1.3 0.5 - 1.4 mg/dL    Calcium 7.5 (L) 8.7 - 10.5 mg/dL    Anion Gap 10 8 - 16 mmol/L    eGFR if African American 53.0 (A) >60 mL/min/1.73 m^2    eGFR if non  46.0 (A) >60 mL/min/1.73 m^2   Magnesium    Collection Time: 04/25/18  3:41 PM   Result Value Ref Range    Magnesium 1.5 (L) 1.6 - 2.6 mg/dL   Phosphorus    Collection Time: 04/25/18  3:41 PM   Result Value Ref Range    Phosphorus 5.0 (H) 2.7 - 4.5 mg/dL   Lactic acid, plasma    Collection Time: 04/25/18  3:59 PM   Result Value Ref Range    Lactate (Lactic Acid) 3.9 (HH) 0.5 - 2.2 mmol/L   ISTAT PROCEDURE    Collection Time: 04/25/18  4:40 PM   Result Value Ref Range    POC PH 7.340 (L) 7.35 - 7.45    POC PCO2 36.1 35 - 45 mmHg    POC PO2 301 (H) 80 - 100 mmHg    POC HCO3 19.5 (L) 24 - 28 mmol/L    POC BE -6 -2 to 2 mmol/L    POC SATURATED O2 100 95 - 100 %    POC TCO2 21 (L) 23 - 27 mmol/L    Rate 12     Sample ARTERIAL     Site Jocelyn/UAC     Allens Test N/A     DelSys Adult Vent     Mode AC/PRVC     Vt 500     PEEP 5     FiO2 100     ETCO2 27     Sp02 99    Lactic acid, plasma    Collection Time: 04/25/18  9:45 PM   Result Value Ref Range    Lactate (Lactic Acid) 11.5 (HH) 0.5 - 2.2 mmol/L   CBC  auto differential    Collection Time: 04/25/18  9:45 PM   Result Value Ref Range    WBC 19.43 (H) 3.90 - 12.70 K/uL    RBC 2.74 (L) 4.00 - 5.40 M/uL    Hemoglobin 8.0 (L) 12.0 - 16.0 g/dL    Hematocrit 26.0 (L) 37.0 - 48.5 %    MCV 95 82 - 98 fL    MCH 29.2 27.0 - 31.0 pg    MCHC 30.8 (L) 32.0 - 36.0 g/dL    RDW 16.1 (H) 11.5 - 14.5 %    Platelets 178 150 - 350 K/uL    MPV 11.8 9.2 - 12.9 fL    Immature Granulocytes CANCELED 0.0 - 0.5 %    Immature Grans (Abs) CANCELED 0.00 - 0.04 K/uL    nRBC 2 (A) 0 /100 WBC    Gran% 88.0 (H) 38.0 - 73.0 %    Lymph% 6.0 (L) 18.0 - 48.0 %    Mono% 1.0 (L) 4.0 - 15.0 %    Eosinophil% 1.0 0.0 - 8.0 %    Basophil% 0.0 0.0 - 1.9 %    Bands 3.0 %    Myelocytes 1.0 %    Differential Method Manual    Comprehensive metabolic panel    Collection Time: 04/25/18  9:45 PM   Result Value Ref Range    Sodium 141 136 - 145 mmol/L    Potassium 3.9 3.5 - 5.1 mmol/L    Chloride 111 (H) 95 - 110 mmol/L    CO2 11 (L) 23 - 29 mmol/L    Glucose 251 (H) 70 - 110 mg/dL    BUN, Bld 21 (H) 6 - 20 mg/dL    Creatinine 1.5 (H) 0.5 - 1.4 mg/dL    Calcium 8.0 (L) 8.7 - 10.5 mg/dL    Total Protein 4.6 (L) 6.0 - 8.4 g/dL    Albumin 1.4 (L) 3.5 - 5.2 g/dL    Total Bilirubin 0.5 0.1 - 1.0 mg/dL    Alkaline Phosphatase 340 (H) 55 - 135 U/L    AST 65 (H) 10 - 40 U/L    ALT 16 10 - 44 U/L    Anion Gap 19 (H) 8 - 16 mmol/L    eGFR if African American 44.6 (A) >60 mL/min/1.73 m^2    eGFR if non  38.7 (A) >60 mL/min/1.73 m^2   Magnesium    Collection Time: 04/25/18  9:45 PM   Result Value Ref Range    Magnesium 1.5 (L) 1.6 - 2.6 mg/dL   Phosphorus    Collection Time: 04/25/18  9:45 PM   Result Value Ref Range    Phosphorus 4.7 (H) 2.7 - 4.5 mg/dL   POCT glucose    Collection Time: 04/25/18  9:54 PM   Result Value Ref Range    POCT Glucose 238 (H) 70 - 110 mg/dL   ISTAT PROCEDURE    Collection Time: 04/25/18 11:32 PM   Result Value Ref Range    POC PH 7.290 (LL) 7.35 - 7.45    POC PCO2 26.6 (LL) 35 - 45  mmHg    POC PO2 119 (H) 80 - 100 mmHg    POC HCO3 12.8 (L) 24 - 28 mmol/L    POC BE -14 -2 to 2 mmol/L    POC SATURATED O2 98 95 - 100 %    POC TCO2 14 (L) 23 - 27 mmol/L    Rate 12     Sample ARTERIAL     Site Jocelyn/UAC     Allens Test N/A     DelSys Adult Vent     Mode AC/PRVC     Vt 500     PEEP 5     PiP 24     FiO2 40     Min Vol 9     Sp02 100    Lactic acid, plasma    Collection Time: 04/26/18 12:58 AM   Result Value Ref Range    Lactate (Lactic Acid) 5.9 (HH) 0.5 - 2.2 mmol/L   CBC with Automated Differential    Collection Time: 04/26/18  3:00 AM   Result Value Ref Range    WBC 10.48 3.90 - 12.70 K/uL    RBC 2.33 (L) 4.00 - 5.40 M/uL    Hemoglobin 6.7 (L) 12.0 - 16.0 g/dL    Hematocrit 21.8 (L) 37.0 - 48.5 %    MCV 94 82 - 98 fL    MCH 28.8 27.0 - 31.0 pg    MCHC 30.7 (L) 32.0 - 36.0 g/dL    RDW 16.0 (H) 11.5 - 14.5 %    Platelets 143 (L) 150 - 350 K/uL    MPV 11.5 9.2 - 12.9 fL    Immature Granulocytes CANCELED 0.0 - 0.5 %    Immature Grans (Abs) CANCELED 0.00 - 0.04 K/uL    Lymph # CANCELED 1.0 - 4.8 K/uL    Mono # CANCELED 0.3 - 1.0 K/uL    Eos # CANCELED 0.0 - 0.5 K/uL    Baso # CANCELED 0.00 - 0.20 K/uL    nRBC 2 (A) 0 /100 WBC    Gran% 81.0 (H) 38.0 - 73.0 %    Lymph% 12.0 (L) 18.0 - 48.0 %    Mono% 1.0 (L) 4.0 - 15.0 %    Eosinophil% 3.0 0.0 - 8.0 %    Basophil% 0.0 0.0 - 1.9 %    Metamyelocytes 2.0 %    Myelocytes 1.0 %    Platelet Estimate Decreased (A)     Aniso Slight     Poly Occasional     Hypo Occasional     Differential Method Manual    Comprehensive Metabolic Panel (CMP)    Collection Time: 04/26/18  3:00 AM   Result Value Ref Range    Sodium 140 136 - 145 mmol/L    Potassium 4.5 3.5 - 5.1 mmol/L    Chloride 112 (H) 95 - 110 mmol/L    CO2 19 (L) 23 - 29 mmol/L    Glucose 57 (L) 70 - 110 mg/dL    BUN, Bld 18 6 - 20 mg/dL    Creatinine 1.2 0.5 - 1.4 mg/dL    Calcium 7.7 (L) 8.7 - 10.5 mg/dL    Total Protein 4.1 (L) 6.0 - 8.4 g/dL    Albumin 1.2 (L) 3.5 - 5.2 g/dL    Total Bilirubin 0.4 0.1 -  1.0 mg/dL    Alkaline Phosphatase 300 (H) 55 - 135 U/L    AST 61 (H) 10 - 40 U/L    ALT 14 10 - 44 U/L    Anion Gap 9 8 - 16 mmol/L    eGFR if African American 58.4 (A) >60 mL/min/1.73 m^2    eGFR if non African American 50.6 (A) >60 mL/min/1.73 m^2   Magnesium    Collection Time: 04/26/18  3:00 AM   Result Value Ref Range    Magnesium 1.4 (L) 1.6 - 2.6 mg/dL   Phosphorus    Collection Time: 04/26/18  3:00 AM   Result Value Ref Range    Phosphorus 3.9 2.7 - 4.5 mg/dL   Lactic acid, plasma    Collection Time: 04/26/18  3:00 AM   Result Value Ref Range    Lactate (Lactic Acid) 2.5 (H) 0.5 - 2.2 mmol/L   Lactic acid, plasma    Collection Time: 04/26/18  3:00 AM   Result Value Ref Range    Lactate (Lactic Acid) 2.5 (H) 0.5 - 2.2 mmol/L   Troponin I    Collection Time: 04/26/18  3:00 AM   Result Value Ref Range    Troponin I 0.027 (H) 0.000 - 0.026 ng/mL   ISTAT PROCEDURE    Collection Time: 04/26/18  4:22 AM   Result Value Ref Range    POC PH 7.406 7.35 - 7.45    POC PCO2 30.7 (L) 35 - 45 mmHg    POC PO2 117 (H) 80 - 100 mmHg    POC HCO3 19.3 (L) 24 - 28 mmol/L    POC BE -5 -2 to 2 mmol/L    POC SATURATED O2 99 95 - 100 %    POC TCO2 20 (L) 23 - 27 mmol/L    Rate 12     Sample ARTERIAL     Site Jocelyn/UAC     Allens Test N/A     DelSys Adult Vent     Mode AC/PRVC     Vt 500     PEEP 5     PiP 23     FiO2 40     Min Vol 11     Sp02 100    POCT glucose    Collection Time: 04/26/18  6:18 AM   Result Value Ref Range    POCT Glucose 62 (L) 70 - 110 mg/dL   POCT glucose    Collection Time: 04/26/18  6:21 AM   Result Value Ref Range    POCT Glucose 46 (LL) 70 - 110 mg/dL   Lactic acid, plasma    Collection Time: 04/26/18  6:22 AM   Result Value Ref Range    Lactate (Lactic Acid) 1.6 0.5 - 2.2 mmol/L   POCT glucose    Collection Time: 04/26/18  6:43 AM   Result Value Ref Range    POCT Glucose 164 (H) 70 - 110 mg/dL         Microbiology Results (last 7 days)     Procedure Component Value Units Date/Time    Blood culture x two  cultures. Draw prior to antibiotics. [[id number]] Collected:  04/20/18 2152    Order Status:  Completed Specimen:  Blood from Peripheral, Antecubital, Left Updated:  04/26/18 0612     Blood Culture, Routine No growth after 5 days.    Narrative:       Aerobic and anaerobic    Blood culture x two cultures. Draw prior to antibiotics. [[id number]] Collected:  04/20/18 2205    Order Status:  Completed Specimen:  Blood from Peripheral, Forearm, Left Updated:  04/26/18 0612     Blood Culture, Routine No growth after 5 days.    Narrative:       Aerobic and anaerobic    Culture, Respiratory [[id number]]     Order Status:  No result Specimen:  Respiratory from Sputum, Expectorated     Culture, Respiratory [[id number]]     Order Status:  Canceled Specimen:  Respiratory from Sputum, Expectorated           Imaging Results          X-Ray Chest AP Portable (Final result)  Result time 04/20/18 22:18:20    Final result by Konstantin Lainez MD (04/20/18 22:18:20)                 Impression:      Multiple lytic lesions and/or pathologic rib fractures.  Right pleural effusion with associated passive atelectasis.  Additional findings as described above.      Electronically signed by: Konstantin Lainez MD  Date:    04/20/2018  Time:    22:18             Narrative:    EXAMINATION:  XR CHEST AP PORTABLE    CLINICAL HISTORY:  Sepsis;    TECHNIQUE:  Single frontal view of the chest was performed.    COMPARISON:  12/20/2006.    FINDINGS:  Cardiac wires overlie the chest.  Cardiac silhouette is magnified by technique but not felt to be enlarged.  Elevation of the right hemidiaphragm with associated right basilar effusion and subsegmental atelectasis.  Left pleural thickening or parenchymal scarring with subsegmental atelectasis.  Multiple bilateral rib fractures or aggressive osseous lesions.  Lytic lesion noted in the left humeral head, metastasis included in the differential.  Recommend correlation with clinical findings and patient history.                               CTA chest 4/25/18:  Heart: Normal size.  Trace pericardial fluid.  No evidence of right heart strain    Pleura: Small right and trace left pleural fluid.  No significant pleural thickening.    Lungs: Pulmonary parenchymal detail is limited secondary to motion artifact..  There are bilateral small pleural effusions which appear no larger when compared to CT examination 04/22/2018.  Some pleural fluid extends into the right major fissure.  There is associated compressive atelectasis in the bilateral lower lobes.  No evidence of pulmonary infarction.  There are multiple pulmonary micronodules in the right upper lobe measuring up to 0.4 cm.  Metastatic disease cannot be excluded.  No pneumothorax.    Chest Wall: Mild body wall edema.      Assessment and Plan:     Right heart failure    Cardiology consulted for R heart failure. Patient is still intubated and sedated, however, is currently off pressors with MAP 60-65. CTA showed no signs of PE, although does not exclude chronic thromboembolism (-V/Q if high suspicion). CXR showed b/l atelectasis and pleural effusion, but without pulmonary edema. EKG showed no signs of acute MI, some nonspecific T-wave abnormalities in V2-V3, however, does not continue into V4-V6, which can be within normal spectrum for her. Troponin negative. Mild pericardial fluid. Mild cardiomegaly. LA 11.5, now 1.6.     ECHO on 4/21 showing RV enlargement (4.6 cm at base) with global, moderate RV depression. Normal LV systolic fxn with EF 60-65%. JAMILA during surgery showing similar results. No need for repeat ECHO at this juncture. CVP prior to surgery was 3, patient was likely volume down. When patient became hypotensive, phenylephrine increased coronary perfusion initially and aided to increase BP, but likely increased patients afterload then increasing stress on Right heart requiring more and more phenylephrine.     -With metastatic cancer, patient likely to require further  procedures. Would recommend giving fluids and consider medications to optimize preload with goal CVP of 8-12 prior to surgery, and would consider using low dose epinephrine gtt, as epinephrine would be a good choice for RHF. As attested by patient's response to it. Cardiology happy to assist with any direct questions in future procedures.   -Continue IV rescusitation   -Mg 1.4. Cont to monitor electrolytes (maintain K>4.0; Mg>2.0)    -If patient becomes hypotensive/shock again, would check SvO2 from CVC. Cool limbs on exam likely from recent vasopressor use.   -Recommend cardiology f/u in [facility] for RHF mgmt.          VTE Risk Mitigation         Ordered     enoxaparin injection 40 mg  Daily      04/25/18 1537     IP VTE HIGH RISK PATIENT  Once      04/25/18 1018     Place sequential compression device  Until discontinued      04/25/18 1018     Place sequential compression device  Until discontinued      04/22/18 1820          Thank you for your consult. I will follow-up with patient. Please contact us if you have any additional questions.    Baldomero Wallis MD  Cardiology   [facility]

## 2018-04-26 NOTE — SUBJECTIVE & OBJECTIVE
Past Medical History:   Diagnosis Date    Bony metastasis 2018    Xray metastatic survey shows lytic lesions of skull, lytic spine and rib lesions with pathologic fractures. Diffuse lytic lesions throughout entire central axial red marrow skeleton. Lytic lesions of proximal long bones and pathological fractures of ribs and right superior and inferior pubic ramus.    Cancer     Insomnia        Past Surgical History:   Procedure Laterality Date     SECTION      HYSTERECTOMY         Review of patient's allergies indicates:  No Known Allergies    No current facility-administered medications on file prior to encounter.      Current Outpatient Prescriptions on File Prior to Encounter   Medication Sig    LURASIDONE HCL (LATUDA ORAL) Take by mouth.    quetiapine (SEROQUEL XR) 400 MG Tb24 Take by mouth once daily.     Family History     None        Social History Main Topics    Smoking status: Former Smoker    Smokeless tobacco: Never Used    Alcohol use No    Drug use: No    Sexual activity: Not on file     Review of Systems   Unable to perform ROS: intubated     Objective:     Vital Signs (Most Recent):  Temp: 97.6 °F (36.4 °C) (18 0700)  Pulse: 98 (18 0853)  Resp: 16 (18 0853)  BP: (!) 88/57 (18 0800)  SpO2: 100 % (18 0853) Vital Signs (24h Range):  Temp:  [97.6 °F (36.4 °C)-98.9 °F (37.2 °C)] 97.6 °F (36.4 °C)  Pulse:  [] 98  Resp:  [13-36] 16  SpO2:  [89 %-100 %] 100 %  BP: ()/(51-80) 88/57  Arterial Line BP: ()/(49-72) 97/52     Weight: 68.6 kg (151 lb 3.8 oz)  Body mass index is 24.41 kg/m².    SpO2: 100 %  O2 Device (Oxygen Therapy): ventilator      Intake/Output Summary (Last 24 hours) at 18 09  Last data filed at 18 0600   Gross per 24 hour   Intake          5867.82 ml   Output             1645 ml   Net          4222.82 ml       Lines/Drains/Airways     Central Venous Catheter Line                 Percutaneous Central Line  Insertion/Assessment - triple lumen  04/25/18 2154 right internal jugular less than 1 day          Drain                 Urethral Catheter 04/25/18 2115 less than 1 day          Airway                 Airway - Non-Surgical 04/25/18 1200 Endotracheal Tube less than 1 day          Epidural Line                 Perineural Analgesia/Anesthesia Assessment (using dermatomes) Epidural 04/25/18 1032 less than 1 day          Arterial Line                 Arterial Line 04/25/18 1443 Right Radial less than 1 day          Peripheral Intravenous Line                 Peripheral IV - Single Lumen 04/24/18 1446 Right Forearm 1 day         Peripheral IV - Single Lumen 04/25/18 1204 Left Hand less than 1 day                Physical Exam   Constitutional: She appears well-developed and well-nourished.   HENT:   Head: Atraumatic.   Eyes: No scleral icterus.   Neck:   Unable to assess JVD with CVC in place   Cardiovascular: Normal rate.    No murmur heard.  HR wnl on exam  S1 and S2 clear  No murmur appreciated   Pulmonary/Chest:   Intubated. See vent settings.    Musculoskeletal: She exhibits no edema.   Neurological:   Sedated with propofol and fentanyl   Skin:   Cool limbs.   Nursing note reviewed.      Significant Labs:   CMP     Recent Labs  Lab 04/25/18  0443 04/25/18  1541 04/25/18  2145 04/26/18  0300    141 141 140   K 4.2 4.5 3.9 4.5    111* 111* 112*   CO2 21* 20* 11* 19*   GLU 85 132* 251* 57*   BUN 17 17 21* 18   CREATININE 1.3 1.3 1.5* 1.2   CALCIUM 8.1* 7.5* 8.0* 7.7*   PROT 5.9*  --  4.6* 4.1*   ALBUMIN 1.6*  --  1.4* 1.2*   BILITOT 0.4  --  0.5 0.4   ALKPHOS 411*  --  340* 300*   AST 53*  --  65* 61*   ALT 13  --  16 14   ANIONGAP 11 10 19* 9   ESTGFRAFRICA 53.0* 53.0* 44.6* 58.4*   EGFRNONAA 46.0* 46.0* 38.7* 50.6*       Recent Results (from the past 24 hour(s))   ISTAT PROCEDURE    Collection Time: 04/25/18  2:45 PM   Result Value Ref Range    POC PH 7.311 (L) 7.35 - 7.45    POC PCO2 37.0 35 - 45 mmHg     POC PO2 65 (L) 80 - 100 mmHg    POC HCO3 18.7 (L) 24 - 28 mmol/L    POC BE -8 -2 to 2 mmol/L    POC SATURATED O2 91 (L) 95 - 100 %    POC Glucose 99 70 - 110 mg/dL    POC Sodium 143 136 - 145 mmol/L    POC Potassium 4.4 3.5 - 5.1 mmol/L    POC TCO2 20 (L) 23 - 27 mmol/L    POC Ionized Calcium 1.08 1.06 - 1.42 mmol/L    POC Hematocrit 28 (L) 36 - 54 %PCV    Sample ARTERIAL    CBC auto differential    Collection Time: 04/25/18  3:41 PM   Result Value Ref Range    WBC 15.94 (H) 3.90 - 12.70 K/uL    RBC 3.31 (L) 4.00 - 5.40 M/uL    Hemoglobin 9.4 (L) 12.0 - 16.0 g/dL    Hematocrit 30.4 (L) 37.0 - 48.5 %    MCV 92 82 - 98 fL    MCH 28.4 27.0 - 31.0 pg    MCHC 30.9 (L) 32.0 - 36.0 g/dL    RDW 15.7 (H) 11.5 - 14.5 %    Platelets 244 150 - 350 K/uL    MPV 11.1 9.2 - 12.9 fL    Immature Granulocytes CANCELED 0.0 - 0.5 %    Immature Grans (Abs) CANCELED 0.00 - 0.04 K/uL    nRBC 1 (A) 0 /100 WBC    Gran% 73.0 38.0 - 73.0 %    Lymph% 14.0 (L) 18.0 - 48.0 %    Mono% 4.0 4.0 - 15.0 %    Eosinophil% 3.0 0.0 - 8.0 %    Basophil% 0.0 0.0 - 1.9 %    Bands 3.0 %    Metamyelocytes 1.0 %    Myelocytes 2.0 %    Platelet Estimate Appears normal     Differential Method Manual    Basic metabolic panel    Collection Time: 04/25/18  3:41 PM   Result Value Ref Range    Sodium 141 136 - 145 mmol/L    Potassium 4.5 3.5 - 5.1 mmol/L    Chloride 111 (H) 95 - 110 mmol/L    CO2 20 (L) 23 - 29 mmol/L    Glucose 132 (H) 70 - 110 mg/dL    BUN, Bld 17 6 - 20 mg/dL    Creatinine 1.3 0.5 - 1.4 mg/dL    Calcium 7.5 (L) 8.7 - 10.5 mg/dL    Anion Gap 10 8 - 16 mmol/L    eGFR if African American 53.0 (A) >60 mL/min/1.73 m^2    eGFR if non  46.0 (A) >60 mL/min/1.73 m^2   Magnesium    Collection Time: 04/25/18  3:41 PM   Result Value Ref Range    Magnesium 1.5 (L) 1.6 - 2.6 mg/dL   Phosphorus    Collection Time: 04/25/18  3:41 PM   Result Value Ref Range    Phosphorus 5.0 (H) 2.7 - 4.5 mg/dL   Lactic acid, plasma    Collection Time: 04/25/18   3:59 PM   Result Value Ref Range    Lactate (Lactic Acid) 3.9 (HH) 0.5 - 2.2 mmol/L   ISTAT PROCEDURE    Collection Time: 04/25/18  4:40 PM   Result Value Ref Range    POC PH 7.340 (L) 7.35 - 7.45    POC PCO2 36.1 35 - 45 mmHg    POC PO2 301 (H) 80 - 100 mmHg    POC HCO3 19.5 (L) 24 - 28 mmol/L    POC BE -6 -2 to 2 mmol/L    POC SATURATED O2 100 95 - 100 %    POC TCO2 21 (L) 23 - 27 mmol/L    Rate 12     Sample ARTERIAL     Site [facility]     Allens Test N/A     DelSys Adult Vent     Mode AC/PRVC     Vt 500     PEEP 5     FiO2 100     ETCO2 27     Sp02 99    Lactic acid, plasma    Collection Time: 04/25/18  9:45 PM   Result Value Ref Range    Lactate (Lactic Acid) 11.5 (HH) 0.5 - 2.2 mmol/L   CBC auto differential    Collection Time: 04/25/18  9:45 PM   Result Value Ref Range    WBC 19.43 (H) 3.90 - 12.70 K/uL    RBC 2.74 (L) 4.00 - 5.40 M/uL    Hemoglobin 8.0 (L) 12.0 - 16.0 g/dL    Hematocrit 26.0 (L) 37.0 - 48.5 %    MCV 95 82 - 98 fL    MCH 29.2 27.0 - 31.0 pg    MCHC 30.8 (L) 32.0 - 36.0 g/dL    RDW 16.1 (H) 11.5 - 14.5 %    Platelets 178 150 - 350 K/uL    MPV 11.8 9.2 - 12.9 fL    Immature Granulocytes CANCELED 0.0 - 0.5 %    Immature Grans (Abs) CANCELED 0.00 - 0.04 K/uL    nRBC 2 (A) 0 /100 WBC    Gran% 88.0 (H) 38.0 - 73.0 %    Lymph% 6.0 (L) 18.0 - 48.0 %    Mono% 1.0 (L) 4.0 - 15.0 %    Eosinophil% 1.0 0.0 - 8.0 %    Basophil% 0.0 0.0 - 1.9 %    Bands 3.0 %    Myelocytes 1.0 %    Differential Method Manual    Comprehensive metabolic panel    Collection Time: 04/25/18  9:45 PM   Result Value Ref Range    Sodium 141 136 - 145 mmol/L    Potassium 3.9 3.5 - 5.1 mmol/L    Chloride 111 (H) 95 - 110 mmol/L    CO2 11 (L) 23 - 29 mmol/L    Glucose 251 (H) 70 - 110 mg/dL    BUN, Bld 21 (H) 6 - 20 mg/dL    Creatinine 1.5 (H) 0.5 - 1.4 mg/dL    Calcium 8.0 (L) 8.7 - 10.5 mg/dL    Total Protein 4.6 (L) 6.0 - 8.4 g/dL    Albumin 1.4 (L) 3.5 - 5.2 g/dL    Total Bilirubin 0.5 0.1 - 1.0 mg/dL    Alkaline Phosphatase 340  (H) 55 - 135 U/L    AST 65 (H) 10 - 40 U/L    ALT 16 10 - 44 U/L    Anion Gap 19 (H) 8 - 16 mmol/L    eGFR if African American 44.6 (A) >60 mL/min/1.73 m^2    eGFR if non  38.7 (A) >60 mL/min/1.73 m^2   Magnesium    Collection Time: 04/25/18  9:45 PM   Result Value Ref Range    Magnesium 1.5 (L) 1.6 - 2.6 mg/dL   Phosphorus    Collection Time: 04/25/18  9:45 PM   Result Value Ref Range    Phosphorus 4.7 (H) 2.7 - 4.5 mg/dL   POCT glucose    Collection Time: 04/25/18  9:54 PM   Result Value Ref Range    POCT Glucose 238 (H) 70 - 110 mg/dL   ISTAT PROCEDURE    Collection Time: 04/25/18 11:32 PM   Result Value Ref Range    POC PH 7.290 (LL) 7.35 - 7.45    POC PCO2 26.6 (LL) 35 - 45 mmHg    POC PO2 119 (H) 80 - 100 mmHg    POC HCO3 12.8 (L) 24 - 28 mmol/L    POC BE -14 -2 to 2 mmol/L    POC SATURATED O2 98 95 - 100 %    POC TCO2 14 (L) 23 - 27 mmol/L    Rate 12     Sample ARTERIAL     Site Jocelyn/[facility]     Allens Test N/A     DelSys Adult Vent     Mode AC/PRVC     Vt 500     PEEP 5     PiP 24     FiO2 40     Min Vol 9     Sp02 100    Lactic acid, plasma    Collection Time: 04/26/18 12:58 AM   Result Value Ref Range    Lactate (Lactic Acid) 5.9 (HH) 0.5 - 2.2 mmol/L   CBC with Automated Differential    Collection Time: 04/26/18  3:00 AM   Result Value Ref Range    WBC 10.48 3.90 - 12.70 K/uL    RBC 2.33 (L) 4.00 - 5.40 M/uL    Hemoglobin 6.7 (L) 12.0 - 16.0 g/dL    Hematocrit 21.8 (L) 37.0 - 48.5 %    MCV 94 82 - 98 fL    MCH 28.8 27.0 - 31.0 pg    MCHC 30.7 (L) 32.0 - 36.0 g/dL    RDW 16.0 (H) 11.5 - 14.5 %    Platelets 143 (L) 150 - 350 K/uL    MPV 11.5 9.2 - 12.9 fL    Immature Granulocytes CANCELED 0.0 - 0.5 %    Immature Grans (Abs) CANCELED 0.00 - 0.04 K/uL    Lymph # CANCELED 1.0 - 4.8 K/uL    Mono # CANCELED 0.3 - 1.0 K/uL    Eos # CANCELED 0.0 - 0.5 K/uL    Baso # CANCELED 0.00 - 0.20 K/uL    nRBC 2 (A) 0 /100 WBC    Gran% 81.0 (H) 38.0 - 73.0 %    Lymph% 12.0 (L) 18.0 - 48.0 %    Mono% 1.0 (L) 4.0  - 15.0 %    Eosinophil% 3.0 0.0 - 8.0 %    Basophil% 0.0 0.0 - 1.9 %    Metamyelocytes 2.0 %    Myelocytes 1.0 %    Platelet Estimate Decreased (A)     Aniso Slight     Poly Occasional     Hypo Occasional     Differential Method Manual    Comprehensive Metabolic Panel (CMP)    Collection Time: 04/26/18  3:00 AM   Result Value Ref Range    Sodium 140 136 - 145 mmol/L    Potassium 4.5 3.5 - 5.1 mmol/L    Chloride 112 (H) 95 - 110 mmol/L    CO2 19 (L) 23 - 29 mmol/L    Glucose 57 (L) 70 - 110 mg/dL    BUN, Bld 18 6 - 20 mg/dL    Creatinine 1.2 0.5 - 1.4 mg/dL    Calcium 7.7 (L) 8.7 - 10.5 mg/dL    Total Protein 4.1 (L) 6.0 - 8.4 g/dL    Albumin 1.2 (L) 3.5 - 5.2 g/dL    Total Bilirubin 0.4 0.1 - 1.0 mg/dL    Alkaline Phosphatase 300 (H) 55 - 135 U/L    AST 61 (H) 10 - 40 U/L    ALT 14 10 - 44 U/L    Anion Gap 9 8 - 16 mmol/L    eGFR if African American 58.4 (A) >60 mL/min/1.73 m^2    eGFR if non African American 50.6 (A) >60 mL/min/1.73 m^2   Magnesium    Collection Time: 04/26/18  3:00 AM   Result Value Ref Range    Magnesium 1.4 (L) 1.6 - 2.6 mg/dL   Phosphorus    Collection Time: 04/26/18  3:00 AM   Result Value Ref Range    Phosphorus 3.9 2.7 - 4.5 mg/dL   Lactic acid, plasma    Collection Time: 04/26/18  3:00 AM   Result Value Ref Range    Lactate (Lactic Acid) 2.5 (H) 0.5 - 2.2 mmol/L   Lactic acid, plasma    Collection Time: 04/26/18  3:00 AM   Result Value Ref Range    Lactate (Lactic Acid) 2.5 (H) 0.5 - 2.2 mmol/L   Troponin I    Collection Time: 04/26/18  3:00 AM   Result Value Ref Range    Troponin I 0.027 (H) 0.000 - 0.026 ng/mL   ISTAT PROCEDURE    Collection Time: 04/26/18  4:22 AM   Result Value Ref Range    POC PH 7.406 7.35 - 7.45    POC PCO2 30.7 (L) 35 - 45 mmHg    POC PO2 117 (H) 80 - 100 mmHg    POC HCO3 19.3 (L) 24 - 28 mmol/L    POC BE -5 -2 to 2 mmol/L    POC SATURATED O2 99 95 - 100 %    POC TCO2 20 (L) 23 - 27 mmol/L    Rate 12     Sample ARTERIAL     Site Jocelyn/[facility]     Allens Test N/A      DelSys Adult Vent     Mode AC/PRVC     Vt 500     PEEP 5     PiP 23     FiO2 40     Min Vol 11     Sp02 100    POCT glucose    Collection Time: 04/26/18  6:18 AM   Result Value Ref Range    POCT Glucose 62 (L) 70 - 110 mg/dL   POCT glucose    Collection Time: 04/26/18  6:21 AM   Result Value Ref Range    POCT Glucose 46 (LL) 70 - 110 mg/dL   Lactic acid, plasma    Collection Time: 04/26/18  6:22 AM   Result Value Ref Range    Lactate (Lactic Acid) 1.6 0.5 - 2.2 mmol/L   POCT glucose    Collection Time: 04/26/18  6:43 AM   Result Value Ref Range    POCT Glucose 164 (H) 70 - 110 mg/dL         Microbiology Results (last 7 days)     Procedure Component Value Units Date/Time    Blood culture x two cultures. Draw prior to antibiotics. [[id number]] Collected:  04/20/18 2152    Order Status:  Completed Specimen:  Blood from Peripheral, Antecubital, Left Updated:  04/26/18 0612     Blood Culture, Routine No growth after 5 days.    Narrative:       Aerobic and anaerobic    Blood culture x two cultures. Draw prior to antibiotics. [[id number]] Collected:  04/20/18 2205    Order Status:  Completed Specimen:  Blood from Peripheral, Forearm, Left Updated:  04/26/18 0612     Blood Culture, Routine No growth after 5 days.    Narrative:       Aerobic and anaerobic    Culture, Respiratory [[id number]]     Order Status:  No result Specimen:  Respiratory from Sputum, Expectorated     Culture, Respiratory [[id number]]     Order Status:  Canceled Specimen:  Respiratory from Sputum, Expectorated           Imaging Results          X-Ray Chest AP Portable (Final result)  Result time 04/20/18 22:18:20    Final result by Konstantin Lainez MD (04/20/18 22:18:20)                 Impression:      Multiple lytic lesions and/or pathologic rib fractures.  Right pleural effusion with associated passive atelectasis.  Additional findings as described above.      Electronically signed by: Konstantin Lainez MD  Date:    04/20/2018  Time:    22:18              Narrative:    EXAMINATION:  XR CHEST AP PORTABLE    CLINICAL HISTORY:  Sepsis;    TECHNIQUE:  Single frontal view of the chest was performed.    COMPARISON:  12/20/2006.    FINDINGS:  Cardiac wires overlie the chest.  Cardiac silhouette is magnified by technique but not felt to be enlarged.  Elevation of the right hemidiaphragm with associated right basilar effusion and subsegmental atelectasis.  Left pleural thickening or parenchymal scarring with subsegmental atelectasis.  Multiple bilateral rib fractures or aggressive osseous lesions.  Lytic lesion noted in the left humeral head, metastasis included in the differential.  Recommend correlation with clinical findings and patient history.                              CTA chest 4/25/18:  Heart: Normal size.  Trace pericardial fluid.  No evidence of right heart strain    Pleura: Small right and trace left pleural fluid.  No significant pleural thickening.    Lungs: Pulmonary parenchymal detail is limited secondary to motion artifact..  There are bilateral small pleural effusions which appear no larger when compared to CT examination 04/22/2018.  Some pleural fluid extends into the right major fissure.  There is associated compressive atelectasis in the bilateral lower lobes.  No evidence of pulmonary infarction.  There are multiple pulmonary micronodules in the right upper lobe measuring up to 0.4 cm.  Metastatic disease cannot be excluded.  No pneumothorax.    Chest Wall: Mild body wall edema.

## 2018-04-26 NOTE — PROGRESS NOTES
[facility]  Critical Care - Surgery  Progress Note    Patient Name: Kerline Grossman  MRN: [id number]  Admission Date: 4/20/2018  Hospital Length of Stay: 5 days  Code Status: Full Code  Attending Provider: David Cruz MD  Primary Care Provider: Primary Doctor No   Principal Problem: Adenocarcinoma of unknown primary    Subjective:     Hospital/ICU Course:  No notes on file    Interval History/Significant Events: CTA last night negative for PE. Upon arrival to SICU, CVP was 3 and was in sinus tach up to 140. Lactate 3.9. Epi discontinued and patient was started on Levo gtt. Levo gtt quickly weaned off with fluid resuscitation. On minimal vent settings this morning. Lactate peaked at 11 overnight, now normalized.     Follow-up For: Procedure(s) (LRB):  IM NAILING OF FEMUR (Left)    Post-Operative Day: 1 Day Post-Op    Objective:     Vital Signs (Most Recent):  Temp: 97.5 °F (36.4 °C) (04/26/18 0935)  Pulse: 93 (04/26/18 0935)  Resp: 16 (04/26/18 0935)  BP: 110/68 (04/26/18 0900)  SpO2: 100 % (04/26/18 0935) Vital Signs (24h Range):  Temp:  [97.5 °F (36.4 °C)-98.9 °F (37.2 °C)] 97.5 °F (36.4 °C)  Pulse:  [] 93  Resp:  [13-36] 16  SpO2:  [98 %-100 %] 100 %  BP: ()/(51-80) 110/68  Arterial Line BP: ()/(49-74) 133/74     Weight: 68.6 kg (151 lb 3.8 oz)  Body mass index is 24.41 kg/m².      Intake/Output Summary (Last 24 hours) at 04/26/18 1006  Last data filed at 04/26/18 0935   Gross per 24 hour   Intake          6117.82 ml   Output             1870 ml   Net          4247.82 ml       Physical Exam   Constitutional: She appears well-developed and well-nourished.   HENT:   Head: Normocephalic and atraumatic.   ETT in place   Eyes: Pupils are equal, round, and reactive to light.   Neck: Normal range of motion. Neck supple.   Cardiovascular: Regular rhythm.    Tachycardic   Pulmonary/Chest:   Mechanically ventilated   Abdominal: Soft. She exhibits no distension. There is no tenderness.  There is no guarding.   Genitourinary:   Genitourinary Comments: Ponce in place   Neurological:   Sedated  Alert and follows commands off sedation   Skin: Skin is warm and dry.   Psychiatric:   Sedated       Vents:  Vent Mode: Spont (04/26/18 0853)  Ventilator Initiated: Yes (04/25/18 1517)  Set Rate: 0 bmp (04/26/18 0853)  Vt Set: 500 mL (04/26/18 0853)  Pressure Support: 10 cmH20 (04/26/18 0853)  PEEP/CPAP: 5 cmH20 (04/26/18 0853)  Oxygen Concentration (%): 40 (04/26/18 0935)  Peak Airway Pressure: 16 cmH2O (04/26/18 0853)  Plateau Pressure: 15 cmH20 (04/26/18 0853)  Total Ve: 8.19 mL (04/26/18 0853)  F/VT Ratio<105 (RSBI): (!) 29.8 (04/26/18 0853)    Lines/Drains/Airways     Central Venous Catheter Line                 Percutaneous Central Line Insertion/Assessment - triple lumen  04/25/18 2154 right internal jugular less than 1 day          Drain                 Urethral Catheter 04/25/18 2115 less than 1 day          Epidural Line                 Perineural Analgesia/Anesthesia Assessment (using dermatomes) Epidural 04/25/18 1032 less than 1 day          Arterial Line                 Arterial Line 04/25/18 1443 Right Radial less than 1 day          Peripheral Intravenous Line                 Peripheral IV - Single Lumen 04/24/18 1446 Right Forearm 1 day         Peripheral IV - Single Lumen 04/25/18 1204 Left Hand less than 1 day                Significant Labs:    CBC/Anemia Profile:    Recent Labs  Lab 04/25/18  1541 04/25/18 2145 04/26/18  0300   WBC 15.94* 19.43* 10.48   HGB 9.4* 8.0* 6.7*   HCT 30.4* 26.0* 21.8*    178 143*   MCV 92 95 94   RDW 15.7* 16.1* 16.0*        Chemistries:    Recent Labs  Lab 04/25/18  0443 04/25/18  1541 04/25/18  2145 04/26/18  0300    141 141 140   K 4.2 4.5 3.9 4.5    111* 111* 112*   CO2 21* 20* 11* 19*   BUN 17 17 21* 18   CREATININE 1.3 1.3 1.5* 1.2   CALCIUM 8.1* 7.5* 8.0* 7.7*   ALBUMIN 1.6*  --  1.4* 1.2*   PROT 5.9*  --  4.6* 4.1*   BILITOT 0.4  --   0.5 0.4   ALKPHOS 411*  --  340* 300*   ALT 13  --  16 14   AST 53*  --  65* 61*   MG 1.7 1.5* 1.5* 1.4*   PHOS 3.4 5.0* 4.7* 3.9       Recent Lab Results       04/26/18  0643 04/26/18  0622 04/26/18  0621 04/26/18  0618 04/26/18  0422      Immature Granulocytes          Immature Grans (Abs)          Albumin          Alkaline Phosphatase          Allens Test     N/A     ALT          Anion Gap          Aniso          AST          BANDS          Baso #          Basophil%          Total Bilirubin          Site     Jocelyn/UAC     BUN, Bld          Calcium          Chloride          CO2          Creatinine          DelSys     Adult Vent     Differential Method          eGFR if           eGFR if non           Eos #          Eosinophil%          ETCO2          FiO2     40     Glucose          Gran%          Hematocrit          Hemoglobin          Hypo          Lactate, Jesus Alberto  1.6  Comment:  Falsely low lactic acid results can be found in samples   containing >=13.0 mg/dL total bilirubin and/or >=3.5 mg/dL   direct bilirubin.          Lymph #          Lymph%          Magnesium          MCH          MCHC          MCV          Metamyelocytes          Min Vol     11     Mode     AC/PRVC     Mono #          Mono%          MPV          Myelocytes          nRBC          PEEP     5     Phosphorus          PiP     23     Platelet Estimate          Platelets          POC BE     -5     POC Glucose          POC HCO3     19.3(L)     POC Hematocrit          POC Ionized Calcium          POC PCO2     30.7(L)     POC PH     7.406     POC PO2     117(H)     POC Potassium          POC SATURATED O2     99     POC Sodium          POC TCO2     20(L)     POCT Glucose 164(H)  46(LL) 62(L)      Poly          Potassium          Total Protein          Rate     12     RBC          RDW          Sample     ARTERIAL     Sodium          Sp02     100     Troponin I          Vt     500     WBC                       04/26/18  0300 04/26/18  0058 04/25/18  2332 04/25/18  2154 04/25/18  2145      Immature Granulocytes CANCELED  Comment:  Result canceled by the ancillary    CANCELED  Comment:  Result canceled by the ancillary     Immature Grans (Abs) CANCELED  Comment:  Mild elevation in immature granulocytes is non specific and   can be seen in a variety of conditions including stress response,   acute inflammation, trauma and pregnancy. Correlation with other   laboratory and clinical findings is essential.    Result canceled by the ancillary      CANCELED  Comment:  Mild elevation in immature granulocytes is non specific and   can be seen in a variety of conditions including stress response,   acute inflammation, trauma and pregnancy. Correlation with other   laboratory and clinical findings is essential.    Result canceled by the ancillary       Albumin 1.2(L)    1.4(L)     Alkaline Phosphatase 300(H)    340(H)     Allens Test   N/A       ALT 14    16     Anion Gap 9    19(H)     Aniso Slight         AST 61(H)    65(H)     BANDS     3.0     Baso # CANCELED  Comment:  Result canceled by the ancillary         Basophil% 0.0    0.0     Total Bilirubin 0.4  Comment:  For infants and newborns, interpretation of results should be based  on gestational age, weight and in agreement with clinical  observations.  Premature Infant recommended reference ranges:  Up to 24 hours.............<8.0 mg/dL  Up to 48 hours............<12.0 mg/dL  3-5 days..................<15.0 mg/dL  6-29 days.................<15.0 mg/dL      0.5  Comment:  For infants and newborns, interpretation of results should be based  on gestational age, weight and in agreement with clinical  observations.  Premature Infant recommended reference ranges:  Up to 24 hours.............<8.0 mg/dL  Up to 48 hours............<12.0 mg/dL  3-5 days..................<15.0 mg/dL  6-29 days.................<15.0 mg/dL       Site   Jocelyn/UA       BUN, Bld 18    21(H)     Calcium  7.7(L)    8.0(L)     Chloride 112(H)    111(H)     CO2 19(L)    11(L)     Creatinine 1.2    1.5(H)     DelSys   Adult Vent       Differential Method Manual    Manual     eGFR if  58.4(A)    44.6(A)     eGFR if non African American 50.6  Comment:  Calculation used to obtain the estimated glomerular filtration  rate (eGFR) is the CKD-EPI equation.   (A)    38.7  Comment:  Calculation used to obtain the estimated glomerular filtration  rate (eGFR) is the CKD-EPI equation.   (A)     Eos # CANCELED  Comment:  Result canceled by the ancillary         Eosinophil% 3.0    1.0     ETCO2          FiO2   40       Glucose 57(L)    251(H)     Gran% 81.0(H)    88.0(H)     Hematocrit 21.8(L)    26.0(L)     Hemoglobin 6.7(L)    8.0(L)     Hypo Occasional         Lactate, Jesus Alberto 2.5  Comment:  Falsely low lactic acid results can be found in samples   containing >=13.0 mg/dL total bilirubin and/or >=3.5 mg/dL   direct bilirubin.  (H) 5.9  Comment:  Falsely low lactic acid results can be found in samples   containing >=13.0 mg/dL total bilirubin and/or >=3.5 mg/dL   direct bilirubin.  *Critical value -   Results called to and read back by:byron garduno rn  ()   11.5  Comment:  Falsely low lactic acid results can be found in samples   containing >=13.0 mg/dL total bilirubin and/or >=3.5 mg/dL   direct bilirubin.  *Critical value -   Results called to and read back by:Byron Garduno RN  ()      2.5  Comment:  Falsely low lactic acid results can be found in samples   containing >=13.0 mg/dL total bilirubin and/or >=3.5 mg/dL   direct bilirubin.  (H)         Lymph # CANCELED  Comment:  Result canceled by the ancillary         Lymph% 12.0(L)    6.0(L)     Magnesium 1.4(L)    1.5(L)     MCH 28.8    29.2     MCHC 30.7(L)    30.8(L)     MCV 94    95     Metamyelocytes 2.0         Min Vol   9       Mode   AC/PRVC       Mono # CANCELED  Comment:  Result canceled by the ancillary         Mono% 1.0(L)    1.0(L)     MPV 11.5     11.8     Myelocytes 1.0    1.0     nRBC 2(A)    2(A)     PEEP   5       Phosphorus 3.9    4.7(H)     PiP   24       Platelet Estimate Decreased(A)         Platelets 143(L)    178     POC BE   -14       POC Glucose          POC HCO3   12.8(L)       POC Hematocrit          POC Ionized Calcium          POC PCO2   26.6(LL)       POC PH   7.290(LL)       POC PO2   119(H)       POC Potassium          POC SATURATED O2   98       POC Sodium          POC TCO2   14(L)       POCT Glucose    238(H)      Poly Occasional         Potassium 4.5    3.9     Total Protein 4.1(L)    4.6(L)     Rate   12       RBC 2.33(L)    2.74(L)     RDW 16.0(H)    16.1(H)     Sample   ARTERIAL       Sodium 140    141     Sp02   100       Troponin I 0.027  Comment:  The reference interval for Troponin I represents the 99th percentile   cutoff   for our facility and is consistent with 3rd generation assay   performance.  (H)         Vt   500       WBC 10.48    19.43(H)                 04/25/18  1640 04/25/18  1559 04/25/18  1541 04/25/18  1445      Immature Granulocytes   CANCELED  Comment:  Result canceled by the ancillary      Immature Grans (Abs)   CANCELED  Comment:  Mild elevation in immature granulocytes is non specific and   can be seen in a variety of conditions including stress response,   acute inflammation, trauma and pregnancy. Correlation with other   laboratory and clinical findings is essential.    Result canceled by the ancillary        Albumin         Alkaline Phosphatase         Allens Test N/A        ALT         Anion Gap   10      Aniso         AST         BANDS   3.0      Baso #         Basophil%   0.0      Total Bilirubin         Site Jocelyn/UAC        BUN, Bld   17      Calcium   7.5(L)      Chloride   111(H)      CO2   20(L)      Creatinine   1.3      DelSys Adult Vent        Differential Method   Manual      eGFR if    53.0(A)      eGFR if non    46.0  Comment:  Calculation used to obtain the  estimated glomerular filtration  rate (eGFR) is the CKD-EPI equation.   (A)      Eos #         Eosinophil%   3.0      ETCO2 27        FiO2 100        Glucose   132(H)      Gran%   73.0      Hematocrit   30.4(L)      Hemoglobin   9.4(L)      Hypo         Lactate, Jesus Alberto  3.9  Comment:  Falsely low lactic acid results can be found in samples   containing >=13.0 mg/dL total bilirubin and/or >=3.5 mg/dL   direct bilirubin.  *Critical value -   Results called to and read back by:Lawanda Montoya RN  ()       Lymph #         Lymph%   14.0(L)      Magnesium   1.5(L)      MCH   28.4      MCHC   30.9(L)      MCV   92      Metamyelocytes   1.0      Min Vol         Mode AC/PRVC        Mono #         Mono%   4.0      MPV   11.1      Myelocytes   2.0      nRBC   1(A)      PEEP 5        Phosphorus   5.0(H)      PiP         Platelet Estimate   Appears normal      Platelets   244      POC BE -6   -8     POC Glucose    99     POC HCO3 19.5(L)   18.7(L)     POC Hematocrit    28(L)     POC Ionized Calcium    1.08     POC PCO2 36.1   37.0     POC PH 7.340(L)   7.311(L)     POC PO2 301(H)   65(L)     POC Potassium    4.4     POC SATURATED O2 100   91(L)     POC Sodium    143     POC TCO2 21(L)   20(L)     POCT Glucose         Poly         Potassium   4.5      Total Protein         Rate 12        RBC   3.31(L)      RDW   15.7(H)      Sample ARTERIAL   ARTERIAL     Sodium   141      Sp02 99        Troponin I         Vt 500        WBC   15.94(H)            Significant Imaging:  I have reviewed all pertinent imaging results/findings within the past 24 hours.    Assessment/Plan:     * Adenocarcinoma of unknown primary    58 year old female with widely metastatic cancer of unknown origin s/p left femur IMN 4/25/18    Neuro:   - Sedation: Fentanyl, Propofol gtt  - Sedation off this morning for SBT. Following commands  - Intermittently confused at baseline per prior notes     Pulmonary:   - Intubated  Vent Mode: Spont  Oxygen Concentration (%):  " [] 40  Resp Rate Total:  [13 br/min-32.1 br/min] 18 br/min  Vt Set:  [500 mL] 500 mL  PEEP/CPAP:  [5 cmH20] 5 cmH20  Pressure Support:  [0 cmH20-10 cmH20] 10 cmH20  Mean Airway Pressure:  [8.9 [id number] cmH20] 8.9 cmH20  - Wean vent  - ABGs PRN  - STAT CTA negative for PE   - Wean to extubate today     Cardiac:  - Epi and levo weaned off. Received 3L LR boluses overnight  - Echo from 4/21 shows normal EF and "right ventricle is enlarged measuring 4.6 cm at the base in the apical right ventricle-focused view. Global right ventricular systolic function appears moderately depressed"  -Lactate normalized    Renal:   - BUN/Cr 18/1.2  - Rosario     Infectious Disease:   - Patient initially on broad spectrum abx  - Cultures have been negative  - Will hold off on restarting abx at this time besides Ancef for surgical ppx  - Trend WBC     Hematology/Oncology:  - Widely metastatic lesions of unknown primary  - Heme/onc consulted  - Trend CBC     Endocrine:  - SSI     Fluids/Electrolytes/Nutrition/GI:   - NPO  - LR @ 125cc/hr  - Trend CMP  - Replace Lytes PRN   - Consider bedside swallow before advancing diet    Pain Management::   - perineural SIFI, PRN Fentanyl     Dispo:  Extubate and possible stepdown to IM today.                Critical care was time spent personally by me on the following activities: development of treatment plan with patient or surrogate and bedside caregivers, discussions with consultants, evaluation of patient's response to treatment, examination of patient, ordering and performing treatments and interventions, ordering and review of laboratory studies, ordering and review of radiographic studies, pulse oximetry, re-evaluation of patient's condition.  This critical care time did not overlap with that of any other provider or involve time for any procedures.     Reginald Romero MD  Critical Care - Surgery  [facility]    "

## 2018-04-26 NOTE — PLAN OF CARE
Problem: Patient Care Overview  Goal: Plan of Care Review  Outcome: Ongoing (interventions implemented as appropriate)  Extubated, passed swallow study, tolerating po  PO2 40s, NC 7L, tachy, increase in LA  LR bolus X2 admin  Transfer to floor cx  Goal: Individualization & Mutuality  Outcome: Ongoing (interventions implemented as appropriate)  Pt does not like ice in water

## 2018-04-26 NOTE — ASSESSMENT & PLAN NOTE
"58 year old female with widely metastatic cancer of unknown origin s/p left femur IMN 4/25/18    Neuro:   - Sedation: Fentanyl, Propofol gtt  - Daily sedation vacation  - Intermittently confused at baseline per prior notes     Pulmonary:   - Intubated  Vent Mode: A/C  Oxygen Concentration (%):  [] 40  Resp Rate Total:  [22.4 br/min-32.1 br/min] 28 br/min  Vt Set:  [500 mL] 500 mL  PEEP/CPAP:  [5 cmH20] 5 cmH20  Pressure Support:  [0 cmH20-10 cmH20] 0 cmH20  Mean Airway Pressure:  [10.3 pjL87-02 cmH20] 11 cmH20  - Wean vent  - ABGs PRN  - Chlorhexidine  - Daily CXRs  - STAT CTA negative for PE. Awaiting final radiology read     Cardiac:  - Currently on epi @ 0.2  - Echo from 4/21 shows normal EF and "right ventricle is enlarged measuring 4.6 cm at the base in the apical right ventricle-focused view. Global right ventricular systolic function appears moderately depressed"  - Given negative CTA chest, sinus tachycardia, lactate 3.9, her hemodynamic instability could be from hypovolemic shock. Will proceed with fluid resuscitation and switch to Norepinephrine  - Wean pressors  - FloTrac    Renal:   - BUN/Cr 17/1.3  - Rosario     Infectious Disease:   - Patient initially on broad spectrum abx  - Cultures have been negative  - Will hold off on restarting abx at this time besides Ancef for surgical ppx  - Trend WBC     Hematology/Oncology:  - Widely metastatic lesions of unknown primary  - Heme/onc consulted  - Trend CBC     Endocrine:  - SSI     Fluids/Electrolytes/Nutrition/GI:   - NPO  - LR @ 125cc/hr  - Trend CMP  - Replace Lytes PRN     Pain Management::   - Fentanyl gtt     Dispo:  Continue SICU care. PE workup        "

## 2018-04-26 NOTE — ASSESSMENT & PLAN NOTE
"- Followed by Dr. Winters and palliative care team here; per their note: patient fluctuates between saying that if she dies "just let me go" and then later said that she wants to "fight" and be kept alive on machines bc she believes in God and wants a miracle.  "

## 2018-04-26 NOTE — HOSPITAL COURSE
4/22: Patient stepped down to hospital medicine. Lower back ache, lumbar MRI with contrast done-> Abnormal marrow signal and diffuse nodular enhancing lesions throughout the lumbar spine and sacrum concerning for metastatic disease. CT chest, abdomen, and pelvis with contrast ordered for metastasis. Chest xray showed multiple lytic lesions or pathologic rib fractures. UA was negative.   4/23: CT Thoracic spine shows diffuse bone marrow abnormality with enhancing osseous lesions. Multilevel neural foraminal narrowing with spinal canal stenosis. Bilateral pleural effusions with compressive atelectasis. Waiting for OSH bone bx of hip and BC results. Pain in neck, chest and lower back not controlled, given 3mg morphine and oxy raised to 5 and 10mg Q6h for 7-10 pain. No weight bearing.   Troponin and EKG. Cytology from thoracentesis 4/20/18 is negative.Final report from bone marrow pending initial impression is a differentiated metastatic carcinoma.   4/24: Xray metastatic survey shows lytic lesions of skull, lytic spine and rib lesions with pathologic fractures. Diffuse lytic lesions throughout entire central axial red marrow skeleton. Lytic lesions of proximal long bones and pathological fractures of ribs and right superior and inferior pubic ramus. Waiting on OSH final bx. Ortho considering seeing patient for femoral b/l lytic lesions and worry of pathological fracture, currently non-ambulatory.  4/25: Patient taken to the OR by Ortho for nailing of the left femur. Approximately 1 hour into the case, she became hypotensive requirement increasing amounts of vasopressor support. JAMILA was done in the OR by anesthesia and reportedly showed signs of right heart strain. Decision was made to leave her intubated and obtain stat CTA to rule out PE -negative for PE. Upon arrival to SICU, CVP was 3 and was in sinus tach up to 140. Lactate 3.9. Epi discontinued and patient was started on Levo gtt. Levo gtt quickly weaned off with  fluid resuscitation. On minimal vent settings this morning. Lactate peaked at 11 overnight, now normalized.   4/26: Patient extubated to room air today. ABG demonstrable for hypoxia with pO2 at 43. Lactic acid sebastian from 2.1 to 4.2 today. Remained in sinus tachycardia 120-130s. MICU consulted to see patient and transfer care as she was unstable for transfer to hospital medicine considering lactic acidosis and tachycardia.

## 2018-04-26 NOTE — CONSULTS
"  [facility]  Adult Nutrition  Consult Note    SUMMARY     Recommendations    1. When/if medically feasible, ADAT to regular (texture per SLP). Add Boost Plus ONS to aid in caloric intake.   2. If unable to advance diet, initiate enteral nutrition. Recommend Isosource 1.5 @ 55 mL/hr to meet 96% EEN, 100% EPN.   3. RD to monitor & follow-up.    Goals: Meet % EEN, EPN  Nutrition Goal Status: new  Communication of RD Recs: reviewed with RN    Reason for Assessment    Reason for Assessment: consult  Diagnosis: other (see comments) (Adencarcinoma)  Relevant Medical History: No sign. PMH  Interdisciplinary Rounds: did not attend    General Information Comments: Pt extubated this AM, remains NPO. Prior to intubation, pt on regular diet w/ 50% PO intake. 1 day post-op IM nailing of femur.  Nutrition Discharge Planning: Adequate nutrition    Nutrition/Diet History    Patient Reported Diet/Restrictions/Preferences: general  Do you have any cultural, spiritual, Islam conflicts, given your current situation?: none stated   Factors Affecting Nutritional Intake: NPO    Anthropometrics    Temp: 97.5 °F (36.4 °C)  Height Method: Stated  Height: 5' 6" (167.6 cm)  Height (inches): 66 in  Weight Method: Estimated  Weight: 68.6 kg (151 lb 3.8 oz)  Weight (lb): 151.24 lb  Ideal Body Weight (IBW), Female: 130 lb  % Ideal Body Weight, Female (lb): 116.34 lb  BMI (Calculated): 24.5  BMI Grade: 18.5-24.9 - normal  Usual Body Weight (UBW), kg:  (DARSHAN)    Lab/Procedures/Meds    Pertinent Labs Reviewed: reviewed  Pertinent Labs Comments: Stable  Pertinent Medications Reviewed: reviewed    Physical Findings/Assessment    Overall Physical Appearance: nourished, weak  Oral/Mouth Cavity: WDL  Skin: incision(s)    Estimated/Assessed Needs    Weight Used For Calorie Calculations: 68.6 kg (151 lb 3.8 oz)  Energy Calorie Requirements (kcal): 2058 kcal/d  Energy Need Method: Kcal/kg (30 kcal/kg)     Protein Requirements: "  g/d (1.2-1.5 g/kg)  Weight Used For Protein Calculations: 68.6 kg (151 lb 3.8 oz)     Fluid Need Method: other (see comments) (Per MD or 1 mL/kcal)    Nutrition Prescription Ordered    Current Diet Order: NPO    Nutrition Risk    Level of Risk/Frequency of Follow-up: high     Assessment and Plan    Severe malnutrition      Nutrition Problem  Inadequate energy intake    Related to (etiology):   Inability to consume sufficient energy    Signs and Symptoms (as evidenced by):   NPO with no alternate means of nutrition     Nutrition Diagnosis Status:   New         Monitor and Evaluation    Food and Nutrient Intake: energy intake, food and beverage intake, enteral nutrition intake  Food and Nutrient Adminstration: diet order, enteral and parenteral nutrition administration  Physical Activity and Function: nutrition-related ADLs and IADLs  Anthropometric Measurements: weight, weight change  Biochemical Data, Medical Tests and Procedures: lipid profile, inflammatory profile, glucose/endocrine profile, electrolyte and renal panel, gastrointestinal profile  Nutrition-Focused Physical Findings: overall appearance     Nutrition Follow-Up    RD Follow-up?: Yes

## 2018-04-26 NOTE — HPI
Mrs. Grossman is a 57 yo female admitted to MICU on 4/20/18 for second opinion regarding recently diagnosed likely metastatic malignancy with unknown primary. Per patient's daughter about a month ago her mother started to gradually decline secondary to diffuse pain. She tehn became progressively more altered and eventually not responsive. She could no longer walk and was urinating on herself. Her family brought her to ED at [facility]. They brought some records from her stay there which show that work up revealed a corrected calcium of 13 and ZACH. She had CT brain, chest and abdomen which showed diffuse lytic lesions and some pathological fractures in her skull, ribs, and hips but no lesion that pointed to primary tumor. Her calcium eventually trended down. SPEP and UPEP were negative. Her daughter states she had 2 BM biopsies which were unrevealing. She had CT guided biopsy of a lesion in her hip on 4/20 as well as diagnostic thoracentesis which removed only 50 ccs from her pleural effusion. Her daughter states the physicians at [facility] were recommending inpatient hospice for her mother as it seemed she most likely had widely metastatic disease with unknown primary tumor however the patient's children wished to have a definitive diagnoses before making a decision.   She has metastatic lesions to the bilateral femurs with concern for impending fracture.   On 4/25/18, she was taken to the OR by Ortho for nailing of the left femur. Approximately 1 hour into the case, she became hypotensive requirement increasing amounts of vasopressor support. JAMILA was done in the OR by anesthesia and reportedly showed signs of right heart strain. Decision was made to leave her intubated and obtain stat CTA to rule out PE. Patient was seen in the PACU intubated, sedated, and on 0.2 of epi.   Cardiology consulted for R heart failure. Patient is still intubated and sedated, however, is currently off pressors with MAP 60-65. CTA showed no  signs of PE. CXR showed b/l atelectasis and pleural effusion, but without pulmonary edema. ECHO on 4/21 showing RV enlargement (4.6 cm at base) with global, moderate RV depression. Normal LV systolic fxn with EF 60-65%. EKG showed no signs of acute MI, troponin negative.

## 2018-04-26 NOTE — ASSESSMENT & PLAN NOTE
- Patient with diffuse lytic lesions and pathological fractures throughout her skull, chest and hips which is seen on CT scans from OSH. Workup has not shown primary malignancy.   - Xray metastatic survey shows lytic lesions of skull, lytic spine and rib lesions with pathologic fractures. Diffuse lytic lesions throughout entire central axial red marrow skeleton. Lytic lesions of proximal long bones and pathological fractures of ribs and right superior and inferior pubic ramus.

## 2018-04-26 NOTE — PLAN OF CARE
Palliative Care Social Work   Assessment  Name: Kerline Grossman  MRN: [id number]  Date of Birth/Age:  1960  Sex: female  Ethnicity:     Primary Language:English   Needed: no    Attending Physician: Dr. Gonsalez  Reason for Referral: assistance with clarification of goals of care  Consult Order Date: 4/21/18 1144  Primary CM/SW: ALONZO BrumfieldW    Palliative Care Provider: Dr. Winters    Present during Interview: pt, Son, [facility] MD    Past & Current Medical Situation:   Diagnosis: Hypotension  PMH:   Past Medical History:   Diagnosis Date    Bony metastasis 4/21/2018    Xray metastatic survey shows lytic lesions of skull, lytic spine and rib lesions with pathologic fractures. Diffuse lytic lesions throughout entire central axial red marrow skeleton. Lytic lesions of proximal long bones and pathological fractures of ribs and right superior and inferior pubic ramus.    Cancer     Insomnia     Right heart failure 4/26/2018     Mental Health/Substance Use History: n/a  Non-traditional Health practices:     Understanding of diagnosis and prognosis: Will benefit from continued support and clarity regarding prognosis.     Patients Mental Status: Alert and oriented.    Socio-Economic Factors/Resources:  Address: [address] [place] [address]  Phone Number: [phone] (home)     Marital Status: Dating qian for 5 years. Pt stated that she is marrying him on November 22nd.   Household Composition: Lives with qian.  Children: 10 children: 6 boys and 4 girls  Relationships with Family: Pt has large supportive family. Pt has 10 children and 32 grandchildren. Pt has 13 siblings.    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    History: no    Financial Resources:[employer] Medicaid      Advanced Care Planning & Legal Concerns:   Advanced Directives/Living Will: no   Planning:  no    Power of : no  Surrogate Decision Maker: 10 children      Spirituality, Culture & Coping Mechanisms:  F- Dora and Belief: Worship     I - Importance: strong Dora    C - Community/Culture Values:     A - Address in Care: Spiritual Care following      Strengths/Coping Strategies:strong dora, large supportive family  Self-Care Activities/Hobbies: Enjoys cooking    Goals/Hopes/Expectations: Pt wants to  her fiance on   Fears/Anxiety/Concerns: Pt wants to be able to eat.         Preferences about EOL Environment: (own bed, family nearby, pets, music, etc)  TBD    Complicated Bereavement Risk Assessment Tool (CBRAT)  Reference:  [facility] Clinical Practice Group (May 2016). Bereavement Risk Screening and Management Guidelines.  Retrieved from: http://[email or web address].au/wp-content/uploads//RXKVF-Werbmlfhpkq-Yomvlvthn-and-Management-Guideline-2016.pdf      Client Characteristics (Bereaved Client)  ? Under 18      no  ? Was a Twin   no  ? Young Spouse   no  ? Elderly Spouse    no  ? Isolated    no  ? Lacks Meaningful Social Support   no  ? Dissatisfied with help available during illness   no  ? New to [employer] [place] no  ? New to Decision-Making   no   History of Loss (Bereaved Client)  ? Cumulative Multiple Losses   no  ? Previous Mental Health Illnesses   no  ? Current Mental Health Illness   no  ? Other Significant Health Issues   no   ? Migrant/Refugee   no    Illness  ? Inherited Disorder   no  ? Stigmatized Disease in the   no  ?  Family/Community   no  ? Lengthy/Burdensome   no Relationship with   ? Profound Lifelong Partner   no  ? Highly Dependent    no  ? Antagonistic   no  ? Ambivalent   no  ? Deeply Connected   yes  ? Culturally  Defined   no   Death  ? Sudden or Unexpected   no  ? Traumatic Circumstances Associated with Death   no  ? Significant Cultural/Social Burdens as a result of Death   no   Risk Factors Scores  0-2  Low  3-5  Moderate  5+  High  All persons scoring moderate to high presume to be at risk**    (** It is acknowledged that protective factors and resilience may outweigh apparent risk factors.      Total Risk Factors Score:   Low to Moderate    Will benefit from continued support and bereavement care.       Discharge Planning Needs/Plan of Care:         Met with pt to begin establishing rapport. Pt was just extubated and was fixated on being able to eat and being able to move around.     Will continue to follow and provide support as appropriate.         Kelly Trotter, ALONZOW, ACHP-SW

## 2018-04-27 LAB
ALBUMIN SERPL BCP-MCNC: 1.3 G/DL
ALLENS TEST: ABNORMAL
ALP SERPL-CCNC: 353 U/L
ALT SERPL W/O P-5'-P-CCNC: 8 U/L
ANION GAP SERPL CALC-SCNC: 10 MMOL/L
ANION GAP SERPL CALC-SCNC: 9 MMOL/L
ANISOCYTOSIS BLD QL SMEAR: SLIGHT
AST SERPL-CCNC: 68 U/L
BASOPHILS # BLD AUTO: ABNORMAL K/UL
BASOPHILS NFR BLD: 0 %
BILIRUB SERPL-MCNC: 0.3 MG/DL
BUN SERPL-MCNC: 14 MG/DL
BUN SERPL-MCNC: 15 MG/DL
BURR CELLS BLD QL SMEAR: ABNORMAL
CALCIUM SERPL-MCNC: 7.6 MG/DL
CALCIUM SERPL-MCNC: 7.8 MG/DL
CHLORIDE SERPL-SCNC: 113 MMOL/L
CHLORIDE SERPL-SCNC: 114 MMOL/L
CO2 SERPL-SCNC: 17 MMOL/L
CO2 SERPL-SCNC: 20 MMOL/L
CREAT SERPL-MCNC: 1.1 MG/DL
CREAT SERPL-MCNC: 1.1 MG/DL
DELSYS: ABNORMAL
DIFFERENTIAL METHOD: ABNORMAL
EOSINOPHIL # BLD AUTO: ABNORMAL K/UL
EOSINOPHIL NFR BLD: 1 %
ERYTHROCYTE [DISTWIDTH] IN BLOOD BY AUTOMATED COUNT: 17.4 %
ERYTHROCYTE [SEDIMENTATION RATE] IN BLOOD BY WESTERGREN METHOD: 20 MM/H
EST. GFR  (AFRICAN AMERICAN): >60 ML/MIN/1.73 M^2
EST. GFR  (AFRICAN AMERICAN): >60 ML/MIN/1.73 M^2
EST. GFR  (NON AFRICAN AMERICAN): 56.3 ML/MIN/1.73 M^2
EST. GFR  (NON AFRICAN AMERICAN): 56.3 ML/MIN/1.73 M^2
ESTIMATED PA SYSTOLIC PRESSURE: 53.16
FLOW: 5
GLUCOSE SERPL-MCNC: 70 MG/DL
GLUCOSE SERPL-MCNC: 79 MG/DL
HCO3 UR-SCNC: 17.8 MMOL/L (ref 24–28)
HCT VFR BLD AUTO: 27.6 %
HGB BLD-MCNC: 8.9 G/DL
IMM GRANULOCYTES # BLD AUTO: ABNORMAL K/UL
IMM GRANULOCYTES NFR BLD AUTO: ABNORMAL %
LACTATE SERPL-SCNC: 1.8 MMOL/L
LACTATE SERPL-SCNC: 1.9 MMOL/L
LYMPHOCYTES # BLD AUTO: ABNORMAL K/UL
LYMPHOCYTES NFR BLD: 26 %
MAGNESIUM SERPL-MCNC: 1.6 MG/DL
MAGNESIUM SERPL-MCNC: 1.7 MG/DL
MCH RBC QN AUTO: 28.1 PG
MCHC RBC AUTO-ENTMCNC: 32.2 G/DL
MCV RBC AUTO: 87 FL
METAMYELOCYTES NFR BLD MANUAL: 3 %
MITRAL VALVE REGURGITATION: ABNORMAL
MODE: ABNORMAL
MONOCYTES # BLD AUTO: ABNORMAL K/UL
MONOCYTES NFR BLD: 7 %
MYELOCYTES NFR BLD MANUAL: 2 %
NEUTROPHILS NFR BLD: 57 %
NEUTS BAND NFR BLD MANUAL: 4 %
NRBC BLD-RTO: 3 /100 WBC
OVALOCYTES BLD QL SMEAR: ABNORMAL
PCO2 BLDA: 28.4 MMHG (ref 35–45)
PH SMN: 7.41 [PH] (ref 7.35–7.45)
PHOSPHATE SERPL-MCNC: 3 MG/DL
PHOSPHATE SERPL-MCNC: 3.1 MG/DL
PLATELET # BLD AUTO: 154 K/UL
PMV BLD AUTO: 12.1 FL
PO2 BLDA: 60 MMHG (ref 80–100)
POC BE: -7 MMOL/L
POC SATURATED O2: 91 % (ref 95–100)
POC TCO2: 19 MMOL/L (ref 23–27)
POCT GLUCOSE: 103 MG/DL (ref 70–110)
POCT GLUCOSE: 70 MG/DL (ref 70–110)
POCT GLUCOSE: 75 MG/DL (ref 70–110)
POCT GLUCOSE: 78 MG/DL (ref 70–110)
POIKILOCYTOSIS BLD QL SMEAR: SLIGHT
POLYCHROMASIA BLD QL SMEAR: ABNORMAL
POTASSIUM SERPL-SCNC: 3.8 MMOL/L
POTASSIUM SERPL-SCNC: 4 MMOL/L
PROT SERPL-MCNC: 4.3 G/DL
RBC # BLD AUTO: 3.17 M/UL
RETIRED EF AND QEF - SEE NOTES: 60 (ref 55–65)
SAMPLE: ABNORMAL
SITE: ABNORMAL
SODIUM SERPL-SCNC: 141 MMOL/L
SODIUM SERPL-SCNC: 142 MMOL/L
SP02: 96
TRICUSPID VALVE REGURGITATION: ABNORMAL
WBC # BLD AUTO: 12.26 K/UL

## 2018-04-27 PROCEDURE — 84100 ASSAY OF PHOSPHORUS: CPT

## 2018-04-27 PROCEDURE — 37799 UNLISTED PX VASCULAR SURGERY: CPT

## 2018-04-27 PROCEDURE — 99232 SBSQ HOSP IP/OBS MODERATE 35: CPT | Mod: ,,, | Performed by: INTERNAL MEDICINE

## 2018-04-27 PROCEDURE — 63600175 PHARM REV CODE 636 W HCPCS: Performed by: STUDENT IN AN ORGANIZED HEALTH CARE EDUCATION/TRAINING PROGRAM

## 2018-04-27 PROCEDURE — 83735 ASSAY OF MAGNESIUM: CPT

## 2018-04-27 PROCEDURE — 36415 COLL VENOUS BLD VENIPUNCTURE: CPT

## 2018-04-27 PROCEDURE — 82803 BLOOD GASES ANY COMBINATION: CPT

## 2018-04-27 PROCEDURE — 93306 TTE W/DOPPLER COMPLETE: CPT | Mod: 26,,, | Performed by: INTERNAL MEDICINE

## 2018-04-27 PROCEDURE — 85027 COMPLETE CBC AUTOMATED: CPT

## 2018-04-27 PROCEDURE — 84100 ASSAY OF PHOSPHORUS: CPT | Mod: 91

## 2018-04-27 PROCEDURE — 85007 BL SMEAR W/DIFF WBC COUNT: CPT

## 2018-04-27 PROCEDURE — 93306 TTE W/DOPPLER COMPLETE: CPT

## 2018-04-27 PROCEDURE — 63600175 PHARM REV CODE 636 W HCPCS: Performed by: ANESTHESIOLOGY

## 2018-04-27 PROCEDURE — 99231 SBSQ HOSP IP/OBS SF/LOW 25: CPT | Mod: ,,, | Performed by: ANESTHESIOLOGY

## 2018-04-27 PROCEDURE — 83605 ASSAY OF LACTIC ACID: CPT

## 2018-04-27 PROCEDURE — 83735 ASSAY OF MAGNESIUM: CPT | Mod: 91

## 2018-04-27 PROCEDURE — 20000000 HC ICU ROOM

## 2018-04-27 PROCEDURE — 99232 SBSQ HOSP IP/OBS MODERATE 35: CPT | Mod: ,,, | Performed by: EMERGENCY MEDICINE

## 2018-04-27 PROCEDURE — 25000003 PHARM REV CODE 250: Performed by: HOSPITALIST

## 2018-04-27 PROCEDURE — 94761 N-INVAS EAR/PLS OXIMETRY MLT: CPT

## 2018-04-27 PROCEDURE — 80048 BASIC METABOLIC PNL TOTAL CA: CPT

## 2018-04-27 PROCEDURE — 99233 SBSQ HOSP IP/OBS HIGH 50: CPT | Mod: ,,, | Performed by: SURGERY

## 2018-04-27 PROCEDURE — 80053 COMPREHEN METABOLIC PANEL: CPT

## 2018-04-27 PROCEDURE — 25000003 PHARM REV CODE 250: Performed by: STUDENT IN AN ORGANIZED HEALTH CARE EDUCATION/TRAINING PROGRAM

## 2018-04-27 PROCEDURE — 83605 ASSAY OF LACTIC ACID: CPT | Mod: 91

## 2018-04-27 RX ORDER — MAGNESIUM SULFATE HEPTAHYDRATE 40 MG/ML
2 INJECTION, SOLUTION INTRAVENOUS ONCE
Status: COMPLETED | OUTPATIENT
Start: 2018-04-27 | End: 2018-04-27

## 2018-04-27 RX ORDER — MAGNESIUM SULFATE HEPTAHYDRATE 40 MG/ML
4 INJECTION, SOLUTION INTRAVENOUS
Status: DISCONTINUED | OUTPATIENT
Start: 2018-04-27 | End: 2018-04-30

## 2018-04-27 RX ORDER — POTASSIUM CHLORIDE 29.8 MG/ML
40 INJECTION INTRAVENOUS ONCE
Status: COMPLETED | OUTPATIENT
Start: 2018-04-27 | End: 2018-04-27

## 2018-04-27 RX ORDER — FUROSEMIDE 10 MG/ML
20 INJECTION INTRAMUSCULAR; INTRAVENOUS ONCE
Status: COMPLETED | OUTPATIENT
Start: 2018-04-27 | End: 2018-04-27

## 2018-04-27 RX ORDER — MAGNESIUM SULFATE HEPTAHYDRATE 40 MG/ML
2 INJECTION, SOLUTION INTRAVENOUS
Status: DISCONTINUED | OUTPATIENT
Start: 2018-04-27 | End: 2018-04-30

## 2018-04-27 RX ADMIN — OXYCODONE HYDROCHLORIDE 5 MG: 5 TABLET ORAL at 04:04

## 2018-04-27 RX ADMIN — MAGNESIUM SULFATE IN WATER 2 G: 40 INJECTION, SOLUTION INTRAVENOUS at 06:04

## 2018-04-27 RX ADMIN — LIDOCAINE 1 PATCH: 50 PATCH CUTANEOUS at 06:04

## 2018-04-27 RX ADMIN — ENOXAPARIN SODIUM 40 MG: 100 INJECTION SUBCUTANEOUS at 05:04

## 2018-04-27 RX ADMIN — ACETAMINOPHEN 1000 MG: 10 INJECTION, SOLUTION INTRAVENOUS at 12:04

## 2018-04-27 RX ADMIN — ACETAMINOPHEN 1000 MG: 10 INJECTION, SOLUTION INTRAVENOUS at 05:04

## 2018-04-27 RX ADMIN — FENTANYL CITRATE 50 MCG: 50 INJECTION, SOLUTION INTRAMUSCULAR; INTRAVENOUS at 02:04

## 2018-04-27 RX ADMIN — POTASSIUM CHLORIDE 40 MEQ: 29.8 INJECTION, SOLUTION INTRAVENOUS at 07:04

## 2018-04-27 RX ADMIN — FUROSEMIDE 20 MG: 10 INJECTION, SOLUTION INTRAMUSCULAR; INTRAVENOUS at 12:04

## 2018-04-27 RX ADMIN — ROPIVACAINE HYDROCHLORIDE 10 ML/HR: 2 INJECTION, SOLUTION EPIDURAL; INFILTRATION at 03:04

## 2018-04-27 RX ADMIN — OXYCODONE HYDROCHLORIDE 5 MG: 5 TABLET ORAL at 03:04

## 2018-04-27 RX ADMIN — MAGNESIUM SULFATE IN WATER 2 G: 40 INJECTION, SOLUTION INTRAVENOUS at 08:04

## 2018-04-27 RX ADMIN — OXYCODONE HYDROCHLORIDE 5 MG: 5 TABLET ORAL at 10:04

## 2018-04-27 RX ADMIN — ROPIVACAINE HYDROCHLORIDE 10 ML/HR: 2 INJECTION, SOLUTION EPIDURAL; INFILTRATION at 05:04

## 2018-04-27 NOTE — ADDENDUM NOTE
Addendum  created 04/27/18 1109 by Citlali Dillon MD    Charge Capture section accepted, Cosign clinical note with attestation

## 2018-04-27 NOTE — ASSESSMENT & PLAN NOTE
- Has been on supplemental oxygen since extubation morning of 6/26.  - ABG with pO2 43, repeat several hours later again 42.   - Patients nasal cannula was removed during visit however and she remained with SpO2>92% on room air for extended conversation and without respiratory distress or work of breathing.  - ABG pO2 does not seem to correlate with spO2 from pulse-ox.  - CTA on 4/25; bilateral pleural effusions. Would be judicious of fluid resuscitation.

## 2018-04-27 NOTE — ANESTHESIA POST-OP PAIN MANAGEMENT
Acute Pain Service Progress Note    Kerline Grossman is a 58 y.o., female, [id number]..    Surgery:  IM NAILING OF FEMUR (Left )    Post Op Day #: 2    Catheter type: perineural  SIFI    Infusion type: Ropivacaine 0.2%  10 basal    Problem List:    There are no hospital problems to display for this patient.      Subjective:     General appearance of sedated on ventilator   Pain with rest: 5    Numbers   Pain with movement: 8    Numbers   Side Effects    1. Pruritis No    2. Nausea No    3. Motor Blockade No, 0=Ability to raise lower extremities off bed    4. Sedation No, 1=awake and alert    Objective:     Catheter site clean, dry, intact      Vitals   There were no vitals filed for this visit.     Labs    Admission on 04/20/2018   No results displayed because visit has over 200 results.           Meds   No current facility-administered medications for this visit.      No current outpatient prescriptions on file.     Facility-Administered Medications Ordered in Other Visits   Medication Dose Route Frequency Provider Last Rate Last Dose    acetaminophen (10 mg/mL) injection 1,000 mg  1,000 mg Intravenous Q6H Atif Valencia  mL/hr at 04/27/18 0524 1,000 mg at 04/27/18 0524    bisacodyl suppository 10 mg  10 mg Rectal Daily PRN Frank Argueta MD        dextrose 50% injection 12.5 g  12.5 g Intravenous PRN Reginald Romero MD        dextrose 50% injection 25 g  25 g Intravenous PRN KITTY Delatorre   25 g at 04/26/18 0627    enoxaparin injection 40 mg  40 mg Subcutaneous Daily Frank Argueta MD   40 mg at 04/26/18 1612    fentaNYL injection 100 mcg  100 mcg Intravenous See admin instructions Franky Rodriguez MD   25 mcg at 04/25/18 1045    fentaNYL injection 50 mcg  50 mcg Intravenous Q2H PRN Reginald Romero MD   50 mcg at 04/27/18 0206    glucagon (human recombinant) injection 1 mg  1 mg Intramuscular PRN Reginald Romero MD        insulin aspart U-100 pen 1-10 Units  1-10 Units Subcutaneous Q6H PRN Reginald Romero MD   4  Units at 04/25/18 2227    lidocaine 5 % patch 1 patch  1 patch Transdermal Q24H David Cruz MD   1 patch at 04/26/18 1721    midazolam (VERSED) 1 mg/mL injection 2 mg  2 mg Intravenous See admin instructions Franky Rodriguez MD   1 mg at 04/25/18 1040    omnipaque oral solution 15 mL  15 mL Oral PRN Celena Wright MD   15 mL at 04/22/18 1116    ondansetron disintegrating tablet 8 mg  8 mg Oral Q8H PRN KITTY Delatorre        oxyCODONE immediate release tablet 5 mg  5 mg Oral Q6H PRN Keenan Licea MD   5 mg at 04/27/18 0339    polyethylene glycol packet 17 g  17 g Oral Daily Frank Argueta MD        promethazine (PHENERGAN) 6.25 mg in dextrose 5 % 50 mL IVPB  6.25 mg Intravenous Q6H PRN Atif Valencia MD        ropivacaine (PF) 2 mg/ml (0.2%) infusion  10 mL/hr Perineural Continuous Atif Valencia MD 10 mL/hr at 04/27/18 0513 10 mL/hr at 04/27/18 0513    senna-docusate 8.6-50 mg per tablet 2 tablet  2 tablet Oral Daily David Cruz MD   2 tablet at 04/24/18 0857    sodium chloride 0.9% flush 3 mL  3 mL Intravenous PRN Han Pelayo MD        sodium chloride 0.9% flush 3 mL  3 mL Intravenous PRN Han Pelayo MD        sodium chloride 0.9% flush 5 mL  5 mL Intravenous PRN KITTY Delatorre        white petrolatum 41 % ointment   Topical (Top) PRN David Cruz MD            Anticoagulant dose lovenox at 40.    Assessment:     Pain control difficult to assess d/t sedation    Plan:     Pain well controlled. Continue current regimen. Monitor SIFI. Respiratory issues seem to be more pressing issue at this time.     Atif Valencia MD  PGY-4 Anesthesiology  P: [phone]

## 2018-04-27 NOTE — PROGRESS NOTES
[facility]  Critical Care - Surgery  Progress Note    Patient Name: Kerline Grossman  MRN: [id number]  Admission Date: 4/20/2018  Hospital Length of Stay: 6 days  Code Status: Full Code  Attending Provider: David Cruz MD  Primary Care Provider: Primary Doctor No   Principal Problem: Tachycardia    Subjective:     Hospital/ICU Course:  No notes on file    Interval History/Significant Events: Extubated yesterday. Did well overnight. Received 2L of LR total overnight for tachycardia and elevated lactate. Having increased work of breathing this morning on round. Currently on 6L NC. CXR showing pulmonary edema    Follow-up For: Procedure(s) (LRB):  IM NAILING OF FEMUR (Left)    Post-Operative Day: 2 Days Post-Op    Objective:     Vital Signs (Most Recent):  Temp: 97.4 °F (36.3 °C) (04/27/18 0300)  Pulse: (!) 120 (04/27/18 1200)  Resp: (!) 28 (04/27/18 1200)  BP: (!) 143/79 (04/26/18 2200)  SpO2: 96 % (04/27/18 1200) Vital Signs (24h Range):  Temp:  [97.4 °F (36.3 °C)-98.4 °F (36.9 °C)] 97.4 °F (36.3 °C)  Pulse:  [113-140] 120  Resp:  [23-51] 28  SpO2:  [91 %-100 %] 96 %  BP: ()/(58-79) 143/79  Arterial Line BP: (108-168)/(53-84) 158/79     Weight: 68.6 kg (151 lb 3.8 oz)  Body mass index is 24.41 kg/m².      Intake/Output Summary (Last 24 hours) at 04/27/18 1252  Last data filed at 04/27/18 1200   Gross per 24 hour   Intake           6830.6 ml   Output             5495 ml   Net           1335.6 ml       Physical Exam   Constitutional: She is oriented to person, place, and time. She appears well-developed and well-nourished. No distress.   HENT:   Head: Normocephalic and atraumatic.   Mouth/Throat: No oropharyngeal exudate.   Eyes: EOM are normal. Pupils are equal, round, and reactive to light. No scleral icterus.   Neck: Normal range of motion. Neck supple. No JVD present.   Cardiovascular: Regular rhythm and intact distal pulses.    Tachycardic in 120-130s    Pulmonary/Chest: Effort normal and  breath sounds normal. No respiratory distress. She has no wheezes. She has no rales.   Abdominal: Soft. Bowel sounds are normal. She exhibits no distension. There is no tenderness.   Musculoskeletal: She exhibits tenderness. She exhibits no edema.   Left knee at site of surgery.    Neurological: She is alert and oriented to person, place, and time.   Skin: Skin is warm and dry. Capillary refill takes less than 2 seconds. She is not diaphoretic. No erythema.     Vents:  Vent Mode: Spont (04/26/18 0853)  Ventilator Initiated: Yes (04/25/18 1517)  Set Rate: 0 bmp (04/26/18 0853)  Vt Set: 500 mL (04/26/18 0853)  Pressure Support: 10 cmH20 (04/26/18 0853)  PEEP/CPAP: 5 cmH20 (04/26/18 0853)  Oxygen Concentration (%): 40 (04/26/18 0935)  Peak Airway Pressure: 16 cmH2O (04/26/18 0853)  Plateau Pressure: 15 cmH20 (04/26/18 0853)  Total Ve: 8.19 mL (04/26/18 0853)  Negative Inspiratory Force (cm H2O): -25 (04/26/18 0928)  F/VT Ratio<105 (RSBI): (!) 29.8 (04/26/18 0853)    Lines/Drains/Airways     Central Venous Catheter Line                 Percutaneous Central Line Insertion/Assessment - triple lumen  04/25/18 2154 right internal jugular 1 day          Drain                 Urethral Catheter 04/25/18 2115 1 day          Epidural Line                 Perineural Analgesia/Anesthesia Assessment (using dermatomes) Epidural 04/25/18 1032 2 days          Arterial Line                 Arterial Line 04/25/18 1443 Right Radial 1 day          Peripheral Intravenous Line                 Peripheral IV - Single Lumen 04/24/18 1446 Right Forearm 2 days         Peripheral IV - Single Lumen 04/25/18 1204 Left Hand 2 days                Significant Labs:    CBC/Anemia Profile:    Recent Labs  Lab 04/26/18  0300 04/26/18  1137 04/27/18  0355   WBC 10.48 11.86 12.26   HGB 6.7* 9.8* 8.9*   HCT 21.8* 30.3* 27.6*   * 152 154   MCV 94 87 87   RDW 16.0* 16.9* 17.4*        Chemistries:    Recent Labs  Lab 04/26/18  0300 04/26/18  1307  "04/26/18  2140 04/27/18  0355    138 140 141   K 4.5 4.1 4.0 4.0   * 111* 112* 114*   CO2 19* 17* 19* 17*   BUN 18 17 16 15   CREATININE 1.2 1.2 1.1 1.1   CALCIUM 7.7* 7.8* 7.6* 7.6*   ALBUMIN 1.2*  --  1.3* 1.3*   PROT 4.1*  --  4.3* 4.3*   BILITOT 0.4  --  0.3 0.3   ALKPHOS 300*  --  351* 353*   ALT 14  --  10 8*   AST 61*  --  69* 68*   MG 1.4* 1.9 1.8 1.6   PHOS 3.9 3.2  --  3.0       Significant Imaging:  I have reviewed all pertinent imaging results/findings within the past 24 hours.    Assessment/Plan:     Adenocarcinoma of unknown primary    58 year old female with widely metastatic cancer of unknown origin s/p left femur IMN 4/25/18    Neuro:   - AAOx3 this morning  - PRN pain control  - Intermittently confused at baseline per prior notes     Pulmonary:   - Extubated; on 6L NC  - CXR showing worsening pulmonary edema. Diuresis today  - IS, OOBTC  - ABGs PRN     Cardiac:  - Sinus tachycardia   - Off pressors  - Echo from 4/21 shows normal EF and "right ventricle is enlarged measuring 4.6 cm at the base in the apical right ventricle-focused view. Global right ventricular systolic function appears moderately depressed"  -Lactate normalized    Renal:   - BUN/Cr 15/1.1  - Rosario     Infectious Disease:   - Patient initially on broad spectrum abx  - Cultures have been negative  - Will hold off on restarting abx at this time besides Ancef for surgical ppx  - Trend WBC     Hematology/Oncology:  - Widely metastatic lesions of unknown primary  - Trend CBC     Endocrine:  - SSI     Fluids/Electrolytes/Nutrition/GI:   - NPO for now given worsening respiratory status  - D/C mIVF  - Trend CMP  - Replace Lytes PRN     Pain Management::   - perineural SIFI, PRN Fentanyl, PO Oxy     Dispo:  Continue SICU care.              Critical care was time spent personally by me on the following activities: development of treatment plan with patient or surrogate and bedside caregivers, discussions with consultants, " evaluation of patient's response to treatment, examination of patient, ordering and performing treatments and interventions, ordering and review of laboratory studies, ordering and review of radiographic studies, pulse oximetry, re-evaluation of patient's condition.  This critical care time did not overlap with that of any other provider or involve time for any procedures.     Reginald Romero MD  Critical Care - Surgery  [facility]

## 2018-04-27 NOTE — SUBJECTIVE & OBJECTIVE
Interval History/Significant Events: Extubated yesterday. Did well overnight. Received 2L of LR total overnight for tachycardia and elevated lactate. Having increased work of breathing this morning on round. Currently on 6L NC. CXR showing pulmonary edema    Follow-up For: Procedure(s) (LRB):  IM NAILING OF FEMUR (Left)    Post-Operative Day: 2 Days Post-Op    Objective:     Vital Signs (Most Recent):  Temp: 97.4 °F (36.3 °C) (04/27/18 0300)  Pulse: (!) 120 (04/27/18 1200)  Resp: (!) 28 (04/27/18 1200)  BP: (!) 143/79 (04/26/18 2200)  SpO2: 96 % (04/27/18 1200) Vital Signs (24h Range):  Temp:  [97.4 °F (36.3 °C)-98.4 °F (36.9 °C)] 97.4 °F (36.3 °C)  Pulse:  [113-140] 120  Resp:  [23-51] 28  SpO2:  [91 %-100 %] 96 %  BP: ()/(58-79) 143/79  Arterial Line BP: (108-168)/(53-84) 158/79     Weight: 68.6 kg (151 lb 3.8 oz)  Body mass index is 24.41 kg/m².      Intake/Output Summary (Last 24 hours) at 04/27/18 1252  Last data filed at 04/27/18 1200   Gross per 24 hour   Intake           6830.6 ml   Output             5495 ml   Net           1335.6 ml       Physical Exam   Constitutional: She is oriented to person, place, and time. She appears well-developed and well-nourished. No distress.   HENT:   Head: Normocephalic and atraumatic.   Mouth/Throat: No oropharyngeal exudate.   Eyes: EOM are normal. Pupils are equal, round, and reactive to light. No scleral icterus.   Neck: Normal range of motion. Neck supple. No JVD present.   Cardiovascular: Regular rhythm and intact distal pulses.    Tachycardic in 120-130s    Pulmonary/Chest: Effort normal and breath sounds normal. No respiratory distress. She has no wheezes. She has no rales.   Abdominal: Soft. Bowel sounds are normal. She exhibits no distension. There is no tenderness.   Musculoskeletal: She exhibits tenderness. She exhibits no edema.   Left knee at site of surgery.    Neurological: She is alert and oriented to person, place, and time.   Skin: Skin is warm and  dry. Capillary refill takes less than 2 seconds. She is not diaphoretic. No erythema.     Vents:  Vent Mode: Spont (04/26/18 0853)  Ventilator Initiated: Yes (04/25/18 1517)  Set Rate: 0 bmp (04/26/18 0853)  Vt Set: 500 mL (04/26/18 0853)  Pressure Support: 10 cmH20 (04/26/18 0853)  PEEP/CPAP: 5 cmH20 (04/26/18 0853)  Oxygen Concentration (%): 40 (04/26/18 0935)  Peak Airway Pressure: 16 cmH2O (04/26/18 0853)  Plateau Pressure: 15 cmH20 (04/26/18 0853)  Total Ve: 8.19 mL (04/26/18 0853)  Negative Inspiratory Force (cm H2O): -25 (04/26/18 0928)  F/VT Ratio<105 (RSBI): (!) 29.8 (04/26/18 0853)    Lines/Drains/Airways     Central Venous Catheter Line                 Percutaneous Central Line Insertion/Assessment - triple lumen  04/25/18 2154 right internal jugular 1 day          Drain                 Urethral Catheter 04/25/18 2115 1 day          Epidural Line                 Perineural Analgesia/Anesthesia Assessment (using dermatomes) Epidural 04/25/18 1032 2 days          Arterial Line                 Arterial Line 04/25/18 1443 Right Radial 1 day          Peripheral Intravenous Line                 Peripheral IV - Single Lumen 04/24/18 1446 Right Forearm 2 days         Peripheral IV - Single Lumen 04/25/18 1204 Left Hand 2 days                Significant Labs:    CBC/Anemia Profile:    Recent Labs  Lab 04/26/18  0300 04/26/18  1137 04/27/18  0355   WBC 10.48 11.86 12.26   HGB 6.7* 9.8* 8.9*   HCT 21.8* 30.3* 27.6*   * 152 154   MCV 94 87 87   RDW 16.0* 16.9* 17.4*        Chemistries:    Recent Labs  Lab 04/26/18  0300 04/26/18  1301 04/26/18  2140 04/27/18  0355    138 140 141   K 4.5 4.1 4.0 4.0   * 111* 112* 114*   CO2 19* 17* 19* 17*   BUN 18 17 16 15   CREATININE 1.2 1.2 1.1 1.1   CALCIUM 7.7* 7.8* 7.6* 7.6*   ALBUMIN 1.2*  --  1.3* 1.3*   PROT 4.1*  --  4.3* 4.3*   BILITOT 0.4  --  0.3 0.3   ALKPHOS 300*  --  351* 353*   ALT 14  --  10 8*   AST 61*  --  69* 68*   MG 1.4* 1.9 1.8 1.6   PHOS  3.9 3.2  --  3.0       Significant Imaging:  I have reviewed all pertinent imaging results/findings within the past 24 hours.

## 2018-04-27 NOTE — ASSESSMENT & PLAN NOTE
Patient with right sided pleural effusion seen on CXR. Possibly malignant.   She had diagnostic thoracentesis at OSH with 50 ccs removed. Will follow up with outside lab.

## 2018-04-27 NOTE — PROGRESS NOTES
[facility]  Hematology/Oncology  Progress Note    Patient Name: Kerline Grossman  Admission Date: 4/20/2018  Hospital Length of Stay: 6 days  Code Status: Full Code     Subjective:     Interval History: Postoperatively admitted to SICU for intra-operative hypotension. Had lactic acidosis which had improved with fluid resuscitation.    Medications:  Continuous Infusions:   lactated ringers 50 mL/hr at 04/25/18 0241     Scheduled Meds:   ibuprofen  400 mg Oral Q8H    lidocaine  1 patch Transdermal Q24H    ropivacaine (PF) 5 mg/ml (0.5%)        senna-docusate 8.6-50 mg  2 tablet Oral Daily     PRN Meds:acetaminophen, dextrose 50%, omnipaque, ondansetron, oxyCODONE, sodium chloride 0.9%, white petrolatum     Review of Systems   Constitutional: Negative for chills and fever.   Respiratory: Negative for chest tightness and wheezing.    Cardiovascular: Negative for chest pain and palpitations.   Gastrointestinal: Positive for abdominal pain. Negative for vomiting.     Objective:     Vital Signs (Most Recent):  Temp: 98.6 °F (37 °C) (04/25/18 0839)  Pulse: (!) 114 (04/25/18 0839)  Resp: 20 (04/25/18 0839)  BP: 130/82 (04/25/18 0839)  SpO2: (!) 89 % (04/25/18 0841) Vital Signs (24h Range):  Temp:  [97.6 °F (36.4 °C)-98.6 °F (37 °C)] 98.6 °F (37 °C)  Pulse:  [103-115] 114  Resp:  [18-20] 20  SpO2:  [84 %-95 %] 89 %  BP: (116-133)/(68-85) 130/82     Weight: 68.6 kg (151 lb 3.8 oz)  Body mass index is 24.41 kg/m².  Body surface area is 1.79 meters squared.      Intake/Output Summary (Last 24 hours) at 04/25/18 1003  Last data filed at 04/24/18 1830   Gross per 24 hour   Intake             1250 ml   Output                0 ml   Net             1250 ml     Physical Exam   Constitutional: She is oriented to person, place, and time. She appears well-developed. No distress.   HENT:   Head: Normocephalic and atraumatic.   Eyes: Conjunctivae and EOM are normal.   Cardiovascular: S1 normal and S2 normal.   Tachycardia present.    Pulmonary/Chest: No accessory muscle usage. Tachypnea noted.   Abdominal: Soft. There is tenderness (mild epigastric).   Musculoskeletal: She exhibits tenderness (L knee). She exhibits no edema.   Neurological: She is alert and oriented to person, place, and time.   Skin: Skin is warm and dry.   Nursing note and vitals reviewed.    Significant Labs:   Recent Results (from the past 24 hour(s))   Lactic acid, plasma    Collection Time: 04/26/18 11:37 AM   Result Value Ref Range    Lactate (Lactic Acid) 2.1 0.5 - 2.2 mmol/L   CBC auto differential    Collection Time: 04/26/18 11:37 AM   Result Value Ref Range    WBC 11.86 3.90 - 12.70 K/uL    RBC 3.47 (L) 4.00 - 5.40 M/uL    Hemoglobin 9.8 (L) 12.0 - 16.0 g/dL    Hematocrit 30.3 (L) 37.0 - 48.5 %    MCV 87 82 - 98 fL    MCH 28.2 27.0 - 31.0 pg    MCHC 32.3 32.0 - 36.0 g/dL    RDW 16.9 (H) 11.5 - 14.5 %    Platelets 152 150 - 350 K/uL    MPV 12.0 9.2 - 12.9 fL    Immature Granulocytes CANCELED 0.0 - 0.5 %    Immature Grans (Abs) CANCELED 0.00 - 0.04 K/uL    nRBC 2 (A) 0 /100 WBC    Gran% 78.0 (H) 38.0 - 73.0 %    Lymph% 11.0 (L) 18.0 - 48.0 %    Mono% 5.0 4.0 - 15.0 %    Eosinophil% 2.0 0.0 - 8.0 %    Basophil% 0.0 0.0 - 1.9 %    Bands 4.0 %    Platelet Estimate Appears normal     Aniso Slight     Poik Slight     Poly Occasional     Ovalocytes Occasional     Cambridge Cells Occasional     Schistocytes Present     Differential Method Manual    POCT glucose    Collection Time: 04/26/18 11:53 AM   Result Value Ref Range    POCT Glucose 79 70 - 110 mg/dL   Basic metabolic panel    Collection Time: 04/26/18  1:01 PM   Result Value Ref Range    Sodium 138 136 - 145 mmol/L    Potassium 4.1 3.5 - 5.1 mmol/L    Chloride 111 (H) 95 - 110 mmol/L    CO2 17 (L) 23 - 29 mmol/L    Glucose 150 (H) 70 - 110 mg/dL    BUN, Bld 17 6 - 20 mg/dL    Creatinine 1.2 0.5 - 1.4 mg/dL    Calcium 7.8 (L) 8.7 - 10.5 mg/dL    Anion Gap 10 8 - 16 mmol/L    eGFR if African American  58.4 (A) >60 mL/min/1.73 m^2    eGFR if non African American 50.6 (A) >60 mL/min/1.73 m^2   Magnesium    Collection Time: 04/26/18  1:01 PM   Result Value Ref Range    Magnesium 1.9 1.6 - 2.6 mg/dL   Phosphorus    Collection Time: 04/26/18  1:01 PM   Result Value Ref Range    Phosphorus 3.2 2.7 - 4.5 mg/dL   POCT glucose    Collection Time: 04/26/18  1:08 PM   Result Value Ref Range    POCT Glucose 152 (H) 70 - 110 mg/dL   Lactic acid, plasma    Collection Time: 04/26/18  2:35 PM   Result Value Ref Range    Lactate (Lactic Acid) 4.2 (HH) 0.5 - 2.2 mmol/L   ISTAT PROCEDURE    Collection Time: 04/26/18  2:36 PM   Result Value Ref Range    POC PH 7.438 7.35 - 7.45    POC PCO2 23.2 (LL) 35 - 45 mmHg    POC PO2 43 (LL) 80 - 100 mmHg    POC HCO3 15.7 (L) 24 - 28 mmol/L    POC BE -8 -2 to 2 mmol/L    POC SATURATED O2 82 (L) 95 - 100 %    POC TCO2 16 (L) 23 - 27 mmol/L    Time Notifed: 1441     Sample ARTERIAL     Site [place]     Allens Test N/A     DelSys Nasal Can     Mode SPONT     Flow 2    Lactic acid, plasma    Collection Time: 04/26/18  6:02 PM   Result Value Ref Range    Lactate (Lactic Acid) 3.4 (H) 0.5 - 2.2 mmol/L   POCT glucose    Collection Time: 04/26/18  6:10 PM   Result Value Ref Range    POCT Glucose 108 70 - 110 mg/dL   ISTAT PROCEDURE    Collection Time: 04/26/18  6:12 PM   Result Value Ref Range    POC PH 7.470 (H) 7.35 - 7.45    POC PCO2 24.4 (LL) 35 - 45 mmHg    POC PO2 42 (LL) 80 - 100 mmHg    POC HCO3 17.8 (L) 24 - 28 mmol/L    POC BE -6 -2 to 2 mmol/L    POC SATURATED O2 82 (L) 95 - 100 %    POC TCO2 19 (L) 23 - 27 mmol/L    Verbal Result Readback Performed Yes     Provider Credentials: MD     Provider Notified: SPIER     Time Notifed: 1818     Sample ARTERIAL     Site [place]     Allens Test N/A     DelSys Nasal Can     Mode SPONT     Flow 2     FiO2 30     Sp02 94    Lactic acid, plasma    Collection Time: 04/26/18  9:40 PM   Result Value Ref Range    Lactate (Lactic Acid) 2.8 (H) 0.5 - 2.2  mmol/L   Comprehensive metabolic panel    Collection Time: 04/26/18  9:40 PM   Result Value Ref Range    Sodium 140 136 - 145 mmol/L    Potassium 4.0 3.5 - 5.1 mmol/L    Chloride 112 (H) 95 - 110 mmol/L    CO2 19 (L) 23 - 29 mmol/L    Glucose 98 70 - 110 mg/dL    BUN, Bld 16 6 - 20 mg/dL    Creatinine 1.1 0.5 - 1.4 mg/dL    Calcium 7.6 (L) 8.7 - 10.5 mg/dL    Total Protein 4.3 (L) 6.0 - 8.4 g/dL    Albumin 1.3 (L) 3.5 - 5.2 g/dL    Total Bilirubin 0.3 0.1 - 1.0 mg/dL    Alkaline Phosphatase 351 (H) 55 - 135 U/L    AST 69 (H) 10 - 40 U/L    ALT 10 10 - 44 U/L    Anion Gap 9 8 - 16 mmol/L    eGFR if African American >60.0 >60 mL/min/1.73 m^2    eGFR if non  56.3 (A) >60 mL/min/1.73 m^2   Magnesium    Collection Time: 04/26/18  9:40 PM   Result Value Ref Range    Magnesium 1.8 1.6 - 2.6 mg/dL   POCT glucose    Collection Time: 04/27/18 12:31 AM   Result Value Ref Range    POCT Glucose 103 70 - 110 mg/dL   Lactic acid, plasma    Collection Time: 04/27/18  3:55 AM   Result Value Ref Range    Lactate (Lactic Acid) 1.8 0.5 - 2.2 mmol/L   CBC with Automated Differential    Collection Time: 04/27/18  3:55 AM   Result Value Ref Range    WBC 12.26 3.90 - 12.70 K/uL    RBC 3.17 (L) 4.00 - 5.40 M/uL    Hemoglobin 8.9 (L) 12.0 - 16.0 g/dL    Hematocrit 27.6 (L) 37.0 - 48.5 %    MCV 87 82 - 98 fL    MCH 28.1 27.0 - 31.0 pg    MCHC 32.2 32.0 - 36.0 g/dL    RDW 17.4 (H) 11.5 - 14.5 %    Platelets 154 150 - 350 K/uL    MPV 12.1 9.2 - 12.9 fL    Immature Granulocytes CANCELED 0.0 - 0.5 %    Immature Grans (Abs) CANCELED 0.00 - 0.04 K/uL    Lymph # CANCELED 1.0 - 4.8 K/uL    Mono # CANCELED 0.3 - 1.0 K/uL    Eos # CANCELED 0.0 - 0.5 K/uL    Baso # CANCELED 0.00 - 0.20 K/uL    nRBC 3 (A) 0 /100 WBC    Gran% 57.0 38.0 - 73.0 %    Lymph% 26.0 18.0 - 48.0 %    Mono% 7.0 4.0 - 15.0 %    Eosinophil% 1.0 0.0 - 8.0 %    Basophil% 0.0 0.0 - 1.9 %    Bands 4.0 %    Metamyelocytes 3.0 %    Myelocytes 2.0 %    Aniso Slight     Poik  Slight     Poly Occasional     Ovalocytes Occasional     Thad Cells Occasional     Differential Method Manual    Comprehensive Metabolic Panel (CMP)    Collection Time: 04/27/18  3:55 AM   Result Value Ref Range    Sodium 141 136 - 145 mmol/L    Potassium 4.0 3.5 - 5.1 mmol/L    Chloride 114 (H) 95 - 110 mmol/L    CO2 17 (L) 23 - 29 mmol/L    Glucose 79 70 - 110 mg/dL    BUN, Bld 15 6 - 20 mg/dL    Creatinine 1.1 0.5 - 1.4 mg/dL    Calcium 7.6 (L) 8.7 - 10.5 mg/dL    Total Protein 4.3 (L) 6.0 - 8.4 g/dL    Albumin 1.3 (L) 3.5 - 5.2 g/dL    Total Bilirubin 0.3 0.1 - 1.0 mg/dL    Alkaline Phosphatase 353 (H) 55 - 135 U/L    AST 68 (H) 10 - 40 U/L    ALT 8 (L) 10 - 44 U/L    Anion Gap 10 8 - 16 mmol/L    eGFR if African American >60.0 >60 mL/min/1.73 m^2    eGFR if non  56.3 (A) >60 mL/min/1.73 m^2   Magnesium    Collection Time: 04/27/18  3:55 AM   Result Value Ref Range    Magnesium 1.6 1.6 - 2.6 mg/dL   Phosphorus    Collection Time: 04/27/18  3:55 AM   Result Value Ref Range    Phosphorus 3.0 2.7 - 4.5 mg/dL   POCT glucose    Collection Time: 04/27/18  5:38 AM   Result Value Ref Range    POCT Glucose 70 70 - 110 mg/dL     Diagnostic Results:  CXR 04/27/18:  Endotracheal tube is no longer noted.  Blunting of the right costophrenic sulcus appears slightly more pronounced on this examination than on the previous study referenced above, which may relate to a minimally increased volume of pleural fluid on this side, but overall, allowing for a poorer inspiratory depth on the current examination, there has been no significant interval change in the appearance of the chest since 04/26/2018.  Numerous osseous abnormalities have been delineated on the report of an osseous metastatic survey performed 04/23/2018.    Assessment/Plan:     Bony metastasis    - Outside pathology from [facility] obtained demonstrating moderately differentiated adenocarcinoma. Preliminary pathology staining from [facility] now demonstrates ER  (+), VT (+), HER2 (-).   - Ideally would benefit from letrozole / palbociclib combination; given her acute illness would opt for letrozole alone initially. Will discuss with staff.          We will continue to follow with you. Please do not hesitate to contact us with questions or concerns.    JAMES Holly MD   PGY-3  [phone]    STAFF NOTE:  I have personally taken the history and examined this patient and agree with residents Note as stated above

## 2018-04-27 NOTE — ASSESSMENT & PLAN NOTE
- Patient has been persistently tachycardic throughout her stay, although HR have remained in 120-130s this afternoon.   - Sinus tachycardia per EKG.  - Some contribution from underlying malignancy.  - May be anxiety as well as pain driven; with distressing palliative care visit in the afternoon.  - No evidence of systemic infection; without leukocytosis, fevers, chills, and hypotension now resolved. BCx NGTD.  - CTA on 4/25 without PE.   - Adequate pain control per SICU team.

## 2018-04-27 NOTE — PLAN OF CARE
Problem: Patient Care Overview  Goal: Plan of Care Review  Outcome: Ongoing (interventions implemented as appropriate)  Pt had increased WOB today, coarse breath sounds. Pt made NPO, CXR & echo ordered, fluids held and lasix given. Pt cont to have increased WOB sats >94% on 5L NC. Cont to be sinus tach and sbp 130-160. Pain controlled w/ scheduled tylenol, prn oxy ir and ropivicaine infusion. See epic flow sheets for complete assessment data. Pt has large family, several family members at bedside throughout the day. POC reviewed w/ pt and family. All questions answered. Will cont to monitor.

## 2018-04-27 NOTE — CONSULTS
[facility]  Critical Care Medicine  Consult Note    Patient Name: Kerline Grossman  MRN: [id number]  Admission Date: 4/20/2018  Hospital Length of Stay: 5 days  Code Status: Full Code  Attending Physician: David Cruz MD   Primary Care Provider: Primary Doctor No   Principal Problem: Tachycardia    Consults  Subjective:     HPI:  Mrs. Grossman is a 57 yo female who was brought to [facility] ED this afternoon for second opinion regarding recently diagnosed likely metastatic malignancy with unknown primary. Per patient's daughter about a month ago her mother started to gradually decline secondary to diffuse pain. She tehn became progressively more altered and eventually not responsive. She could no longer walk and was urinating on herself. Her family brought her to ED at [facility]. They brought some records from her stay there which show that work up revealed a corrected calcium of 13 and ZACH. She had CT brain, chest and abdomen which showed diffuse lytic lesions and some pathological fractures in her skull, ribs, and hips but no lesion that pointed to primary tumor. Her calcium eventually trended down. SPEP and UPEP were negative. Her daughter states she had 2 BM biopsies which were unrevealing. She had CT guided biopsy of a lesion in her hip on 4/20 as well as diagnostic thoracentesis which removed only 50 ccs from her pleural effusion. Her daughter states the physicians at [facility] were recommending inpatient hospice for her mother as it seemed she most likely had widely metastatic disease with unknown primary tumor however the patient;s children wished to have a definitive diagnoses before making a decision. They initially attempted to transfer to [facility] but were denied and then decided to have her discharged from [facility] and brought her here themselves. They state she had become more interactive and alert int he last few days and was showing signs of improvement. Of note the daughter states patient  was on a morphine gtt at outside hospital and took and MS contin before being discharge. She states she was having some hypotension at OSH which she states the physicians attributed to the morphine gtt.     Critical care medicine was consulted overnight due to hypotension on arrival to ED. Patient had SBP ~ 80 which responded well to 1 L of NS and MAPs were then > 65 so decision initially was to admit to hospital medicine. However, blood pressure then dropped again to systolic of 80s and patient was accepted to critical care medicine.       Hospital/ICU Course:  4/22: Patient stepped down to hospital medicine. Lower back ache, lumbar MRI with contrast done-> Abnormal marrow signal and diffuse nodular enhancing lesions throughout the lumbar spine and sacrum concerning for metastatic disease. CT chest, abdomen, and pelvis with contrast ordered for metastasis. Chest xray showed multiple lytic lesions or pathologic rib fractures. UA was negative.   4/23: CT Thoracic spine shows diffuse bone marrow abnormality with enhancing osseous lesions. Multilevel neural foraminal narrowing with spinal canal stenosis. Bilateral pleural effusions with compressive atelectasis. Waiting for OSH bone bx of hip and BC results. Pain in neck, chest and lower back not controlled, given 3mg morphine and oxy raised to 5 and 10mg Q6h for 7-10 pain. No weight bearing.   Troponin and EKG. Cytology from thoracentesis 4/20/18 is negative.Final report from bone marrow pending initial impression is a differentiated metastatic carcinoma.   4/24: Xray metastatic survey shows lytic lesions of skull, lytic spine and rib lesions with pathologic fractures. Diffuse lytic lesions throughout entire central axial red marrow skeleton. Lytic lesions of proximal long bones and pathological fractures of ribs and right superior and inferior pubic ramus. Waiting on OSH final bx. Ortho considering seeing patient for femoral b/l lytic lesions and worry of pathological  fracture, currently non-ambulatory.  : Patient taken to the OR by Ortho for nailing of the left femur. Approximately 1 hour into the case, she became hypotensive requirement increasing amounts of vasopressor support. JAMILA was done in the OR by anesthesia and reportedly showed signs of right heart strain. Decision was made to leave her intubated and obtain stat CTA to rule out PE -negative for PE. Upon arrival to SICU, CVP was 3 and was in sinus tach up to 140. Lactate 3.9. Epi discontinued and patient was started on Levo gtt. Levo gtt quickly weaned off with fluid resuscitation. On minimal vent settings this morning. Lactate peaked at 11 overnight, now normalized.   : Patient extubated to room air today. ABG demonstrable for hypoxia with pO2 at 43. Lactic acid sebastian from 2.1 to 4.2 today. Remained in sinus tachycardia 120-130s. MICU consulted to see patient and transfer care as she was unstable for transfer to hospital medicine considering lactic acidosis and tachycardia.     Past Medical History:   Diagnosis Date    Bony metastasis 2018    Xray metastatic survey shows lytic lesions of skull, lytic spine and rib lesions with pathologic fractures. Diffuse lytic lesions throughout entire central axial red marrow skeleton. Lytic lesions of proximal long bones and pathological fractures of ribs and right superior and inferior pubic ramus.    Cancer     Insomnia     Right heart failure 2018       Past Surgical History:   Procedure Laterality Date     SECTION      HYSTERECTOMY         Review of patient's allergies indicates:  No Known Allergies    Family History     None        Social History Main Topics    Smoking status: Former Smoker    Smokeless tobacco: Never Used    Alcohol use No    Drug use: No    Sexual activity: Not on file      Review of Systems   Constitutional: Positive for activity change, appetite change, chills and fatigue. Negative for fever.   HENT: Negative for  nosebleeds.    Eyes: Negative for visual disturbance.   Respiratory: Negative for cough and shortness of breath.    Cardiovascular: Negative for chest pain and leg swelling.   Gastrointestinal: Negative for abdominal distention, abdominal pain, blood in stool, constipation, diarrhea, nausea and vomiting.   Genitourinary: Negative for dysuria.   Musculoskeletal: Positive for arthralgias and myalgias.   Skin: Negative for color change and rash.   Neurological: Positive for weakness.   Hematological: Does not bruise/bleed easily.   Psychiatric/Behavioral: Negative for confusion.     Objective:     Vital Signs (Most Recent):  Temp: 98 °F (36.7 °C) (04/26/18 1500)  Pulse: (!) 139 (04/26/18 1815)  Resp: (!) 28 (04/26/18 1815)  BP: 114/71 (04/26/18 1800)  SpO2: (!) 94 % (04/26/18 1815) Vital Signs (24h Range):  Temp:  [97.5 °F (36.4 °C)-98.7 °F (37.1 °C)] 98 °F (36.7 °C)  Pulse:  [] 139  Resp:  [13-46] 28  SpO2:  [91 %-100 %] 94 %  BP: ()/(51-77) 114/71  Arterial Line BP: ()/(49-77) 153/70   Weight: 68.6 kg (151 lb 3.8 oz)  Body mass index is 24.41 kg/m².      Intake/Output Summary (Last 24 hours) at 04/26/18 1840  Last data filed at 04/26/18 1800   Gross per 24 hour   Intake          8170.82 ml   Output             3860 ml   Net          4310.82 ml       Physical Exam   Constitutional: She is oriented to person, place, and time. She appears well-developed and well-nourished. No distress.   HENT:   Head: Normocephalic and atraumatic.   Mouth/Throat: No oropharyngeal exudate.   Eyes: EOM are normal. Pupils are equal, round, and reactive to light. No scleral icterus.   Neck: Normal range of motion. Neck supple. No JVD present.   Cardiovascular: Regular rhythm and intact distal pulses.    Tachycardic in 120-130s    Pulmonary/Chest: Effort normal and breath sounds normal. No respiratory distress. She has no wheezes. She has no rales.   Abdominal: Soft. Bowel sounds are normal. She exhibits no distension.  There is no tenderness.   Musculoskeletal: She exhibits tenderness. She exhibits no edema.   Left knee at site of surgery.    Neurological: She is alert and oriented to person, place, and time.   Skin: Skin is warm and dry. Capillary refill takes less than 2 seconds. She is not diaphoretic. No erythema.     Lines/Drains/Airways     Central Venous Catheter Line                 Percutaneous Central Line Insertion/Assessment - triple lumen  04/25/18 2154 right internal jugular less than 1 day          Drain                 Urethral Catheter 04/25/18 2115 less than 1 day          Epidural Line                 Perineural Analgesia/Anesthesia Assessment (using dermatomes) Epidural 04/25/18 1032 1 day          Arterial Line                 Arterial Line 04/25/18 1443 Right Radial 1 day          Peripheral Intravenous Line                 Peripheral IV - Single Lumen 04/24/18 1446 Right Forearm 2 days         Peripheral IV - Single Lumen 04/25/18 1204 Left Hand 1 day              Significant Labs:    CBC/Anemia Profile:    Recent Labs  Lab 04/25/18  2145 04/26/18  0300 04/26/18  1137   WBC 19.43* 10.48 11.86   HGB 8.0* 6.7* 9.8*   HCT 26.0* 21.8* 30.3*    143* 152   MCV 95 94 87   RDW 16.1* 16.0* 16.9*        Chemistries:    Recent Labs  Lab 04/25/18  0443  04/25/18  2145 04/26/18  0300 04/26/18  1301     < > 141 140 138   K 4.2  < > 3.9 4.5 4.1     < > 111* 112* 111*   CO2 21*  < > 11* 19* 17*   BUN 17  < > 21* 18 17   CREATININE 1.3  < > 1.5* 1.2 1.2   CALCIUM 8.1*  < > 8.0* 7.7* 7.8*   ALBUMIN 1.6*  --  1.4* 1.2*  --    PROT 5.9*  --  4.6* 4.1*  --    BILITOT 0.4  --  0.5 0.4  --    ALKPHOS 411*  --  340* 300*  --    ALT 13  --  16 14  --    AST 53*  --  65* 61*  --    MG 1.7  < > 1.5* 1.4* 1.9   PHOS 3.4  < > 4.7* 3.9 3.2   < > = values in this interval not displayed.       4/26/2018 14:36   POC PH 7.438   POC PCO2 23.2 (LL)   POC PO2 43 (LL)     Significant Imaging:     CTA Chest Non-Coronary  Impression       No pulmonary thromboembolism to the segmental level.  No evidence of right heart strain or pulmonary infarction.    Multiple right upper lobe pulmonary micro nodules measuring up to 0.4 cm.  Metastatic disease cannot be excluded.  Recommend further evaluation as clinically indicated.    Innumerable lytic osseous lesions concerning for metastatic disease.    Grossly stable small bilateral pleural effusions with associated compressive atelectasis.    Additional findings as above.    Electronically signed by resident: Marco Jones  Date: 04/25/2018  Time: 21:20    Electronically signed by: Han Melo MD  Date: 04/25/2018  Time: 21:58           CXR 1 View FINDINGS:  Endotracheal tube above the mandy.  Central venous catheter in the SVC.  Mild cardiomegaly.  Bibasal atelectatic changes and probably associated pleural effusion.  The lung apices are clear       Electronically signed by: Boston Shrestha MD  Date: 04/26/2018  Time: 08:40         Assessment/Plan:     Pulmonary   Acute hypoxemic respiratory failure    - Has been on supplemental oxygen since extubation morning of 6/26.  - ABG with pO2 43, repeat several hours later again 42.   - Patients nasal cannula was removed during visit however and she remained with SpO2>92% on room air for extended conversation and without respiratory distress or work of breathing.  - ABG pO2 does not seem to correlate with spO2 from pulse-ox.  - CTA on 4/25; bilateral pleural effusions. Would be judicious of fluid resuscitation.             Pleural effusion    Patient with right sided pleural effusion seen on CXR. Possibly malignant.   She had diagnostic thoracentesis at OSH with 50 ccs removed. Will follow up with outside lab.         Cardiac/Vascular   * Tachycardia    - Patient has been persistently tachycardic throughout her stay, although HR have remained in 120-130s this afternoon.   - Sinus tachycardia per EKG.  - Some contribution from underlying  "malignancy.  - May be anxiety as well as pain driven; with distressing palliative care visit in the afternoon.  - No evidence of systemic infection; without leukocytosis, fevers, chills, and hypotension now resolved. BCx NGTD.  - CTA on 4/25 without PE.   - Adequate pain control per SICU team.         Hypotension    Patient found to have MAPS between 60 and 70 upon arrival to ED. She received 1 L of NS with improvements to MAPs > 65.   Likely secondary to combination of dehydration and morphine still wearing off. She may also have an infection although her WBC is normal and she is afebrile  BP now improved s/p 2 L of NS   Continue broad spectrum abx until cultures return.            Renal/   Lactic acidemia    - Appears to be persistent throughout her hospital stay and at one point during admission up to 11 which resolved to 2.5 with IVF.  - With tachycardia but afebrile and without leukocytosis.  - Likely from underlying malignancy.  - Trend today 1.6 --> 2.1 --> 4.2 --> 3.4        Oncology   Pain due to malignant neoplasm metastatic to bone    - Per SICU team         Anemia    - Likely secondary to chronic disease.  - With precipitous drop of hemoglobin to 6.7 on 4/25 -->4/26 likely from post-operative blood loss and transfused 2U PRBC .  - Now stable at 9.8 today. No evidence of blood loss.         Adenocarcinoma of unknown primary    - Followed by hem/onc consult here. Per their note: "Outside pathology from [facility] obtained demonstrating moderately differentiated adenocarcinoma. Differential includes GI, lung, breast as potential sources; with previously collected bloodwork and significantly elevated CA 27-29, high suspicion for breast malignancy as primary source at this time."        Bony metastasis    - Patient with diffuse lytic lesions and pathological fractures throughout her skull, chest and hips which is seen on CT scans from OSH. Workup has not shown primary malignancy.   - Xray metastatic survey shows " "lytic lesions of skull, lytic spine and rib lesions with pathologic fractures. Diffuse lytic lesions throughout entire central axial red marrow skeleton. Lytic lesions of proximal long bones and pathological fractures of ribs and right superior and inferior pubic ramus.          Other   Palliative care encounter    - Followed by Dr. Winters and palliative care team here; per their note: patient fluctuates between saying that if she dies "just let me go" and then later said that she wants to "fight" and be kept alive on machines bc she believes in God and wants a miracle.           Critical secondary to Patient has a condition that poses threat to life and bodily function: Severe Respiratory Distress and Lactic acidosis .    LA likely from underlying malignancy. Sinus tachycardia present throughout admission - elevated rates today multifactorial - could be anxiety or pain related. Pain control can be done by SICU team. Respiratory needs appear to be improving, although ABG pO2 does not correlate so spO2 from pulse-ox. CTA with b/l pleural effusion; recommend judicious with further fluid resuscitation. BP remains stable. Medical management can likely be optimized by SICU team with anticipated stepdown within next 24 hours.       Thank you for your consult. I will sign off. Please contact us if you have any additional questions. This has been discussed with ICU fellow Dr. Foster..      Raman Ryder MD  PGY-2 Internal Medicine  [phone]    Critical Care Medicine  [facility]  "

## 2018-04-27 NOTE — PROGRESS NOTES
[facility]  Orthopedics  Progress Note    Patient Name: Kerline Grossman  MRN: [id number]  Admission Date: 4/20/2018  Hospital Length of Stay: 6 days  Attending Provider: David Cruz MD  Primary Care Provider: Primary Doctor No  Follow-up For: Procedure(s) (LRB):  IM NAILING OF FEMUR (Left)    Post-Operative Day: 2 Days Post-Op  Subjective:     Principal Problem:Tachycardia    Principal Orthopedic Problem: B femur impending fractures    Interval History: Patient seen and examined at bedside.  No acute events overnight.  Pain controlled.  No PT yesterday.  Extubated yesterday.  Lactate 4.2 max yesterday.  1.8 this AM.     Review of patient's allergies indicates:  No Known Allergies    Current Facility-Administered Medications   Medication    acetaminophen (10 mg/mL) injection 1,000 mg    bisacodyl suppository 10 mg    dextrose 50% injection 12.5 g    dextrose 50% injection 25 g    enoxaparin injection 40 mg    fentaNYL injection 100 mcg    fentaNYL injection 50 mcg    glucagon (human recombinant) injection 1 mg    insulin aspart U-100 pen 1-10 Units    lidocaine 5 % patch 1 patch    midazolam (VERSED) 1 mg/mL injection 2 mg    omnipaque oral solution 15 mL    ondansetron disintegrating tablet 8 mg    oxyCODONE immediate release tablet 5 mg    polyethylene glycol packet 17 g    promethazine (PHENERGAN) 6.25 mg in dextrose 5 % 50 mL IVPB    ropivacaine (PF) 2 mg/ml (0.2%) infusion    senna-docusate 8.6-50 mg per tablet 2 tablet    sodium chloride 0.9% flush 3 mL    sodium chloride 0.9% flush 3 mL    sodium chloride 0.9% flush 5 mL    white petrolatum 41 % ointment     Objective:     Vital Signs (Most Recent):  Temp: 97.4 °F (36.3 °C) (04/27/18 0300)  Pulse: (!) 121 (04/27/18 0830)  Resp: (!) 51 (04/27/18 0800)  BP: (!) 143/79 (04/26/18 2200)  SpO2: (!) 94 % (04/27/18 0830) Vital Signs (24h Range):  Temp:  [97.4 °F (36.3 °C)-98.4 °F (36.9 °C)] 97.4 °F (36.3 °C)  Pulse:   "[107-140] 121  Resp:  [23-51] 51  SpO2:  [91 %-100 %] 94 %  BP: ()/(58-79) 143/79  Arterial Line BP: (108-165)/(53-79) 137/74     Weight: 68.6 kg (151 lb 3.8 oz)  Height: 5' 6" (167.6 cm)  Body mass index is 24.41 kg/m².      Intake/Output Summary (Last 24 hours) at 04/27/18 1013  Last data filed at 04/27/18 0800   Gross per 24 hour   Intake           7200.6 ml   Output             4795 ml   Net           2405.6 ml       Ortho/SPM Exam    AAOx4  NAD  RRR  No increased WOB  SILT and motor intact T/SP/DP  WWP extremities  FCDs in place and functioning    Significant Labs:   CBC:     Recent Labs  Lab 04/26/18  0300 04/26/18  1137 04/27/18  0355   WBC 10.48 11.86 12.26   HGB 6.7* 9.8* 8.9*   HCT 21.8* 30.3* 27.6*   * 152 154     CMP:     Recent Labs  Lab 04/26/18  0300 04/26/18  1301 04/26/18  2140 04/27/18  0355    138 140 141   K 4.5 4.1 4.0 4.0   * 111* 112* 114*   CO2 19* 17* 19* 17*   GLU 57* 150* 98 79   BUN 18 17 16 15   CREATININE 1.2 1.2 1.1 1.1   CALCIUM 7.7* 7.8* 7.6* 7.6*   PROT 4.1*  --  4.3* 4.3*   ALBUMIN 1.2*  --  1.3* 1.3*   BILITOT 0.4  --  0.3 0.3   ALKPHOS 300*  --  351* 353*   AST 61*  --  69* 68*   ALT 14  --  10 8*   ANIONGAP 9 10 9 10   EGFRNONAA 50.6* 50.6* 56.3* 56.3*     All pertinent labs within the past 24 hours have been reviewed.    Significant Imaging: I have reviewed all pertinent imaging results/findings.    Assessment/Plan:     Bony metastasis    58 y.o. female POD2 s/p L CMN    Pain control  PT/OT: bed rest  DVT PPx: lovenox, FCDs at all times when not ambulating  Abx: postop Ancef complete  Labs: Hb 8.9, lactate 1.8  Drain: none  Ponce: none    Dispo: likely step down to floor today              Frank Argueta MD  Orthopedics  [facility]    Attg Note:  I agree with the above assessment and plan.  Patient doing very well.  Extubated.  I discussed the next steps with her son today.  We will see how she does over the weekend and talk about " possible nail on the right side Monday and then get her up ambulating.      Jd Cabrera MD

## 2018-04-27 NOTE — PT/OT/SLP DISCHARGE
Occupational Therapy Discharge Summary    Kerline Grossman  MRN: [id number]   Principal Problem: Tachycardia      Patient Discharged from acute Occupational Therapy on 4/27/2018  .  Please refer to prior OT note dated 4/24/18 for functional status.    Assessment:      Patient transferred to lower level of care secondary to IM nail 4/25/18    Objective:     GOALS:    Occupational Therapy Goals        Problem: Occupational Therapy Goal    Goal Priority Disciplines Outcome Interventions   Occupational Therapy Goal     OT, PT/OT Ongoing (interventions implemented as appropriate)    Description:  Goals to be met by: 5/10/18    Patient will increase functional independence with ADLs by performing:    UE Dressing with [place].  Grooming while seated with Set-up Assistance.  Toileting from bedside commode with Contact Guard Assistance for hygiene and clothing management.   Sitting at edge of bed x10 minutes with Supervision.                      Reasons for Discontinuation of Therapy Services  pt s/p IM NAIL femur 4/25/18 and t/f to ICU    MD's notified to re-consult therapy when appropriate.     Plan:     Patient Discharged to: ICU     JUSTIN Johnston  4/27/2018

## 2018-04-27 NOTE — ASSESSMENT & PLAN NOTE
58 y.o. female POD2 s/p L CMN    Pain control  PT/OT: bed rest  DVT PPx: lovenox, FCDs at all times when not ambulating  Abx: postop Ancef complete  Labs: Hb 8.9, lactate 1.8  Drain: none  Ponce: none    Dispo: likely step down to floor today

## 2018-04-27 NOTE — PROGRESS NOTES
[facility]  Palliative Medicine  Progress Note    Patient Name: Kerline Grossman  MRN: [id number]  Admission Date: 4/20/2018  Hospital Length of Stay: 6 days  Code Status: Full Code   Attending Provider: David Cruz MD  Consulting Provider: Landon Larsen MD  Primary Care Physician: Primary Doctor No  Principal Problem:Tachycardia    Patient information was obtained from patient and relative(s).      Assessment/Plan:     Palliative care encounter    Pathology report pending with preliminary concerning for metastatic carcinoma.  Cytology from 4/20 pleural fluid negative.  Prognosis and treatment options pending official diagnosis.  4/27/18 Spoke with Dr. Rosario (pathologist; [place]) who says it looks like metastatic breast cancer ER+/VA+/HER2 negative.  He will fax the report as soon as it is finalized.  Pain constantly reported as 8/10 but otherwise appears comfortable at this level.  Reports discomfort of posterior portion of upper back when she has to reposition herself.  Known subacute rib fractures on CXR and CT scan.  3 doses of 5mg oxycodone in the past 24 hours.  High risk for oversedation.  Reviewed power of  4/27/18.  Her son, Hector, is her first choice contact and her daughter, Leonarda, is her backup contact.  Form filled out in the presence of both designated children and placed in the chart to be scanned.  -Recommend continuing oxycodone 5mg q4h prn pain for now as she does not appear to be in significant distress.  -Recommend  Continuing lidocaine patch 5% to rib areas that bother her the most  --Patient may prefer lidocaine patch on during day and off at night.  --Alternatively if pain medications only causing sedation instead of pain relief AND if pain is most bothersome to her in her chest, 2nd line therapy may consider pain management for intercostal block  -continue with senna-colace  -Will follow up with pathology from OSH            I will follow-up with patient. Please  "contact us if you have any additional questions.    Subjective:     Chief Complaint:   Chief Complaint   Patient presents with    Generalized Body Aches     Pt reports having all over body pain. Pt reports having cancer "all over". Pt reports care at [facility], had fluid taken off lungs this AM. O2 in triage 98%, denies SOB.       HPI:   57 yo woman with medical history significant for chronic mood disorder who presented to OSH with ZACH, hypercalcemia, and lytic lesions concerning for MM.  Workup for MM negative thus far.  Her family wanted to transfer her to [facility], but it was denied, so the family had her discharged and transported her to [facility] themselves.  She arrived hypotensive, which may have been 2/2 opiates (received morphine at OSH).  Hypotension resolved with IVF and only required brief stay under ICU care before being transferred to the floor.    Palliative care consulted for goals of care.  See clinical review 4/23/18 with Dr. Rosario regarding preliminary pathology findings.     She states she is in pain, 8/10, primarily in her back.  When she takes pain medications she says she falls asleep only for 15-20 minutes before waking up with pain 8/10.  Family at bedside confirms she does this throughout the day even without pain medications.  She reports pain level remains at an 8 despite taking meds.    Last filled prescriptions [facility] on [facility] [address] 3/2017  paliperidone ER 6mg tabs (2 tabs qAM)  oxcarbazepime 300mg 2 tabs bid  Levothyroxine 25mcg qday  Benztropine 1mg bid  Trazodone 100mg 2 tabs qhs    Hospital Course:  No notes on file    Interval History: In pain, 8/10 post surgery.  Denies fevers, chills, worsening shortness of breath, nausea, abdominal pain, or diarrhea.  She wants her son Hector to be her power of  and her daughter Leonarda to be the backup.  Both Hector and Leonarda are at bedside during this conversation.    Medications:  Continuous Infusions:   ropivacaine " (PF) 2 mg/ml (0.2%) 10 mL/hr (04/27/18 0513)     Scheduled Meds:   acetaminophen  1,000 mg Intravenous Q6H    enoxaparin  40 mg Subcutaneous Daily    lidocaine  1 patch Transdermal Q24H    polyethylene glycol  17 g Oral Daily    senna-docusate 8.6-50 mg  2 tablet Oral Daily     PRN Meds:bisacodyl, dextrose 50%, dextrose 50%, fentaNYL, fentaNYL, glucagon (human recombinant), insulin aspart U-100, midazolam, omnipaque, ondansetron, oxyCODONE, promethazine (PHENERGAN) IVPB, sodium chloride 0.9%, sodium chloride 0.9%, sodium chloride 0.9%, white petrolatum    Objective:     Vital Signs (Most Recent):  Temp: 97.4 °F (36.3 °C) (04/27/18 0300)  Pulse: (!) 120 (04/27/18 1200)  Resp: (!) 28 (04/27/18 1200)  BP: (!) 143/79 (04/26/18 2200)  SpO2: 96 % (04/27/18 1200) Vital Signs (24h Range):  Temp:  [97.4 °F (36.3 °C)-98.4 °F (36.9 °C)] 97.4 °F (36.3 °C)  Pulse:  [113-140] 120  Resp:  [23-51] 28  SpO2:  [92 %-100 %] 96 %  BP: ()/(58-79) 143/79  Arterial Line BP: (108-168)/(53-84) 158/79     Weight: 68.6 kg (151 lb 3.8 oz)  Body mass index is 24.41 kg/m².    Review of Symptoms  Symptom Assessment (ESAS 0-10 scale)  ESAS 0 1 2 3 4 5 6 7 8 9 10   Pain              Dyspnea              Anxiety              Nausea              Depression               Anorexia              Fatigue              Insomnia              Restlessness               Agitation              CAM / Delirium __ --  ___+   Constipation     __ --  ___+   Diarrhea           __ --  ___+  Bowel Management Plan (BMP): Yes    Comments: senna colace    Pain Assessment: 8/10    OME in 24 hours: 3x oxycodone 5mg tabs / 24h    Performance Status: 40    ECOG Performance Status Grade: 3 - Confined to bed or chair 50% of waking hours    Physical Exam   Constitutional: She is oriented to person, place, and time. She appears well-developed and well-nourished. No distress.   HENT:   Head: Normocephalic and atraumatic.   Mouth/Throat: No oropharyngeal exudate.   Eyes:  EOM are normal. Pupils are equal, round, and reactive to light. No scleral icterus.   Neck: Normal range of motion. Neck supple. No JVD present.   Cardiovascular: Regular rhythm and intact distal pulses.    Tachycardic in 120-130s    Pulmonary/Chest: Effort normal and breath sounds normal. No respiratory distress. She has no wheezes. She has no rales.   Abdominal: Soft. Bowel sounds are normal. She exhibits no distension. There is no tenderness.   Musculoskeletal: She exhibits tenderness. She exhibits no edema.   Left knee at site of surgery.    Neurological: She is alert and oriented to person, place, and time.   Skin: Skin is warm and dry. Capillary refill takes less than 2 seconds. She is not diaphoretic. No erythema.       Significant Labs:   CBC:   Recent Labs  Lab 04/26/18  0300 04/26/18  1137 04/27/18  0355   WBC 10.48 11.86 12.26   HGB 6.7* 9.8* 8.9*   HCT 21.8* 30.3* 27.6*   * 152 154     CMP:   Recent Labs  Lab 04/26/18  0300 04/26/18  1301 04/26/18  2140 04/27/18  0355    138 140 141   K 4.5 4.1 4.0 4.0   * 111* 112* 114*   CO2 19* 17* 19* 17*   GLU 57* 150* 98 79   BUN 18 17 16 15   CREATININE 1.2 1.2 1.1 1.1   CALCIUM 7.7* 7.8* 7.6* 7.6*   PROT 4.1*  --  4.3* 4.3*   ALBUMIN 1.2*  --  1.3* 1.3*   BILITOT 0.4  --  0.3 0.3   ALKPHOS 300*  --  351* 353*   AST 61*  --  69* 68*   ALT 14  --  10 8*   ANIONGAP 9 10 9 10   EGFRNONAA 50.6* 50.6* 56.3* 56.3*     CBC:     Recent Labs  Lab 04/27/18  0355   WBC 12.26   HGB 8.9*   HCT 27.6*   MCV 87        BMP:    Recent Labs  Lab 04/27/18  0355   GLU 79      K 4.0   *   CO2 17*   BUN 15   CREATININE 1.1   CALCIUM 7.6*   MG 1.6     LFT:  Lab Results   Component Value Date    AST 68 (H) 04/27/2018    ALKPHOS 353 (H) 04/27/2018    BILITOT 0.3 04/27/2018     Albumin:   Albumin   Date Value Ref Range Status   04/27/2018 1.3 (L) 3.5 - 5.2 g/dL Final     Protein:   Total Protein   Date Value Ref Range Status   04/27/2018 4.3 (L) 6.0 -  8.4 g/dL Final     Lactic acid:   Lab Results   Component Value Date    LACTATE 1.8 04/27/2018    LACTATE 2.8 (H) 04/26/2018       Significant Imaging: I have reviewed all pertinent imaging results/findings within the past 24 hours.    Advanced Directives::  Living Will: No  LaPOST: No  Do Not Resuscitate Status: Yes  Medical Power of : Yes. Agent's Name: Hector. Agent's Contact Number: see chart    Decision-Making Capacity: Patient answered questions, Family answered questions    Living Arrangements: Lives with spouse    Psychosocial/Cultural:  Patient's most important priorities:  n/a    Patient's biggest concerns/fears:  n/a    Previous death/end of life care history:  n/a    Patient's goals/hopes:  n/a    Spiritual:     F- Dora and Belief: n/a    I - Importance: n/a  .  C - Community: n/a    A - Address in Care: n/a      > 50% of 30 min visit spent in chart review, face to face discussion of goals of care,  symptom assessment, coordination of care and emotional support.    Landon Larsen MD  Palliative Medicine  [facility]

## 2018-04-27 NOTE — ASSESSMENT & PLAN NOTE
"58 year old female with widely metastatic cancer of unknown origin s/p left femur IMN 4/25/18    Neuro:   - AAOx3 this morning  - PRN pain control  - Intermittently confused at baseline per prior notes     Pulmonary:   - Extubated; on 6L NC  - CXR showing worsening pulmonary edema. Diuresis today  - IS, OOBTC  - ABGs PRN     Cardiac:  - Sinus tachycardia   - Off pressors  - Echo from 4/21 shows normal EF and "right ventricle is enlarged measuring 4.6 cm at the base in the apical right ventricle-focused view. Global right ventricular systolic function appears moderately depressed"  -Lactate normalized    Renal:   - BUN/Cr 15/1.1  - Rosario     Infectious Disease:   - Patient initially on broad spectrum abx  - Cultures have been negative  - Will hold off on restarting abx at this time besides Ancef for surgical ppx  - Trend WBC     Hematology/Oncology:  - Widely metastatic lesions of unknown primary  - Trend CBC     Endocrine:  - SSI     Fluids/Electrolytes/Nutrition/GI:   - NPO for now given worsening respiratory status  - D/C mIVF  - Trend CMP  - Replace Lytes PRN     Pain Management::   - perineural SIFI, PRN Fentanyl, PO Oxy     Dispo:  Continue SICU care.   "

## 2018-04-27 NOTE — ASSESSMENT & PLAN NOTE
Pathology report pending with preliminary concerning for metastatic carcinoma.  Cytology from 4/20 pleural fluid negative.  Prognosis and treatment options pending official diagnosis.  4/27/18 Spoke with Dr. Rosario (pathologist; [facility]) who says it looks like metastatic breast cancer ER+/CA+/HER2 negative.  He will fax the report as soon as it is finalized.  Pain constantly reported as 8/10 but otherwise appears comfortable at this level.  Reports discomfort of posterior portion of upper back when she has to reposition herself.  Known subacute rib fractures on CXR and CT scan.  3 doses of 5mg oxycodone in the past 24 hours.  High risk for oversedation.  Reviewed power of  4/27/18.  Her son, Hector, is her first choice contact and her daughter, Leonarda, is her backup contact.  Form filled out in the presence of both designated children and placed in the chart to be scanned.  -Recommend continuing oxycodone 5mg q4h prn pain for now as she does not appear to be in significant distress.  -Recommend  Continuing lidocaine patch 5% to rib areas that bother her the most  --Patient may prefer lidocaine patch on during day and off at night.  --Alternatively if pain medications only causing sedation instead of pain relief AND if pain is most bothersome to her in her chest, 2nd line therapy may consider pain management for intercostal block  -continue with senna-colace  -Will follow up with pathology from OSH

## 2018-04-27 NOTE — SUBJECTIVE & OBJECTIVE
Interval History: In pain, 8/10 post surgery.  Denies fevers, chills, worsening shortness of breath, nausea, abdominal pain, or diarrhea.  She wants her son Hector to be her power of  and her daughter Leonarda to be the backup.  Both Hector and Leonarda are at bedside during this conversation.    Medications:  Continuous Infusions:   ropivacaine (PF) 2 mg/ml (0.2%) 10 mL/hr (04/27/18 0513)     Scheduled Meds:   acetaminophen  1,000 mg Intravenous Q6H    enoxaparin  40 mg Subcutaneous Daily    lidocaine  1 patch Transdermal Q24H    polyethylene glycol  17 g Oral Daily    senna-docusate 8.6-50 mg  2 tablet Oral Daily     PRN Meds:bisacodyl, dextrose 50%, dextrose 50%, fentaNYL, fentaNYL, glucagon (human recombinant), insulin aspart U-100, midazolam, omnipaque, ondansetron, oxyCODONE, promethazine (PHENERGAN) IVPB, sodium chloride 0.9%, sodium chloride 0.9%, sodium chloride 0.9%, white petrolatum    Objective:     Vital Signs (Most Recent):  Temp: 97.4 °F (36.3 °C) (04/27/18 0300)  Pulse: (!) 120 (04/27/18 1200)  Resp: (!) 28 (04/27/18 1200)  BP: (!) 143/79 (04/26/18 2200)  SpO2: 96 % (04/27/18 1200) Vital Signs (24h Range):  Temp:  [97.4 °F (36.3 °C)-98.4 °F (36.9 °C)] 97.4 °F (36.3 °C)  Pulse:  [113-140] 120  Resp:  [23-51] 28  SpO2:  [92 %-100 %] 96 %  BP: ()/(58-79) 143/79  Arterial Line BP: (108-168)/(53-84) 158/79     Weight: 68.6 kg (151 lb 3.8 oz)  Body mass index is 24.41 kg/m².    Review of Symptoms  Symptom Assessment (ESAS 0-10 scale)  ESAS 0 1 2 3 4 5 6 7 8 9 10   Pain              Dyspnea              Anxiety              Nausea              Depression               Anorexia              Fatigue              Insomnia              Restlessness               Agitation              CAM / Delirium __ --  ___+   Constipation     __ --  ___+   Diarrhea           __ --  ___+  Bowel Management Plan (BMP): Yes    Comments: senna colace    Pain Assessment: 8/10    OME in 24 hours: 3x oxycodone 5mg tabs  / 24h    Performance Status: 40    ECOG Performance Status Grade: 3 - Confined to bed or chair 50% of waking hours    Physical Exam   Constitutional: She is oriented to person, place, and time. She appears well-developed and well-nourished. No distress.   HENT:   Head: Normocephalic and atraumatic.   Mouth/Throat: No oropharyngeal exudate.   Eyes: EOM are normal. Pupils are equal, round, and reactive to light. No scleral icterus.   Neck: Normal range of motion. Neck supple. No JVD present.   Cardiovascular: Regular rhythm and intact distal pulses.    Tachycardic in 120-130s    Pulmonary/Chest: Effort normal and breath sounds normal. No respiratory distress. She has no wheezes. She has no rales.   Abdominal: Soft. Bowel sounds are normal. She exhibits no distension. There is no tenderness.   Musculoskeletal: She exhibits tenderness. She exhibits no edema.   Left knee at site of surgery.    Neurological: She is alert and oriented to person, place, and time.   Skin: Skin is warm and dry. Capillary refill takes less than 2 seconds. She is not diaphoretic. No erythema.       Significant Labs:   CBC:   Recent Labs  Lab 04/26/18  0300 04/26/18  1137 04/27/18  0355   WBC 10.48 11.86 12.26   HGB 6.7* 9.8* 8.9*   HCT 21.8* 30.3* 27.6*   * 152 154     CMP:   Recent Labs  Lab 04/26/18  0300 04/26/18  1301 04/26/18  2140 04/27/18  0355    138 140 141   K 4.5 4.1 4.0 4.0   * 111* 112* 114*   CO2 19* 17* 19* 17*   GLU 57* 150* 98 79   BUN 18 17 16 15   CREATININE 1.2 1.2 1.1 1.1   CALCIUM 7.7* 7.8* 7.6* 7.6*   PROT 4.1*  --  4.3* 4.3*   ALBUMIN 1.2*  --  1.3* 1.3*   BILITOT 0.4  --  0.3 0.3   ALKPHOS 300*  --  351* 353*   AST 61*  --  69* 68*   ALT 14  --  10 8*   ANIONGAP 9 10 9 10   EGFRNONAA 50.6* 50.6* 56.3* 56.3*     CBC:     Recent Labs  Lab 04/27/18  0355   WBC 12.26   HGB 8.9*   HCT 27.6*   MCV 87        BMP:    Recent Labs  Lab 04/27/18 0355   GLU 79      K 4.0   *   CO2 17*   BUN 15    CREATININE 1.1   CALCIUM 7.6*   MG 1.6     LFT:  Lab Results   Component Value Date    AST 68 (H) 04/27/2018    ALKPHOS 353 (H) 04/27/2018    BILITOT 0.3 04/27/2018     Albumin:   Albumin   Date Value Ref Range Status   04/27/2018 1.3 (L) 3.5 - 5.2 g/dL Final     Protein:   Total Protein   Date Value Ref Range Status   04/27/2018 4.3 (L) 6.0 - 8.4 g/dL Final     Lactic acid:   Lab Results   Component Value Date    LACTATE 1.8 04/27/2018    LACTATE 2.8 (H) 04/26/2018       Significant Imaging: I have reviewed all pertinent imaging results/findings within the past 24 hours.    Advanced Directives::  Living Will: No  LaPOST: No  Do Not Resuscitate Status: Yes  Medical Power of : Yes. Agent's Name: Hector. Agent's Contact Number: see chart    Decision-Making Capacity: Patient answered questions, Family answered questions    Living Arrangements: Lives with spouse    Psychosocial/Cultural:  Patient's most important priorities:  n/a    Patient's biggest concerns/fears:  n/a    Previous death/end of life care history:  n/a    Patient's goals/hopes:  n/a    Spiritual:     F- Dora and Belief: n/a    I - Importance: n/a  .  C - Community: n/a    A - Address in Care: n/a

## 2018-04-27 NOTE — SUBJECTIVE & OBJECTIVE
"Principal Problem:Tachycardia    Principal Orthopedic Problem: B femur impending fractures    Interval History: Patient seen and examined at bedside.  No acute events overnight.  Pain controlled.  No PT yesterday.  Extubated yesterday.  Lactate 4.2 max yesterday.  1.8 this AM.     Review of patient's allergies indicates:  No Known Allergies    Current Facility-Administered Medications   Medication    acetaminophen (10 mg/mL) injection 1,000 mg    bisacodyl suppository 10 mg    dextrose 50% injection 12.5 g    dextrose 50% injection 25 g    enoxaparin injection 40 mg    fentaNYL injection 100 mcg    fentaNYL injection 50 mcg    glucagon (human recombinant) injection 1 mg    insulin aspart U-100 pen 1-10 Units    lidocaine 5 % patch 1 patch    midazolam (VERSED) 1 mg/mL injection 2 mg    omnipaque oral solution 15 mL    ondansetron disintegrating tablet 8 mg    oxyCODONE immediate release tablet 5 mg    polyethylene glycol packet 17 g    promethazine (PHENERGAN) 6.25 mg in dextrose 5 % 50 mL IVPB    ropivacaine (PF) 2 mg/ml (0.2%) infusion    senna-docusate 8.6-50 mg per tablet 2 tablet    sodium chloride 0.9% flush 3 mL    sodium chloride 0.9% flush 3 mL    sodium chloride 0.9% flush 5 mL    white petrolatum 41 % ointment     Objective:     Vital Signs (Most Recent):  Temp: 97.4 °F (36.3 °C) (04/27/18 0300)  Pulse: (!) 121 (04/27/18 0830)  Resp: (!) 51 (04/27/18 0800)  BP: (!) 143/79 (04/26/18 2200)  SpO2: (!) 94 % (04/27/18 0830) Vital Signs (24h Range):  Temp:  [97.4 °F (36.3 °C)-98.4 °F (36.9 °C)] 97.4 °F (36.3 °C)  Pulse:  [107-140] 121  Resp:  [23-51] 51  SpO2:  [91 %-100 %] 94 %  BP: ()/(58-79) 143/79  Arterial Line BP: (108-165)/(53-79) 137/74     Weight: 68.6 kg (151 lb 3.8 oz)  Height: 5' 6" (167.6 cm)  Body mass index is 24.41 kg/m².      Intake/Output Summary (Last 24 hours) at 04/27/18 1013  Last data filed at 04/27/18 0800   Gross per 24 hour   Intake           7200.6 ml "   Output             4795 ml   Net           2405.6 ml       Ortho/SPM Exam    AAOx4  NAD  RRR  No increased WOB  SILT and motor intact T/SP/DP  WWP extremities  FCDs in place and functioning    Significant Labs:   CBC:     Recent Labs  Lab 04/26/18  0300 04/26/18  1137 04/27/18  0355   WBC 10.48 11.86 12.26   HGB 6.7* 9.8* 8.9*   HCT 21.8* 30.3* 27.6*   * 152 154     CMP:     Recent Labs  Lab 04/26/18  0300 04/26/18  1301 04/26/18  2140 04/27/18  0355    138 140 141   K 4.5 4.1 4.0 4.0   * 111* 112* 114*   CO2 19* 17* 19* 17*   GLU 57* 150* 98 79   BUN 18 17 16 15   CREATININE 1.2 1.2 1.1 1.1   CALCIUM 7.7* 7.8* 7.6* 7.6*   PROT 4.1*  --  4.3* 4.3*   ALBUMIN 1.2*  --  1.3* 1.3*   BILITOT 0.4  --  0.3 0.3   ALKPHOS 300*  --  351* 353*   AST 61*  --  69* 68*   ALT 14  --  10 8*   ANIONGAP 9 10 9 10   EGFRNONAA 50.6* 50.6* 56.3* 56.3*     All pertinent labs within the past 24 hours have been reviewed.    Significant Imaging: I have reviewed all pertinent imaging results/findings.

## 2018-04-28 ENCOUNTER — ANESTHESIA EVENT (OUTPATIENT)
Dept: SURGERY | Facility: HOSPITAL | Age: 58
DRG: 477 | End: 2018-04-28
Payer: MEDICAID

## 2018-04-28 LAB
ABO + RH BLD: NORMAL
ALBUMIN SERPL BCP-MCNC: 1.4 G/DL
ALP SERPL-CCNC: 397 U/L
ALT SERPL W/O P-5'-P-CCNC: 8 U/L
ANION GAP SERPL CALC-SCNC: 7 MMOL/L
ANISOCYTOSIS BLD QL SMEAR: SLIGHT
AST SERPL-CCNC: 63 U/L
BASOPHILS # BLD AUTO: ABNORMAL K/UL
BASOPHILS NFR BLD: 0 %
BILIRUB SERPL-MCNC: 0.5 MG/DL
BLD GP AB SCN CELLS X3 SERPL QL: NORMAL
BUN SERPL-MCNC: 15 MG/DL
BURR CELLS BLD QL SMEAR: ABNORMAL
CALCIUM SERPL-MCNC: 8 MG/DL
CHLORIDE SERPL-SCNC: 114 MMOL/L
CO2 SERPL-SCNC: 23 MMOL/L
CREAT SERPL-MCNC: 1.1 MG/DL
DIFFERENTIAL METHOD: ABNORMAL
EOSINOPHIL # BLD AUTO: ABNORMAL K/UL
EOSINOPHIL NFR BLD: 1 %
ERYTHROCYTE [DISTWIDTH] IN BLOOD BY AUTOMATED COUNT: 17.9 %
EST. GFR  (AFRICAN AMERICAN): >60 ML/MIN/1.73 M^2
EST. GFR  (NON AFRICAN AMERICAN): 56.3 ML/MIN/1.73 M^2
GLUCOSE SERPL-MCNC: 71 MG/DL
HCT VFR BLD AUTO: 30 %
HGB BLD-MCNC: 9.6 G/DL
HYPOCHROMIA BLD QL SMEAR: ABNORMAL
IMM GRANULOCYTES # BLD AUTO: ABNORMAL K/UL
IMM GRANULOCYTES NFR BLD AUTO: ABNORMAL %
LACTATE SERPL-SCNC: 1.6 MMOL/L
LACTATE SERPL-SCNC: 2.1 MMOL/L
LYMPHOCYTES # BLD AUTO: ABNORMAL K/UL
LYMPHOCYTES NFR BLD: 31 %
MAGNESIUM SERPL-MCNC: 2 MG/DL
MCH RBC QN AUTO: 28.4 PG
MCHC RBC AUTO-ENTMCNC: 32 G/DL
MCV RBC AUTO: 89 FL
MONOCYTES # BLD AUTO: ABNORMAL K/UL
MONOCYTES NFR BLD: 2 %
NEUTROPHILS NFR BLD: 66 %
NRBC BLD-RTO: 2 /100 WBC
PHOSPHATE SERPL-MCNC: 3.5 MG/DL
PLATELET # BLD AUTO: 161 K/UL
PLATELET BLD QL SMEAR: ABNORMAL
PMV BLD AUTO: 11.5 FL
POCT GLUCOSE: 124 MG/DL (ref 70–110)
POCT GLUCOSE: 143 MG/DL (ref 70–110)
POCT GLUCOSE: 60 MG/DL (ref 70–110)
POCT GLUCOSE: 65 MG/DL (ref 70–110)
POCT GLUCOSE: 74 MG/DL (ref 70–110)
POCT GLUCOSE: 84 MG/DL (ref 70–110)
POIKILOCYTOSIS BLD QL SMEAR: SLIGHT
POLYCHROMASIA BLD QL SMEAR: ABNORMAL
POTASSIUM SERPL-SCNC: 4.5 MMOL/L
PROT SERPL-MCNC: 5 G/DL
RBC # BLD AUTO: 3.38 M/UL
SCHISTOCYTES BLD QL SMEAR: PRESENT
SODIUM SERPL-SCNC: 144 MMOL/L
WBC # BLD AUTO: 13.7 K/UL

## 2018-04-28 PROCEDURE — 63600175 PHARM REV CODE 636 W HCPCS: Performed by: ANESTHESIOLOGY

## 2018-04-28 PROCEDURE — 83605 ASSAY OF LACTIC ACID: CPT | Mod: 91

## 2018-04-28 PROCEDURE — 27000221 HC OXYGEN, UP TO 24 HOURS

## 2018-04-28 PROCEDURE — 84100 ASSAY OF PHOSPHORUS: CPT

## 2018-04-28 PROCEDURE — 86901 BLOOD TYPING SEROLOGIC RH(D): CPT

## 2018-04-28 PROCEDURE — 86920 COMPATIBILITY TEST SPIN: CPT

## 2018-04-28 PROCEDURE — 20000000 HC ICU ROOM

## 2018-04-28 PROCEDURE — 80053 COMPREHEN METABOLIC PANEL: CPT

## 2018-04-28 PROCEDURE — 94761 N-INVAS EAR/PLS OXIMETRY MLT: CPT

## 2018-04-28 PROCEDURE — 85007 BL SMEAR W/DIFF WBC COUNT: CPT

## 2018-04-28 PROCEDURE — 27100171 HC OXYGEN HIGH FLOW UP TO 24 HOURS

## 2018-04-28 PROCEDURE — 85027 COMPLETE CBC AUTOMATED: CPT

## 2018-04-28 PROCEDURE — 25000003 PHARM REV CODE 250: Performed by: HOSPITALIST

## 2018-04-28 PROCEDURE — 63600175 PHARM REV CODE 636 W HCPCS: Performed by: STUDENT IN AN ORGANIZED HEALTH CARE EDUCATION/TRAINING PROGRAM

## 2018-04-28 PROCEDURE — 99233 SBSQ HOSP IP/OBS HIGH 50: CPT | Mod: ,,, | Performed by: SURGERY

## 2018-04-28 PROCEDURE — 83735 ASSAY OF MAGNESIUM: CPT

## 2018-04-28 PROCEDURE — 25000003 PHARM REV CODE 250: Performed by: STUDENT IN AN ORGANIZED HEALTH CARE EDUCATION/TRAINING PROGRAM

## 2018-04-28 RX ORDER — RAMELTEON 8 MG/1
8 TABLET ORAL NIGHTLY PRN
Status: DISCONTINUED | OUTPATIENT
Start: 2018-04-28 | End: 2018-05-14 | Stop reason: HOSPADM

## 2018-04-28 RX ADMIN — LIDOCAINE 1 PATCH: 50 PATCH CUTANEOUS at 05:04

## 2018-04-28 RX ADMIN — OXYCODONE HYDROCHLORIDE 5 MG: 5 TABLET ORAL at 08:04

## 2018-04-28 RX ADMIN — STANDARDIZED SENNA CONCENTRATE AND DOCUSATE SODIUM 2 TABLET: 8.6; 5 TABLET, FILM COATED ORAL at 09:04

## 2018-04-28 RX ADMIN — ROPIVACAINE HYDROCHLORIDE 10 ML/HR: 2 INJECTION, SOLUTION EPIDURAL; INFILTRATION at 10:04

## 2018-04-28 RX ADMIN — POLYETHYLENE GLYCOL 3350 17 G: 17 POWDER, FOR SOLUTION ORAL at 09:04

## 2018-04-28 RX ADMIN — OXYCODONE HYDROCHLORIDE 5 MG: 5 TABLET ORAL at 01:04

## 2018-04-28 RX ADMIN — ROPIVACAINE HYDROCHLORIDE 10 ML/HR: 2 INJECTION, SOLUTION EPIDURAL; INFILTRATION at 01:04

## 2018-04-28 RX ADMIN — DEXTROSE MONOHYDRATE 12.5 G: 25 INJECTION, SOLUTION INTRAVENOUS at 12:04

## 2018-04-28 RX ADMIN — DEXTROSE MONOHYDRATE 12.5 G: 25 INJECTION, SOLUTION INTRAVENOUS at 06:04

## 2018-04-28 RX ADMIN — ROPIVACAINE HYDROCHLORIDE 10 ML/HR: 2 INJECTION, SOLUTION EPIDURAL; INFILTRATION at 09:04

## 2018-04-28 RX ADMIN — OXYCODONE HYDROCHLORIDE 5 MG: 5 TABLET ORAL at 02:04

## 2018-04-28 RX ADMIN — ENOXAPARIN SODIUM 40 MG: 100 INJECTION SUBCUTANEOUS at 05:04

## 2018-04-28 NOTE — SUBJECTIVE & OBJECTIVE
Interval History/Significant Events: No issues overnight. Good response to lasix yesterday - net negative over 5L total. On 4L NC this morning. CXR imnproved    Follow-up For: Procedure(s) (LRB):  IM NAILING OF FEMUR (Left)    Post-Operative Day: 3 Days Post-Op    Objective:     Vital Signs (Most Recent):  Temp: 98.1 °F (36.7 °C) (04/28/18 0700)  Pulse: (!) 115 (04/28/18 1000)  Resp: (!) 29 (04/28/18 1000)  BP: 113/75 (04/28/18 1000)  SpO2: (!) 94 % (04/28/18 1000) Vital Signs (24h Range):  Temp:  [98.1 °F (36.7 °C)-99.2 °F (37.3 °C)] 98.1 °F (36.7 °C)  Pulse:  [110-130] 115  Resp:  [18-31] 29  SpO2:  [94 %-100 %] 94 %  BP: (113-125)/(75-79) 113/75  Arterial Line BP: (115-163)/(71-85) 152/79     Weight: 68.6 kg (151 lb 3.8 oz)  Body mass index is 24.41 kg/m².      Intake/Output Summary (Last 24 hours) at 04/28/18 1042  Last data filed at 04/28/18 1000   Gross per 24 hour   Intake           912.22 ml   Output             6200 ml   Net         -5287.78 ml       Physical Exam   Constitutional: She is oriented to person, place, and time. She appears well-developed and well-nourished. No distress.   HENT:   Head: Normocephalic and atraumatic.   Mouth/Throat: No oropharyngeal exudate.   Eyes: EOM are normal. Pupils are equal, round, and reactive to light. No scleral icterus.   Neck: Normal range of motion. Neck supple. No JVD present.   Cardiovascular: Regular rhythm and intact distal pulses.    Tachycardic in 110-120s    Pulmonary/Chest: Effort normal and breath sounds normal. No respiratory distress. She has no wheezes. She has no rales.   Abdominal: Soft. Bowel sounds are normal. She exhibits no distension. There is no tenderness.   Musculoskeletal: She exhibits tenderness. She exhibits no edema.   Left knee at site of surgery.    Neurological: She is alert and oriented to person, place, and time.   Skin: Skin is warm and dry. Capillary refill takes less than 2 seconds. She is not diaphoretic. No erythema.      Vents:  Vent Mode: Spont (04/26/18 0853)  Ventilator Initiated: Yes (04/25/18 1517)  Set Rate: 0 bmp (04/26/18 0853)  Vt Set: 500 mL (04/26/18 0853)  Pressure Support: 10 cmH20 (04/26/18 0853)  PEEP/CPAP: 5 cmH20 (04/26/18 0853)  Oxygen Concentration (%): 40 (04/26/18 0935)  Peak Airway Pressure: 16 cmH2O (04/26/18 0853)  Plateau Pressure: 15 cmH20 (04/26/18 0853)  Total Ve: 8.19 mL (04/26/18 0853)  Negative Inspiratory Force (cm H2O): -25 (04/26/18 0928)  F/VT Ratio<105 (RSBI): (!) 29.8 (04/26/18 0853)    Lines/Drains/Airways     Central Venous Catheter Line                 Percutaneous Central Line Insertion/Assessment - triple lumen  04/25/18 2154 right internal jugular 2 days          Drain                 Urethral Catheter 04/25/18 2115 2 days          Epidural Line                 Perineural Analgesia/Anesthesia Assessment (using dermatomes) Epidural 04/25/18 1032 3 days          Peripheral Intravenous Line                 Peripheral IV - Single Lumen 04/28/18 1030 Right Wrist less than 1 day                Significant Labs:    CBC/Anemia Profile:    Recent Labs  Lab 04/26/18  1137 04/27/18  0355 04/28/18  0408   WBC 11.86 12.26 13.70*   HGB 9.8* 8.9* 9.6*   HCT 30.3* 27.6* 30.0*    154 161   MCV 87 87 89   RDW 16.9* 17.4* 17.9*        Chemistries:    Recent Labs  Lab 04/26/18  2140 04/27/18  0355 04/27/18  1619 04/28/18  0408    141 142 144   K 4.0 4.0 3.8 4.5   * 114* 113* 114*   CO2 19* 17* 20* 23   BUN 16 15 14 15   CREATININE 1.1 1.1 1.1 1.1   CALCIUM 7.6* 7.6* 7.8* 8.0*   ALBUMIN 1.3* 1.3*  --  1.4*   PROT 4.3* 4.3*  --  5.0*   BILITOT 0.3 0.3  --  0.5   ALKPHOS 351* 353*  --  397*   ALT 10 8*  --  8*   AST 69* 68*  --  63*   MG 1.8 1.6 1.7 2.0   PHOS  --  3.0 3.1 3.5       Significant Imaging:  I have reviewed all pertinent imaging results/findings within the past 24 hours.

## 2018-04-28 NOTE — SUBJECTIVE & OBJECTIVE
Principal Problem:Tachycardia    Principal Orthopedic Problem: B femur impending fractures    Interval History: Patient seen and examined at bedside.  No acute events overnight.  Pain controlled.  No PT yesterday.  Lactate 1.6. Still on 5L high flow.  Tachycardic and tachypnea overnight.  Review of patient's allergies indicates:  No Known Allergies    Current Facility-Administered Medications   Medication    bisacodyl suppository 10 mg    calcium gluconate 1g in dextrose 5% 100mL (ready to mix system)    calcium gluconate 1g in dextrose 5% 100mL (ready to mix system)    calcium gluconate 1g in dextrose 5% 100mL (ready to mix system)    dextrose 50% injection 12.5 g    dextrose 50% injection 25 g    enoxaparin injection 40 mg    fentaNYL injection 50 mcg    glucagon (human recombinant) injection 1 mg    insulin aspart U-100 pen 1-10 Units    lidocaine 5 % patch 1 patch    magnesium sulfate 2g in water 50mL IVPB (premix)    magnesium sulfate 2g in water 50mL IVPB (premix)    omnipaque oral solution 15 mL    ondansetron disintegrating tablet 8 mg    oxyCODONE immediate release tablet 5 mg    polyethylene glycol packet 17 g    promethazine (PHENERGAN) 6.25 mg in dextrose 5 % 50 mL IVPB    ramelteon tablet 8 mg    ropivacaine (PF) 2 mg/ml (0.2%) infusion    senna-docusate 8.6-50 mg per tablet 2 tablet    sodium chloride 0.9% flush 3 mL    sodium chloride 0.9% flush 3 mL    sodium chloride 0.9% flush 5 mL    sodium phosphate 15 mmol in dextrose 5 % 250 mL IVPB    sodium phosphate 20.01 mmol in dextrose 5 % 250 mL IVPB    sodium phosphate 30 mmol in dextrose 5 % 250 mL IVPB    white petrolatum 41 % ointment     Objective:     Vital Signs (Most Recent):  Temp: 99.2 °F (37.3 °C) (04/28/18 0300)  Pulse: (!) 117 (04/28/18 0600)  Resp: (!) 21 (04/28/18 0600)  BP: (!) 143/79 (04/26/18 2200)  SpO2: 99 % (04/28/18 0600) Vital Signs (24h Range):  Temp:  [98.1 °F (36.7 °C)-99.2 °F (37.3 °C)] 99.2 °F (37.3  "°C)  Pulse:  [113-130] 117  Resp:  [18-30] 21  SpO2:  [93 %-99 %] 99 %  Arterial Line BP: (115-168)/(69-85) 139/76     Weight: 68.6 kg (151 lb 3.8 oz)  Height: 5' 6" (167.6 cm)  Body mass index is 24.41 kg/m².      Intake/Output Summary (Last 24 hours) at 04/28/18 0819  Last data filed at 04/28/18 0700   Gross per 24 hour   Intake           912.22 ml   Output             6200 ml   Net         -5287.78 ml       Ortho/SPM Exam    AAOx4  NAD  RRR  Tachypnea   SILT and motor intact T/SP/DP  WWP extremities  FCDs in place and functioning    Significant Labs:   CBC:     Recent Labs  Lab 04/26/18  1137 04/27/18  0355 04/28/18  0408   WBC 11.86 12.26 13.70*   HGB 9.8* 8.9* 9.6*   HCT 30.3* 27.6* 30.0*    154 161     CMP:     Recent Labs  Lab 04/26/18  2140 04/27/18  0355 04/27/18  1619 04/28/18  0408    141 142 144   K 4.0 4.0 3.8 4.5   * 114* 113* 114*   CO2 19* 17* 20* 23   GLU 98 79 70 71   BUN 16 15 14 15   CREATININE 1.1 1.1 1.1 1.1   CALCIUM 7.6* 7.6* 7.8* 8.0*   PROT 4.3* 4.3*  --  5.0*   ALBUMIN 1.3* 1.3*  --  1.4*   BILITOT 0.3 0.3  --  0.5   ALKPHOS 351* 353*  --  397*   AST 69* 68*  --  63*   ALT 10 8*  --  8*   ANIONGAP 9 10 9 7*   EGFRNONAA 56.3* 56.3* 56.3* 56.3*     All pertinent labs within the past 24 hours have been reviewed.    Significant Imaging: I have reviewed all pertinent imaging results/findings.  "

## 2018-04-28 NOTE — PROGRESS NOTES
[facility]  Critical Care - Surgery  Progress Note    Patient Name: Kerline Grossman  MRN: [id number]  Admission Date: 4/20/2018  Hospital Length of Stay: 7 days  Code Status: Full Code  Attending Provider: David Cruz MD  Primary Care Provider: Primary Doctor No   Principal Problem: Tachycardia    Subjective:     Hospital/ICU Course:  No notes on file    Interval History/Significant Events: No issues overnight. Good response to lasix yesterday - net negative over 5L total. On 4L NC this morning. CXR imnproved    Follow-up For: Procedure(s) (LRB):  IM NAILING OF FEMUR (Left)    Post-Operative Day: 3 Days Post-Op    Objective:     Vital Signs (Most Recent):  Temp: 98.1 °F (36.7 °C) (04/28/18 0700)  Pulse: (!) 115 (04/28/18 1000)  Resp: (!) 29 (04/28/18 1000)  BP: 113/75 (04/28/18 1000)  SpO2: (!) 94 % (04/28/18 1000) Vital Signs (24h Range):  Temp:  [98.1 °F (36.7 °C)-99.2 °F (37.3 °C)] 98.1 °F (36.7 °C)  Pulse:  [110-130] 115  Resp:  [18-31] 29  SpO2:  [94 %-100 %] 94 %  BP: (113-125)/(75-79) 113/75  Arterial Line BP: (115-163)/(71-85) 152/79     Weight: 68.6 kg (151 lb 3.8 oz)  Body mass index is 24.41 kg/m².      Intake/Output Summary (Last 24 hours) at 04/28/18 1042  Last data filed at 04/28/18 1000   Gross per 24 hour   Intake           912.22 ml   Output             6200 ml   Net         -5287.78 ml       Physical Exam   Constitutional: She is oriented to person, place, and time. She appears well-developed and well-nourished. No distress.   HENT:   Head: Normocephalic and atraumatic.   Mouth/Throat: No oropharyngeal exudate.   Eyes: EOM are normal. Pupils are equal, round, and reactive to light. No scleral icterus.   Neck: Normal range of motion. Neck supple. No JVD present.   Cardiovascular: Regular rhythm and intact distal pulses.    Tachycardic in 110-120s    Pulmonary/Chest: Effort normal and breath sounds normal. No respiratory distress. She has no wheezes. She has no rales.    Abdominal: Soft. Bowel sounds are normal. She exhibits no distension. There is no tenderness.   Musculoskeletal: She exhibits tenderness. She exhibits no edema.   Left knee at site of surgery.    Neurological: She is alert and oriented to person, place, and time.   Skin: Skin is warm and dry. Capillary refill takes less than 2 seconds. She is not diaphoretic. No erythema.     Vents:  Vent Mode: Spont (04/26/18 0853)  Ventilator Initiated: Yes (04/25/18 1517)  Set Rate: 0 bmp (04/26/18 0853)  Vt Set: 500 mL (04/26/18 0853)  Pressure Support: 10 cmH20 (04/26/18 0853)  PEEP/CPAP: 5 cmH20 (04/26/18 0853)  Oxygen Concentration (%): 40 (04/26/18 0935)  Peak Airway Pressure: 16 cmH2O (04/26/18 0853)  Plateau Pressure: 15 cmH20 (04/26/18 0853)  Total Ve: 8.19 mL (04/26/18 0853)  Negative Inspiratory Force (cm H2O): -25 (04/26/18 0928)  F/VT Ratio<105 (RSBI): (!) 29.8 (04/26/18 0853)    Lines/Drains/Airways     Central Venous Catheter Line                 Percutaneous Central Line Insertion/Assessment - triple lumen  04/25/18 2154 right internal jugular 2 days          Drain                 Urethral Catheter 04/25/18 2115 2 days          Epidural Line                 Perineural Analgesia/Anesthesia Assessment (using dermatomes) Epidural 04/25/18 1032 3 days          Peripheral Intravenous Line                 Peripheral IV - Single Lumen 04/28/18 1030 Right Wrist less than 1 day                Significant Labs:    CBC/Anemia Profile:    Recent Labs  Lab 04/26/18  1137 04/27/18  0355 04/28/18  0408   WBC 11.86 12.26 13.70*   HGB 9.8* 8.9* 9.6*   HCT 30.3* 27.6* 30.0*    154 161   MCV 87 87 89   RDW 16.9* 17.4* 17.9*        Chemistries:    Recent Labs  Lab 04/26/18  2140 04/27/18  0355 04/27/18  1619 04/28/18  0408    141 142 144   K 4.0 4.0 3.8 4.5   * 114* 113* 114*   CO2 19* 17* 20* 23   BUN 16 15 14 15   CREATININE 1.1 1.1 1.1 1.1   CALCIUM 7.6* 7.6* 7.8* 8.0*   ALBUMIN 1.3* 1.3*  --  1.4*   PROT 4.3*  "4.3*  --  5.0*   BILITOT 0.3 0.3  --  0.5   ALKPHOS 351* 353*  --  397*   ALT 10 8*  --  8*   AST 69* 68*  --  63*   MG 1.8 1.6 1.7 2.0   PHOS  --  3.0 3.1 3.5       Significant Imaging:  I have reviewed all pertinent imaging results/findings within the past 24 hours.    Assessment/Plan:     Adenocarcinoma of unknown primary    58 year old female with widely metastatic cancer of unknown origin s/p left femur IMN 4/25/18    Neuro:   - AAOx3 this morning  - PRN pain control  - Intermittently confused at baseline per prior notes     Pulmonary:   - Extubated; on 4LNC  - Continue to wean O2 as tolerated; SpO2 goal >90%  - CXR improved  - IS, OOBTC  - ABGs PRN     Cardiac:  - Sinus tachycardia   - Off pressors  - Echo from 4/21 shows normal EF and "right ventricle is enlarged measuring 4.6 cm at the base in the apical right ventricle-focused view. Global right ventricular systolic function appears moderately depressed"    Renal:   - BUN/Cr at baseline  - Rosario      Infectious Disease:   - Patient initially on broad spectrum abx  - Cultures have been negative  - Will hold off on restarting abx at this time besides Ancef for surgical ppx  - Trend WBC -13 today     Hematology/Oncology:  - Widely metastatic lesions of unknown primary  - Trend CBC     Endocrine:  - SSI     Fluids/Electrolytes/Nutrition/GI:   - Regular diet  - Trend CMP  - Replace Lytes PRN     Pain Management::   - perineural SIFI, PRN Fentanyl, PO Oxy     Dispo:  Stable for stepdown to medicine. Ortho following             Critical care was time spent personally by me on the following activities: development of treatment plan with patient or surrogate and bedside caregivers, discussions with consultants, evaluation of patient's response to treatment, examination of patient, ordering and performing treatments and interventions, ordering and review of laboratory studies, ordering and review of radiographic studies, pulse oximetry, re-evaluation of patient's " condition.  This critical care time did not overlap with that of any other provider or involve time for any procedures.     Reginald Romero MD  Critical Care - Surgery  [facility]

## 2018-04-28 NOTE — ANESTHESIA PREPROCEDURE EVALUATION
2018  Kerline Grossman is a 58 y.o., female who presented from OSH for second opinion about widely metastatic malignancy of unknown primary origin. She is s/p IM nail of left femur.    Pt and family have also been having discussions with palliative care but refuse to make patient comfort care only until a firm diagnosis is made, pt has undergone biopsy with path reports pending.      Pre-operative evaluation for IM NAILING OF FEMUR - hana table - large c arm door side (Right)    LDA:  22G R forearm    Past Surgical History:   Procedure Laterality Date     SECTION      HYSTERECTOMY           Vital Signs Range (Last 24H):  Temp:  [36.7 °C (98.1 °F)-37.3 °C (99.2 °F)]   Pulse:  [110-130]   Resp:  [18-31]   BP: (113-126)/(75-81)   SpO2:  [94 %-100 %]   Arterial Line BP: (115-162)/(71-85)       CBC:       Recent Labs  Lab 18  2152 18  0610 18  0446 18  0357 18  0510 18  0443 18  1445 18  1541 18  2145 18  0300 18  1137 18  0355 18  0408   WBC 11.92 9.96 8.08 8.25 8.26 9.65  --  15.94* 19.43* 10.48 11.86 12.26 13.70*   RBC 3.98* 3.60* 3.50* 3.50* 3.57* 3.79*  --  3.31* 2.74* 2.33* 3.47* 3.17* 3.38*   HGB 11.4* 10.4* 9.9* 9.9* 10.2* 10.8*  --  9.4* 8.0* 6.7* 9.8* 8.9* 9.6*   HCT 36.0* 34.1* 30.5* 31.3* 31.6* 34.4* 28* 30.4* 26.0* 21.8* 30.3* 27.6* 30.0*   PLT 76* 70* 122* 165 202 253  --  244 178 143* 152 154 161   MCV 91 95 87 89 89 91  --  92 95 94 87 87 89   MCH 28.6 28.9 28.3 28.3 28.6 28.5  --  28.4 29.2 28.8 28.2 28.1 28.4   MCHC 31.7* 30.5* 32.5 31.6* 32.3 31.4*  --  30.9* 30.8* 30.7* 32.3 32.2 32.0       CMP:     Recent Labs  Lab 18  2152 18  0610 18  0446 18  0357 18  0510 18  0443 18  1541 18  2145 18  0300 18  1301 18  2140 18  0355  18  1619 18  0408   * 138 139 137 138 141 141 141 140 138 140 141 142 144   K 5.4* 4.5 4.9 4.2 4.1 4.2 4.5 3.9 4.5 4.1 4.0 4.0 3.8 4.5    112* 111* 107 107 109 111* 111* 112* 111* 112* 114* 113* 114*   CO2 18* 18* 19* 20* 21* 21* 20* 11* 19* 17* 19* 17* 20* 23   BUN 27* 24* 19 17 16 17 17 21* 18 17 16 15 14 15   CREATININE 1.1 1.0 1.0 1.1 1.2 1.3 1.3 1.5* 1.2 1.2 1.1 1.1 1.1 1.1   GLU 72 99 104 96 68* 85 132* 251* 57* 150* 98 79 70 71   MG 1.6 1.2* 1.4* 1.8 1.5* 1.7 1.5* 1.5* 1.4* 1.9 1.8 1.6 1.7 2.0   PHOS  --  3.2 2.5* 3.1 3.1 3.4 5.0* 4.7* 3.9 3.2  --  3.0 3.1 3.5   CALCIUM 8.5* 7.4* 7.9* 7.7* 7.8* 8.1* 7.5* 8.0* 7.7* 7.8* 7.6* 7.6* 7.8* 8.0*   ALBUMIN 1.6* 1.4* 1.4* 1.4* 1.5* 1.6*  --  1.4* 1.2*  --  1.3* 1.3*  --  1.4*   PROT 6.2 5.2* 5.3* 5.4* 5.7* 5.9*  --  4.6* 4.1*  --  4.3* 4.3*  --  5.0*   ALKPHOS 534* 449* 419* 418* 410* 411*  --  340* 300*  --  351* 353*  --  397*   ALT 22 18 17 17 13 13  --  16 14  --  10 8*  --  8*   AST 81* 56* 56* 49* 47* 53*  --  65* 61*  --  69* 68*  --  63*   BILITOT 0.7 0.5 0.5 0.5 0.5 0.4  --  0.5 0.4  --  0.3 0.3  --  0.5       INR:    Recent Labs  Lab 18  2152 18  1549   INR 1.0 0.9   APTT  --  25.7         Diagnostic Studies:      EK18:  Nonspecific T wave abnormality  Abnormal ECG  When compared with ECG of 2018 12:37,  T wave inversion no longer evident in Lateral leads    2D Echo:  18:  CONCLUSIONS     1 - Normal left ventricular systolic function (EF 60-65%).     2 - Right ventricular enlargement with moderately depressed systolic function.     3 - Normal left ventricular diastolic function.     4 - The estimated PA systolic pressure is 40 mmHg.     5 - Low central venous pressure.     Pre-op Assessment    I have reviewed the Patient Summary Reports.      I have reviewed the Medications.     Review of Systems  Anesthesia Hx:  No problems with previous Anesthesia  History of prior surgery of interest to airway  management or planning: Previous anesthesia: Nerve Block, MAC, General Denies Family Hx of Anesthesia complications.   Denies Personal Hx of Anesthesia complications.   Hematology/Oncology:        Current/Recent Cancer. Oncology Comments: metastatic   Cardiovascular:   Denies MI.  Denies CAD.    Denies CABG/stent.  CHF    Pulmonary:  Pulmonary Normal    Renal/:  Renal/ Normal     Hepatic/GI:  Hepatic/GI Normal    Neurological:  Neurology Normal    Endocrine:  Endocrine Normal        Physical Exam  General:  Obesity    Airway/Jaw/Neck:  Airway Findings: Mouth Opening: Small, but > 3cm Tongue: Large  Mallampati: III  Improves to II with phonation.     Eyes/Ears/Nose:  EYES/EARS/NOSE FINDINGS: Normal   Dental:  Dental Findings: Periodontal disease, Severe    Chest/Lungs:  Chest/Lungs Findings: Normal Respiratory Rate     Heart/Vascular:  Heart Findings: Rate: Normal  Rhythm: Regular Rhythm        Mental Status:  Mental Status Findings: Normal        Anesthesia Plan  Type of Anesthesia, risks & benefits discussed:  Anesthesia Type:  general, regional, MAC  Patient's Preference:   Intra-op Monitoring Plan: standard ASA monitors and arterial line  Intra-op Monitoring Plan Comments:   Post Op Pain Control Plan: multimodal analgesia, per primary service following discharge from PACU, IV/PO Opioids PRN and peripheral nerve block  Post Op Pain Control Plan Comments:   Induction:   IV  Beta Blocker:  Patient is not currently on a Beta-Blocker (No further documentation required).       Informed Consent: Patient understands risks and agrees with Anesthesia plan.  Questions answered. Anesthesia consent signed with patient.  ASA Score: 3     Day of Surgery Review of History & Physical:    H&P update referred to the surgeon.         Ready For Surgery From Anesthesia Perspective.

## 2018-04-28 NOTE — PLAN OF CARE
Problem: Patient Care Overview  Goal: Plan of Care Review  Outcome: Ongoing (interventions implemented as appropriate)  Pt remained oriented this shift. Pt is able to answer all questions appropriately, but has bouts of confusion and forgetfulness. She repeatedly requested food/water this shift after being educated on having nothing by mouth at this time for her safety. Pt airway remained patent w/pt sating WNL on 5L. Pt remained tachycardic this shift starting the shift at approx 126 and ending the shift at approx 115. Pt's rocha remained patent with approximately 2.6L of output this shift. Pt's LLE surgical dressing remained dry and intact with no additional drainage noted. Pt remains NPO w/CBG checks q6h. Twice this shift pt's CBG found to be 65. Pt was given 12.5g of D50 both times. Will pass it on in report for Day shift RN to speak to Day shift MD team regarding possible slow infusion of d5w IVF just to keep pt's CBG WNL while NPO. Will continue to monitor.

## 2018-04-28 NOTE — NURSING
New order noted for Ramelteon. Updated pt on new addition to her MAR. Pt now refuses sleeping pill. Will continue to monitor pt's needs.

## 2018-04-28 NOTE — PLAN OF CARE
Problem: Patient Care Overview  Goal: Plan of Care Review  Outcome: Ongoing (interventions implemented as appropriate)  Decreasing oxygen requirements today, now on 3LNC. Pain controlled w/ perineural catheter w/ ropivicaine and prn oxycontin IR. See epic flow sheets for complete assessment data. Orders to transfer to the floor. Pt has large family and multiple family members have been at bedside throughout the day, family updated on pt status and POC. All questions answered. Will cont to monitor.

## 2018-04-28 NOTE — NURSING
Called by pt's  regarding pt's medical care update.  unable to provide pt's passcode. Requested  come into the hospital to get the passcode so he can be given updates via phone. NAD noted w/pt. Will continue to monitor.

## 2018-04-28 NOTE — NURSING
Dr. Torre at bedside for update on pt. Pt very upset that she cannot eat anything at this time. Re-educated pt as to why she is currently NPO, but pt remains upset after discussion. Dr. Torre updated on pt's respiratory status and pt's concerns. No new orders noted at this time. VSS. Will continue to monitor.

## 2018-04-28 NOTE — NURSING
Dr. Torre at bedside to check on pt. Updated on pt's repeated requests for food, pt respiratory status, and pt's request for a sleeping pill. Dr. Torre will put in orders for ramelteon. Will wait for new orders, and continue to monitor pt.

## 2018-04-28 NOTE — ASSESSMENT & PLAN NOTE
58 y.o. female POD3 s/p L CMN    Pain control  PT/OT: bed rest  DVT PPx: lovenox, FCDs at all times when not ambulating  Abx: postop Ancef complete  Labs: Hb 9.6 lactate 1.6  Drain: none  Ponce: none    Dispo: possibly step down to the floor today if felt medically stable by SICU team

## 2018-04-28 NOTE — ANESTHESIA POST-OP PAIN MANAGEMENT
Acute Pain Service Progress Note    Kerline Grossman is a 58 y.o., female, [id number].    Surgery:  IM nail of femur    Post Op Day #: 3    Catheter type: perineural  SIFI    Infusion type: Ropivacaine 0.2%  10 ml/hr basal    Problem List:    Active Hospital Problems    Diagnosis  POA    *Tachycardia [R00.0]  Yes    Malignant neoplasm of central portion of right breast in female, estrogen receptor positive [C50.111, Z17.0]  Not Applicable    Cardiac rhythm disorder or disturbance or change [I49.9]  Unknown    CHF (congestive heart failure) [I50.9]  Unknown    Right heart failure [I50.810]  Yes    Acute hypoxemic respiratory failure [J96.01]  Yes    Encounter for weaning from ventilator [Z99.11]  Not Applicable    Lactic acidemia [E87.2]  Yes    Severe malnutrition [E43]  Yes    Cancer of left femur [C40.22]  Yes    Cancer of right femur [C40.21]  Yes    Palliative care encounter [Z51.5]  Not Applicable    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 Diffuse lytic lesions throughout entire central axial red marrow skeleton. Lytic lesions of proximal long bones and pathological fractures of ribs and right superior and inferior pubic ramus.      Adenocarcinoma of unknown primary [C80.1]  Yes    Pleural effusion [J90]  Yes      Resolved Hospital Problems    Diagnosis Date Resolved POA    Shock [R57.9] 04/26/2018 Unknown    Encephalopathy, metabolic [G93.41] 04/21/2018 Yes       Subjective:     General appearance of alert, oriented, no complaints   Pain with rest: 3    Numbers   Pain with movement: 5    Numbers   Side Effects    1. Pruritis No    2. Nausea No      Objective:       Catheter site clean, dry, intact      Vitals   Vitals:    04/28/18 1231   BP:    Pulse: (!)  118   Resp: (!) 27   Temp:         Labs    Admission on 04/20/2018   No results displayed because visit has over 200 results.           Meds   Current Facility-Administered Medications   Medication Dose Route Frequency Provider Last Rate Last Dose    bisacodyl suppository 10 mg  10 mg Rectal Daily PRN Frank Argueta MD        calcium gluconate 1g in dextrose 5% 100mL (ready to mix system)  1 g Intravenous PRN Kelsey Torre MD        calcium gluconate 1g in dextrose 5% 100mL (ready to mix system)  1 g Intravenous PRN Kelsey Torre MD        calcium gluconate 1g in dextrose 5% 100mL (ready to mix system)  1 g Intravenous PRN Kelsey Torre MD        dextrose 50% injection 12.5 g  12.5 g Intravenous PRN Reginald Romero MD   12.5 g at 04/28/18 0619    dextrose 50% injection 25 g  25 g Intravenous PRN KITTY Delatorre   25 g at 04/26/18 0627    enoxaparin injection 40 mg  40 mg Subcutaneous Daily Frank Argueta MD   40 mg at 04/27/18 1759    fentaNYL injection 50 mcg  50 mcg Intravenous Q2H PRN Reginald Romero MD   50 mcg at 04/27/18 0206    glucagon (human recombinant) injection 1 mg  1 mg Intramuscular PRN Reginald Romero MD        insulin aspart U-100 pen 1-10 Units  1-10 Units Subcutaneous Q6H PRN Reginald Romero MD   4 Units at 04/25/18 2227    lidocaine 5 % patch 1 patch  1 patch Transdermal Q24H David Cruz MD   1 patch at 04/27/18 1811    magnesium sulfate 2g in water 50mL IVPB (premix)  2 g Intravenous PRN Kelsey Torre MD   2 g at 04/27/18 2007    magnesium sulfate 2g in water 50mL IVPB (premix)  4 g Intravenous PRN Kelsey Torre MD        omnipaque oral solution 15 mL  15 mL Oral PRN Celena Wright MD   15 mL at 04/22/18 1116    ondansetron disintegrating tablet 8 mg  8 mg Oral Q8H PRN KITTY Delatorre        oxyCODONE immediate release tablet 5 mg  5 mg Oral Q6H PRN Keenan Licea MD   5 mg at 04/28/18 0230    polyethylene glycol packet 17 g  17 g Oral Daily Frank Argueta MD   17 g at  04/28/18 0900    promethazine (PHENERGAN) 6.25 mg in dextrose 5 % 50 mL IVPB  6.25 mg Intravenous Q6H PRN Atif Valencia MD        ramelteon tablet 8 mg  8 mg Oral Nightly PRN Kelsey Torre MD        ropivacaine (PF) 2 mg/ml (0.2%) infusion  10 mL/hr Perineural Continuous Atif Valencia MD 10 mL/hr at 04/28/18 1200 10 mL/hr at 04/28/18 1200    senna-docusate 8.6-50 mg per tablet 2 tablet  2 tablet Oral Daily David Cruz MD   2 tablet at 04/28/18 0900    sodium chloride 0.9% flush 3 mL  3 mL Intravenous PRN Han Pelayo MD        sodium chloride 0.9% flush 3 mL  3 mL Intravenous PRN Han Pelayo MD        sodium chloride 0.9% flush 5 mL  5 mL Intravenous PRN KITTY Delatorre        sodium phosphate 15 mmol in dextrose 5 % 250 mL IVPB  15 mmol Intravenous PRN Kelsey Torre MD        sodium phosphate 20.01 mmol in dextrose 5 % 250 mL IVPB  20.01 mmol Intravenous PRN Kelsey Torre MD        sodium phosphate 30 mmol in dextrose 5 % 250 mL IVPB  30 mmol Intravenous PRN Kelsey Torre MD        white petrolatum 41 % ointment   Topical (Top) PRN David Cruz MD            Anticoagulant dose: lovenox 40 mg daily.    Assessment:     Pain control adequate    Plan:     Patient doing well, continue present treatment.    Evaluator Alexander Taylor

## 2018-04-28 NOTE — NURSING
Received a message from staff that the pt's  was trying to reach this nurse regarding pt's status. Attempted to call phone number back, but no one answered. Message left that it was a call returning his message, and he can call back at any time to receive updates w/pt's password. Will continue to monitor pt.

## 2018-04-28 NOTE — PROGRESS NOTES
[facility]  Orthopedics  Progress Note    Patient Name: Kerline Grossman  MRN: [id number]  Admission Date: 4/20/2018  Hospital Length of Stay: 7 days  Attending Provider: David Cruz MD  Primary Care Provider: Primary Doctor No  Follow-up For: Procedure(s) (LRB):  IM NAILING OF FEMUR (Left)    Post-Operative Day: 3 Days Post-Op  Subjective:     Principal Problem:Tachycardia    Principal Orthopedic Problem: B femur impending fractures    Interval History: Patient seen and examined at bedside.  No acute events overnight.  Pain controlled.  No PT yesterday.  Lactate 1.6. Still on 5L high flow.  Tachycardic and tachypnea overnight.  Review of patient's allergies indicates:  No Known Allergies    Current Facility-Administered Medications   Medication    bisacodyl suppository 10 mg    calcium gluconate 1g in dextrose 5% 100mL (ready to mix system)    calcium gluconate 1g in dextrose 5% 100mL (ready to mix system)    calcium gluconate 1g in dextrose 5% 100mL (ready to mix system)    dextrose 50% injection 12.5 g    dextrose 50% injection 25 g    enoxaparin injection 40 mg    fentaNYL injection 50 mcg    glucagon (human recombinant) injection 1 mg    insulin aspart U-100 pen 1-10 Units    lidocaine 5 % patch 1 patch    magnesium sulfate 2g in water 50mL IVPB (premix)    magnesium sulfate 2g in water 50mL IVPB (premix)    omnipaque oral solution 15 mL    ondansetron disintegrating tablet 8 mg    oxyCODONE immediate release tablet 5 mg    polyethylene glycol packet 17 g    promethazine (PHENERGAN) 6.25 mg in dextrose 5 % 50 mL IVPB    ramelteon tablet 8 mg    ropivacaine (PF) 2 mg/ml (0.2%) infusion    senna-docusate 8.6-50 mg per tablet 2 tablet    sodium chloride 0.9% flush 3 mL    sodium chloride 0.9% flush 3 mL    sodium chloride 0.9% flush 5 mL    sodium phosphate 15 mmol in dextrose 5 % 250 mL IVPB    sodium phosphate 20.01 mmol in dextrose 5 % 250 mL IVPB    sodium  "phosphate 30 mmol in dextrose 5 % 250 mL IVPB    white petrolatum 41 % ointment     Objective:     Vital Signs (Most Recent):  Temp: 99.2 °F (37.3 °C) (04/28/18 0300)  Pulse: (!) 117 (04/28/18 0600)  Resp: (!) 21 (04/28/18 0600)  BP: (!) 143/79 (04/26/18 2200)  SpO2: 99 % (04/28/18 0600) Vital Signs (24h Range):  Temp:  [98.1 °F (36.7 °C)-99.2 °F (37.3 °C)] 99.2 °F (37.3 °C)  Pulse:  [113-130] 117  Resp:  [18-30] 21  SpO2:  [93 %-99 %] 99 %  Arterial Line BP: (115-168)/(69-85) 139/76     Weight: 68.6 kg (151 lb 3.8 oz)  Height: 5' 6" (167.6 cm)  Body mass index is 24.41 kg/m².      Intake/Output Summary (Last 24 hours) at 04/28/18 0819  Last data filed at 04/28/18 0700   Gross per 24 hour   Intake           912.22 ml   Output             6200 ml   Net         -5287.78 ml       Ortho/SPM Exam    AAOx4  NAD  RRR  Tachypnea   SILT and motor intact T/SP/DP  WWP extremities  FCDs in place and functioning    Significant Labs:   CBC:     Recent Labs  Lab 04/26/18  1137 04/27/18  0355 04/28/18  0408   WBC 11.86 12.26 13.70*   HGB 9.8* 8.9* 9.6*   HCT 30.3* 27.6* 30.0*    154 161     CMP:     Recent Labs  Lab 04/26/18  2140 04/27/18  0355 04/27/18  1619 04/28/18  0408    141 142 144   K 4.0 4.0 3.8 4.5   * 114* 113* 114*   CO2 19* 17* 20* 23   GLU 98 79 70 71   BUN 16 15 14 15   CREATININE 1.1 1.1 1.1 1.1   CALCIUM 7.6* 7.6* 7.8* 8.0*   PROT 4.3* 4.3*  --  5.0*   ALBUMIN 1.3* 1.3*  --  1.4*   BILITOT 0.3 0.3  --  0.5   ALKPHOS 351* 353*  --  397*   AST 69* 68*  --  63*   ALT 10 8*  --  8*   ANIONGAP 9 10 9 7*   EGFRNONAA 56.3* 56.3* 56.3* 56.3*     All pertinent labs within the past 24 hours have been reviewed.    Significant Imaging: I have reviewed all pertinent imaging results/findings.    Assessment/Plan:     Bony metastasis    58 y.o. female POD3 s/p L CMN    Pain control  PT/OT: bed rest  DVT PPx: lovenox, FCDs at all times when not ambulating  Abx: postop Ancef complete  Labs: Hb 9.6 lactate " 1.6  Drain: none  Ponce: none    Dispo: possibly step down to the floor today if felt medically stable by SICU team               Kings Garner MD  Orthopedics  [facility]    Attg Note:  I agree with the above assessment and plan.  Will continue to monitor physiologic progress and discuss when/if we stabilize the right femur.      Jd Cabrera MD

## 2018-04-29 LAB
ALBUMIN SERPL BCP-MCNC: 1.4 G/DL
ALBUMIN SERPL BCP-MCNC: 1.4 G/DL
ALP SERPL-CCNC: 335 U/L
ALP SERPL-CCNC: 348 U/L
ALT SERPL W/O P-5'-P-CCNC: 7 U/L
ALT SERPL W/O P-5'-P-CCNC: 9 U/L
ANION GAP SERPL CALC-SCNC: 8 MMOL/L
ANION GAP SERPL CALC-SCNC: 9 MMOL/L
ANISOCYTOSIS BLD QL SMEAR: SLIGHT
ANISOCYTOSIS BLD QL SMEAR: SLIGHT
AST SERPL-CCNC: 50 U/L
AST SERPL-CCNC: 58 U/L
BASO STIPL BLD QL SMEAR: ABNORMAL
BASOPHILS # BLD AUTO: ABNORMAL K/UL
BASOPHILS NFR BLD: 0 %
BASOPHILS NFR BLD: 0 %
BILIRUB SERPL-MCNC: 0.5 MG/DL
BILIRUB SERPL-MCNC: 0.5 MG/DL
BUN SERPL-MCNC: 16 MG/DL
BUN SERPL-MCNC: 18 MG/DL
BURR CELLS BLD QL SMEAR: ABNORMAL
BURR CELLS BLD QL SMEAR: ABNORMAL
CALCIUM SERPL-MCNC: 7.8 MG/DL
CALCIUM SERPL-MCNC: 7.8 MG/DL
CHLORIDE SERPL-SCNC: 107 MMOL/L
CHLORIDE SERPL-SCNC: 108 MMOL/L
CO2 SERPL-SCNC: 20 MMOL/L
CO2 SERPL-SCNC: 21 MMOL/L
CREAT SERPL-MCNC: 1 MG/DL
CREAT SERPL-MCNC: 1 MG/DL
DACRYOCYTES BLD QL SMEAR: ABNORMAL
DIFFERENTIAL METHOD: ABNORMAL
DIFFERENTIAL METHOD: ABNORMAL
EOSINOPHIL # BLD AUTO: ABNORMAL K/UL
EOSINOPHIL NFR BLD: 0 %
EOSINOPHIL NFR BLD: 2 %
ERYTHROCYTE [DISTWIDTH] IN BLOOD BY AUTOMATED COUNT: 17.8 %
ERYTHROCYTE [DISTWIDTH] IN BLOOD BY AUTOMATED COUNT: 17.8 %
EST. GFR  (AFRICAN AMERICAN): >60 ML/MIN/1.73 M^2
EST. GFR  (AFRICAN AMERICAN): >60 ML/MIN/1.73 M^2
EST. GFR  (NON AFRICAN AMERICAN): >60 ML/MIN/1.73 M^2
EST. GFR  (NON AFRICAN AMERICAN): >60 ML/MIN/1.73 M^2
GLUCOSE SERPL-MCNC: 78 MG/DL
GLUCOSE SERPL-MCNC: 96 MG/DL
HCT VFR BLD AUTO: 28.1 %
HCT VFR BLD AUTO: 28.5 %
HGB BLD-MCNC: 9 G/DL
HGB BLD-MCNC: 9.1 G/DL
HYPOCHROMIA BLD QL SMEAR: ABNORMAL
HYPOCHROMIA BLD QL SMEAR: ABNORMAL
IMM GRANULOCYTES # BLD AUTO: ABNORMAL K/UL
IMM GRANULOCYTES # BLD AUTO: ABNORMAL K/UL
IMM GRANULOCYTES NFR BLD AUTO: ABNORMAL %
IMM GRANULOCYTES NFR BLD AUTO: ABNORMAL %
LACTATE SERPL-SCNC: 2.7 MMOL/L
LYMPHOCYTES # BLD AUTO: ABNORMAL K/UL
LYMPHOCYTES NFR BLD: 18 %
LYMPHOCYTES NFR BLD: 22 %
MAGNESIUM SERPL-MCNC: 1.4 MG/DL
MAGNESIUM SERPL-MCNC: 1.5 MG/DL
MCH RBC QN AUTO: 28 PG
MCH RBC QN AUTO: 28.6 PG
MCHC RBC AUTO-ENTMCNC: 31.6 G/DL
MCHC RBC AUTO-ENTMCNC: 32.4 G/DL
MCV RBC AUTO: 88 FL
MCV RBC AUTO: 89 FL
METAMYELOCYTES NFR BLD MANUAL: 3 %
METAMYELOCYTES NFR BLD MANUAL: 3 %
MONOCYTES # BLD AUTO: ABNORMAL K/UL
MONOCYTES NFR BLD: 3 %
MONOCYTES NFR BLD: 5 %
MYELOCYTES NFR BLD MANUAL: 2 %
MYELOCYTES NFR BLD MANUAL: 3 %
NEUTROPHILS NFR BLD: 55 %
NEUTROPHILS NFR BLD: 74 %
NEUTS BAND NFR BLD MANUAL: 10 %
NRBC BLD-RTO: 2 /100 WBC
NRBC BLD-RTO: 2 /100 WBC
OVALOCYTES BLD QL SMEAR: ABNORMAL
PHOSPHATE SERPL-MCNC: 2.5 MG/DL
PHOSPHATE SERPL-MCNC: 3 MG/DL
PLATELET # BLD AUTO: 157 K/UL
PLATELET # BLD AUTO: 163 K/UL
PLATELET BLD QL SMEAR: ABNORMAL
PMV BLD AUTO: 11.8 FL
PMV BLD AUTO: 12 FL
POCT GLUCOSE: 103 MG/DL (ref 70–110)
POCT GLUCOSE: 117 MG/DL (ref 70–110)
POCT GLUCOSE: 126 MG/DL (ref 70–110)
POCT GLUCOSE: 95 MG/DL (ref 70–110)
POIKILOCYTOSIS BLD QL SMEAR: SLIGHT
POIKILOCYTOSIS BLD QL SMEAR: SLIGHT
POLYCHROMASIA BLD QL SMEAR: ABNORMAL
POLYCHROMASIA BLD QL SMEAR: ABNORMAL
POTASSIUM SERPL-SCNC: 3.7 MMOL/L
POTASSIUM SERPL-SCNC: 3.9 MMOL/L
PROT SERPL-MCNC: 5.1 G/DL
PROT SERPL-MCNC: 5.1 G/DL
RBC # BLD AUTO: 3.18 M/UL
RBC # BLD AUTO: 3.22 M/UL
SCHISTOCYTES BLD QL SMEAR: ABNORMAL
SCHISTOCYTES BLD QL SMEAR: PRESENT
SODIUM SERPL-SCNC: 136 MMOL/L
SODIUM SERPL-SCNC: 137 MMOL/L
WBC # BLD AUTO: 12.41 K/UL
WBC # BLD AUTO: 13 K/UL

## 2018-04-29 PROCEDURE — 94761 N-INVAS EAR/PLS OXIMETRY MLT: CPT

## 2018-04-29 PROCEDURE — 63600175 PHARM REV CODE 636 W HCPCS: Performed by: STUDENT IN AN ORGANIZED HEALTH CARE EDUCATION/TRAINING PROGRAM

## 2018-04-29 PROCEDURE — 25000003 PHARM REV CODE 250: Performed by: STUDENT IN AN ORGANIZED HEALTH CARE EDUCATION/TRAINING PROGRAM

## 2018-04-29 PROCEDURE — 25000003 PHARM REV CODE 250: Performed by: HOSPITALIST

## 2018-04-29 PROCEDURE — 80053 COMPREHEN METABOLIC PANEL: CPT | Mod: 91

## 2018-04-29 PROCEDURE — 83605 ASSAY OF LACTIC ACID: CPT

## 2018-04-29 PROCEDURE — 63600175 PHARM REV CODE 636 W HCPCS: Performed by: ANESTHESIOLOGY

## 2018-04-29 PROCEDURE — 99233 SBSQ HOSP IP/OBS HIGH 50: CPT | Mod: ,,, | Performed by: SURGERY

## 2018-04-29 PROCEDURE — 80053 COMPREHEN METABOLIC PANEL: CPT

## 2018-04-29 PROCEDURE — 84100 ASSAY OF PHOSPHORUS: CPT

## 2018-04-29 PROCEDURE — 36415 COLL VENOUS BLD VENIPUNCTURE: CPT

## 2018-04-29 PROCEDURE — 11000001 HC ACUTE MED/SURG PRIVATE ROOM

## 2018-04-29 PROCEDURE — 27000221 HC OXYGEN, UP TO 24 HOURS

## 2018-04-29 PROCEDURE — 84100 ASSAY OF PHOSPHORUS: CPT | Mod: 91

## 2018-04-29 PROCEDURE — 85027 COMPLETE CBC AUTOMATED: CPT

## 2018-04-29 PROCEDURE — 83735 ASSAY OF MAGNESIUM: CPT

## 2018-04-29 PROCEDURE — 83735 ASSAY OF MAGNESIUM: CPT | Mod: 91

## 2018-04-29 PROCEDURE — 85007 BL SMEAR W/DIFF WBC COUNT: CPT

## 2018-04-29 RX ORDER — HYDROCODONE BITARTRATE AND ACETAMINOPHEN 500; 5 MG/1; MG/1
TABLET ORAL
Status: DISCONTINUED | OUTPATIENT
Start: 2018-04-29 | End: 2018-05-14 | Stop reason: HOSPADM

## 2018-04-29 RX ORDER — SODIUM CHLORIDE 9 MG/ML
INJECTION, SOLUTION INTRAVENOUS CONTINUOUS
Status: DISCONTINUED | OUTPATIENT
Start: 2018-04-29 | End: 2018-04-30

## 2018-04-29 RX ADMIN — ROPIVACAINE HYDROCHLORIDE 10 ML/HR: 2 INJECTION, SOLUTION EPIDURAL; INFILTRATION at 09:04

## 2018-04-29 RX ADMIN — ROPIVACAINE HYDROCHLORIDE 10 ML/HR: 2 INJECTION, SOLUTION EPIDURAL; INFILTRATION at 07:04

## 2018-04-29 RX ADMIN — MAGNESIUM SULFATE IN WATER 4 G: 40 INJECTION, SOLUTION INTRAVENOUS at 06:04

## 2018-04-29 RX ADMIN — OXYCODONE HYDROCHLORIDE 5 MG: 5 TABLET ORAL at 04:04

## 2018-04-29 RX ADMIN — STANDARDIZED SENNA CONCENTRATE AND DOCUSATE SODIUM 2 TABLET: 8.6; 5 TABLET, FILM COATED ORAL at 09:04

## 2018-04-29 RX ADMIN — OXYCODONE HYDROCHLORIDE 5 MG: 5 TABLET ORAL at 11:04

## 2018-04-29 RX ADMIN — POLYETHYLENE GLYCOL 3350 17 G: 17 POWDER, FOR SOLUTION ORAL at 09:04

## 2018-04-29 RX ADMIN — FENTANYL CITRATE 50 MCG: 50 INJECTION, SOLUTION INTRAMUSCULAR; INTRAVENOUS at 01:04

## 2018-04-29 RX ADMIN — SODIUM CHLORIDE: 0.9 INJECTION, SOLUTION INTRAVENOUS at 11:04

## 2018-04-29 RX ADMIN — ENOXAPARIN SODIUM 40 MG: 100 INJECTION SUBCUTANEOUS at 04:04

## 2018-04-29 RX ADMIN — LIDOCAINE 1 PATCH: 50 PATCH CUTANEOUS at 06:04

## 2018-04-29 NOTE — PROGRESS NOTES
[facility]  Orthopedics  Progress Note    Patient Name: Kerline Grossman  MRN: [id number]  Admission Date: 4/20/2018  Hospital Length of Stay: 8 days  Attending Provider: David Cruz MD  Primary Care Provider: Primary Doctor No  Follow-up For: Procedure(s) (LRB):  IM NAILING OF FEMUR (Left)    Post-Operative Day: 4 Days Post-Op  Subjective:     Principal Problem:Tachycardia    Principal Orthopedic Problem: B femur impending fractures    Interval History: Patient seen and examined at bedside.  No acute events overnight.  Pain controlled.  No PT yesterday.  Weaning off NC only at 2L now.  Still in sinus tachycardia.    Review of patient's allergies indicates:  No Known Allergies    Current Facility-Administered Medications   Medication    bisacodyl suppository 10 mg    calcium gluconate 1g in dextrose 5% 100mL (ready to mix system)    calcium gluconate 1g in dextrose 5% 100mL (ready to mix system)    calcium gluconate 1g in dextrose 5% 100mL (ready to mix system)    dextrose 50% injection 12.5 g    dextrose 50% injection 25 g    enoxaparin injection 40 mg    fentaNYL injection 50 mcg    glucagon (human recombinant) injection 1 mg    insulin aspart U-100 pen 1-10 Units    lidocaine 5 % patch 1 patch    magnesium sulfate 2g in water 50mL IVPB (premix)    magnesium sulfate 2g in water 50mL IVPB (premix)    omnipaque oral solution 15 mL    ondansetron disintegrating tablet 8 mg    oxyCODONE immediate release tablet 5 mg    polyethylene glycol packet 17 g    promethazine (PHENERGAN) 6.25 mg in dextrose 5 % 50 mL IVPB    ramelteon tablet 8 mg    ropivacaine (PF) 2 mg/ml (0.2%) infusion    senna-docusate 8.6-50 mg per tablet 2 tablet    sodium chloride 0.9% flush 3 mL    sodium chloride 0.9% flush 3 mL    sodium chloride 0.9% flush 5 mL    sodium phosphate 15 mmol in dextrose 5 % 250 mL IVPB    sodium phosphate 20.01 mmol in dextrose 5 % 250 mL IVPB    sodium phosphate 30 mmol  "in dextrose 5 % 250 mL IVPB    white petrolatum 41 % ointment     Objective:     Vital Signs (Most Recent):  Temp: 98.1 °F (36.7 °C) (04/29/18 0700)  Pulse: (!) 120 (04/29/18 0800)  Resp: (!) 26 (04/29/18 0800)  BP: 118/71 (04/29/18 0800)  SpO2: 96 % (04/29/18 0800) Vital Signs (24h Range):  Temp:  [98.1 °F (36.7 °C)-98.8 °F (37.1 °C)] 98.1 °F (36.7 °C)  Pulse:  [110-132] 120  Resp:  [21-32] 26  SpO2:  [92 %-98 %] 96 %  BP: (111-141)/(68-84) 118/71     Weight: 68.6 kg (151 lb 3.8 oz)  Height: 5' 6" (167.6 cm)  Body mass index is 24.41 kg/m².      Intake/Output Summary (Last 24 hours) at 04/29/18 0905  Last data filed at 04/29/18 0800   Gross per 24 hour   Intake          1145.92 ml   Output             4790 ml   Net         -3644.08 ml       Ortho/SPM Exam    AAOx4  NAD  RRR  Tachypnea   SILT and motor intact T/SP/DP  WWP extremities  FCDs in place and functioning    Significant Labs:   CBC:     Recent Labs  Lab 04/28/18  0408 04/29/18  0441   WBC 13.70* 12.41   HGB 9.6* 9.0*   HCT 30.0* 28.5*    163     CMP:     Recent Labs  Lab 04/27/18  1619 04/28/18  0408 04/29/18  0441    144 137   K 3.8 4.5 3.9   * 114* 108   CO2 20* 23 20*   GLU 70 71 78   BUN 14 15 18   CREATININE 1.1 1.1 1.0   CALCIUM 7.8* 8.0* 7.8*   PROT  --  5.0* 5.1*   ALBUMIN  --  1.4* 1.4*   BILITOT  --  0.5 0.5   ALKPHOS  --  397* 348*   AST  --  63* 50*   ALT  --  8* 7*   ANIONGAP 9 7* 9   EGFRNONAA 56.3* 56.3* >60.0     All pertinent labs within the past 24 hours have been reviewed.    Significant Imaging: I have reviewed all pertinent imaging results/findings.    Assessment/Plan:     Bony metastasis    58 y.o. female POD4 s/p L CMN    Pain control  PT/OT: bed rest  DVT PPx: lovenox, FCDs at all times when not ambulating  Abx: postop Ancef complete  Labs: Hb 9   Drain: none  Ponce: none    Dispo: possibly step down to the floor today if felt medically stable by SICU team will tentatively plan for CMN of right femur tomorrow      "         Kings Garner MD  Orthopedics  [facility] Note:  I agree with the above assessment and plan.  Some remaining tachycardia.  Otherwise doing very well.  Transferred to floor today.  Weaning NC.  Possibly CMN right femur tomorrow so we can get her up with PT ambulating.      Jd Cabrera MD

## 2018-04-29 NOTE — PROGRESS NOTES
[facility]  Critical Care - Surgery  Progress Note    Patient Name: Kerline Grossman  MRN: [id number]  Admission Date: 4/20/2018  Hospital Length of Stay: 8 days  Code Status: Full Code  Attending Provider: David Cruz MD  Primary Care Provider: Primary Doctor No   Principal Problem: Tachycardia    Subjective:     Hospital/ICU Course:  No notes on file    Interval History/Significant Events:  No acute events overnight. Down to 3LNC. Tolerating regular diet. Awaiting floor bed    Follow-up For: Procedure(s) (LRB):  IM NAILING OF FEMUR (Left)    Post-Operative Day: 4 Days Post-Op    Objective:     Vital Signs (Most Recent):  Temp: 98.1 °F (36.7 °C) (04/29/18 0700)  Pulse: 110 (04/29/18 0700)  Resp: (!) 30 (04/29/18 0700)  BP: 122/74 (04/29/18 0700)  SpO2: 97 % (04/29/18 0700) Vital Signs (24h Range):  Temp:  [98.1 °F (36.7 °C)-98.8 °F (37.1 °C)] 98.1 °F (36.7 °C)  Pulse:  [110-132] 110  Resp:  [20-32] 30  SpO2:  [92 %-98 %] 97 %  BP: (111-141)/(68-84) 122/74  Arterial Line BP: (149-152)/(72-79) 152/79     Weight: 68.6 kg (151 lb 3.8 oz)  Body mass index is 24.41 kg/m².      Intake/Output Summary (Last 24 hours) at 04/29/18 0756  Last data filed at 04/29/18 0700   Gross per 24 hour   Intake          1385.92 ml   Output             4890 ml   Net         -3504.08 ml       Physical Exam   Constitutional: She is oriented to person, place, and time. She appears well-developed and well-nourished. No distress.   HENT:   Head: Normocephalic and atraumatic.   Mouth/Throat: No oropharyngeal exudate.   Eyes: EOM are normal. Pupils are equal, round, and reactive to light. No scleral icterus.   Neck: Normal range of motion. Neck supple. No JVD present.   Cardiovascular: Regular rhythm and intact distal pulses.    Tachycardic in 110-120s    Pulmonary/Chest: Effort normal and breath sounds normal. No respiratory distress. She has no wheezes. She has no rales.   Abdominal: Soft. Bowel sounds are normal. She  exhibits no distension. There is no tenderness.   Musculoskeletal: She exhibits tenderness. She exhibits no edema.   Left knee at site of surgery.    Neurological: She is alert and oriented to person, place, and time.   Skin: Skin is warm and dry. Capillary refill takes less than 2 seconds. She is not diaphoretic. No erythema.       Lines/Drains/Airways     Drain                 Urethral Catheter 04/25/18 2115 3 days          Epidural Line                 Perineural Analgesia/Anesthesia Assessment (using dermatomes) Epidural 04/25/18 1032 3 days          Peripheral Intravenous Line                 Peripheral IV - Single Lumen Left Forearm -- days         Peripheral IV - Single Lumen 04/28/18 1030 Right Wrist less than 1 day                Significant Labs:    CBC/Anemia Profile:    Recent Labs  Lab 04/28/18  0408 04/29/18  0441   WBC 13.70* 12.41   HGB 9.6* 9.0*   HCT 30.0* 28.5*    163   MCV 89 89   RDW 17.9* 17.8*        Chemistries:    Recent Labs  Lab 04/27/18  1619 04/28/18  0408 04/29/18  0441    144 137   K 3.8 4.5 3.9   * 114* 108   CO2 20* 23 20*   BUN 14 15 18   CREATININE 1.1 1.1 1.0   CALCIUM 7.8* 8.0* 7.8*   ALBUMIN  --  1.4* 1.4*   PROT  --  5.0* 5.1*   BILITOT  --  0.5 0.5   ALKPHOS  --  397* 348*   ALT  --  8* 7*   AST  --  63* 50*   MG 1.7 2.0 1.4*   PHOS 3.1 3.5 3.0       ABGs:   Recent Labs  Lab 04/27/18  0932   PH 7.406   PCO2 28.4*   HCO3 17.8*   POCSATURATED 91*   BE -7     Blood Culture: No results for input(s): LABBLOO in the last 48 hours.    Significant Imaging:  I have reviewed all pertinent imaging results/findings within the past 24 hours.    Assessment/Plan:     Adenocarcinoma of unknown primary    58 year old female with widely metastatic cancer of unknown origin s/p left femur IMN 4/25/18    Neuro:   - AAOx3 this morning  - PRN pain control  - Intermittently confused at baseline per prior notes     Pulmonary:   - Extubated; on 3LNC  - Continue to wean O2 as  "tolerated; SpO2 goal >90%  - IS, OOBTC  - ABGs PRN     Cardiac:  - Sinus tachycardia   - Off pressors  - Echo from 4/21 shows normal EF and "right ventricle is enlarged measuring 4.6 cm at the base in the apical right ventricle-focused view. Global right ventricular systolic function appears moderately depressed"    Renal:   - BUN/Cr at baseline  - Ponce in place     Infectious Disease:   - Patient initially on broad spectrum abx  - Cultures have been negative  - Will hold off on restarting abx at this time besides Ancef for surgical ppx  - Trend WBC -12 today     Hematology/Oncology:  - Widely metastatic lesions of unknown primary  - Heme/onc on board  - Trend CBC     Endocrine:  - SSI     Fluids/Electrolytes/Nutrition/GI:   - Regular diet  - Trend CMP  - Replace Lytes PRN     Pain Management::   - perineural SIFI, PRN Fentanyl, PO Oxy     Dispo:  Stable for stepdown to medicine; awaiting bed. Ortho following             Critical care was time spent personally by me on the following activities: development of treatment plan with patient or surrogate and bedside caregivers, discussions with consultants, evaluation of patient's response to treatment, examination of patient, ordering and performing treatments and interventions, ordering and review of laboratory studies, ordering and review of radiographic studies, pulse oximetry, re-evaluation of patient's condition.  This critical care time did not overlap with that of any other provider or involve time for any procedures.     Reginald Romero MD  Critical Care - Surgery  [facility]    "

## 2018-04-29 NOTE — SUBJECTIVE & OBJECTIVE
Principal Problem:Tachycardia    Principal Orthopedic Problem: B femur impending fractures    Interval History: Patient seen and examined at bedside.  No acute events overnight.  Pain controlled.  No PT yesterday.  Weaning off NC only at 2L now.  Still in sinus tachycardia.    Review of patient's allergies indicates:  No Known Allergies    Current Facility-Administered Medications   Medication    bisacodyl suppository 10 mg    calcium gluconate 1g in dextrose 5% 100mL (ready to mix system)    calcium gluconate 1g in dextrose 5% 100mL (ready to mix system)    calcium gluconate 1g in dextrose 5% 100mL (ready to mix system)    dextrose 50% injection 12.5 g    dextrose 50% injection 25 g    enoxaparin injection 40 mg    fentaNYL injection 50 mcg    glucagon (human recombinant) injection 1 mg    insulin aspart U-100 pen 1-10 Units    lidocaine 5 % patch 1 patch    magnesium sulfate 2g in water 50mL IVPB (premix)    magnesium sulfate 2g in water 50mL IVPB (premix)    omnipaque oral solution 15 mL    ondansetron disintegrating tablet 8 mg    oxyCODONE immediate release tablet 5 mg    polyethylene glycol packet 17 g    promethazine (PHENERGAN) 6.25 mg in dextrose 5 % 50 mL IVPB    ramelteon tablet 8 mg    ropivacaine (PF) 2 mg/ml (0.2%) infusion    senna-docusate 8.6-50 mg per tablet 2 tablet    sodium chloride 0.9% flush 3 mL    sodium chloride 0.9% flush 3 mL    sodium chloride 0.9% flush 5 mL    sodium phosphate 15 mmol in dextrose 5 % 250 mL IVPB    sodium phosphate 20.01 mmol in dextrose 5 % 250 mL IVPB    sodium phosphate 30 mmol in dextrose 5 % 250 mL IVPB    white petrolatum 41 % ointment     Objective:     Vital Signs (Most Recent):  Temp: 98.1 °F (36.7 °C) (04/29/18 0700)  Pulse: (!) 120 (04/29/18 0800)  Resp: (!) 26 (04/29/18 0800)  BP: 118/71 (04/29/18 0800)  SpO2: 96 % (04/29/18 0800) Vital Signs (24h Range):  Temp:  [98.1 °F (36.7 °C)-98.8 °F (37.1 °C)] 98.1 °F (36.7 °C)  Pulse:   "[110-132] 120  Resp:  [21-32] 26  SpO2:  [92 %-98 %] 96 %  BP: (111-141)/(68-84) 118/71     Weight: 68.6 kg (151 lb 3.8 oz)  Height: 5' 6" (167.6 cm)  Body mass index is 24.41 kg/m².      Intake/Output Summary (Last 24 hours) at 04/29/18 0905  Last data filed at 04/29/18 0800   Gross per 24 hour   Intake          1145.92 ml   Output             4790 ml   Net         -3644.08 ml       Ortho/SPM Exam    AAOx4  NAD  RRR  Tachypnea   SILT and motor intact T/SP/DP  WWP extremities  FCDs in place and functioning    Significant Labs:   CBC:     Recent Labs  Lab 04/28/18  0408 04/29/18  0441   WBC 13.70* 12.41   HGB 9.6* 9.0*   HCT 30.0* 28.5*    163     CMP:     Recent Labs  Lab 04/27/18  1619 04/28/18  0408 04/29/18  0441    144 137   K 3.8 4.5 3.9   * 114* 108   CO2 20* 23 20*   GLU 70 71 78   BUN 14 15 18   CREATININE 1.1 1.1 1.0   CALCIUM 7.8* 8.0* 7.8*   PROT  --  5.0* 5.1*   ALBUMIN  --  1.4* 1.4*   BILITOT  --  0.5 0.5   ALKPHOS  --  397* 348*   AST  --  63* 50*   ALT  --  8* 7*   ANIONGAP 9 7* 9   EGFRNONAA 56.3* 56.3* >60.0     All pertinent labs within the past 24 hours have been reviewed.    Significant Imaging: I have reviewed all pertinent imaging results/findings.  "

## 2018-04-29 NOTE — ANESTHESIA POST-OP PAIN MANAGEMENT
Acute Pain Service Progress Note    Kerline Grossman is a 58 y.o., female, [id number].    Surgery:  IM nail of left femur    Post Op Day #: 4    Catheter type: perineural  SIFI    Infusion type: Ropivacaine 0.2%  8 basal    Problem List:    Active Hospital Problems    Diagnosis  POA    *Tachycardia [R00.0]  Yes    Malignant neoplasm of central portion of right breast in female, estrogen receptor positive [C50.111, Z17.0]  Not Applicable    Cardiac rhythm disorder or disturbance or change [I49.9]  Unknown    CHF (congestive heart failure) [I50.9]  Unknown    Right heart failure [I50.810]  Yes    Acute hypoxemic respiratory failure [J96.01]  Yes    Encounter for weaning from ventilator [Z99.11]  Not Applicable    Lactic acidemia [E87.2]  Yes    Severe malnutrition [E43]  Yes    Cancer of left femur [C40.22]  Yes    Cancer of right femur [C40.21]  Yes    Palliative care encounter [Z51.5]  Not Applicable    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 Diffuse lytic lesions throughout entire central axial red marrow skeleton. Lytic lesions of proximal long bones and pathological fractures of ribs and right superior and inferior pubic ramus.      Adenocarcinoma of unknown primary [C80.1]  Yes    Pleural effusion [J90]  Yes      Resolved Hospital Problems    Diagnosis Date Resolved POA    Shock [R57.9] 04/26/2018 Unknown    Encephalopathy, metabolic [G93.41] 04/21/2018 Yes       Subjective:     General appearance of alert, oriented, no complaints   Pain with rest: 1    Numbers   Pain with movement: 1    Numbers   Side Effects    1. Pruritis No    2. Nausea No      Objective:     Catheter site clean, dry, intact      Vitals   Vitals:    04/29/18 1238   BP: (!) 144/81   Pulse:  (!) 111   Resp: 20   Temp: 37 °C (98.6 °F)        Labs    Admission on 04/20/2018   No results displayed because visit has over 200 results.           Meds   Current Facility-Administered Medications   Medication Dose Route Frequency Provider Last Rate Last Dose    0.9%  NaCl infusion (for blood administration)   Intravenous Q24H PRN Kings Garner MD        bisacodyl suppository 10 mg  10 mg Rectal Daily PRN Frank Argueta MD        calcium gluconate 1g in dextrose 5% 100mL (ready to mix system)  1 g Intravenous PRN Kelsey Torre MD        calcium gluconate 1g in dextrose 5% 100mL (ready to mix system)  1 g Intravenous PRN Kelesy Torre MD        calcium gluconate 1g in dextrose 5% 100mL (ready to mix system)  1 g Intravenous PRN Kelsey Torre MD        dextrose 50% injection 12.5 g  12.5 g Intravenous PRN Reginald Romero MD   12.5 g at 04/28/18 0619    dextrose 50% injection 25 g  25 g Intravenous PRN KITTY Delatorre   25 g at 04/26/18 0627    enoxaparin injection 40 mg  40 mg Subcutaneous Daily Frank Argueta MD   40 mg at 04/28/18 1741    glucagon (human recombinant) injection 1 mg  1 mg Intramuscular PRN Reginald Romero MD        insulin aspart U-100 pen 1-10 Units  1-10 Units Subcutaneous Q6H PRN Reginald Romero MD   4 Units at 04/25/18 2227    lidocaine 5 % patch 1 patch  1 patch Transdermal Q24H David Cruz MD   1 patch at 04/28/18 1741    magnesium sulfate 2g in water 50mL IVPB (premix)  2 g Intravenous PRN Kelsey Torre MD   2 g at 04/27/18 2007    magnesium sulfate 2g in water 50mL IVPB (premix)  4 g Intravenous PRN Kelsey Torre MD   4 g at 04/29/18 0643    omnipaque oral solution 15 mL  15 mL Oral PRN Celena Wright MD   15 mL at 04/22/18 1116    ondansetron disintegrating tablet 8 mg  8 mg Oral Q8H PRN KITTY Delatorre        oxyCODONE immediate release tablet 5 mg  5 mg Oral Q6H PRN Keenan Licea MD   5 mg at 04/28/18 2000    polyethylene glycol packet 17 g  17 g Oral  Daily Frank Miguel Argueta MD   17 g at 04/29/18 0909    promethazine (PHENERGAN) 6.25 mg in dextrose 5 % 50 mL IVPB  6.25 mg Intravenous Q6H PRN Atif Valencia MD        ramelteon tablet 8 mg  8 mg Oral Nightly PRN Kelsey Torre MD        ropivacaine (PF) 2 mg/ml (0.2%) infusion  10 mL/hr Perineural Continuous Atif Valencia MD 10 mL/hr at 04/29/18 1200 10 mL/hr at 04/29/18 1200    senna-docusate 8.6-50 mg per tablet 2 tablet  2 tablet Oral Daily Davdi Cruz MD   2 tablet at 04/29/18 0909    sodium chloride 0.9% flush 3 mL  3 mL Intravenous PRN Han Pelayo MD        sodium chloride 0.9% flush 3 mL  3 mL Intravenous PRN Han Pelayo MD        sodium chloride 0.9% flush 5 mL  5 mL Intravenous PRN KITTY Delatorre        sodium phosphate 15 mmol in dextrose 5 % 250 mL IVPB  15 mmol Intravenous PRN Kelsey Torre MD        sodium phosphate 20.01 mmol in dextrose 5 % 250 mL IVPB  20.01 mmol Intravenous PRN Kelsey Torre MD        sodium phosphate 30 mmol in dextrose 5 % 250 mL IVPB  30 mmol Intravenous PRN Kelsey Torre MD        white petrolatum 41 % ointment   Topical (Top) PRN David Cruz MD            Anticoagulant dose: lovenox 40 mg daily    Assessment:     Pain control adequate    Plan:     Patient doing well, continue present treatment.    Evaluator Alexander Taylor

## 2018-04-29 NOTE — PLAN OF CARE
"Problem: Patient Care Overview  Goal: Plan of Care Review  Outcome: Ongoing (interventions implemented as appropriate)  Pt had no acute events overnight. Pt remains AAOx4. Pt's heart rate remains tachy with her beginning the shift in the high 120's to 130's and ending the shift approx 110. No acute respiratory events overnight. Pt began the shift on 3L High Flow NC and it was titrated down to 2L throughout the night w/the pt remaining with sats greater than 92%. Pt drank and ate throughout this shift w/nurse present at bedside to make sure pt was sitting upright and taking small bites to decrease her potential for aspiration. Pt's rocha remains patent w/adequate u/o. Pt output this shift was approx 3.1L. Pt continues to refuse to move about in the bed or to be turned. Pt re-educated on the need to move to prevent pneumonia as well as skin breakdown. Pt continued to refuse to move. Pt states, "I can't! It hurts too much!" Pt received 2 doses of pain medicine this shift. Will pass on in report for day MD staff to consider PT consult to help pt start moving more. NAD noted. VSS. Will continue to monitor.       "

## 2018-04-29 NOTE — ASSESSMENT & PLAN NOTE
"58 year old female with widely metastatic cancer of unknown origin s/p left femur IMN 4/25/18    Neuro:   - AAOx3 this morning  - PRN pain control  - Intermittently confused at baseline per prior notes     Pulmonary:   - Extubated; on 3LNC  - Continue to wean O2 as tolerated; SpO2 goal >90%  - IS, OOBTC  - ABGs PRN     Cardiac:  - Sinus tachycardia   - Off pressors  - Echo from 4/21 shows normal EF and "right ventricle is enlarged measuring 4.6 cm at the base in the apical right ventricle-focused view. Global right ventricular systolic function appears moderately depressed"    Renal:   - BUN/Cr at baseline  - Ponce in place     Infectious Disease:   - Patient initially on broad spectrum abx  - Cultures have been negative  - Will hold off on restarting abx at this time besides Ancef for surgical ppx  - Trend WBC -12 today     Hematology/Oncology:  - Widely metastatic lesions of unknown primary  - Heme/onc on board  - Trend CBC     Endocrine:  - SSI     Fluids/Electrolytes/Nutrition/GI:   - Regular diet  - Trend CMP  - Replace Lytes PRN     Pain Management::   - perineural SIFI, PRN Fentanyl, PO Oxy     Dispo:  Stable for stepdown to medicine; awaiting bed. Ortho following  "

## 2018-04-29 NOTE — SUBJECTIVE & OBJECTIVE
Interval History/Significant Events:  No acute events overnight. Down to 3LNC. Tolerating regular diet. Awaiting floor bed    Follow-up For: Procedure(s) (LRB):  IM NAILING OF FEMUR (Left)    Post-Operative Day: 4 Days Post-Op    Objective:     Vital Signs (Most Recent):  Temp: 98.1 °F (36.7 °C) (04/29/18 0700)  Pulse: 110 (04/29/18 0700)  Resp: (!) 30 (04/29/18 0700)  BP: 122/74 (04/29/18 0700)  SpO2: 97 % (04/29/18 0700) Vital Signs (24h Range):  Temp:  [98.1 °F (36.7 °C)-98.8 °F (37.1 °C)] 98.1 °F (36.7 °C)  Pulse:  [110-132] 110  Resp:  [20-32] 30  SpO2:  [92 %-98 %] 97 %  BP: (111-141)/(68-84) 122/74  Arterial Line BP: (149-152)/(72-79) 152/79     Weight: 68.6 kg (151 lb 3.8 oz)  Body mass index is 24.41 kg/m².      Intake/Output Summary (Last 24 hours) at 04/29/18 0756  Last data filed at 04/29/18 0700   Gross per 24 hour   Intake          1385.92 ml   Output             4890 ml   Net         -3504.08 ml       Physical Exam   Constitutional: She is oriented to person, place, and time. She appears well-developed and well-nourished. No distress.   HENT:   Head: Normocephalic and atraumatic.   Mouth/Throat: No oropharyngeal exudate.   Eyes: EOM are normal. Pupils are equal, round, and reactive to light. No scleral icterus.   Neck: Normal range of motion. Neck supple. No JVD present.   Cardiovascular: Regular rhythm and intact distal pulses.    Tachycardic in 110-120s    Pulmonary/Chest: Effort normal and breath sounds normal. No respiratory distress. She has no wheezes. She has no rales.   Abdominal: Soft. Bowel sounds are normal. She exhibits no distension. There is no tenderness.   Musculoskeletal: She exhibits tenderness. She exhibits no edema.   Left knee at site of surgery.    Neurological: She is alert and oriented to person, place, and time.   Skin: Skin is warm and dry. Capillary refill takes less than 2 seconds. She is not diaphoretic. No erythema.       Lines/Drains/Airways     Drain                  Urethral Catheter 04/25/18 2115 3 days          Epidural Line                 Perineural Analgesia/Anesthesia Assessment (using dermatomes) Epidural 04/25/18 1032 3 days          Peripheral Intravenous Line                 Peripheral IV - Single Lumen Left Forearm -- days         Peripheral IV - Single Lumen 04/28/18 1030 Right Wrist less than 1 day                Significant Labs:    CBC/Anemia Profile:    Recent Labs  Lab 04/28/18  0408 04/29/18  0441   WBC 13.70* 12.41   HGB 9.6* 9.0*   HCT 30.0* 28.5*    163   MCV 89 89   RDW 17.9* 17.8*        Chemistries:    Recent Labs  Lab 04/27/18  1619 04/28/18  0408 04/29/18  0441    144 137   K 3.8 4.5 3.9   * 114* 108   CO2 20* 23 20*   BUN 14 15 18   CREATININE 1.1 1.1 1.0   CALCIUM 7.8* 8.0* 7.8*   ALBUMIN  --  1.4* 1.4*   PROT  --  5.0* 5.1*   BILITOT  --  0.5 0.5   ALKPHOS  --  397* 348*   ALT  --  8* 7*   AST  --  63* 50*   MG 1.7 2.0 1.4*   PHOS 3.1 3.5 3.0       ABGs:   Recent Labs  Lab 04/27/18  0932   PH 7.406   PCO2 28.4*   HCO3 17.8*   POCSATURATED 91*   BE -7     Blood Culture: No results for input(s): LABBLOO in the last 48 hours.    Significant Imaging:  I have reviewed all pertinent imaging results/findings within the past 24 hours.

## 2018-04-29 NOTE — ASSESSMENT & PLAN NOTE
58 y.o. female POD4 s/p L CMN    Pain control  PT/OT: bed rest  DVT PPx: lovenox, FCDs at all times when not ambulating  Abx: postop Ancef complete  Labs: Hb 9   Drain: none  Ponce: none    Dispo: possibly step down to the floor today if felt medically stable by SICU team will tentatively plan for CMN of right femur tomorrow

## 2018-04-29 NOTE — PROGRESS NOTES
"Pt arrived to unit from SICU. Pt refusing SCDs pumps sayng "those hurt my legs". Pt encouraged to wear SCDs and informed on the importance of SCD pumps. AA+O times 4. Follows commands. Denies numbness and tingling. Dressing clean dry and intact. Rates pain as tolerable. PNC infusing. Denies nausea. Oriented to room. Call bell in reach. Advised pt and family to call with ant concerns. Will continue to monitor.     "

## 2018-04-30 ENCOUNTER — ANESTHESIA (OUTPATIENT)
Dept: SURGERY | Facility: HOSPITAL | Age: 58
DRG: 477 | End: 2018-04-30
Payer: MEDICAID

## 2018-04-30 ENCOUNTER — SURGERY (OUTPATIENT)
Age: 58
End: 2018-04-30

## 2018-04-30 PROBLEM — C50.919 METASTATIC BREAST CANCER: Status: ACTIVE | Noted: 2018-04-21

## 2018-04-30 LAB
ALBUMIN SERPL BCP-MCNC: 1.4 G/DL
ALP SERPL-CCNC: 327 U/L
ALT SERPL W/O P-5'-P-CCNC: 9 U/L
ANION GAP SERPL CALC-SCNC: 8 MMOL/L
ANISOCYTOSIS BLD QL SMEAR: SLIGHT
ANISOCYTOSIS BLD QL SMEAR: SLIGHT
AST SERPL-CCNC: 59 U/L
BASOPHILS # BLD AUTO: ABNORMAL K/UL
BASOPHILS # BLD AUTO: ABNORMAL K/UL
BASOPHILS NFR BLD: 0 %
BASOPHILS NFR BLD: 1 %
BILIRUB SERPL-MCNC: 0.6 MG/DL
BUN SERPL-MCNC: 15 MG/DL
BURR CELLS BLD QL SMEAR: ABNORMAL
CALCIUM SERPL-MCNC: 7.7 MG/DL
CHLORIDE SERPL-SCNC: 108 MMOL/L
CO2 SERPL-SCNC: 21 MMOL/L
CREAT SERPL-MCNC: 0.9 MG/DL
DIFFERENTIAL METHOD: ABNORMAL
DIFFERENTIAL METHOD: ABNORMAL
EOSINOPHIL # BLD AUTO: ABNORMAL K/UL
EOSINOPHIL # BLD AUTO: ABNORMAL K/UL
EOSINOPHIL NFR BLD: 0 %
EOSINOPHIL NFR BLD: 1 %
ERYTHROCYTE [DISTWIDTH] IN BLOOD BY AUTOMATED COUNT: 17.4 %
ERYTHROCYTE [DISTWIDTH] IN BLOOD BY AUTOMATED COUNT: 17.5 %
EST. GFR  (AFRICAN AMERICAN): >60 ML/MIN/1.73 M^2
EST. GFR  (NON AFRICAN AMERICAN): >60 ML/MIN/1.73 M^2
GLUCOSE SERPL-MCNC: 74 MG/DL
GLUCOSE SERPL-MCNC: 92 MG/DL (ref 70–110)
HCO3 UR-SCNC: 19.7 MMOL/L (ref 24–28)
HCT VFR BLD AUTO: 28.3 %
HCT VFR BLD AUTO: 28.7 %
HCT VFR BLD CALC: 23 %PCV (ref 36–54)
HGB BLD-MCNC: 8.8 G/DL
HGB BLD-MCNC: 9 G/DL
HYPOCHROMIA BLD QL SMEAR: ABNORMAL
HYPOCHROMIA BLD QL SMEAR: ABNORMAL
IMM GRANULOCYTES # BLD AUTO: ABNORMAL K/UL
IMM GRANULOCYTES # BLD AUTO: ABNORMAL K/UL
IMM GRANULOCYTES NFR BLD AUTO: ABNORMAL %
IMM GRANULOCYTES NFR BLD AUTO: ABNORMAL %
LACTATE SERPL-SCNC: 1.6 MMOL/L
LACTATE SERPL-SCNC: 2.4 MMOL/L
LACTATE SERPL-SCNC: 2.6 MMOL/L
LYMPHOCYTES # BLD AUTO: ABNORMAL K/UL
LYMPHOCYTES # BLD AUTO: ABNORMAL K/UL
LYMPHOCYTES NFR BLD: 8 %
LYMPHOCYTES NFR BLD: 9 %
MAGNESIUM SERPL-MCNC: 1.5 MG/DL
MCH RBC QN AUTO: 27.8 PG
MCH RBC QN AUTO: 28.8 PG
MCHC RBC AUTO-ENTMCNC: 31.1 G/DL
MCHC RBC AUTO-ENTMCNC: 31.4 G/DL
MCV RBC AUTO: 89 FL
MCV RBC AUTO: 92 FL
METAMYELOCYTES NFR BLD MANUAL: 1 %
METAMYELOCYTES NFR BLD MANUAL: 2 %
MONOCYTES # BLD AUTO: ABNORMAL K/UL
MONOCYTES # BLD AUTO: ABNORMAL K/UL
MONOCYTES NFR BLD: 3 %
MONOCYTES NFR BLD: 3 %
MYELOCYTES NFR BLD MANUAL: 1 %
MYELOCYTES NFR BLD MANUAL: 3 %
NEUTROPHILS NFR BLD: 83 %
NEUTROPHILS NFR BLD: 85 %
NRBC BLD-RTO: 1 /100 WBC
NRBC BLD-RTO: 2 /100 WBC
OVALOCYTES BLD QL SMEAR: ABNORMAL
OVALOCYTES BLD QL SMEAR: ABNORMAL
PCO2 BLDA: 39.6 MMHG (ref 35–45)
PH SMN: 7.3 [PH] (ref 7.35–7.45)
PHOSPHATE SERPL-MCNC: 2.7 MG/DL
PLATELET # BLD AUTO: 115 K/UL
PLATELET # BLD AUTO: 172 K/UL
PLATELET BLD QL SMEAR: ABNORMAL
PMV BLD AUTO: 12.1 FL
PMV BLD AUTO: 12.3 FL
PO2 BLDA: 59 MMHG (ref 80–100)
POC BE: -7 MMOL/L
POC IONIZED CALCIUM: 1.14 MMOL/L (ref 1.06–1.42)
POC SATURATED O2: 87 % (ref 95–100)
POC TCO2: 21 MMOL/L (ref 23–27)
POCT GLUCOSE: 133 MG/DL (ref 70–110)
POCT GLUCOSE: 85 MG/DL (ref 70–110)
POCT GLUCOSE: 87 MG/DL (ref 70–110)
POIKILOCYTOSIS BLD QL SMEAR: SLIGHT
POIKILOCYTOSIS BLD QL SMEAR: SLIGHT
POLYCHROMASIA BLD QL SMEAR: ABNORMAL
POLYCHROMASIA BLD QL SMEAR: ABNORMAL
POTASSIUM BLD-SCNC: 3.9 MMOL/L (ref 3.5–5.1)
POTASSIUM SERPL-SCNC: 3.7 MMOL/L
PROT SERPL-MCNC: 5.1 G/DL
RBC # BLD AUTO: 3.13 M/UL
RBC # BLD AUTO: 3.17 M/UL
SAMPLE: ABNORMAL
SODIUM BLD-SCNC: 140 MMOL/L (ref 136–145)
SODIUM SERPL-SCNC: 137 MMOL/L
WBC # BLD AUTO: 13.13 K/UL
WBC # BLD AUTO: 13.47 K/UL

## 2018-04-30 PROCEDURE — 63600175 PHARM REV CODE 636 W HCPCS

## 2018-04-30 PROCEDURE — 63600175 PHARM REV CODE 636 W HCPCS: Performed by: ANESTHESIOLOGY

## 2018-04-30 PROCEDURE — 36000710: Performed by: ORTHOPAEDIC SURGERY

## 2018-04-30 PROCEDURE — 20600001 HC STEP DOWN PRIVATE ROOM

## 2018-04-30 PROCEDURE — 27000221 HC OXYGEN, UP TO 24 HOURS

## 2018-04-30 PROCEDURE — 25000003 PHARM REV CODE 250: Performed by: STUDENT IN AN ORGANIZED HEALTH CARE EDUCATION/TRAINING PROGRAM

## 2018-04-30 PROCEDURE — D9220A PRA ANESTHESIA: Mod: ANES,,, | Performed by: ANESTHESIOLOGY

## 2018-04-30 PROCEDURE — 25000003 PHARM REV CODE 250: Performed by: ANESTHESIOLOGY

## 2018-04-30 PROCEDURE — 83605 ASSAY OF LACTIC ACID: CPT | Mod: 91

## 2018-04-30 PROCEDURE — 27201423 OPTIME MED/SURG SUP & DEVICES STERILE SUPPLY: Performed by: ORTHOPAEDIC SURGERY

## 2018-04-30 PROCEDURE — 25000003 PHARM REV CODE 250: Performed by: INTERNAL MEDICINE

## 2018-04-30 PROCEDURE — 63600175 PHARM REV CODE 636 W HCPCS: Performed by: STUDENT IN AN ORGANIZED HEALTH CARE EDUCATION/TRAINING PROGRAM

## 2018-04-30 PROCEDURE — 85007 BL SMEAR W/DIFF WBC COUNT: CPT | Mod: 91

## 2018-04-30 PROCEDURE — 63600175 PHARM REV CODE 636 W HCPCS: Performed by: NURSE ANESTHETIST, CERTIFIED REGISTERED

## 2018-04-30 PROCEDURE — 85027 COMPLETE CBC AUTOMATED: CPT | Mod: 91

## 2018-04-30 PROCEDURE — 27187 REINFORCE HIP BONES: CPT | Mod: 79,RT,, | Performed by: ORTHOPAEDIC SURGERY

## 2018-04-30 PROCEDURE — C1769 GUIDE WIRE: HCPCS | Performed by: ORTHOPAEDIC SURGERY

## 2018-04-30 PROCEDURE — 84100 ASSAY OF PHOSPHORUS: CPT

## 2018-04-30 PROCEDURE — 37000009 HC ANESTHESIA EA ADD 15 MINS: Performed by: ORTHOPAEDIC SURGERY

## 2018-04-30 PROCEDURE — 86850 RBC ANTIBODY SCREEN: CPT

## 2018-04-30 PROCEDURE — 25000003 PHARM REV CODE 250: Performed by: NURSE ANESTHETIST, CERTIFIED REGISTERED

## 2018-04-30 PROCEDURE — 25000003 PHARM REV CODE 250: Performed by: HOSPITALIST

## 2018-04-30 PROCEDURE — 99233 SBSQ HOSP IP/OBS HIGH 50: CPT | Mod: ,,, | Performed by: INTERNAL MEDICINE

## 2018-04-30 PROCEDURE — 83735 ASSAY OF MAGNESIUM: CPT

## 2018-04-30 PROCEDURE — 27201037 HC PRESSURE MONITORING SET UP

## 2018-04-30 PROCEDURE — 71000033 HC RECOVERY, INTIAL HOUR: Performed by: ORTHOPAEDIC SURGERY

## 2018-04-30 PROCEDURE — 80053 COMPREHEN METABOLIC PANEL: CPT

## 2018-04-30 PROCEDURE — 37000008 HC ANESTHESIA 1ST 15 MINUTES: Performed by: ORTHOPAEDIC SURGERY

## 2018-04-30 PROCEDURE — 25000242 PHARM REV CODE 250 ALT 637 W/ HCPCS: Performed by: NURSE ANESTHETIST, CERTIFIED REGISTERED

## 2018-04-30 PROCEDURE — C1713 ANCHOR/SCREW BN/BN,TIS/BN: HCPCS | Performed by: ORTHOPAEDIC SURGERY

## 2018-04-30 PROCEDURE — 36415 COLL VENOUS BLD VENIPUNCTURE: CPT

## 2018-04-30 PROCEDURE — 36000711: Performed by: ORTHOPAEDIC SURGERY

## 2018-04-30 PROCEDURE — P9016 RBC LEUKOCYTES REDUCED: HCPCS

## 2018-04-30 PROCEDURE — 0QH806Z INSERTION OF INTRAMEDULLARY INTERNAL FIXATION DEVICE INTO RIGHT FEMORAL SHAFT, OPEN APPROACH: ICD-10-PCS | Performed by: ORTHOPAEDIC SURGERY

## 2018-04-30 PROCEDURE — 64999 UNLISTED PX NERVOUS SYSTEM: CPT | Mod: 59,RT,, | Performed by: ANESTHESIOLOGY

## 2018-04-30 PROCEDURE — 71000039 HC RECOVERY, EACH ADD'L HOUR: Performed by: ORTHOPAEDIC SURGERY

## 2018-04-30 PROCEDURE — 94761 N-INVAS EAR/PLS OXIMETRY MLT: CPT

## 2018-04-30 PROCEDURE — 36620 INSERTION CATHETER ARTERY: CPT | Mod: 59,,, | Performed by: ANESTHESIOLOGY

## 2018-04-30 PROCEDURE — 82962 GLUCOSE BLOOD TEST: CPT | Performed by: ORTHOPAEDIC SURGERY

## 2018-04-30 PROCEDURE — D9220A PRA ANESTHESIA: Mod: CRNA,,, | Performed by: NURSE ANESTHETIST, CERTIFIED REGISTERED

## 2018-04-30 DEVICE — NAIL IM CANN 130 DEG 12X400 R: Type: IMPLANTABLE DEVICE | Site: HIP | Status: FUNCTIONAL

## 2018-04-30 DEVICE — SCREW STRDRV REC T25 5X44 TTNM: Type: IMPLANTABLE DEVICE | Site: HIP | Status: FUNCTIONAL

## 2018-04-30 DEVICE — SCREW STRDRV REC T25 5X46 TTNM: Type: IMPLANTABLE DEVICE | Site: HIP | Status: FUNCTIONAL

## 2018-04-30 RX ORDER — ENOXAPARIN SODIUM 100 MG/ML
40 INJECTION SUBCUTANEOUS EVERY 24 HOURS
Status: DISCONTINUED | OUTPATIENT
Start: 2018-04-30 | End: 2018-05-14 | Stop reason: HOSPADM

## 2018-04-30 RX ORDER — NEOSTIGMINE METHYLSULFATE 1 MG/ML
INJECTION, SOLUTION INTRAVENOUS
Status: DISCONTINUED | OUTPATIENT
Start: 2018-04-30 | End: 2018-04-30

## 2018-04-30 RX ORDER — PREGABALIN 50 MG/1
150 CAPSULE ORAL NIGHTLY
Status: DISCONTINUED | OUTPATIENT
Start: 2018-04-30 | End: 2018-05-14 | Stop reason: HOSPADM

## 2018-04-30 RX ORDER — MUPIROCIN 20 MG/G
OINTMENT TOPICAL
Status: DISCONTINUED | OUTPATIENT
Start: 2018-04-30 | End: 2018-04-30

## 2018-04-30 RX ORDER — PHENYLEPHRINE HYDROCHLORIDE 10 MG/ML
INJECTION INTRAVENOUS
Status: DISCONTINUED | OUTPATIENT
Start: 2018-04-30 | End: 2018-04-30

## 2018-04-30 RX ORDER — PROMETHAZINE HYDROCHLORIDE 25 MG/1
25 TABLET ORAL EVERY 6 HOURS PRN
Status: DISCONTINUED | OUTPATIENT
Start: 2018-04-30 | End: 2018-05-14 | Stop reason: HOSPADM

## 2018-04-30 RX ORDER — ROCURONIUM BROMIDE 10 MG/ML
INJECTION, SOLUTION INTRAVENOUS
Status: DISCONTINUED | OUTPATIENT
Start: 2018-04-30 | End: 2018-04-30

## 2018-04-30 RX ORDER — ONDANSETRON 2 MG/ML
4 INJECTION INTRAMUSCULAR; INTRAVENOUS EVERY 12 HOURS PRN
Status: DISCONTINUED | OUTPATIENT
Start: 2018-04-30 | End: 2018-04-30

## 2018-04-30 RX ORDER — ALBUTEROL SULFATE 90 UG/1
AEROSOL, METERED RESPIRATORY (INHALATION)
Status: DISCONTINUED | OUTPATIENT
Start: 2018-04-30 | End: 2018-04-30

## 2018-04-30 RX ORDER — ACETAMINOPHEN 10 MG/ML
1000 INJECTION, SOLUTION INTRAVENOUS ONCE
Status: COMPLETED | OUTPATIENT
Start: 2018-04-30 | End: 2018-04-30

## 2018-04-30 RX ORDER — SODIUM CHLORIDE 0.9 % (FLUSH) 0.9 %
3 SYRINGE (ML) INJECTION
Status: DISCONTINUED | OUTPATIENT
Start: 2018-04-30 | End: 2018-04-30 | Stop reason: HOSPADM

## 2018-04-30 RX ORDER — LANOLIN ALCOHOL/MO/W.PET/CERES
800 CREAM (GRAM) TOPICAL DAILY
Status: DISCONTINUED | OUTPATIENT
Start: 2018-04-30 | End: 2018-05-14 | Stop reason: HOSPADM

## 2018-04-30 RX ORDER — GLYCOPYRROLATE 0.2 MG/ML
INJECTION INTRAMUSCULAR; INTRAVENOUS
Status: DISCONTINUED | OUTPATIENT
Start: 2018-04-30 | End: 2018-04-30

## 2018-04-30 RX ORDER — MIDAZOLAM HYDROCHLORIDE 1 MG/ML
0.5 INJECTION INTRAMUSCULAR; INTRAVENOUS EVERY 5 MIN PRN
Status: DISCONTINUED | OUTPATIENT
Start: 2018-04-30 | End: 2018-04-30

## 2018-04-30 RX ORDER — SODIUM CHLORIDE 0.9 % (FLUSH) 0.9 %
3 SYRINGE (ML) INJECTION
Status: DISCONTINUED | OUTPATIENT
Start: 2018-04-30 | End: 2018-05-14 | Stop reason: HOSPADM

## 2018-04-30 RX ORDER — OXYCODONE HYDROCHLORIDE 5 MG/1
TABLET ORAL
Status: DISCONTINUED
Start: 2018-04-30 | End: 2018-04-30 | Stop reason: WASHOUT

## 2018-04-30 RX ORDER — FENTANYL CITRATE 50 UG/ML
25 INJECTION, SOLUTION INTRAMUSCULAR; INTRAVENOUS EVERY 5 MIN PRN
Status: DISCONTINUED | OUTPATIENT
Start: 2018-04-30 | End: 2018-04-30

## 2018-04-30 RX ORDER — RAMELTEON 8 MG/1
8 TABLET ORAL NIGHTLY
Status: DISCONTINUED | OUTPATIENT
Start: 2018-04-30 | End: 2018-05-14 | Stop reason: HOSPADM

## 2018-04-30 RX ORDER — OXYCODONE HYDROCHLORIDE 5 MG/1
10 TABLET ORAL
Status: DISCONTINUED | OUTPATIENT
Start: 2018-04-30 | End: 2018-05-01

## 2018-04-30 RX ORDER — EPINEPHRINE 0.1 MG/ML
INJECTION INTRAVENOUS
Status: DISCONTINUED | OUTPATIENT
Start: 2018-04-30 | End: 2018-04-30

## 2018-04-30 RX ORDER — OXYCODONE HYDROCHLORIDE 5 MG/1
15 TABLET ORAL
Status: DISCONTINUED | OUTPATIENT
Start: 2018-04-30 | End: 2018-05-01

## 2018-04-30 RX ORDER — VASOPRESSIN 20 [USP'U]/ML
INJECTION, SOLUTION INTRAMUSCULAR; SUBCUTANEOUS
Status: DISCONTINUED | OUTPATIENT
Start: 2018-04-30 | End: 2018-04-30

## 2018-04-30 RX ORDER — HYDROMORPHONE HYDROCHLORIDE 1 MG/ML
0.5 INJECTION, SOLUTION INTRAMUSCULAR; INTRAVENOUS; SUBCUTANEOUS
Status: DISCONTINUED | OUTPATIENT
Start: 2018-04-30 | End: 2018-05-01

## 2018-04-30 RX ORDER — CEFAZOLIN SODIUM 1 G/3ML
2 INJECTION, POWDER, FOR SOLUTION INTRAMUSCULAR; INTRAVENOUS
Status: COMPLETED | OUTPATIENT
Start: 2018-04-30 | End: 2018-05-01

## 2018-04-30 RX ORDER — HYDROMORPHONE HYDROCHLORIDE 1 MG/ML
1 INJECTION, SOLUTION INTRAMUSCULAR; INTRAVENOUS; SUBCUTANEOUS EVERY 4 HOURS PRN
Status: DISCONTINUED | OUTPATIENT
Start: 2018-04-30 | End: 2018-05-01

## 2018-04-30 RX ORDER — ETOMIDATE 2 MG/ML
INJECTION INTRAVENOUS
Status: DISCONTINUED | OUTPATIENT
Start: 2018-04-30 | End: 2018-04-30

## 2018-04-30 RX ORDER — ACETAMINOPHEN 500 MG
1000 TABLET ORAL EVERY 6 HOURS
Status: DISCONTINUED | OUTPATIENT
Start: 2018-04-30 | End: 2018-05-14 | Stop reason: HOSPADM

## 2018-04-30 RX ORDER — ROPIVACAINE HYDROCHLORIDE 2 MG/ML
10 INJECTION, SOLUTION EPIDURAL; INFILTRATION; PERINEURAL CONTINUOUS
Status: DISCONTINUED | OUTPATIENT
Start: 2018-04-30 | End: 2018-05-03

## 2018-04-30 RX ORDER — CEFAZOLIN SODIUM 1 G/3ML
2 INJECTION, POWDER, FOR SOLUTION INTRAMUSCULAR; INTRAVENOUS
Status: COMPLETED | OUTPATIENT
Start: 2018-04-30 | End: 2018-04-30

## 2018-04-30 RX ORDER — ALBUTEROL SULFATE 0.83 MG/ML
2.5 SOLUTION RESPIRATORY (INHALATION) EVERY 4 HOURS PRN
Status: DISCONTINUED | OUTPATIENT
Start: 2018-04-30 | End: 2018-04-30 | Stop reason: HOSPADM

## 2018-04-30 RX ORDER — SODIUM CHLORIDE 9 MG/ML
INJECTION, SOLUTION INTRAVENOUS CONTINUOUS PRN
Status: DISCONTINUED | OUTPATIENT
Start: 2018-04-30 | End: 2018-04-30

## 2018-04-30 RX ORDER — FENTANYL CITRATE 50 UG/ML
INJECTION, SOLUTION INTRAMUSCULAR; INTRAVENOUS
Status: COMPLETED
Start: 2018-04-30 | End: 2018-04-30

## 2018-04-30 RX ORDER — FENTANYL CITRATE 50 UG/ML
10 INJECTION, SOLUTION INTRAMUSCULAR; INTRAVENOUS EVERY 5 MIN PRN
Status: DISCONTINUED | OUTPATIENT
Start: 2018-04-30 | End: 2018-04-30 | Stop reason: HOSPADM

## 2018-04-30 RX ORDER — OXYCODONE HYDROCHLORIDE 5 MG/1
5 TABLET ORAL
Status: DISCONTINUED | OUTPATIENT
Start: 2018-04-30 | End: 2018-05-01

## 2018-04-30 RX ADMIN — LIDOCAINE 1 PATCH: 50 PATCH CUTANEOUS at 05:04

## 2018-04-30 RX ADMIN — FENTANYL CITRATE 10 MCG: 50 INJECTION, SOLUTION INTRAMUSCULAR; INTRAVENOUS at 11:04

## 2018-04-30 RX ADMIN — ACETAMINOPHEN 1000 MG: 500 TABLET, FILM COATED ORAL at 05:04

## 2018-04-30 RX ADMIN — NEOSTIGMINE METHYLSULFATE 4 MG: 1 INJECTION INTRAVENOUS at 10:04

## 2018-04-30 RX ADMIN — HYDROMORPHONE HYDROCHLORIDE 0.5 MG: 1 INJECTION, SOLUTION INTRAMUSCULAR; INTRAVENOUS; SUBCUTANEOUS at 03:04

## 2018-04-30 RX ADMIN — FENTANYL CITRATE 25 MCG: 50 INJECTION, SOLUTION INTRAMUSCULAR; INTRAVENOUS at 08:04

## 2018-04-30 RX ADMIN — EPINEPHRINE 5 MCG: 0.1 INJECTION, SOLUTION ENDOTRACHEAL; INTRACARDIAC; INTRAVENOUS at 09:04

## 2018-04-30 RX ADMIN — ROPIVACAINE HYDROCHLORIDE 10 ML/HR: 2 INJECTION, SOLUTION EPIDURAL; INFILTRATION at 04:04

## 2018-04-30 RX ADMIN — ROCURONIUM BROMIDE 10 MG: 10 INJECTION, SOLUTION INTRAVENOUS at 08:04

## 2018-04-30 RX ADMIN — ALBUTEROL SULFATE 2 PUFF: 90 AEROSOL, METERED RESPIRATORY (INHALATION) at 10:04

## 2018-04-30 RX ADMIN — STANDARDIZED SENNA CONCENTRATE AND DOCUSATE SODIUM 2 TABLET: 8.6; 5 TABLET, FILM COATED ORAL at 01:04

## 2018-04-30 RX ADMIN — RAMELTEON 8 MG: 8 TABLET, FILM COATED ORAL at 01:04

## 2018-04-30 RX ADMIN — SODIUM CHLORIDE 500 ML: 0.9 INJECTION, SOLUTION INTRAVENOUS at 08:04

## 2018-04-30 RX ADMIN — POLYETHYLENE GLYCOL 3350 17 G: 17 POWDER, FOR SOLUTION ORAL at 01:04

## 2018-04-30 RX ADMIN — PHENYLEPHRINE HYDROCHLORIDE 100 MCG: 10 INJECTION INTRAVENOUS at 09:04

## 2018-04-30 RX ADMIN — ROPIVACAINE HYDROCHLORIDE 10 ML/HR: 2 INJECTION, SOLUTION EPIDURAL; INFILTRATION at 11:04

## 2018-04-30 RX ADMIN — GLYCOPYRROLATE 0.4 MG: 0.2 INJECTION, SOLUTION INTRAMUSCULAR; INTRAVENOUS at 10:04

## 2018-04-30 RX ADMIN — SODIUM CHLORIDE, SODIUM GLUCONATE, SODIUM ACETATE, POTASSIUM CHLORIDE, MAGNESIUM CHLORIDE, SODIUM PHOSPHATE, DIBASIC, AND POTASSIUM PHOSPHATE: .53; .5; .37; .037; .03; .012; .00082 INJECTION, SOLUTION INTRAVENOUS at 09:04

## 2018-04-30 RX ADMIN — MAGNESIUM OXIDE TAB 400 MG (241.3 MG ELEMENTAL MG) 800 MG: 400 (241.3 MG) TAB at 01:04

## 2018-04-30 RX ADMIN — ETOMIDATE 4 MG: 2 INJECTION, SOLUTION INTRAVENOUS at 09:04

## 2018-04-30 RX ADMIN — ACETAMINOPHEN 1000 MG: 10 INJECTION, SOLUTION INTRAVENOUS at 11:04

## 2018-04-30 RX ADMIN — RAMELTEON 8 MG: 8 TABLET, FILM COATED ORAL at 10:04

## 2018-04-30 RX ADMIN — VASOPRESSIN 2 UNITS: 20 INJECTION INTRAVENOUS at 09:04

## 2018-04-30 RX ADMIN — SUGAMMADEX 140 MG: 100 INJECTION, SOLUTION INTRAVENOUS at 10:04

## 2018-04-30 RX ADMIN — MUPIROCIN: 20 OINTMENT TOPICAL at 08:04

## 2018-04-30 RX ADMIN — SODIUM CHLORIDE 0.25 MCG: 9 INJECTION, SOLUTION INTRAVENOUS at 08:04

## 2018-04-30 RX ADMIN — CEFAZOLIN 2 G: 330 INJECTION, POWDER, FOR SOLUTION INTRAMUSCULAR; INTRAVENOUS at 05:04

## 2018-04-30 RX ADMIN — PHENYLEPHRINE HYDROCHLORIDE 200 MCG: 10 INJECTION INTRAVENOUS at 09:04

## 2018-04-30 RX ADMIN — SODIUM CHLORIDE: 0.9 INJECTION, SOLUTION INTRAVENOUS at 08:04

## 2018-04-30 RX ADMIN — ROPIVACAINE HYDROCHLORIDE 10 ML/HR: 2 INJECTION, SOLUTION EPIDURAL; INFILTRATION at 05:04

## 2018-04-30 RX ADMIN — ETOMIDATE 14 MG: 2 INJECTION, SOLUTION INTRAVENOUS at 08:04

## 2018-04-30 RX ADMIN — ROCURONIUM BROMIDE 40 MG: 10 INJECTION, SOLUTION INTRAVENOUS at 08:04

## 2018-04-30 RX ADMIN — ENOXAPARIN SODIUM 40 MG: 100 INJECTION SUBCUTANEOUS at 05:04

## 2018-04-30 RX ADMIN — CEFAZOLIN 2 G: 330 INJECTION, POWDER, FOR SOLUTION INTRAMUSCULAR; INTRAVENOUS at 09:04

## 2018-04-30 RX ADMIN — ROCURONIUM BROMIDE 20 MG: 10 INJECTION, SOLUTION INTRAVENOUS at 09:04

## 2018-04-30 RX ADMIN — PREGABALIN 150 MG: 50 CAPSULE ORAL at 10:04

## 2018-04-30 NOTE — ANESTHESIA POSTPROCEDURE EVALUATION
"Anesthesia Post Evaluation    Patient: Kelrine Grossman    Procedure(s) Performed: Procedure(s) (LRB):  IM NAILING OF FEMUR - hana table - large c arm door side (Right)    Final Anesthesia Type: general  Patient location during evaluation: PACU  Level of consciousness: awake and alert  Post-procedure vital signs: reviewed and stable  Pain management: adequate  Airway patency: patent  PONV status at discharge: No PONV  Anesthetic complications: no      Cardiovascular status: blood pressure returned to baseline  Respiratory status: unassisted and spontaneous ventilation  Hydration status: euvolemic  Follow-up not needed.        Visit Vitals  BP 99/61   Pulse 91   Temp 36.3 °C (97.4 °F) (Temporal)   Resp 20   Ht 5' 6" (1.676 m)   Wt 74 kg (163 lb 2.3 oz)   LMP  (LMP Unknown)   SpO2 97%   Breastfeeding? No   BMI 26.33 kg/m²       Pain/Yoko Score: Pain Assessment Performed: Yes (4/30/2018  1:07 PM)  Presence of Pain: complains of pain/discomfort (4/30/2018  1:07 PM)  Pain Rating Prior to Med Admin: 4 (4/30/2018 11:45 AM)  Pain Rating Post Med Admin: 0 (4/29/2018  1:30 AM)  Yoko Score: 9 (4/30/2018  1:07 PM)      "

## 2018-04-30 NOTE — PROGRESS NOTES
Full progress note to follow    Based on HR, RR, and O2 demands an AI rapid response was called overnight.  She has a mild leukocytosis associated with lactic acidosis overnight that did respond to IVF but she does not seem to tolerate fluids well due to poor nutritional status (albumin 1.4).  She went to SICU for hypotension 5 days ago after surgery.  Discussed with ortho this morning and based on my initial assessment she would be high risk for surgery at this point.  Without surgery she will likely have significant morbidity so I would recommend a detailed discussion between the patient and the orthopedic team about risks and benefits of surgery before decision is made.  I would also address code status before she goes to the OR again.    Alma Robles MD  [phone]

## 2018-04-30 NOTE — ANESTHESIA PROCEDURE NOTES
Suprainguinal Fascia Iliaca Catheter    Patient location during procedure: OR   Block not for primary anesthetic.  Reason for block: at surgeon's request and post-op pain management   Post-op Pain Location: R hip pain  Start time: 4/30/2018 8:45 AM  Timeout: 4/30/2018 8:45 AM   End time: 4/30/2018 9:00 AM  Staffing  Anesthesiologist: LEIGHA MONTELONGO  Resident/CRNA: KENYETTA ALCAZAR  Performed: resident/CRNA   Preanesthetic Checklist  Completed: patient identified, site marked, surgical consent, pre-op evaluation, timeout performed, IV checked, risks and benefits discussed and monitors and equipment checked  Peripheral Block  Patient position: supine  Prep: ChloraPrep and site prepped and draped  Patient monitoring: heart rate, cardiac monitor, continuous pulse ox, continuous capnometry and frequent blood pressure checks  Block type: fascia iliaca (Suprainguinal fascia iliaca)  Laterality: right  Injection technique: continuous  Needle  Needle type: Tuohy   Needle gauge: 17 G  Needle length: 3.5 in  Needle localization: anatomical landmarks and ultrasound guidance  Catheter type: spring wound  Catheter size: 19 G  Catheter at skin depth: 10 cm  Test dose: lidocaine 1.5% with Epi 1-to-200,000 and negative   -ultrasound image captured on disc.  Assessment  Injection assessment: negative aspiration, negative parasthesia and local visualized surrounding nerve  Paresthesia pain: none  Heart rate change: no  Slow fractionated injection: yes  Medications:  Bolus administered: 40 mL of 0.25 bupivacaine  Epinephrine added: 3.75 mcg/mL (1/300,000)  Additional Notes  VSS.  D[facility] RN monitoring vitals throughout procedure.  Patient tolerated procedure well.

## 2018-04-30 NOTE — PROGRESS NOTES
[facility]  Orthopedics  Progress Note    Attg Note:  Patient seen and examined.  I agree with the above assessment and plan.  Doing better this morning.  Lactate now 1.6.  She is a higher risk patient for any intervention, but my concern is getting her out of bed and mobilized.  She has a lytic intertrochanteric lesion on the right.  If she ambulates and breaks through there, the morbidity and chance of earlier mortality would be even greater.  To OR for IMN of right femur.  The risks, benefits and alternatives to surgery were discussed with the patient at great length.  These include bleeding, infection, vessel/nerve damage, pain, numbness, tingling, complex regional pain syndrome, hardware/surgical failure, need for further surgery, malunion, nonunion, DVT, PE, arthritis and death.  Patient states an understanding and wishes to proceed with surgery.   All questions were answered.  No guarantees were implied or stated.  Informed consent was obtained.      Jd Cabrera MD      Patient Name: Kerline Grossman  MRN: [id number]  Admission Date: 4/20/2018  Hospital Length of Stay: 9 days  Attending Provider: David Cruz MD  Primary Care Provider: Primary Doctor No  Follow-up For: Procedure(s) (LRB):  IM NAILING OF FEMUR (Left)    Post-Operative Day: 5 Days Post-Op  Subjective:     Principal Problem:Tachycardia    Principal Orthopedic Problem: B femur impending fractures    Interval History: Patient seen and examined at bedside.  No acute events overnight.  Pain controlled.  No PT yesterday.  NC 2.5L.  Still in sinus tachycardia with some tachypnea.    Review of patient's allergies indicates:  No Known Allergies    Current Facility-Administered Medications   Medication    0.9%  NaCl infusion (for blood administration)    bisacodyl suppository 10 mg    calcium gluconate 1g in dextrose 5% 100mL (ready to mix system)    calcium gluconate 1g in dextrose 5% 100mL (ready to mix system)    calcium  "gluconate 1g in dextrose 5% 100mL (ready to mix system)    dextrose 50% injection 12.5 g    dextrose 50% injection 25 g    enoxaparin injection 40 mg    glucagon (human recombinant) injection 1 mg    insulin aspart U-100 pen 1-10 Units    lidocaine 5 % patch 1 patch    magnesium oxide tablet 800 mg    magnesium sulfate 2g in water 50mL IVPB (premix)    magnesium sulfate 2g in water 50mL IVPB (premix)    omnipaque oral solution 15 mL    ondansetron disintegrating tablet 8 mg    oxyCODONE immediate release tablet 5 mg    polyethylene glycol packet 17 g    promethazine (PHENERGAN) 6.25 mg in dextrose 5 % 50 mL IVPB    ramelteon tablet 8 mg    ramelteon tablet 8 mg    ropivacaine (PF) 2 mg/ml (0.2%) infusion    senna-docusate 8.6-50 mg per tablet 2 tablet    sodium chloride 0.9% flush 3 mL    sodium chloride 0.9% flush 3 mL    sodium chloride 0.9% flush 5 mL    sodium phosphate 15 mmol in dextrose 5 % 250 mL IVPB    sodium phosphate 20.01 mmol in dextrose 5 % 250 mL IVPB    sodium phosphate 30 mmol in dextrose 5 % 250 mL IVPB    white petrolatum 41 % ointment     Objective:     Vital Signs (Most Recent):  Temp: 98 °F (36.7 °C) (04/30/18 0438)  Pulse: (!) 115 (04/30/18 0438)  Resp: (!) 21 (04/30/18 0438)  BP: 131/74 (04/30/18 0438)  SpO2: (!) 94 % (04/30/18 0438) Vital Signs (24h Range):  Temp:  [96.3 °F (35.7 °C)-98.6 °F (37 °C)] 98 °F (36.7 °C)  Pulse:  [109-130] 115  Resp:  [15-30] 21  SpO2:  [91 %-98 %] 94 %  BP: (115-144)/(66-81) 131/74     Weight: 74 kg (163 lb 2.3 oz)  Height: 5' 6" (167.6 cm)  Body mass index is 26.33 kg/m².      Intake/Output Summary (Last 24 hours) at 04/30/18 0552  Last data filed at 04/30/18 0438   Gross per 24 hour   Intake          2462.67 ml   Output             5300 ml   Net         -2837.33 ml       Ortho/SPM Exam    AAOx4  NAD  Regular rhythm, tachycardic  Tachypnea   L thigh dressings with stable, dry drainage  SILT and motor intact T/SP/DP  WWP " extremities  FCDs in place and functioning    Significant Labs:   CBC:     Recent Labs  Lab 04/29/18  0441 04/29/18  2317   WBC 12.41 13.00*   HGB 9.0* 9.1*   HCT 28.5* 28.1*    157     CMP:     Recent Labs  Lab 04/29/18  0441 04/29/18  2317    136   K 3.9 3.7    107   CO2 20* 21*   GLU 78 96   BUN 18 16   CREATININE 1.0 1.0   CALCIUM 7.8* 7.8*   PROT 5.1* 5.1*   ALBUMIN 1.4* 1.4*   BILITOT 0.5 0.5   ALKPHOS 348* 335*   AST 50* 58*   ALT 7* 9*   ANIONGAP 9 8   EGFRNONAA >60.0 >60.0     All pertinent labs within the past 24 hours have been reviewed.    Significant Imaging: I have reviewed all pertinent imaging results/findings.    Assessment/Plan:     Bony metastasis    58 y.o. female POD5 s/p L CMN    Pain control  PT/OT: bed rest  DVT PPx: hold lovenox, FCDs at all times when not ambulating  Abx: postop Ancef complete  Labs: pending this AM  Drain: none  Ponce: none    Dispo: Plan for CMN R femur this AM.  R/B/A discussed with patient and family.  They understand and wish to proceed.  Lactate 2.7 overnight.  Labs this AM pending.  Will reevaluate after labs result.  NPO.              Frank Argueta MD  Orthopedics  [facility]

## 2018-04-30 NOTE — PLAN OF CARE
Problem: Patient Care Overview  Goal: Plan of Care Review  Outcome: Ongoing (interventions implemented as appropriate)  Pt alert, tachycardic, tachypnic - MD aware. Family at bedside have questions for MD prior to surgery tomorrow. MD aware. IVF initiated. Tele monitor on. Pain managed with PRN medications. IS education complete. Patient refuses SCD's. Fall precautions maintained. Will monitor.

## 2018-04-30 NOTE — OP NOTE
DATE OF PROCEDURE:  04/30/2018.    PREOPERATIVE DIAGNOSIS:  Metastatic cancer, lytic lesions impending fracture,   right femur.    POSTOPERATIVE DIAGNOSIS:  Metastatic cancer, lytic lesions impending fracture,   right femur.    PROCEDURE PERFORMED:  Intramedullary nail fixation of right femur.    SURGEON:  Jd Cabrera M.D.    ASSISTANT:  Frank Argueta M.D. (RES)    ANESTHESIA:  General endotracheal.    ESTIMATED BLOOD LOSS:  50 mL    IV FLUIDS:  1500 mL of crystalloid and 1 unit packed red blood cells.    IMPLANTS:  Synthes trochanteric fixation nail advanced 400 x 12 mm with 100 mm   hip screw and 2 distal interlocking screws.    INDICATIONS FOR PROCEDURE:  The patient is a 58-year-old female with metastatic   cancer, yet to be completely elucidated.  I took her to the Operating Room last   week and placed a cephalomedullary nail in her left femur for impending   fractures in that area with significant lytic lesions in the intertrochanteric   region as well as the femoral shaft.  She also had significant lesions in the   right femur to include a very large anterior cortical defect on the   intertrochanteric region of the right femur.  She was in the ICU for a few days   after her left nail, but has come out and is doing as well she is going to be   doing, so I will bring her back to fix the right side, so that she can mobilize.    The risks, benefits, and alternatives of surgery were discussed with the   patient and the patient's family prior to going to the Operating Room.  Informed   consent was obtained.    PROCEDURE IN DETAIL:  The patient was identified in the preoperative holding   area and site was marked.  The patient was wheeled into the Operating Room and   general endotracheal anesthesia was induced in the patient's hospital bed.    Regional analgesia performed in the form of a fascia iliaca suprainguinal   catheter and then the patient was placed on the fracture table with all bony   prominences well  padded and both lower extremities in traction boots.  Right   lower extremity was prepped and draped in sterile fashion from the hip down.  A   timeout was undertaken to confirm patient, site, surgery, surgeon and   administration of preoperative antibiotics.  All agreed and we proceeded.    Starting incision was made proximal to the greater trochanter and the guidewire   for the entry reamer placed in appropriate position.  Entry reamer was used and   then the reaming spencer was placed in the distal aspect of the femur.  We had   already opened the 400 mm nail after we had done the other side, but chose to go   slightly narrower on the nail since she did not have as many lytic lesions in   the shaft.  Sequential reaming was taken up to a level of 14 mm, and a 12 x 400   mm nail was placed over the guide spencer and passed distally under direct lateral   fluoroscopic guidance to ensure its proper position.    Attention was turned proximally and a separate lateral incision was made.  The   guidewire for the hip screw was placed center-center in the femoral head.  This   was measured, drilled and a 100 mm hip screw was placed and the proximal   cephalic screw was tightened and backed up just a little bit to allow for   control collapse should that be necessary.    Attention was turned distally and using one incision, 2 distal interlocking   screws were placed.  The second one I thought was long and switched out for a   slightly shorter one and we had very good purchase distally.    The entire construct was visualized under fluoroscopy with good nail placement.    The wounds were copiously irrigated with normal saline solution.   Deep fascia   proximally was closed with 0 Vicryl suture and then all wounds were closed with   2-0 Vicryl suture for subcutaneous tissue and 3-0 nylon suture in the skin.    Aquacel dressings were applied, and the patient was turned to supine position on   the hospital bed and this time, extubated,  awakened and taken to Recovery in   stable condition.    PLAN FOR THE PATIENT:  At this point, we have both femurs nail.  We will try   with physical therapy to get her mobilized out of bed, weightbearing as   tolerated.      FABRICE/DAVID  dd: 04/30/2018 11:02:00 (CDT)  td: 04/30/2018 11:43:31 (CDT)  Doc ID   #[id number]  Job ID #[id number]    CC:

## 2018-04-30 NOTE — SUBJECTIVE & OBJECTIVE
Principal Problem:Tachycardia    Principal Orthopedic Problem: B femur impending fractures    Interval History: Patient seen and examined at bedside.  No acute events overnight.  Pain controlled.  No PT yesterday.  NC 2.5L.  Still in sinus tachycardia with some tachypnea.    Review of patient's allergies indicates:  No Known Allergies    Current Facility-Administered Medications   Medication    0.9%  NaCl infusion (for blood administration)    bisacodyl suppository 10 mg    calcium gluconate 1g in dextrose 5% 100mL (ready to mix system)    calcium gluconate 1g in dextrose 5% 100mL (ready to mix system)    calcium gluconate 1g in dextrose 5% 100mL (ready to mix system)    dextrose 50% injection 12.5 g    dextrose 50% injection 25 g    enoxaparin injection 40 mg    glucagon (human recombinant) injection 1 mg    insulin aspart U-100 pen 1-10 Units    lidocaine 5 % patch 1 patch    magnesium oxide tablet 800 mg    magnesium sulfate 2g in water 50mL IVPB (premix)    magnesium sulfate 2g in water 50mL IVPB (premix)    omnipaque oral solution 15 mL    ondansetron disintegrating tablet 8 mg    oxyCODONE immediate release tablet 5 mg    polyethylene glycol packet 17 g    promethazine (PHENERGAN) 6.25 mg in dextrose 5 % 50 mL IVPB    ramelteon tablet 8 mg    ramelteon tablet 8 mg    ropivacaine (PF) 2 mg/ml (0.2%) infusion    senna-docusate 8.6-50 mg per tablet 2 tablet    sodium chloride 0.9% flush 3 mL    sodium chloride 0.9% flush 3 mL    sodium chloride 0.9% flush 5 mL    sodium phosphate 15 mmol in dextrose 5 % 250 mL IVPB    sodium phosphate 20.01 mmol in dextrose 5 % 250 mL IVPB    sodium phosphate 30 mmol in dextrose 5 % 250 mL IVPB    white petrolatum 41 % ointment     Objective:     Vital Signs (Most Recent):  Temp: 98 °F (36.7 °C) (04/30/18 0438)  Pulse: (!) 115 (04/30/18 0438)  Resp: (!) 21 (04/30/18 0438)  BP: 131/74 (04/30/18 0438)  SpO2: (!) 94 % (04/30/18 0438) Vital Signs (24h  "Range):  Temp:  [96.3 °F (35.7 °C)-98.6 °F (37 °C)] 98 °F (36.7 °C)  Pulse:  [109-130] 115  Resp:  [15-30] 21  SpO2:  [91 %-98 %] 94 %  BP: (115-144)/(66-81) 131/74     Weight: 74 kg (163 lb 2.3 oz)  Height: 5' 6" (167.6 cm)  Body mass index is 26.33 kg/m².      Intake/Output Summary (Last 24 hours) at 04/30/18 0552  Last data filed at 04/30/18 0438   Gross per 24 hour   Intake          2462.67 ml   Output             5300 ml   Net         -2837.33 ml       Ortho/SPM Exam    AAOx4  NAD  Regular rhythm, tachycardic  Tachypnea   L thigh dressings with stable, dry drainage  SILT and motor intact T/SP/DP  WWP extremities  FCDs in place and functioning    Significant Labs:   CBC:     Recent Labs  Lab 04/29/18  0441 04/29/18  2317   WBC 12.41 13.00*   HGB 9.0* 9.1*   HCT 28.5* 28.1*    157     CMP:     Recent Labs  Lab 04/29/18  0441 04/29/18  2317    136   K 3.9 3.7    107   CO2 20* 21*   GLU 78 96   BUN 18 16   CREATININE 1.0 1.0   CALCIUM 7.8* 7.8*   PROT 5.1* 5.1*   ALBUMIN 1.4* 1.4*   BILITOT 0.5 0.5   ALKPHOS 348* 335*   AST 50* 58*   ALT 7* 9*   ANIONGAP 9 8   EGFRNONAA >60.0 >60.0     All pertinent labs within the past 24 hours have been reviewed.    Significant Imaging: I have reviewed all pertinent imaging results/findings.  "

## 2018-04-30 NOTE — PROGRESS NOTES
Hospital Medicine  Progress Note                                                                Team: [facility]     Patient Name: Kerline Grossman   YOB: 1960  Location: 629/629A    Admit Date: 4/20/2018                     LOS: 9    SUBJECTIVE:     FOLLOW-UP FOR: Metastatic breast cancer    INTERVAL HISTORY: I saw pt after her arrival to the floor from PACU, she complained of pain all over and did not wish to be examined.  RN at bedside to administer medications.  Daughter at bedside with no questions.    HPI: Mrs. Grossman is a 59 yo female who was brought to [facility] this afternoon for second opinion regarding recently diagnosed likely metastatic malignancy with unknown primary. Per patient's daughter about a month ago her mother started to gradually decline secondary to diffuse pain. She tehn became progressively more altered and eventually not responsive. She could no longer walk and was urinating on herself. Her family brought her to ED at [facility]. They brought some records from her stay there which show that work up revealed a corrected calcium of 13 and ZACH. She had CT brain, chest and abdomen which showed diffuse lytic lesions and some pathological fractures in her skull, ribs, and hips but no lesion that pointed to primary tumor. Her calcium eventually trended down. SPEP and UPEP were negative. Her daughter states she had 2 BM biopsies which were unrevealing. She had CT guided biopsy of a lesion in her hip on 4/20 as well as diagnostic thoracentesis which removed only 50 ccs from her pleural effusion. Her daughter states the physicians at [facility] were recommending inpatient hospice for her mother as it seemed she most likely had widely metastatic disease with unknown primary tumor however the patient;s children wished to have a definitive diagnoses before making a decision. They initially attempted to transfer to [facility] but were denied and then decided to [facility] and brought her here themselves. They state she had become more interactive and alert int he last few days and was showing signs of improvement. Of note the daughter states patient was on a morphine gtt at outside hospital and took and MS contin before being discharge. She states she was having some hypotension at [facility] which she states the physicians attributed to the morphine gtt.      She has metastatic lesions to the bilateral femurs with concern for impending fracture. On 4/25/18, she was taken to the OR by Ortho for nailing of the left femur. Approximately 1 hour into the case, she became hypotensive requirement increasing amounts of vasopressor support. JAMILA was done in the OR by anesthesia and reportedly showed signs of right heart strain. Decision was made to leave her intubated and obtain stat CTA to rule out PE. Patient was seen in the PACU intubated, sedated, and on 0.2 of epi.     REVIEW OF SYSTEMS:  Unable to obtain 2/2 patient's pain and emotional status     MEDICATIONS:  Scheduled:   acetaminophen  1,000 mg Oral Q6H    ceFAZolin (ANCEF) IVPB  2 g Intravenous Q8H    enoxaparin  40 mg Subcutaneous Daily    lidocaine  1 patch Transdermal Q24H    magnesium oxide  800 mg Oral Daily    polyethylene glycol  17 g Oral Daily    pregabalin  150 mg Oral QHS    ramelteon  8 mg Oral QHS    senna-docusate 8.6-50 mg  2 tablet Oral Daily     Continuous:   ropivacaine (PF) 2 mg/ml (0.2%) 10 mL/hr (04/30/18 1120)     PRN:  sodium chloride, bisacodyl, dextrose 50%, dextrose 50%, glucagon (human recombinant), HYDROmorphone, HYDROmorphone, insulin aspart U-100, omnipaque, ondansetron, ondansetron, oxyCODONE, oxyCODONE, oxyCODONE, promethazine (PHENERGAN) IVPB, ramelteon, sodium chloride 0.9%, sodium chloride 0.9%, sodium chloride 0.9%, sodium chloride 0.9%, white petrolatum      ALLERGIES:  Review of patient's allergies indicates:  No Known Allergies      OBJECTIVE:     VITALS  Most Recent Range (Last 24H)  "  /68 (BP Location: Right arm, Patient Position: Lying)   Pulse 103   Temp 99 °F (37.2 °C)   Resp (!) 24   Ht 5' 6" (1.676 m)   Wt 74 kg (163 lb 2.3 oz)   LMP  (LMP Unknown)   SpO2 (!) 94%   Breastfeeding? No   BMI 26.33 kg/m²  Temp:  [97 °F (36.1 °C)-99 °F (37.2 °C)]   Pulse:  []   Resp:  [15-24]   BP: ()/(53-90)   SpO2:  [92 %-99 %]   Arterial Line BP: ()/(48-61)      Intake and Output  BMI     Intake/Output Summary (Last 24 hours) at 04/30/18 1639  Last data filed at 04/30/18 1307   Gross per 24 hour   Intake          4435.67 ml   Output             3350 ml   Net          1085.67 ml       Net I/O since admission: Body mass index is 26.33 kg/m².      PHYSICAL EXAM  Pt declined examination      LABS  CBC/Anemia Labs Coag Labs     Recent Labs  Lab 04/29/18 2317 04/30/18 0415 04/30/18  1120   WBC 13.00* 13.13* 13.47*   HGB 9.1* 8.8* 9.0*   HCT 28.1* 28.3* 28.7*   MCV 88 89 92    172 115*    Lab Results   Component Value Date    INR 0.9 04/24/2018    INR 1.0 04/20/2018    INR 0.9 06/14/2015        BMP     Recent Labs  Lab 04/29/18 0441 04/29/18 2317 04/30/18  0415   GLU 78 96 74    136 137   K 3.9 3.7 3.7    107 108   CO2 20* 21* 21*   BUN 18 16 15   CREATININE 1.0 1.0 0.9   CALCIUM 7.8* 7.8* 7.7*      Mag & Phos LFTs     Recent Labs  Lab 04/29/18 0441 04/29/18 2317 04/30/18  0415   MG 1.4* 1.5* 1.5*   PHOS 3.0 2.5* 2.7      Recent Labs  Lab 04/29/18 0441 04/29/18 2317 04/30/18  0415   PROT 5.1* 5.1* 5.1*   BILITOT 0.5 0.5 0.6   ALKPHOS 348* 335* 327*   AST 50* 58* 59*   ALT 7* 9* 9*             Cardiac Enzymes Ejection Fractions/BNP     Recent Labs  Lab 04/24/18  1200 04/24/18  1807 04/26/18  0300   TROPONINI <0.006 0.011 0.027*    EF   Date Value Ref Range Status   04/27/2018 60 55 - 65    04/21/2018 60 55 - 65      No results for input(s): BNP in the last 168 hours.     No results found for: HGBA1C      Microbiology Results (last 7 days)     Procedure " Component Value Units Date/Time    Blood culture x two cultures. Draw prior to antibiotics. [[id number]] Collected:  04/20/18 2152    Order Status:  Completed Specimen:  Blood from Peripheral, Antecubital, Left Updated:  04/26/18 0612     Blood Culture, Routine No growth after 5 days.    Narrative:       Aerobic and anaerobic    Blood culture x two cultures. Draw prior to antibiotics. [[id number]] Collected:  04/20/18 2205    Order Status:  Completed Specimen:  Blood from Peripheral, Forearm, Left Updated:  04/26/18 0612     Blood Culture, Routine No growth after 5 days.    Narrative:       Aerobic and anaerobic          INVESTIGATION RESULTS    Imaging Results          X-Ray Chest AP Portable (Final result)  Result time 04/20/18 22:18:20    Final result by Konstantin Lainez MD (04/20/18 22:18:20)                 Impression:      Multiple lytic lesions and/or pathologic rib fractures.  Right pleural effusion with associated passive atelectasis.  Additional findings as described above.      Electronically signed by: Konstantin Lainez MD  Date:    04/20/2018  Time:    22:18             Narrative:    EXAMINATION:  XR CHEST AP PORTABLE    CLINICAL HISTORY:  Sepsis;    TECHNIQUE:  Single frontal view of the chest was performed.    COMPARISON:  12/20/2006.    FINDINGS:  Cardiac wires overlie the chest.  Cardiac silhouette is magnified by technique but not felt to be enlarged.  Elevation of the right hemidiaphragm with associated right basilar effusion and subsegmental atelectasis.  Left pleural thickening or parenchymal scarring with subsegmental atelectasis.  Multiple bilateral rib fractures or aggressive osseous lesions.  Lytic lesion noted in the left humeral head, metastasis included in the differential.  Recommend correlation with clinical findings and patient history.                                  ASSESSMENT/PLAN:     Current Problems List:  Active Hospital Problems    Diagnosis  POA    *Metastatic breast cancer  [C50.919]  Yes    Malignant neoplasm of central portion of right breast in female, estrogen receptor positive [C50.111, Z17.0]  Not Applicable    Cardiac rhythm disorder or disturbance or change [I49.9]  Yes    CHF (congestive heart failure) [I50.9]  Yes    Right heart failure [I50.810]  Yes    Acute hypoxemic respiratory failure [J96.01]  Yes    Tachycardia [R00.0]  Yes    Lactic acidemia [E87.2]  Yes    Severe malnutrition [E43]  Yes    Cancer of left femur [C40.22]  Yes    Cancer of right femur [C40.21]  Yes    Palliative care encounter [Z51.5]  Not Applicable    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 Diffuse lytic lesions throughout entire central axial red marrow skeleton. Lytic lesions of proximal long bones and pathological fractures of ribs and right superior and inferior pubic ramus.      Pleural effusion [J90]  Yes      Resolved Hospital Problems    Diagnosis Date Resolved POA    Shock [R57.9] 04/26/2018 Unknown    Encounter for weaning from ventilator [Z99.11] 04/30/2018 Not Applicable    Encephalopathy, metabolic [G93.41] 04/21/2018 Yes       ASSESSMENT:   58 y.o. year old female with metastatic breast cancer      PLAN:    Metastatic breast cancer  Mets to bone  - oncology following, pt did not want to talk this afternoon after arriving to the floor from surgery, discussed with Dr. Fink (onc) he will see her tomorrow  - she may be a candidate for some oral therapy  - radiation oncology consult placed  - pain control (BP is sensitive to narcotics)  - palliative care saw her while she was in ICU, would continue discussions     Pathologic fracture of ribs pubic ramus  - s/p IM nailing femur x2    Hypoxemic respiratory failure  - pulmonary edema  from IVF  - indeterminate diastolic function on 4/27 echo, PAP 53mmHg  - currently on 4L NC since arriving to floor from PACU    Lactic acidosis  - 2.7 overnight, responded to IVF  - does not appear infected  - again elevated to 2.4 after procedure, will trend    Urinary incontinence   - rocha in place, consider switch to purwick when pt can tolerate    Pleural effusion  - likely metastatic, s/p sampling at OSH  - associated compressive atelectasis bilaterally  - multiple pulmonary micronodules    Anemia   - likely chronic disease with some blood loss during procedures  - iron studies pending    Diet: adult regular    DVT Prophylaxis: lovenox    Code Status: full    Alma Robles MD  [phone]

## 2018-04-30 NOTE — BRIEF OP NOTE
BRIEF OP NOTE    Preop Dx: Metastatic cancer with lytic lesions and impending fracture right femur    Postop Dx: Metastatic cancer with lytic lesions and impending fracture right femur    Procedure: Intramedullary nail fixation of right femur    Surgeon: Jd Cabrera M.D.    Asst:  Frank Elizondo M.D    Anesthesia: GETA    EBL:  50cc    IVF:  1500cc crystalloid, 1U PRBC    Implants: Synthes TFNa 400x12 with 100mm hip screw and 2 distal IL screws.    Specimens: None    Findings: Stable fixation. WBAT    Dispo:  To PACU extubated/stable     Dict#  [id number]

## 2018-04-30 NOTE — NURSING
IMT paged to notify MD of sustained tachycardia (125-130 BPM/1 hour). Dr. Larsen aware, no new orders at this time. Also, family has lots of questions for primary team. MD aware.

## 2018-04-30 NOTE — PLAN OF CARE
Plan of care and periop routine discussed with patient. Pt verbalized understanding. All questions answered. VSS. Respirations even and unlabored. Pt reports surgical pain is tolerable. . Will continue to monitor.

## 2018-04-30 NOTE — ANESTHESIA PROCEDURE NOTES
Arterial    Diagnosis: surgery, anemia    Patient location during procedure: done in OR  Procedure start time: 4/30/2018 8:45 AM  Timeout: 4/30/2018 8:45 AM  Procedure end time: 4/30/2018 8:49 AM  Staffing  Anesthesiologist: BRANDON HORN  Performed: anesthesiologist   Anesthesiologist was present at the time of the procedure.  Preanesthetic Checklist  Completed: patient identified, site marked, surgical consent, pre-op evaluation, timeout performed, IV checked, risks and benefits discussed, monitors and equipment checked and anesthesia consent givenArterial  Skin Prep: chlorhexidine gluconate  Orientation: left  Location: radial  Catheter Size: 20 G  Catheter placement by Anatomical landmarks. Heme positive aspiration all ports.Insertion Attempts: 1  Assessment  Dressing: steri-strips and tegaderm  Patient: Tolerated well

## 2018-04-30 NOTE — NURSING
"  RN Proactive Rounding Note  Time of Visit:     Admit Date: 2018  LOS: 9  Code Status: Full Code   Date of Visit: 2018  : 1960  Age: 58 y.o.  Sex: female  Bed: [address] [place] [address] A:   MRN: [id number]  Was the patient discharged from an ICU this admission? yes  Was the patient discharged from a PACU within last 24 hours? no  Did the patient receive conscious sedation/general anesthesia in last 24 hours? no  Was the patient in the ED within the past 24 hours? no  Was the patient started on NIPPV within the past 24 hours? no  Attending Physician: David Cruz MD  Primary Service: Networked reference to record PCT       ASSESSMENT:     Abnormal Vital Signs: , increase RR 22  Clinical Issues: Circulatory   INTERVENTIONS/ RECOMMENDATIONS:   Called to see patient per nurse. Increased restlessness and confusion. Patient was asleep but responded to verbal stimuli and followed commands on assessment. Patient stated"I just don't feal good"    Discussed plan of care with RN    PHYSICIAN ESCALATION:     Yes    Orders received and case discussed with Dr. Cross    Disposition: Labs ordered, will follow    FOLLOW-UP/CONTINGENCY:       Call back the Rapid Response Nurse at x [phone]  for additional questions or concerns      "

## 2018-04-30 NOTE — ANESTHESIA POST-OP PAIN MANAGEMENT
Acute Pain Service Progress Note    Kerline Grossman is a 58 y.o., female, [id number].    Surgery:  IM nail of left femur     Post Op Day #: 5     Catheter type: perineural  SIFI     Infusion type: Ropivacaine 0.2%  8 basal    Problem List:    Active Hospital Problems    Diagnosis  POA    *Tachycardia [R00.0]  Yes    Malignant neoplasm of central portion of right breast in female, estrogen receptor positive [C50.111, Z17.0]  Not Applicable    Cardiac rhythm disorder or disturbance or change [I49.9]  Unknown    CHF (congestive heart failure) [I50.9]  Unknown    Right heart failure [I50.810]  Yes    Acute hypoxemic respiratory failure [J96.01]  Yes    Encounter for weaning from ventilator [Z99.11]  Not Applicable    Lactic acidemia [E87.2]  Yes    Severe malnutrition [E43]  Yes    Cancer of left femur [C40.22]  Yes    Cancer of right femur [C40.21]  Yes    Palliative care encounter [Z51.5]  Not Applicable    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 Diffuse lytic lesions throughout entire central axial red marrow skeleton. Lytic lesions of proximal long bones and pathological fractures of ribs and right superior and inferior pubic ramus.      Adenocarcinoma of unknown primary [C80.1]  Yes    Pleural effusion [J90]  Yes      Resolved Hospital Problems    Diagnosis Date Resolved POA    Shock [R57.9] 04/26/2018 Unknown    Encephalopathy, metabolic [G93.41] 04/21/2018 Yes     Subjective:                 General appearance of alert, oriented, no complaints              Pain with rest: 1    Numbers              Pain with movement: 1    Numbers              Side Effects                          1. Pruritis No                          2. Nausea No        Objective:                  Catheter site clean, dry, intact        Vitals   Vitals:    04/30/18 0438   BP: 131/74   Pulse: (!) 115   Resp: (!) 21   Temp: 36.7 °C (98 °F)        Labs    Admission on 04/20/2018   No results displayed because visit has over 200 results.           Meds   Current Facility-Administered Medications   Medication Dose Route Frequency Provider Last Rate Last Dose    0.9%  NaCl infusion (for blood administration)   Intravenous Q24H PRN Kings Garner MD        bisacodyl suppository 10 mg  10 mg Rectal Daily PRN Frank Argueta MD        calcium gluconate 1g in dextrose 5% 100mL (ready to mix system)  1 g Intravenous PRN Kelsey Torre MD        calcium gluconate 1g in dextrose 5% 100mL (ready to mix system)  1 g Intravenous PRN Kelsey Torre MD        calcium gluconate 1g in dextrose 5% 100mL (ready to mix system)  1 g Intravenous PRN Kelsey Torre MD        ceFAZolin injection 2 g  2 g Intravenous On Call Procedure Frank Argueta MD        dextrose 50% injection 12.5 g  12.5 g Intravenous PRN Reginald Romero MD   12.5 g at 04/28/18 0619    dextrose 50% injection 25 g  25 g Intravenous PRN KITTY Delatorre   25 g at 04/26/18 0627    fentaNYL injection 25 mcg  25 mcg Intravenous Q5 Min PRN Gwendolyn Coburn MD        glucagon (human recombinant) injection 1 mg  1 mg Intramuscular PRN Reginald Romero MD        insulin aspart U-100 pen 1-10 Units  1-10 Units Subcutaneous Q6H PRN Reginald Romero MD   4 Units at 04/25/18 2227    lidocaine 5 % patch 1 patch  1 patch Transdermal Q24H David Cruz MD   1 patch at 04/29/18 1852    magnesium oxide tablet 800 mg  800 mg Oral Daily Gordon Cross MD        magnesium sulfate 2g in water 50mL IVPB (premix)  2 g Intravenous PRN Kelsey Torre MD   2 g at 04/27/18 2007    magnesium sulfate 2g in water 50mL IVPB (premix)  4 g Intravenous PRN Kelsey Torre MD   4 g at 04/29/18 0643    midazolam (VERSED) 1 mg/mL injection 0.5 mg  0.5 mg Intravenous Q5 Min PRN Gwendolyn  MD Almas        mupirocin 2 % ointment   Nasal On Call Procedure Frank Argueta MD        omnipaque oral solution 15 mL  15 mL Oral PRN Celena Wright MD   15 mL at 04/22/18 1116    ondansetron disintegrating tablet 8 mg  8 mg Oral Q8H PRN KITTY Delatorre        oxyCODONE immediate release tablet 5 mg  5 mg Oral Q6H PRN Keenan Licea MD   5 mg at 04/29/18 2342    polyethylene glycol packet 17 g  17 g Oral Daily Frank Argueta MD   17 g at 04/29/18 0909    promethazine (PHENERGAN) 6.25 mg in dextrose 5 % 50 mL IVPB  6.25 mg Intravenous Q6H PRN Atif Valencia MD        ramelteon tablet 8 mg  8 mg Oral Nightly PRN Kelsey Torre MD        ramelteon tablet 8 mg  8 mg Oral QHS Kelsey Torre MD   8 mg at 04/30/18 0111    ropivacaine (PF) 2 mg/ml (0.2%) infusion  10 mL/hr Perineural Continuous Atif Valencia MD 10 mL/hr at 04/30/18 0438 10 mL/hr at 04/30/18 0438    senna-docusate 8.6-50 mg per tablet 2 tablet  2 tablet Oral Daily David Cruz MD   2 tablet at 04/29/18 0909    sodium chloride 0.9% flush 3 mL  3 mL Intravenous PRN Han Pelayo MD        sodium chloride 0.9% flush 3 mL  3 mL Intravenous PRN Han Pelayo MD        sodium chloride 0.9% flush 5 mL  5 mL Intravenous PRN KITTY Delatorre        sodium phosphate 15 mmol in dextrose 5 % 250 mL IVPB  15 mmol Intravenous PRN Kelsey Torre MD        sodium phosphate 20.01 mmol in dextrose 5 % 250 mL IVPB  20.01 mmol Intravenous PRN Kelsey Torre MD        sodium phosphate 30 mmol in dextrose 5 % 250 mL IVPB  30 mmol Intravenous PRN Kelsey Torre MD        white petrolatum 41 % ointment   Topical (Top) PRN David Cruz MD            Anticoagulant dose lovenox 40 daily     Assessment:     Pain control adequate    Plan:     Patient doing well, continue present treatment.     Rapid response called overnight due to elevated HR, RR and O2 demands. Ortho planning on CMN of R femur today. Presently, L PNC  controlling pain mostly; patient complaining of 5/10 pain but mostly related to back pain. Did require bolus of catheter overnight and an early dose of oxycodone 5 mg. Plan to continue present PNC and likely place R sided PNC for R femur nailing.    Evaluator Sarai Coyne

## 2018-04-30 NOTE — TRANSFER OF CARE
"Anesthesia Transfer of Care Note    Patient: Kerline Grossman    Procedure(s) Performed: Procedure(s) (LRB):  IM NAILING OF FEMUR - hana table - large c arm door side (Right)    Patient location: PACU    Anesthesia Type: general    Transport from OR: Transported from OR on 6-10 L/min O2 by face mask with adequate spontaneous ventilation    Post pain: adequate analgesia    Post assessment: no apparent anesthetic complications and tolerated procedure well    Post vital signs: stable    Level of consciousness: awake    Nausea/Vomiting: no nausea/vomiting    Complications: none    Transfer of care protocol was followed      Last vitals:   Visit Vitals  BP (!) 144/90 (BP Location: Right arm, Patient Position: Lying)   Pulse (!) 111   Temp 37 °C (98.6 °F) (Temporal)   Resp 20   Ht 5' 6" (1.676 m)   Wt 74 kg (163 lb 2.3 oz)   LMP  (LMP Unknown)   SpO2 97%   Breastfeeding? No   BMI 26.33 kg/m²     "

## 2018-04-30 NOTE — ASSESSMENT & PLAN NOTE
58 y.o. female POD5 s/p L CMN    Pain control  PT/OT: bed rest  DVT PPx: hold lovenox, FCDs at all times when not ambulating  Abx: postop Ancef complete  Labs: pending this AM  Drain: none  Ponce: none    Dispo: Plan for CMN R femur this AM.  R/B/A discussed with patient and family.  They understand and wish to proceed.  Lactate 2.7 overnight.  Labs this AM pending.  Will reevaluate after labs result.  NPO.

## 2018-05-01 PROBLEM — R57.9 SHOCK: Status: RESOLVED | Noted: 2018-05-01 | Resolved: 2018-04-26

## 2018-05-01 PROBLEM — E44.0 MALNUTRITION OF MODERATE DEGREE: Status: ACTIVE | Noted: 2018-05-01

## 2018-05-01 LAB
ALBUMIN SERPL BCP-MCNC: 1.3 G/DL
ALBUMIN SERPL BCP-MCNC: 1.4 G/DL
ALP SERPL-CCNC: 295 U/L
ALP SERPL-CCNC: 305 U/L
ALT SERPL W/O P-5'-P-CCNC: 7 U/L
ALT SERPL W/O P-5'-P-CCNC: 9 U/L
ANION GAP SERPL CALC-SCNC: 9 MMOL/L
ANION GAP SERPL CALC-SCNC: 9 MMOL/L
ANISOCYTOSIS BLD QL SMEAR: SLIGHT
ANISOCYTOSIS BLD QL SMEAR: SLIGHT
AST SERPL-CCNC: 59 U/L
AST SERPL-CCNC: 67 U/L
BASOPHILS # BLD AUTO: ABNORMAL K/UL
BASOPHILS NFR BLD: 0 %
BASOPHILS NFR BLD: 0 %
BILIRUB SERPL-MCNC: 0.5 MG/DL
BILIRUB SERPL-MCNC: 0.7 MG/DL
BNP SERPL-MCNC: 548 PG/ML
BUN SERPL-MCNC: 14 MG/DL
BUN SERPL-MCNC: 14 MG/DL
BURR CELLS BLD QL SMEAR: ABNORMAL
CALCIUM SERPL-MCNC: 7.5 MG/DL
CALCIUM SERPL-MCNC: 7.9 MG/DL
CHLORIDE SERPL-SCNC: 110 MMOL/L
CHLORIDE SERPL-SCNC: 110 MMOL/L
CO2 SERPL-SCNC: 18 MMOL/L
CO2 SERPL-SCNC: 19 MMOL/L
CREAT SERPL-MCNC: 0.9 MG/DL
CREAT SERPL-MCNC: 0.9 MG/DL
DIFFERENTIAL METHOD: ABNORMAL
DIFFERENTIAL METHOD: ABNORMAL
EOSINOPHIL # BLD AUTO: ABNORMAL K/UL
EOSINOPHIL NFR BLD: 1 %
EOSINOPHIL NFR BLD: 2 %
ERYTHROCYTE [DISTWIDTH] IN BLOOD BY AUTOMATED COUNT: 17.7 %
ERYTHROCYTE [DISTWIDTH] IN BLOOD BY AUTOMATED COUNT: 18.1 %
EST. GFR  (AFRICAN AMERICAN): >60 ML/MIN/1.73 M^2
EST. GFR  (AFRICAN AMERICAN): >60 ML/MIN/1.73 M^2
EST. GFR  (NON AFRICAN AMERICAN): >60 ML/MIN/1.73 M^2
EST. GFR  (NON AFRICAN AMERICAN): >60 ML/MIN/1.73 M^2
FERRITIN SERPL-MCNC: 4144 NG/ML
FOLATE SERPL-MCNC: 4.9 NG/ML
GLUCOSE SERPL-MCNC: 73 MG/DL
GLUCOSE SERPL-MCNC: 76 MG/DL
HCT VFR BLD AUTO: 29.8 %
HCT VFR BLD AUTO: 30 %
HGB BLD-MCNC: 9.3 G/DL
HGB BLD-MCNC: 9.4 G/DL
HYPOCHROMIA BLD QL SMEAR: ABNORMAL
IMM GRANULOCYTES # BLD AUTO: ABNORMAL K/UL
IMM GRANULOCYTES # BLD AUTO: ABNORMAL K/UL
IMM GRANULOCYTES NFR BLD AUTO: ABNORMAL %
IMM GRANULOCYTES NFR BLD AUTO: ABNORMAL %
IRON SERPL-MCNC: 50 UG/DL
LACTATE SERPL-SCNC: 2.2 MMOL/L
LYMPHOCYTES # BLD AUTO: ABNORMAL K/UL
LYMPHOCYTES NFR BLD: 14 %
LYMPHOCYTES NFR BLD: 7 %
MAGNESIUM SERPL-MCNC: 1.5 MG/DL
MAGNESIUM SERPL-MCNC: 1.5 MG/DL
MCH RBC QN AUTO: 27.9 PG
MCH RBC QN AUTO: 28.2 PG
MCHC RBC AUTO-ENTMCNC: 31 G/DL
MCHC RBC AUTO-ENTMCNC: 31.5 G/DL
MCV RBC AUTO: 90 FL
MCV RBC AUTO: 90 FL
METAMYELOCYTES NFR BLD MANUAL: 1 %
METAMYELOCYTES NFR BLD MANUAL: 4 %
MONOCYTES # BLD AUTO: ABNORMAL K/UL
MONOCYTES NFR BLD: 4 %
MONOCYTES NFR BLD: 8 %
MYELOCYTES NFR BLD MANUAL: 1 %
NEUTROPHILS NFR BLD: 72 %
NEUTROPHILS NFR BLD: 77 %
NEUTS BAND NFR BLD MANUAL: 4 %
NEUTS BAND NFR BLD MANUAL: 5 %
NRBC BLD-RTO: 2 /100 WBC
NRBC BLD-RTO: 3 /100 WBC
PHOSPHATE SERPL-MCNC: 3 MG/DL
PLATELET # BLD AUTO: 122 K/UL
PLATELET # BLD AUTO: 124 K/UL
PLATELET BLD QL SMEAR: ABNORMAL
PLATELET BLD QL SMEAR: ABNORMAL
PMV BLD AUTO: 11 FL
PMV BLD AUTO: 12.3 FL
POCT GLUCOSE: 109 MG/DL (ref 70–110)
POCT GLUCOSE: 70 MG/DL (ref 70–110)
POCT GLUCOSE: 72 MG/DL (ref 70–110)
POCT GLUCOSE: 81 MG/DL (ref 70–110)
POIKILOCYTOSIS BLD QL SMEAR: SLIGHT
POLYCHROMASIA BLD QL SMEAR: ABNORMAL
POLYCHROMASIA BLD QL SMEAR: ABNORMAL
POTASSIUM SERPL-SCNC: 3.8 MMOL/L
POTASSIUM SERPL-SCNC: 3.9 MMOL/L
POTASSIUM SERPL-SCNC: 4.1 MMOL/L
PROT SERPL-MCNC: 4.9 G/DL
PROT SERPL-MCNC: 5 G/DL
RBC # BLD AUTO: 3.33 M/UL
RBC # BLD AUTO: 3.33 M/UL
SATURATED IRON: 31 %
SCHISTOCYTES BLD QL SMEAR: PRESENT
SODIUM SERPL-SCNC: 137 MMOL/L
SODIUM SERPL-SCNC: 138 MMOL/L
TOTAL IRON BINDING CAPACITY: 160 UG/DL
TRANSFERRIN SERPL-MCNC: 108 MG/DL
TROPONIN I SERPL DL<=0.01 NG/ML-MCNC: 0.01 NG/ML
VANCOMYCIN SERPL-MCNC: <1.1 UG/ML
VIT B12 SERPL-MCNC: 293 PG/ML
WBC # BLD AUTO: 12.42 K/UL
WBC # BLD AUTO: 13.63 K/UL

## 2018-05-01 PROCEDURE — 99231 SBSQ HOSP IP/OBS SF/LOW 25: CPT | Mod: ,,, | Performed by: ANESTHESIOLOGY

## 2018-05-01 PROCEDURE — 36415 COLL VENOUS BLD VENIPUNCTURE: CPT

## 2018-05-01 PROCEDURE — 83735 ASSAY OF MAGNESIUM: CPT

## 2018-05-01 PROCEDURE — 83540 ASSAY OF IRON: CPT

## 2018-05-01 PROCEDURE — 80053 COMPREHEN METABOLIC PANEL: CPT

## 2018-05-01 PROCEDURE — 27000221 HC OXYGEN, UP TO 24 HOURS

## 2018-05-01 PROCEDURE — 99233 SBSQ HOSP IP/OBS HIGH 50: CPT | Mod: ,,, | Performed by: INTERNAL MEDICINE

## 2018-05-01 PROCEDURE — 63600175 PHARM REV CODE 636 W HCPCS: Performed by: HOSPITALIST

## 2018-05-01 PROCEDURE — 25000003 PHARM REV CODE 250: Performed by: ANESTHESIOLOGY

## 2018-05-01 PROCEDURE — 97164 PT RE-EVAL EST PLAN CARE: CPT

## 2018-05-01 PROCEDURE — 85007 BL SMEAR W/DIFF WBC COUNT: CPT | Mod: 91

## 2018-05-01 PROCEDURE — G8987 SELF CARE CURRENT STATUS: HCPCS | Mod: CL

## 2018-05-01 PROCEDURE — 82728 ASSAY OF FERRITIN: CPT

## 2018-05-01 PROCEDURE — 82746 ASSAY OF FOLIC ACID SERUM: CPT

## 2018-05-01 PROCEDURE — 82607 VITAMIN B-12: CPT

## 2018-05-01 PROCEDURE — 99233 SBSQ HOSP IP/OBS HIGH 50: CPT | Mod: ,,, | Performed by: RADIOLOGY

## 2018-05-01 PROCEDURE — 25000003 PHARM REV CODE 250: Performed by: STUDENT IN AN ORGANIZED HEALTH CARE EDUCATION/TRAINING PROGRAM

## 2018-05-01 PROCEDURE — 99233 SBSQ HOSP IP/OBS HIGH 50: CPT | Mod: ,,, | Performed by: HOSPITALIST

## 2018-05-01 PROCEDURE — 11000001 HC ACUTE MED/SURG PRIVATE ROOM

## 2018-05-01 PROCEDURE — 97535 SELF CARE MNGMENT TRAINING: CPT

## 2018-05-01 PROCEDURE — 97168 OT RE-EVAL EST PLAN CARE: CPT

## 2018-05-01 PROCEDURE — 87040 BLOOD CULTURE FOR BACTERIA: CPT | Mod: 59

## 2018-05-01 PROCEDURE — 84484 ASSAY OF TROPONIN QUANT: CPT

## 2018-05-01 PROCEDURE — 97530 THERAPEUTIC ACTIVITIES: CPT

## 2018-05-01 PROCEDURE — 63600175 PHARM REV CODE 636 W HCPCS: Performed by: STUDENT IN AN ORGANIZED HEALTH CARE EDUCATION/TRAINING PROGRAM

## 2018-05-01 PROCEDURE — 93005 ELECTROCARDIOGRAM TRACING: CPT

## 2018-05-01 PROCEDURE — 93010 ELECTROCARDIOGRAM REPORT: CPT | Mod: ,,, | Performed by: INTERNAL MEDICINE

## 2018-05-01 PROCEDURE — 25000003 PHARM REV CODE 250: Performed by: HOSPITALIST

## 2018-05-01 PROCEDURE — G8988 SELF CARE GOAL STATUS: HCPCS | Mod: CK

## 2018-05-01 PROCEDURE — 85027 COMPLETE CBC AUTOMATED: CPT

## 2018-05-01 PROCEDURE — 84132 ASSAY OF SERUM POTASSIUM: CPT

## 2018-05-01 PROCEDURE — 63600175 PHARM REV CODE 636 W HCPCS: Performed by: ANESTHESIOLOGY

## 2018-05-01 PROCEDURE — 80053 COMPREHEN METABOLIC PANEL: CPT | Mod: 91

## 2018-05-01 PROCEDURE — 84100 ASSAY OF PHOSPHORUS: CPT

## 2018-05-01 PROCEDURE — G8998 SWALLOW D/C STATUS: HCPCS | Mod: CJ

## 2018-05-01 PROCEDURE — 92610 EVALUATE SWALLOWING FUNCTION: CPT

## 2018-05-01 PROCEDURE — 83735 ASSAY OF MAGNESIUM: CPT | Mod: 91

## 2018-05-01 PROCEDURE — G8996 SWALLOW CURRENT STATUS: HCPCS | Mod: CJ

## 2018-05-01 PROCEDURE — 83605 ASSAY OF LACTIC ACID: CPT

## 2018-05-01 PROCEDURE — 83880 ASSAY OF NATRIURETIC PEPTIDE: CPT

## 2018-05-01 PROCEDURE — 80202 ASSAY OF VANCOMYCIN: CPT

## 2018-05-01 RX ORDER — TIZANIDINE 4 MG/1
4 TABLET ORAL EVERY 8 HOURS PRN
Status: DISCONTINUED | OUTPATIENT
Start: 2018-05-01 | End: 2018-05-14 | Stop reason: HOSPADM

## 2018-05-01 RX ORDER — MAGNESIUM SULFATE HEPTAHYDRATE 40 MG/ML
2 INJECTION, SOLUTION INTRAVENOUS ONCE
Status: COMPLETED | OUTPATIENT
Start: 2018-05-01 | End: 2018-05-01

## 2018-05-01 RX ORDER — OXYCODONE HYDROCHLORIDE 5 MG/1
5 TABLET ORAL EVERY 4 HOURS PRN
Status: DISCONTINUED | OUTPATIENT
Start: 2018-05-01 | End: 2018-05-14 | Stop reason: HOSPADM

## 2018-05-01 RX ADMIN — ENOXAPARIN SODIUM 40 MG: 100 INJECTION SUBCUTANEOUS at 05:05

## 2018-05-01 RX ADMIN — MAGNESIUM OXIDE TAB 400 MG (241.3 MG ELEMENTAL MG) 800 MG: 400 (241.3 MG) TAB at 09:05

## 2018-05-01 RX ADMIN — RAMELTEON 8 MG: 8 TABLET, FILM COATED ORAL at 09:05

## 2018-05-01 RX ADMIN — CEFAZOLIN 2 G: 330 INJECTION, POWDER, FOR SOLUTION INTRAMUSCULAR; INTRAVENOUS at 12:05

## 2018-05-01 RX ADMIN — MAGNESIUM SULFATE IN WATER 2 G: 40 INJECTION, SOLUTION INTRAVENOUS at 09:05

## 2018-05-01 RX ADMIN — ACETAMINOPHEN 1000 MG: 500 TABLET, FILM COATED ORAL at 11:05

## 2018-05-01 RX ADMIN — ACETAMINOPHEN 1000 MG: 500 TABLET, FILM COATED ORAL at 05:05

## 2018-05-01 RX ADMIN — PREGABALIN 150 MG: 50 CAPSULE ORAL at 09:05

## 2018-05-01 RX ADMIN — ROPIVACAINE HYDROCHLORIDE 10 ML/HR: 2 INJECTION, SOLUTION EPIDURAL; INFILTRATION at 06:05

## 2018-05-01 RX ADMIN — SODIUM CHLORIDE 250 ML: 0.9 INJECTION, SOLUTION INTRAVENOUS at 01:05

## 2018-05-01 RX ADMIN — ROPIVACAINE HYDROCHLORIDE 10 ML/HR: 2 INJECTION, SOLUTION EPIDURAL; INFILTRATION at 02:05

## 2018-05-01 RX ADMIN — ROPIVACAINE HYDROCHLORIDE 10 ML/HR: 2 INJECTION, SOLUTION EPIDURAL; INFILTRATION at 10:05

## 2018-05-01 RX ADMIN — STANDARDIZED SENNA CONCENTRATE AND DOCUSATE SODIUM 2 TABLET: 8.6; 5 TABLET, FILM COATED ORAL at 09:05

## 2018-05-01 RX ADMIN — OXYCODONE HYDROCHLORIDE 5 MG: 5 TABLET ORAL at 02:05

## 2018-05-01 RX ADMIN — OXYCODONE HYDROCHLORIDE 5 MG: 5 TABLET ORAL at 09:05

## 2018-05-01 RX ADMIN — POLYETHYLENE GLYCOL 3350 17 G: 17 POWDER, FOR SOLUTION ORAL at 09:05

## 2018-05-01 RX ADMIN — LIDOCAINE 1 PATCH: 50 PATCH CUTANEOUS at 05:05

## 2018-05-01 NOTE — HPI
REFERRING PHYSICIAN: Micah Cruz MD    PROBLEM: Kerline Grossman is a 58 years old woman presenting with extensive skeletal metastasis likely a carcinoma of the left breast who has recently undergone intramedullary nailing of both femurs.    OTHER MEDICAL HISTORY: Patient is a former smoker.  She has had a  and a hysterectomy.  She has been treated or followed for insomnia. PS is ECOG 2    PRESENT ILLNESS: Patient was seen in the ED at [facility] on 18 with a history of 1 month of diffuse pain, becoming less responsive, incontinence of urine and unable to walk.  She had recently been discharged from an outside hospital where she learned that she had a metastatic cancer.  Imaging studies indicated disease in both femurs that are risk for fracture.  On 18 she underwent intramedullary nailing of the left femur.  On 18 she underwent intramedullary nailing of the right femur.  Pathology from the  surgery is pending.     PHYSICAL EXAMINATION: Patient is an alert slender woman who responds appropriately.  She is seen seated in a chair in the hospital.  She has been able to walk since the surgery.  The respirations are normal.  There are no evident neurologic deficits.    RADIOLOGIC STUDIES: Radiographic metastatic survey of the skeleton on 18 shows multiple lytic lesions distributed throughout the axial skeleton.      A CTA of the chest on 18 shows small bilateral pleural effusions, numerous lytic bone lesions, a CTA of the thorax on 18 shows small bilateral pleural effusions, scattered micronodules in the lung of uncertain significance, the bone lesions and a thickened masslike complex deep to the right nipple and areola in the left breast.      MRI scan of the cervical, thoracic and lumbar spine show the many metastatic lesions.  There is retropulsion of a vertebral lesion at C5 with no epidural tumor noted.     LABORATORY STUDIES: 15/1/18 the hemoglobin is 9.4 with a white count of  12,420.  Platelets are 124,000.  Comprehensive metabolic panel is remarkable for an albumin of 1.4, calcium 7.5, alkaline phosphatase and AST of 67.    IMPRESSION: Radiation treatment of each of the mailed femurs is recommended.    PLAN: We will arrange for patient to come for radiation treatment planning simulation as an outpatient.  Treatment will likely consist of 10 fractions of 3Gy each.

## 2018-05-01 NOTE — PLAN OF CARE
Problem: Patient Care Overview  Goal: Plan of Care Review  Outcome: Ongoing (interventions implemented as appropriate)  Plan of care discussed with pt. Pt verbalizes understanding. Pt tolerating liquids with no complaints of discomfort or nausea. Pain managed with PRN pain medication. Pt on telemetry, sinus tach. Pt ambulated in room once with PT/OT. Pt on ropivicaine drip to right side for pain. Pt up in chair with bp decreased. IV fluid bolus given with improved BP.. Pt remains free of falls and injury. No acute events this shift. Will continue to monitor.

## 2018-05-01 NOTE — PT/OT/SLP EVAL
"Speech Language Pathology Evaluation  Bedside Swallow/ Discharge Summary    Patient Name:  Kerline Grossman   MRN:  [id number]   [id number]    Admitting Diagnosis: Metastatic breast cancer    Recommendations:                 General Recommendations:  Follow-up not indicated  Diet recommendations:  Dental Soft, Thin   Aspiration Precautions:   · Fully awake and alert for PO intake  · Fully upright position for PO intake  · Small bites/ sips  · Slow rate of eating/ drinking  · 1 bite/ sip @ a time  · Refrain from talking prior to swallow completion   · Remain upright for 20-30 min post PO intake  · Discontinue PO upon: coughing, throat clearing, choking, wet vocal quality, wet breath sounds, watery eyes, reddened facial features  General Precautions: Standard, fall, dental soft, respiratory  Communication strategies:  go to room if call light pushed    History:     Past Medical History:   Diagnosis Date    Bony metastasis 2018    Xray metastatic survey shows lytic lesions of skull, lytic spine and rib lesions with pathologic fractures. Diffuse lytic lesions throughout entire central axial red marrow skeleton. Lytic lesions of proximal long bones and pathological fractures of ribs and right superior and inferior pubic ramus.    Cancer     Insomnia     Right heart failure 2018       Past Surgical History:   Procedure Laterality Date     SECTION      HYSTERECTOMY       Prior Intubation HX:  Per review of pt's medical chart, intubated 18 and 18-18.     Prior diet: Per pt, regular consistency diet and thin liquids prior to this hospitalization. Per review of medical chart, SLP recommended regular consistency diet and thin liquids per results of Clinical Evaluation Swallow completed previously during this hospitalization on 18. OF NOTE: medical team ordered regular consistency diet and thin liquids for pt prior to completion of this evaluation.     Subjective     "I don't want any." Pt " initially refused PO trials. However with encouragement from family and clinician, as well as additional education provided by clinician as to purpose of PO trials, pt eventually cooperative.     Pain/Comfort:  · Pain Rating 1: 0/10  · Pain Rating Post-Intervention 1: 0/10    Objective:     Oral Musculature Evaluation  · Oral Musculature: WFL  · Dentition: scattered dentition  · Secretion Management: adequate  · Mucosal Quality: good  · Mandibular Strength and Mobility: WFL  · Oral Labial Strength and Mobility: WFL  · Lingual Strength and Mobility: WFL  · Velar Elevation: WFL  · Buccal Strength and Mobility: WFL  · Volitional Cough: WFL  · Volitional Swallow: WFL  · Voice Prior to PO Intake: Clear, dry     Bedside Swallow Eval:   Consistencies Assessed:  · Thin water- multiple cup and straw sips in isolation and as liquid wash   · Regular consistency dinner roll- bite x1-2    Oral Phase:   · Prolonged mastication observed with regular consistency, appearing likely 2/2 scattered dentition  · Appeared WFL with thin liquids    Pharyngeal Phase:   · No overt clinical signs of aspiration observed     Treatment:   Pt awake and alert upon entry with brother and daughter present at bedside. Pt repositioned and HOB raised. Education provided re: role of SLP, definition/ risks/ overt clinical signs of aspiration, consistent implementation of all aspiration precautions listed above, and SLP POC. Encouragement provided to request SLP re-consult should pt experience swallowing changes. Pt and family verbalized understanding of all education provided and agreement with SLP POC. White board updated. No further questions.     Assessment:     Kerline Grossman is a 58 y.o. female with no further SLP needs at this time. Please re-consult should changes occur.     Goals:    SLP Goals     Not on file          Multidisciplinary Problems (Resolved)        Problem: SLP Goal    Goal Priority Disciplines Outcome   SLP Goal   (Resolved)     SLP  Outcome(s) achieved   Description:                           Plan:     · Plan of Care reviewed with:  patient, daughter, sibling   · SLP Follow-Up:  No       Discharge recommendations:  nursing facility, skilled     Time Tracking:     SLP Treatment Date:   05/01/18  Speech Start Time:  1616  Speech Stop Time:  1634     Speech Total Time (min):  18 min    Billable Minutes: Eval Swallow and Oral Function 18    ALTAGRACIA Mcdermott, CCC-SLP  [phone]  5/1/2018

## 2018-05-01 NOTE — PLAN OF CARE
Problem: Occupational Therapy Goal  Goal: Occupational Therapy Goal  Goals to be met by: 5/15/18     Patient will increase functional independence with ADLs by performing:    UE Dressing with Minimal Assistance.  LE Dressing with Minimal Assistance.  Grooming while EOB with Minimal Assistance.  Toileting from bedside commode with Minimal Assistance for hygiene and clothing management.   Rolling to Bilateral with Minimal Assistance.   Supine to sit with Minimal Assistance.  Stand pivot transfers with Moderate Assistance.  Toilet transfer to bedside commode with Moderate Assistance.    Outcome: Ongoing (interventions implemented as appropriate)  Re-eval completed; goals updated    Comments: Continue OT JONNATHAN Sanchez OT  5/1/2018

## 2018-05-01 NOTE — PROGRESS NOTES
" [facility]  Adult Nutrition  Progress Note    SUMMARY       Recommendations    Recommendation/Intervention:   Family reports patient is unable to swallow properly and therefore not eating regular foods. Family is waiting on SLP to evaluate swallow function. Family believes pt needs pureed diet. No SLP consult ordred. RD ordered pt lunch and dinner tonight.     Please order speech consult.   Continue regular diet, texture per SLP.   Continue Boost Plus TID.   RD following.     Goals: Meet % EEN, EPN  Nutrition Goal Status: goal not met  Communication of RD Recs: reviewed with RN    Reason for Assessment    Reason for Assessment: RD follow-up  Diagnosis: other (see comments) (Adencarcinoma)  Relevant Medical History: metastatic breast cancer  Interdisciplinary Rounds: did not attend    General Information Comments: Pt now regular diet; at visit family reported pt unable to swallow foods on tray - ordered pt pureed food; family reported they were waiting on SLP to reasses swallow function, daughter believes pureed diet is necessary, TPN d'c; no N/V    Nutrition Discharge Planning: Adequate nutrition    Nutrition Risk Screen    Nutrition Risk Screen: no indicators present    Nutrition/Diet History    Patient Reported Diet/Restrictions/Preferences: general  Do you have any cultural, spiritual, Voodoo conflicts, given your current situation?: none reported  Factors Affecting Nutritional Intake: difficulty/impaired swallowing    Anthropometrics    Temp: 97.8 °F (36.6 °C)  Height Method: Stated  Height: 5' 6" (167.6 cm)  Height (inches): 66 in  Weight Method: Bed Scale  Weight: 74 kg (163 lb 2.3 oz)  Weight (lb): 163.14 lb  Ideal Body Weight (IBW), Female: 130 lb  % Ideal Body Weight, Female (lb): 125.49 lb  BMI (Calculated): 26.4  BMI Grade: 25 - 29.9 - overweight  Usual Body Weight (UBW), kg:  (DARSHAN)       Lab/Procedures/Meds    Pertinent Labs Reviewed: reviewed  Pertinent Labs Comments: Mg " 1.5, Alk Phos 305, AST 67, ALT 9  Pertinent Medications Reviewed: reviewed    Physical Findings/Assessment    Overall Physical Appearance: nourished, weak  Oral/Mouth Cavity: WDL  Skin: incision(s)    Estimated/Assessed Needs    Weight Used For Calorie Calculations: 68.6 kg (151 lb 3.8 oz)  Energy Calorie Requirements (kcal): 2058 kcal/d  Energy Need Method: Kcal/kg (30 kcal/kg)  Protein Requirements:  g/d (1.2-1.5 g/kg)  Weight Used For Protein Calculations: 68.6 kg (151 lb 3.8 oz)     Fluid Need Method: other (see comments) (Per MD or 1 mL/kcal)  RDA Method (mL): 2058         Nutrition Prescription Ordered    Current Diet Order: Regular  Oral Nutrition Supplement: Boost Plus TID    Evaluation of Received Nutrient/Fluid Intake    I/O: +2.3L since admit  Comments: LBM 4/28  % Intake of Estimated Energy Needs: 0 - 25 %  % Meal Intake: 0 - 25 %    Nutrition Risk    Level of Risk/Frequency of Follow-up: high     Assessment and Plan    Severe malnutrition      Nutrition Problem  Inadequate energy intake    Related to (etiology):   Inability to consume sufficient energy    Signs and Symptoms (as evidenced by):   NPO with no alternate means of nutrition     Nutrition Diagnosis Status:   Resolved                 Monitor and Evaluation    Food and Nutrient Intake: energy intake, food and beverage intake, enteral nutrition intake  Food and Nutrient Adminstration: diet order, enteral and parenteral nutrition administration  Physical Activity and Function: nutrition-related ADLs and IADLs  Anthropometric Measurements: weight, weight change  Biochemical Data, Medical Tests and Procedures: lipid profile, inflammatory profile, glucose/endocrine profile, electrolyte and renal panel, gastrointestinal profile  Nutrition-Focused Physical Findings: overall appearance     Nutrition Follow-Up    RD Follow-up?: Yes

## 2018-05-01 NOTE — MEDICAL/APP STUDENT
Hospital Medicine  Progress note    Team: [facility] B Dr. Cruz  Admit Date: 4/20/2018  VIGNESH 5/4/2018  Code status: Full Code    Principal Problem:  Metastatic breast cancer    Interval hx:  5/1: Mg 1.5-replaced. Oncology consult: outside path suggestive of breast primary with moderately differentiated adenocarcinoma ER/NH+, HER 2-. Will f/u on path to confirm. Consider MRI brain w/ w/o contrast once medically stable.  Radiation Oncology consulted. Family wants to talk about options with Oncology and Radiation Oncology before finalizing any palliative care plans. 5L NC-taper. Systolic BP 70s this AM, up to 113 with IVF bolus. Chest Xray, NS bolus, Blood cultures, Lactic Acid, UA, CBC/CMP, Troponin ordered. Decreased prn oxycodone to 5mg q4hrs.     ROS     Pain Scale: 8/10, Neck, Back, Legs  Respiratory: no cough or shortness of breath  Cardiovascular: no chest pain or palpitations  Gastrointestinal: no nausea or vomiting, no abdominal pain or change in bowel habits  Behavioral/Psych: no depression or anxiety      PEx  Temp:  [97.8 °F (36.6 °C)-99 °F (37.2 °C)]   Pulse:  [103-118]   Resp:  [18-24]   BP: ()/(52-84)   SpO2:  [91 %-96 %]     Intake/Output Summary (Last 24 hours) at 05/01/18 1430  Last data filed at 05/01/18 0227   Gross per 24 hour   Intake          1070.33 ml   Output             1800 ml   Net          -729.67 ml       General Appearance: no acute distress   Heart: regular rate and rhythm  Respiratory: Normal respiratory effort, no crackles 5L NC  Abdomen: Soft, non-tender; bowel sounds active  Skin: intact. IV sites ok  Neurologic:  No focal numbness or weakness. Pupils normal.  Mental status: Alert, oriented x 2, affect appropriate   Extremities: Limited motion BLE 2/2 pain, dressings from orthopedic surgery in place    Recent Labs  Lab 04/30/18  1120 05/01/18  0434 05/01/18  1302   WBC 13.47* 12.42 13.63*   HGB 9.0* 9.4* 9.3*   HCT 28.7* 29.8* 30.0*   * 124* 122*       Recent  Labs  Lab 04/29/18  2317 04/30/18  0415 05/01/18  0058 05/01/18  0434 05/01/18  1302    137  --  138 137   K 3.7 3.7 4.1 3.9 3.8    108  --  110 110   CO2 21* 21*  --  19* 18*   BUN 16 15  --  14 14   CREATININE 1.0 0.9  --  0.9 0.9   GLU 96 74  --  73 76   CALCIUM 7.8* 7.7*  --  7.5* 7.9*   MG 1.5* 1.5* 1.5* 1.5*  --    PHOS 2.5* 2.7  --  3.0  --        Recent Labs  Lab 04/24/18  1549  04/30/18  0415 05/01/18  0434 05/01/18  1302   ALKPHOS  --   < > 327* 305* 295*   ALT  --   < > 9* 9* 7*   AST  --   < > 59* 67* 59*   ALBUMIN  --   < > 1.4* 1.4* 1.3*   PROT  --   < > 5.1* 5.0* 4.9*   BILITOT  --   < > 0.6 0.5 0.7   INR 0.9  --   --   --   --    < > = values in this interval not displayed.     Recent Labs  Lab 04/30/18  1124 04/30/18  1825 05/01/18  0041 05/01/18  0116 05/01/18  0556 05/01/18  1312   POCTGLUCOSE 133* 85 72 109 81 70     Recent Labs      05/01/18   1302   TROPONINI  0.015       Scheduled Meds:   acetaminophen  1,000 mg Oral Q6H    enoxaparin  40 mg Subcutaneous Daily    lidocaine  1 patch Transdermal Q24H    magnesium oxide  800 mg Oral Daily    polyethylene glycol  17 g Oral Daily    pregabalin  150 mg Oral QHS    ramelteon  8 mg Oral QHS    senna-docusate 8.6-50 mg  2 tablet Oral Daily     Continuous Infusions:   ropivacaine (PF) 2 mg/ml (0.2%) 10 mL/hr (05/01/18 1036)     As Needed:  sodium chloride, bisacodyl, dextrose 50%, dextrose 50%, glucagon (human recombinant), insulin aspart U-100, omnipaque, ondansetron, oxyCODONE, promethazine (PHENERGAN) IVPB, promethazine, ramelteon, sodium chloride 0.9%, sodium chloride 0.9%, sodium chloride 0.9%, sodium chloride 0.9%, tiZANidine, white petrolatum    Active Hospital Problems    Diagnosis  POA    *Metastatic breast cancer [C50.919]  Yes    Malignant neoplasm of central portion of right breast in female, estrogen receptor positive [C50.111, Z17.0]  Not Applicable    Cardiac rhythm disorder or disturbance or change [I49.9]  Yes     CHF (congestive heart failure) [I50.9]  Yes    Right heart failure [I50.810]  Yes    Acute hypoxemic respiratory failure [J96.01]  Yes    Tachycardia [R00.0]  Yes    Lactic acidemia [E87.2]  Yes    Severe malnutrition [E43]  Yes    Cancer of left femur [C40.22]  Yes    Cancer of right femur [C40.21]  Yes    Palliative care encounter [Z51.5]  Not Applicable    Pain due to malignant neoplasm metastatic to bone [G89.3, C79.51]  Yes    Anemia [D64.9]  Yes    Thrombocytopenia, unspecified [D69.6]  Yes    Hypomagnesemia [E83.42]  Yes    Bony metastasis [C79.51]  Yes     Xray metastatic survey shows lytic lesions of skull, lytic spine and rib lesions with pathologic fractures. Diffuse lytic lesions throughout entire central axial red marrow skeleton. Lytic lesions of proximal long bones and pathological fractures of ribs and right superior and inferior pubic ramus.      Pleural effusion [J90]  Yes      Resolved Hospital Problems    Diagnosis Date Resolved POA    Shock [R57.9] 04/26/2018 Yes    Encounter for weaning from ventilator [Z99.11] 04/30/2018 Not Applicable    Encephalopathy, metabolic [G93.41] 04/21/2018 Yes       Overview  Mrs. Grossman is a 59 yo female who was brought to [facility] ED on 4/20/2018 for second opinion regarding recently diagnosed likely metastatic malignancy with unknown primary.     Assessment and Plan for Problems addressed today:    Metastatic breast cancer  Mets to bone  - oncology following, pt did not want to talk this afternoon after arriving to the floor from surgery, discussed with Dr. Fink (onc) he will see her tomorrow  - she may be a candidate for some oral therapy  - radiation oncology consult placed  - pain control (BP is sensitive to narcotics)  - palliative care saw her while she was in ICU, would continue discussions. Family wants to talk about options with Oncology and Radiation Oncology before finalizing any palliative care plans.   - 5/1 Oncology recs: outside path  suggestive of breast primary with moderately differentiated adenocarcinoma ER/SD+, HER 2-. Will f/u on path to confirm. Consider MRI brain w/ w/o contrast once medically stable.      Pathologic fracture of ribs pubic ramus  - s/p IM nailing femur x2     Hypoxemic respiratory failure  - pulmonary edema from IVF  - indeterminate diastolic function on 4/27 echo, PAP 53mmHg  - currently on 4L NC since arriving to floor from PACU  - 5/1: increased to 5L NC.     Hypotension  -5/1: Systolic BP 70s this AM, up to 113 with IVF bolus. Decreased prn oxycodone to 5mg q4hrs. Chest Xray, NS bolus, Blood cultures, Lactic Acid, UA, CBC/CMP, Troponin ordered.      Lactic acidosis  - 2.7 overnight, responded to IVF  - does not appear infected  - again elevated to 2.4 after procedure, will trend  - 4/30: 2.6, monitor     Urinary incontinence   - rocha in place, consider switch to purwick when pt can tolerate     Pleural effusion  - likely metastatic, s/p sampling at OSH  - associated compressive atelectasis bilaterally  - multiple pulmonary micronodules     Anemia of Chronic Disease  - likely chronic disease with some blood loss during procedures  - iron studies pending: Iron 50 (wnl), TIBC 160 (low), Saturation 31 (wnl), Transferrin 108 (low)  - monitor with CBC daily    Hypomagnesemia  -most likely 2/2 chronic disease  -4/22: 1.4-replaced  -5/1: 1.5-replaced    Hypophosphatemia   -4/22: 2.5 - replaced  - Most likely 2/2 chronic disease    Thrombocytopenia  -4/22: 122--> 165 resolved   - Most likely 2/2 chronic disease    Congestive Heart Failure  Right Heart Failure  -4/27 Echo: Right Ventricular Enlargement with normal systolic function  -5/1:     DVT PPx: Lovenox      Discharge plan and follow up  Pending family discussion    Provider    I personally scribed for David Cruz MD on 05/01/2018 at 2:31 PM. Electronically signed by kera Lemus on 05/01/2018 at 2:31 PM.

## 2018-05-01 NOTE — PROGRESS NOTES
[facility]  Orthopedics  Progress Note    Patient Name: Kerline Grossman  MRN: [id number]  Admission Date: 4/20/2018  Hospital Length of Stay: 10 days  Attending Provider: David Cruz MD  Primary Care Provider: Primary Doctor No  Follow-up For: Procedure(s) (LRB):  IM NAILING OF FEMUR - hana table - large c arm door side (Right)    Post-Operative Day: 1 Day Post-Op  Subjective:     Principal Problem:Metastatic breast cancer    Principal Orthopedic Problem: B femur impending fractures s/p B CMN    Interval History: Patient seen and examined at bedside.  No acute events overnight.  Pain controlled. Went for right CMN yesterday.    Review of patient's allergies indicates:  No Known Allergies    Current Facility-Administered Medications   Medication    0.9%  NaCl infusion (for blood administration)    acetaminophen tablet 1,000 mg    bisacodyl suppository 10 mg    dextrose 50% injection 12.5 g    dextrose 50% injection 25 g    enoxaparin injection 40 mg    glucagon (human recombinant) injection 1 mg    HYDROmorphone injection 0.5 mg    HYDROmorphone injection 1 mg    insulin aspart U-100 pen 1-10 Units    lidocaine 5 % patch 1 patch    magnesium oxide tablet 800 mg    omnipaque oral solution 15 mL    ondansetron disintegrating tablet 8 mg    oxyCODONE immediate release tablet 10 mg    oxyCODONE immediate release tablet 15 mg    oxyCODONE immediate release tablet 5 mg    polyethylene glycol packet 17 g    pregabalin capsule 150 mg    promethazine (PHENERGAN) 6.25 mg in dextrose 5 % 50 mL IVPB    promethazine tablet 25 mg    ramelteon tablet 8 mg    ramelteon tablet 8 mg    ropivacaine (PF) 2 mg/ml (0.2%) infusion    senna-docusate 8.6-50 mg per tablet 2 tablet    sodium chloride 0.9% flush 3 mL    sodium chloride 0.9% flush 3 mL    sodium chloride 0.9% flush 3 mL    sodium chloride 0.9% flush 5 mL    white petrolatum 41 % ointment     Objective:     Vital Signs (Most  "Recent):  Temp: 98 °F (36.7 °C) (05/01/18 0019)  Pulse: (!) 116 (05/01/18 0409)  Resp: 20 (05/01/18 0019)  BP: 116/73 (05/01/18 0019)  SpO2: (!) 92 % (05/01/18 0019) Vital Signs (24h Range):  Temp:  [97.4 °F (36.3 °C)-99 °F (37.2 °C)] 98 °F (36.7 °C)  Pulse:  [] 116  Resp:  [18-24] 20  SpO2:  [91 %-99 %] 92 %  BP: ()/(53-90) 116/73  Arterial Line BP: ()/(48-61) 110/60     Weight: 74 kg (163 lb 2.3 oz)  Height: 5' 6" (167.6 cm)  Body mass index is 26.33 kg/m².      Intake/Output Summary (Last 24 hours) at 05/01/18 0611  Last data filed at 05/01/18 0227   Gross per 24 hour   Intake          3170.33 ml   Output             2500 ml   Net           670.33 ml       Ortho/SPM Exam    AAOx4  NAD  Regular rhythm, tachycardic  Tachypnea     BLE:  L thigh dressings with stable, dry drainage  R thigh dressing minimal drainage on dressings  SILT and motor intact T/SP/DP  WWP extremities  FCDs in place and functioning    Significant Labs:   CBC:     Recent Labs  Lab 04/29/18 2317 04/30/18 0415 04/30/18  1003 04/30/18  1120   WBC 13.00* 13.13*  --  13.47*   HGB 9.1* 8.8*  --  9.0*   HCT 28.1* 28.3* 23* 28.7*    172  --  115*     CMP:     Recent Labs  Lab 04/29/18 2317 04/30/18  0415 05/01/18  0058    137  --    K 3.7 3.7 4.1    108  --    CO2 21* 21*  --    GLU 96 74  --    BUN 16 15  --    CREATININE 1.0 0.9  --    CALCIUM 7.8* 7.7*  --    PROT 5.1* 5.1*  --    ALBUMIN 1.4* 1.4*  --    BILITOT 0.5 0.6  --    ALKPHOS 335* 327*  --    AST 58* 59*  --    ALT 9* 9*  --    ANIONGAP 8 8  --    EGFRNONAA >60.0 >60.0  --      All pertinent labs within the past 24 hours have been reviewed.    Significant Imaging: I have reviewed all pertinent imaging results/findings.    Assessment/Plan:     Bony metastasis    58 y.o. female POD6 s/p L CMN, POD1 s/p R CMN    Pain control  PT/OT: WBAT  DVT PPx: lovenox, FCDs at all times when not ambulating  Abx: postop Ancef  Drain: none  Ponce: discontinue " today    Dispo: per primary              Aayush Gifford MD  Orthopedics  [facility]    Attg Note:  I agree with the above assessment and plan.    Jd Cabrera MD

## 2018-05-01 NOTE — CARE UPDATE
RN Proactive Rounding Note  Time of Visit: 1205    Admit Date: 2018  LOS: 10  Code Status: Full Code   Date of Visit: 2018  : 1960  Age: 58 y.o.  Sex: female  Bed: [address] [place] [address]:   MRN: [id number]  Was the patient discharged from an ICU this admission? yes  Was the patient discharged from a PACU within last 24 hours? yes  Did the patient receive conscious sedation/general anesthesia in last 24 hours? yes  Was the patient in the ED within the past 24 hours? no  Was the patient started on NIPPV within the past 24 hours? no  Attending Physician: David Cruz MD  Primary Service: [facility] MED B      ASSESSMENT:     Abnormal Vital Signs: Hypotension  Clinical Issues: Circulatory     INTERVENTIONS/ RECOMMENDATIONS:     Proactive rounding performed for hypotension. Pt in chair, BP 74/48 manually. . Pt c/o feeling weak, dizzy, and tired. Denies c/o CP, SOB. SpO2 86% on 3 LNC. Respirations shallow. O2 increased to 5 L. SpO2 92%. Chair placed into a reclining position, legs elevated. BP 90/50. MD paged at 1227. Page returned at 1230. Orders obtained for blood cx's, CXR, lactic acid, CBC, CMP, UA, troponin, and 250 mL NS bolus. NS bolus administered. Therapy at bedside to assist pt back to bed. /60. HR 98. SpO2 94%. MD to bedside. BP stable. Pt states she feels better and would like to eat lunch. Continue to monitor VS frequently. Notify team of abnormal lab results & VS.     Discussed plan of care with CHICO Zhou    PHYSICIAN ESCALATION:     Yes/No Yes    Orders received and case discussed with Dr. Cruz     Disposition: Remains in RM 62    FOLLOW-UP/CONTINGENCY:       Call back the Rapid Response Nurse at x [phone]  for additional questions or concerns

## 2018-05-01 NOTE — ASSESSMENT & PLAN NOTE
"Patient is comfortable with current epidural catheter by anesthesia. She needs pressure reduction strategy for pressure injury prevention. Discussed with nursing. Will change to oral pain meds when epidural catheter removal is contemplated.  Family is awaiting oncology recommendations for further "options". Not interested in discussing plans until then. If unable to ambulate options are different. Will continue to explore with family. Encourage family meeting to understand how we can manage her illness. I am happy to facilitate with primary team and oncology and orthopedics.  "

## 2018-05-01 NOTE — PT/OT/SLP RE-EVAL
Occupational Therapy   Re-evaluation    Name: Kerline Grossman  MRN: [id number]  Admitting Diagnosis:  Metastatic breast cancer 1 Day Post-Op    Recommendations:     Discharge Recommendations: nursing facility, skilled  Discharge Equipment Recommendations:   (TBD)  Barriers to discharge:  Inaccessible home environment (2SH)    History:     Past Medical History:   Diagnosis Date    Bony metastasis 2018    Xray metastatic survey shows lytic lesions of skull, lytic spine and rib lesions with pathologic fractures. Diffuse lytic lesions throughout entire central axial red marrow skeleton. Lytic lesions of proximal long bones and pathological fractures of ribs and right superior and inferior pubic ramus.    Cancer     Insomnia     Right heart failure 2018       Past Surgical History:   Procedure Laterality Date     SECTION      HYSTERECTOMY         Subjective     Chief Complaint: pain  Patient/Family stated goals: OOB  Communicated with: RN prior to session.  Pain/Comfort:  · Pain Rating 1: 0/10  · Location - Side 1: Bilateral  · Location - Orientation 1: generalized  · Location 1: leg  · Pain Addressed 1: Reposition, Distraction, Cessation of Activity  · Pain Rating Post-Intervention 1:  (Pt did not rate)    Objective:     Patient found with: peripheral IV, perineural catheter    General Precautions: Standard, fall   Orthopedic Precautions:RLE weight bearing as tolerated, LLE weight bearing as tolerated   Braces: N/A     Occupational Performance:    Bed Mobility:    · Patient completed Rolling/Turning to Left with  maximal assistance  · Patient completed Scooting/Bridging with maximal assistance  · Patient completed Supine to Sit with maximal assistance    Functional Mobility/Transfers:  · Patient completed Sit <> Stand Transfer with total assistance progressing to maximal assistance with no assistive device; sit>stand from bed attempted with RW but pt unable to clear bed. 2nd attempt at sit>stand  without RW initially required total assist but pt was able to provide assist with BLE as she was lifted further off the bed  · Patient completed Bed <> Chair Transfer using Squat Pivot technique with maximal assistance with no assistive device    Activities of Daily Living:  · UB Dressing: maximal assistance to doff/don gown while seated UIC 2/2 decreased ROM  · LB Dressing: total assistance to don B socks in supine and to doff/don B socks while seated UIC  · Toileting: total assistance for hygiene in standing following BM and urinary incontinence; pt required max A to stand while hygiene was completed    Cognitive/Visual Perceptual:  Cognitive/Psychosocial Skills:     -       Oriented to: Person and Place   -       Follows Commands/attention:Follows one-step commands  -       Communication: clear/fluent  -       Memory: No Deficits noted  -       Safety awareness/insight to disability: impaired   -       Mood/Affect/Coping skills/emotional control: Anxious and Guarded  Visual/Perceptual:      -Intact    Physical Exam:  Balance:    -       fair sitting balance; poor static/dynamic standing balance  Postural examination/scapula alignment:    -       Rounded shoulders  -       Forward head  Skin integrity: Visible skin intact  Edema:  Mild LUE  Sensation:    -       Intact  Dominant hand:    -       R hand  Upper Extremity Range of Motion:     -       Right Upper Extremity: no AROM at shoulder or elbow, full PROM throughout UE  -       Left Upper Extremity: AROM at shoulder to 90 observed during t/f to chair, full elbow ROM  Upper Extremity Strength:    -       Right Upper Extremity: grossly 1/5; pt unable to actively move UE, however trace amounts of muscle twitches palpated during attempt in supine  -       Left Upper Extremity: grossly 2-/5; pt able to flex shoulder to 90 but unable to hold test position in supine   Strength:    -       Right Upper Extremity: 2-/5  -       Left Upper Extremity: 3/5  Fine Motor  "Coordination:    -       Impaired  Pt with no active movement of RUE  Gross motor coordination:   Pt required max A for standing with knee block    Patient left up in chair with all lines intact, call button in reach, RN notified and son, daughter present    [facility] 6 Click:  [facility] Total Score: 10    Treatment & Education:  Pt educated on role of OT/POC  Pt educated on importance of OOB/UIC  Pt educated on SNF placement  White board/communication board updated  Education:    Assessment:     Kerline Grossman is a 58 y.o. female with a medical diagnosis of Metastatic breast cancer.  She presents with pain and generalized weakness impairing functional mobility and self care participation.  Performance deficits affecting function are weakness, impaired endurance, impaired functional mobilty, impaired self care skills, impaired balance, gait instability, orthopedic precautions, pain, decreased upper extremity function, decreased ROM, decreased safety awareness, edema.      Rehab Prognosis:  Good; patient would benefit from acute skilled OT services to address these deficits and reach maximum level of function.         Clinical Decision Makin.  OT Mod:  "Pt evaluation falls under moderate complexity for evaluation coding due to identification of 3-5 performance deficits noted as stated above. Eval required Min/Mod assistance to complete on this date and detailed assessment(s) were utilized. Moreover, an expanded review of history and occupational profile obtained with additional review of cognitive, physical and psychosocial hx."     Plan:     Patient to be seen 4 x/week to address the above listed problems via self-care/home management, therapeutic activities, therapeutic exercises  · Plan of Care Expires: 18  · Plan of Care Reviewed with: patient, daughter, son    This Plan of care has been discussed with the patient who was involved in its development and understands and is in agreement with the identified " goals and treatment plan    GOALS:    Occupational Therapy Goals        Problem: Occupational Therapy Goal    Goal Priority Disciplines Outcome Interventions   Occupational Therapy Goal     OT, PT/OT Ongoing (interventions implemented as appropriate)    Description:  Goals to be met by: 5/15/18     Patient will increase functional independence with ADLs by performing:    UE Dressing with Minimal Assistance.  LE Dressing with Minimal Assistance.  Grooming while EOB with Minimal Assistance.  Toileting from bedside commode with Minimal Assistance for hygiene and clothing management.   Rolling to Bilateral with Minimal Assistance.   Supine to sit with Minimal Assistance.  Stand pivot transfers with Moderate Assistance.  Toilet transfer to bedside commode with Moderate Assistance.                      Time Tracking:     OT Date of Treatment: 05/01/18  OT Start Time: 1028  OT Stop Time: 1100  OT Total Time (min): 32 min    Billable Minutes:Lisa-jose a 22  Self Care/Home Management 10    Irma Sanchez OT  5/1/2018

## 2018-05-01 NOTE — SUBJECTIVE & OBJECTIVE
Principal Problem:Metastatic breast cancer    Principal Orthopedic Problem: B femur impending fractures s/p B CMN    Interval History: Patient seen and examined at bedside.  No acute events overnight.  Pain controlled. Went for right CMN yesterday.    Review of patient's allergies indicates:  No Known Allergies    Current Facility-Administered Medications   Medication    0.9%  NaCl infusion (for blood administration)    acetaminophen tablet 1,000 mg    bisacodyl suppository 10 mg    dextrose 50% injection 12.5 g    dextrose 50% injection 25 g    enoxaparin injection 40 mg    glucagon (human recombinant) injection 1 mg    HYDROmorphone injection 0.5 mg    HYDROmorphone injection 1 mg    insulin aspart U-100 pen 1-10 Units    lidocaine 5 % patch 1 patch    magnesium oxide tablet 800 mg    omnipaque oral solution 15 mL    ondansetron disintegrating tablet 8 mg    oxyCODONE immediate release tablet 10 mg    oxyCODONE immediate release tablet 15 mg    oxyCODONE immediate release tablet 5 mg    polyethylene glycol packet 17 g    pregabalin capsule 150 mg    promethazine (PHENERGAN) 6.25 mg in dextrose 5 % 50 mL IVPB    promethazine tablet 25 mg    ramelteon tablet 8 mg    ramelteon tablet 8 mg    ropivacaine (PF) 2 mg/ml (0.2%) infusion    senna-docusate 8.6-50 mg per tablet 2 tablet    sodium chloride 0.9% flush 3 mL    sodium chloride 0.9% flush 3 mL    sodium chloride 0.9% flush 3 mL    sodium chloride 0.9% flush 5 mL    white petrolatum 41 % ointment     Objective:     Vital Signs (Most Recent):  Temp: 98 °F (36.7 °C) (05/01/18 0019)  Pulse: (!) 116 (05/01/18 0409)  Resp: 20 (05/01/18 0019)  BP: 116/73 (05/01/18 0019)  SpO2: (!) 92 % (05/01/18 0019) Vital Signs (24h Range):  Temp:  [97.4 °F (36.3 °C)-99 °F (37.2 °C)] 98 °F (36.7 °C)  Pulse:  [] 116  Resp:  [18-24] 20  SpO2:  [91 %-99 %] 92 %  BP: ()/(53-90) 116/73  Arterial Line BP: ()/(48-61) 110/60     Weight: 74 kg  "(163 lb 2.3 oz)  Height: 5' 6" (167.6 cm)  Body mass index is 26.33 kg/m².      Intake/Output Summary (Last 24 hours) at 05/01/18 0611  Last data filed at 05/01/18 0227   Gross per 24 hour   Intake          3170.33 ml   Output             2500 ml   Net           670.33 ml       Ortho/SPM Exam    AAOx4  NAD  Regular rhythm, tachycardic  Tachypnea     BLE:  L thigh dressings with stable, dry drainage  R thigh dressing minimal drainage on dressings  SILT and motor intact T/SP/DP  WWP extremities  FCDs in place and functioning    Significant Labs:   CBC:     Recent Labs  Lab 04/29/18  2317 04/30/18  0415 04/30/18  1003 04/30/18  1120   WBC 13.00* 13.13*  --  13.47*   HGB 9.1* 8.8*  --  9.0*   HCT 28.1* 28.3* 23* 28.7*    172  --  115*     CMP:     Recent Labs  Lab 04/29/18 2317 04/30/18  0415 05/01/18  0058    137  --    K 3.7 3.7 4.1    108  --    CO2 21* 21*  --    GLU 96 74  --    BUN 16 15  --    CREATININE 1.0 0.9  --    CALCIUM 7.8* 7.7*  --    PROT 5.1* 5.1*  --    ALBUMIN 1.4* 1.4*  --    BILITOT 0.5 0.6  --    ALKPHOS 335* 327*  --    AST 58* 59*  --    ALT 9* 9*  --    ANIONGAP 8 8  --    EGFRNONAA >60.0 >60.0  --      All pertinent labs within the past 24 hours have been reviewed.    Significant Imaging: I have reviewed all pertinent imaging results/findings.  "

## 2018-05-01 NOTE — PLAN OF CARE
Problem: SLP Goal  Goal: SLP Goal         Outcome: Outcome(s) achieved Date Met: 05/01/18  Clinical evaluation of swallow complete. Recommend dental soft diet with thin liquids. Given pt with scattered dentition, she appears to have met max potential with SLP services at this time.     No further SLP needs at this time. Please re-consult should changes occur.     ALTAGRACIA Mcdermott, CCC-SLP  [phone]  5/1/2018

## 2018-05-01 NOTE — PROGRESS NOTES
"[facility]  Palliative Medicine  Progress Note    Patient Name: Kerline Grossman  MRN: [id number]  Admission Date: 4/20/2018  Hospital Length of Stay: 10 days  Code Status: Full Code   Attending Provider: David Cruz MD  Consulting Provider: Debo Rosario MD  Primary Care Physician: Primary Doctor No  Principal Problem:Metastatic breast cancer    Patient information was obtained from patient and past medical records.      Assessment/Plan:     Palliative care encounter    Patient is comfortable with current epidural catheter by anesthesia. She needs pressure reduction strategy for pressure injury prevention. Discussed with nursing. Will change to oral pain meds when epidural catheter removal is contemplated.  Family is awaiting oncology recommendations for further "options". Not interested in discussing plans until then. If unable to ambulate options are different. Will continue to explore with family. Encourage family meeting to understand how we can manage her illness. I am happy to facilitate with primary team and oncology and orthopedics.            I will follow-up with patient. Please contact us if you have any additional questions.    Subjective:     Chief Complaint:   Chief Complaint   Patient presents with    Generalized Body Aches     Pt reports having all over body pain. Pt reports having cancer "all over". Pt reports care at [facility], had fluid taken off lungs this AM. O2 in triage 98%, denies SOB.       HPI:   57 yo woman with medical history significant for chronic mood disorder who presented to OSH with ZACH, hypercalcemia, and lytic lesions concerning for MM.  Workup for MM negative thus far.  Her family wanted to transfer her to [facility], but it was denied, so the family had her discharged and transported her to [facility] themselves.  She arrived hypotensive, which may have been 2/2 opiates (received morphine at OSH).  Hypotension resolved with IVF and only required brief stay under " ICU care before being transferred to the floor.    Palliative care consulted for goals of care.  See clinical review 4/23/18 with Dr. Rosario regarding preliminary pathology findings.     She states she is in pain, 8/10, primarily in her back.  When she takes pain medications she says she falls asleep only for 15-20 minutes before waking up with pain 8/10.  Family at bedside confirms she does this throughout the day even without pain medications.  She reports pain level remains at an 8 despite taking meds.    Last filled prescriptions [facility] on [address] [employer] [address] [employer] 3/2017  paliperidone ER 6mg tabs (2 tabs qAM)  oxcarbazepime 300mg 2 tabs bid  Levothyroxine 25mcg qday  Benztropine 1mg bid  Trazodone 100mg 2 tabs qhs    Hospital Course:  No notes on file    Interval History: Patient lying in bed. Pain reasonably well controlled with epidural and prn pain meds. Son Kings and Daughter Guerda are in the room. They are hopeful for her recovery. Will need further discussion with oncology and primary team for plan of care.     Medications:  Continuous Infusions:   ropivacaine (PF) 2 mg/ml (0.2%) 10 mL/hr (05/01/18 0258)     Scheduled Meds:   acetaminophen  1,000 mg Oral Q6H    enoxaparin  40 mg Subcutaneous Daily    lidocaine  1 patch Transdermal Q24H    magnesium oxide  800 mg Oral Daily    magnesium sulfate IVPB  2 g Intravenous Once    polyethylene glycol  17 g Oral Daily    pregabalin  150 mg Oral QHS    ramelteon  8 mg Oral QHS    senna-docusate 8.6-50 mg  2 tablet Oral Daily     PRN Meds:sodium chloride, bisacodyl, dextrose 50%, dextrose 50%, glucagon (human recombinant), HYDROmorphone, HYDROmorphone, insulin aspart U-100, omnipaque, ondansetron, oxyCODONE, oxyCODONE, oxyCODONE, promethazine (PHENERGAN) IVPB, promethazine, ramelteon, sodium chloride 0.9%, sodium chloride 0.9%, sodium chloride 0.9%, sodium chloride 0.9%, white petrolatum    Objective:     Vital Signs (Most Recent):  Temp:  98.7 °F (37.1 °C) (05/01/18 0812)  Pulse: (!) 117 (05/01/18 0812)  Resp: 20 (05/01/18 0812)  BP: 139/84 (05/01/18 0812)  SpO2: (!) 92 % (05/01/18 0812) Vital Signs (24h Range):  Temp:  [97.4 °F (36.3 °C)-99 °F (37.2 °C)] 98.7 °F (37.1 °C)  Pulse:  [] 117  Resp:  [18-24] 20  SpO2:  [91 %-99 %] 92 %  BP: ()/(53-84) 139/84  Arterial Line BP: ()/(48-61) 110/60     Weight: 74 kg (163 lb 2.3 oz)  Body mass index is 26.33 kg/m².    Review of Symptoms  Symptom Assessment (ESAS 0-10 scale)  ESAS 0 1 2 3 4 5 6 7 8 9 10   Pain      x        Dyspnea x             Anxiety x             Nausea x             Depression  x             Anorexia x             Fatigue x             Insomnia x             Restlessness  x             Agitation x             CAM / Delirium x__ --  ___+   Constipation     _x_ --  ___+   Diarrhea           _x_ --  ___+  Bowel Management Plan (BMP): Yes    Comments: miralax    Pain Assessment: Location: generalized, buttock, upper leg    OME in 24 hours: 0.5 mg dilaudid    Performance Status: 30    ECOG Performance Status Grade: 4 - Completely disabled    Physical Exam   Constitutional: She appears cachectic.   HENT:   Head: Normocephalic and atraumatic.   Neurological: She is alert.   Nursing note and vitals reviewed.      Significant Labs: All pertinent labs within the past 24 hours have been reviewed.  CBC:     Recent Labs  Lab 05/01/18  0434   WBC 12.42   HGB 9.4*   HCT 29.8*   MCV 90   *     BMP:    Recent Labs  Lab 05/01/18  0434   GLU 73      K 3.9      CO2 19*   BUN 14   CREATININE 0.9   CALCIUM 7.5*   MG 1.5*     LFT:  Lab Results   Component Value Date    AST 67 (H) 05/01/2018    ALKPHOS 305 (H) 05/01/2018    BILITOT 0.5 05/01/2018     Albumin:   Albumin   Date Value Ref Range Status   05/01/2018 1.4 (L) 3.5 - 5.2 g/dL Final     Protein:   Total Protein   Date Value Ref Range Status   05/01/2018 5.0 (L) 6.0 - 8.4 g/dL Final     Lactic acid:   Lab Results    Component Value Date    LACTATE 2.6 (H) 04/30/2018    LACTATE 2.4 (H) 04/30/2018       Significant Imaging: I have reviewed all pertinent imaging results/findings within the past 24 hours.    Advanced Directives::  Living Will: No  LaPOST: No  Do Not Resuscitate Status: No  Medical Power of : No    Decision-Making Capacity: Patient answered questions, Family answered questions    Living Arrangements: Lives in home, Lives >50 miles from [facility]    Psychosocial/Cultural:  Patient's most important priorities:  Unable to assess.    Patient's biggest concerns/fears:  Unable to assess.    Previous death/end of life care history:  Unable to assess.    Patient's goals/hopes:  Unable to assess.    Spiritual:     F- Dora and Belief: Unable to assess.  I - Importance: Unable to assess.  .C - Community: Unable to assess.  A - Address in Care: Unable to assess.      > 50% of 35 min visit spent in chart review, face to face discussion of goals of care,  symptom assessment, coordination of care and emotional support.    Debo Rosario MD  Palliative Medicine  [facility]

## 2018-05-01 NOTE — CONSULTS
[facility]  Radiation Oncology  Consult Note    Patient Name: Kerline Grossman  MRN: [id number]  Admission Date: 2018  Hospital Length of Stay: 10 days  Code Status: Full Code   Attending Provider: David Cruz MD  Consulting Provider: Dwight Constantino MD  Primary Care Physician: Primary Doctor No  Principal Problem:Metastatic breast cancer    Inpatient consult to Radiation Oncology  Consult performed by: DWIGHT CONSTANTINO.  Consult ordered by: SHANTANU MALNOEY        Subjective:     HPI:  REFERRING PHYSICIAN: Micah Cruz MD    PROBLEM: Kerline Grossman is a 58 years old woman presenting with extensive skeletal metastasis likely a carcinoma of the left breast who has recently undergone intramedullary nailing of both femurs.    OTHER MEDICAL HISTORY: Patient is a former smoker.  She has had a  and a hysterectomy.  She has been treated or followed for insomnia. PS is ECOG 2    PRESENT ILLNESS: Patient was seen in the [facility] with a history of 1 month of diffuse pain, becoming less responsive, incontinence of urine and unable to walk.  She had recently been discharged from an outside hospital where she learned that she had a metastatic cancer.  Imaging studies indicated disease in both femurs that are risk for fracture.  On 18 she underwent intramedullary nailing of the left femur.  On 18 she underwent intramedullary nailing of the right femur.  Pathology from the  surgery is pending.     PHYSICAL EXAMINATION: Patient is an alert slender woman who responds appropriately.  She is seen seated in a chair in the hospital.  She has been able to walk since the surgery.  The respirations are normal.  There are no evident neurologic deficits.    RADIOLOGIC STUDIES: Radiographic metastatic survey of the skeleton on 18 shows multiple lytic lesions distributed throughout the axial skeleton.      A CTA of the chest on 18 shows small bilateral pleural effusions,  numerous lytic bone lesions, a CTA of the thorax on 4/25/18 shows small bilateral pleural effusions, scattered micronodules in the lung of uncertain significance, the bone lesions and a thickened masslike complex deep to the right nipple and areola in the left breast.      MRI scan of the cervical, thoracic and lumbar spine show the many metastatic lesions.  There is retropulsion of a vertebral lesion at C5 with no epidural tumor noted.     LABORATORY STUDIES: 15/1/18 the hemoglobin is 9.4 with a white count of 12,420.  Platelets are 124,000.  Comprehensive metabolic panel is remarkable for an albumin of 1.4, calcium 7.5, alkaline phosphatase and AST of 67.    IMPRESSION: Radiation treatment of each of the mailed femurs is recommended.    PLAN: We will arrange for patient to come for radiation treatment planning simulation as an outpatient.  Treatment will likely consist of 10 fractions of 3Gy each.        No new subjective & objective note has been filed under this hospital service since the last note was generated.    Assessment/Plan:     No notes have been filed under this hospital service.  Service: Radiation Oncology      Thank you for your consult.     Dwight Hardin MD  Radiation Oncology  [facility]

## 2018-05-01 NOTE — PROGRESS NOTES
Hospital Medicine  Progress note     Team: [facility] HOSP MED B Dr. Cruz  Admit Date: 4/20/2018  VIGNESH 5/4/2018  Code status: Full Code     Principal Problem:  Metastatic breast cancer     Interval hx:  5/1: Mg 1.5-replaced. Oncology consult: outside path suggestive of breast primary with moderately differentiated adenocarcinoma ER/LA+, HER 2-. Will f/u on path to confirm. Consider MRI brain w/ w/o contrast once medically stable.  Radiation Oncology consulted. Family wants to talk about options with Oncology and Radiation Oncology before finalizing any palliative care plans. 5L NC-taper. Systolic BP 70s this AM, up to 113 with IVF bolus. Chest Xray -Small lung volumes and bibasal patchy subsegmental opacities persist, similar to recent studies.  No new disease identified , NS bolus, Blood cultures, Lactic Acid, UA, CBC/CMP, Troponin ordered. Decreased prn oxycodone to 5mg q4hrs.      ROS      Pain Scale: 8/10, Neck, Back, Legs  Respiratory: no cough or shortness of breath  Cardiovascular: no chest pain or palpitations  Gastrointestinal: no nausea or vomiting, no abdominal pain or change in bowel habits  Behavioral/Psych: no depression or anxiety        PEx  Temp:  [97.8 °F (36.6 °C)-99 °F (37.2 °C)]   Pulse:  [103-118]   Resp:  [18-24]   BP: ()/(52-84)   SpO2:  [91 %-96 %]      Intake/Output Summary (Last 24 hours) at 05/01/18 1430  Last data filed at 05/01/18 0227    Gross per 24 hour   Intake          1070.33 ml   Output             1800 ml   Net          -729.67 ml         General Appearance: no acute distress   Heart: regular rate and rhythm  Respiratory: Normal respiratory effort, no crackles 5L NC  Abdomen: Soft, non-tender; bowel sounds active  Skin: intact. IV sites ok  Neurologic:  No focal numbness or weakness. Pupils normal.  Mental status: Alert, oriented x 2, affect appropriate   Extremities: Limited motion BLE 2/2 pain, dressings from orthopedic surgery in place     Recent Labs  Lab 04/30/18  1120  05/01/18  0434 05/01/18  1302   WBC 13.47* 12.42 13.63*   HGB 9.0* 9.4* 9.3*   HCT 28.7* 29.8* 30.0*   * 124* 122*         Recent Labs  Lab 04/29/18  2317 04/30/18  0415 05/01/18  0058 05/01/18  0434 05/01/18  1302    137  --  138 137   K 3.7 3.7 4.1 3.9 3.8    108  --  110 110   CO2 21* 21*  --  19* 18*   BUN 16 15  --  14 14   CREATININE 1.0 0.9  --  0.9 0.9   GLU 96 74  --  73 76   CALCIUM 7.8* 7.7*  --  7.5* 7.9*   MG 1.5* 1.5* 1.5* 1.5*  --    PHOS 2.5* 2.7  --  3.0  --          Recent Labs  Lab 04/24/18  1549   04/30/18  0415 05/01/18  0434 05/01/18  1302   ALKPHOS  --   < > 327* 305* 295*   ALT  --   < > 9* 9* 7*   AST  --   < > 59* 67* 59*   ALBUMIN  --   < > 1.4* 1.4* 1.3*   PROT  --   < > 5.1* 5.0* 4.9*   BILITOT  --   < > 0.6 0.5 0.7   INR 0.9  --   --   --   --    < > = values in this interval not displayed.      Recent Labs  Lab 04/30/18  1124 04/30/18  1825 05/01/18  0041 05/01/18  0116 05/01/18  0556 05/01/18  1312   POCTGLUCOSE 133* 85 72 109 81 70          Recent Labs      05/01/18   1302   TROPONINI  0.015         Scheduled Meds:   acetaminophen  1,000 mg Oral Q6H    enoxaparin  40 mg Subcutaneous Daily    lidocaine  1 patch Transdermal Q24H    magnesium oxide  800 mg Oral Daily    polyethylene glycol  17 g Oral Daily    pregabalin  150 mg Oral QHS    ramelteon  8 mg Oral QHS    senna-docusate 8.6-50 mg  2 tablet Oral Daily      Continuous Infusions:   ropivacaine (PF) 2 mg/ml (0.2%) 10 mL/hr (05/01/18 1036)      As Needed:  sodium chloride, bisacodyl, dextrose 50%, dextrose 50%, glucagon (human recombinant), insulin aspart U-100, omnipaque, ondansetron, oxyCODONE, promethazine (PHENERGAN) IVPB, promethazine, ramelteon, sodium chloride 0.9%, sodium chloride 0.9%, sodium chloride 0.9%, sodium chloride 0.9%, tiZANidine, white petrolatum     Active Hospital Problems     Diagnosis   POA    *Metastatic breast cancer [C50.919]   Yes    Malignant neoplasm of central portion  of right breast in female, estrogen receptor positive [C50.111, Z17.0]   Not Applicable    Cardiac rhythm disorder or disturbance or change [I49.9]   Yes    CHF (congestive heart failure) [I50.9]   Yes    Right heart failure [I50.810]   Yes    Acute hypoxemic respiratory failure [J96.01]   Yes    Tachycardia [R00.0]   Yes    Lactic acidemia [E87.2]   Yes    Severe malnutrition [E43]   Yes    Cancer of left femur [C40.22]   Yes    Cancer of right femur [C40.21]   Yes    Palliative care encounter [Z51.5]   Not Applicable    Pain due to malignant neoplasm metastatic to bone [G89.3, C79.51]   Yes    Anemia [D64.9]   Yes    Thrombocytopenia, unspecified [D69.6]   Yes    Hypomagnesemia [E83.42]   Yes    Bony metastasis [C79.51]   Yes       Xray metastatic survey shows lytic lesions of skull, lytic spine and rib lesions with pathologic fractures. Diffuse lytic lesions throughout entire central axial red marrow skeleton. Lytic lesions of proximal long bones and pathological fractures of ribs and right superior and inferior pubic ramus.       Pleural effusion [J90]   Yes       Resolved Hospital Problems     Diagnosis Date Resolved POA    Shock [R57.9] 04/26/2018 Yes    Encounter for weaning from ventilator [Z99.11] 04/30/2018 Not Applicable    Encephalopathy, metabolic [G93.41] 04/21/2018 Yes         Overview  Mrs. Grossman is a 59 yo female who was brought to [facility] ED on 4/20/2018 for second opinion regarding recently diagnosed likely metastatic malignancy with unknown primary.      Assessment and Plan for Problems addressed today:     Metastatic breast cancer  Mets to bone  - oncology following, pt did not want to talk this afternoon after arriving to the floor from surgery, discussed with Dr. Fink (onc) he will see her tomorrow  - she may be a candidate for some oral therapy  - radiation oncology consult placed  - pain control (BP is sensitive to narcotics)  - palliative care saw her while she was in ICU,  would continue discussions. Family wants to talk about options with Oncology and Radiation Oncology before finalizing any palliative care plans.   - 5/1 Oncology recs: outside path suggestive of breast primary with moderately differentiated adenocarcinoma ER/DC+, HER 2-. Will f/u on path to confirm. Consider MRI brain w/ w/o contrast once medically stable.   - s/p radiation oncology evaluation - Radiation treatment of each of the mailed femurs is recommended. 10 fractions of 3Gy each as outpatient      Pathologic fracture of ribs pubic ramus  - s/p IM nailing femur x2     Hypoxemic respiratory failure  - pulmonary edema from IVF  - indeterminate diastolic function on 4/27 echo, PAP 53mmHg  - currently on 4L NC since arriving to floor from PACU  - 5/1: increased to 5L NC.      Hypotension -likley orthostasis   -5/1: Systolic BP 70s this AM, up to 113 with IVF bolus. Decreased prn oxycodone to 5mg q4hrs. Chest Xray, NS bolus, Blood cultures, Lactic Acid, UA, CBC/CMP, Troponin ordered.      Lactic acidosis  - 2.7 overnight, responded to IVF  - does not appear infected  - again elevated to 2.4 after procedure, will trend  - 4/30: 2.6, monitor     Urinary incontinence   - rocha in place, consider switch to purwick when pt can tolerate     Pleural effusion  - likely metastatic, s/p sampling at OSH  - associated compressive atelectasis bilaterally  - multiple pulmonary micronodules     Anemia of Chronic Disease  - likely chronic disease with some blood loss during procedures  - iron studies pending: Iron 50 (wnl), TIBC 160 (low), Saturation 31 (wnl), Transferrin 108 (low)  - monitor with CBC daily     Hypomagnesemia  -most likely 2/2 chronic disease  -4/22: 1.4-replaced  -5/1: 1.5-replaced     Hypophosphatemia   -4/22: 2.5 - replaced  - Most likely 2/2 chronic disease     Thrombocytopenia  -4/22: 122--> 165 resolved   - Most likely 2/2 chronic disease     Congestive Heart Failure  Right Heart Failure  -4/27 Echo: Right  Ventricular Enlargement with normal systolic function  -5/1:     Malnutrition   Prealbumin of 4 . dietary consulted      DVT PPx: Lovenox        Discharge plan and follow up  Pending family discussion     Provider     I personally scribed for David Cruz MD on 05/01/2018 at 2:31 PM. Electronically signed by kera Lemus on 05/01/2018 at 2:31 PM.   The documentation recorded by the scribe accurately reflects service I personally performed and the decisions made by me.  David Cruz MD  Attending Staff Physician  [facility] Medicine  pager- [phone]  [facility] - [phone]

## 2018-05-01 NOTE — PT/OT/SLP RE-EVAL
Physical Therapy Re-evaluation    Patient Name:  Kerline Grossman   MRN:  [id number]    Recommendations:     Discharge Recommendations:  nursing facility, skilled   Discharge Equipment Recommendations:  (TBD)   Barriers to discharge: None    Assessment:     Kerline Grossman is a 58 y.o. female admitted with a medical diagnosis of Metastatic breast cancer.  She presents with the following impairments/functional limitations:  weakness, impaired endurance, impaired functional mobilty, gait instability, impaired balance, decreased lower extremity function, pain, orthopedic precautions, decreased safety awareness, edema, impaired self care skills, decreased upper extremity function, decreased ROM Pt. reluctant, but cooperative and tolerated treatment fairly well. Pt. appeared nervous about mobility, and demonstrated poor safety awareness while seated on EOB. Pt.'s pain appeared to be controlled.    Rehab Prognosis:  fair; patient would benefit from acute skilled PT services to address these deficits and reach maximum level of function.      Recent Surgery: Procedure(s) (LRB):  IM NAILING OF FEMUR - hana table - large c arm door side (Right) 1 Day Post-Op    Plan:     During this hospitalization, patient to be seen 5 x/week to address the above listed problems via gait training, therapeutic exercises, therapeutic activities, wheelchair management/training, neuromuscular re-education  · Plan of Care Expires:  05/31/18   Plan of Care Reviewed with: patient    Subjective     Communicated with nursing prior to session.  Patient found supine upon PT entry to room, agreeable to evaluation.      Chief Complaint: weakness  Patient comments/goals: pt. did not specify  Pain/Comfort:  · Pain Rating 1:  (pt. did not rate)  · Location - Side 1: Bilateral  · Location - Orientation 1: generalized  · Location 1: leg  · Pain Addressed 1: Pre-medicate for activity, Reposition, Cessation of Activity, Nurse notified    Patients cultural, spiritual,  Ep[employer]opalian conflicts given the current situation:        Objective:     Patient found with: perineural catheter, peripheral IV     General Precautions: Standard, fall   Orthopedic Precautions:RLE weight bearing as tolerated, LLE weight bearing as tolerated   Braces: N/A     Exams:  · RUE ROM: WFL  · RUE Strength: 2/5  · LUE ROM: WFL  · LUE Strength: 2/5  · RLE ROM: pain limited  · RLE Strength: pain limited, but active movement noted  · LLE ROM: pain limited  · LLE Strength: pain limited, but active movment noted    Functional Mobility:  · Bed Mobility:     · Rolling Left:  maximal assistance  · Scooting: maximal assistance  · Supine to Sit: maximal assistance  · Transfers:     · Sit to Stand:  maximal assistance and total assistance with no AD with (B) knees blocked and supported by PT  · Balance: fair-poor sitting and poor standing    AM-PAC 6 CLICK MOBILITY  Total Score:10       Therapeutic Activities and Exercises:   Pt. performed static stand for ~1 min. with Max A while OT performed jony-care at EOB. Pt. transferred from bed to chair via stand/squat pivot and Max-Total A. Returned later to get pt. BTB with Max-Total A transfer and Max A for sit-supine.    Patient left up in chair with all lines intact, call button in reach and nursing notified.    GOALS:    Physical Therapy Goals        Problem: Physical Therapy Goal    Goal Priority Disciplines Outcome Goal Variances Interventions   Physical Therapy Goal     PT/OT, PT Ongoing (interventions implemented as appropriate)     Description:  Goals to be met by: 5/15/2018    Patient will increase functional independence with mobility by performin. Supine to sit with Minimal Assistance  2. Sit to supine with Minimal Assistance  3. Sit to stand transfer with Minimal Assistance using RW  4. Gait  x 10 feet with Minimal Assistance using Rolling Walker.   5. Lower extremity exercise program x15 reps per handout, with Supervision to improve muscular strength and  endurance.                        History:     Past Medical History:   Diagnosis Date    Bony metastasis 2018    Xray metastatic survey shows lytic lesions of skull, lytic spine and rib lesions with pathologic fractures. Diffuse lytic lesions throughout entire central axial red marrow skeleton. Lytic lesions of proximal long bones and pathological fractures of ribs and right superior and inferior pubic ramus.    Cancer     Insomnia     Right heart failure 2018       Past Surgical History:   Procedure Laterality Date     SECTION      HYSTERECTOMY         Clinical Decision Making:     History  Co-morbidities and personal factors that may impact the plan of care Examination  Body Structures and Functions, activity limitations and participation restrictions that may impact the plan of care Clinical Presentation   Decision Making/ Complexity Score   Co-morbidities:   [] Time since onset of injury / illness / exacerbation  [] Status of current condition  []Patient's cognitive status and safety concerns    [] Multiple Medical Problems (see med hx)  Personal Factors:   [] Patient's age  [] Prior Level of function   [] Patient's home situation (environment and family support)  [] Patient's level of motivation  [] Expected progression of patient      HISTORY:(criteria)    [] 97373 - no personal factors/history    [] 59818 - has 1-2 personal factor/comorbidity     [] 88363 - has >3 personal factor/comorbidity     Body Regions:  [] Objective examination findings  [] Head     []  Neck  [] Trunk   [] Upper Extremity  [] Lower Extremity    Body Systems:  [] For communication ability, affect, cognition, language, and learning style: the assessment of the ability to make needs known, consciousness, orientation (person, place, and time), expected emotional /behavioral responses, and learning preferences (eg, learning barriers, education  needs)  [] For the neuromuscular system: a general assessment of gross  coordinated movement (eg, balance, gait, locomotion, transfers, and transitions) and motor function  (motor control and motor learning)  [] For the musculoskeletal system: the assessment of gross symmetry, gross range of motion, gross strength, height, and weight  [] For the integumentary system: the assessment of pliability(texture), presence of scar formation, skin color, and skin integrity  [] For cardiovascular/pulmonary system: the assessment of heart rate, respiratory rate, blood pressure, and edema     Activity limitations:    [] Patient's cognitive status and saf ety concerns          [] Status of current condition      [] Weight bearing restriction  [] Cardiopulmunary Restriction    Participation Restrictions:   [] Goals and goal agreement with the patient     [] Rehab potential (prognosis) and probable outcome      Examination of Body System: (criteria)    [] 04454 - addressing 1-2 elements    [] 17389 - addressing a total of 3 or more elements     [] 04144 -  Addressing a total of 4 or more elements         Clinical Presentation: (criteria)  Choose one     On examination of body system using standardized tests and measures patient presents with (CHOOSE ONE) elements from any of the following: body structures and functions, activity limitations, and/or participation restrictions.  Leading to a clinical presentation that is considered (CHOOSE ONE)                              Clinical Decision Making  (Eval Complexity):  Choose One     Time Tracking:     PT Received On: 05/01/18  PT Start Time: 1028 (1231 (returned to get pt. BTB))     PT Stop Time: 1058 (1239 (returned to get pt. BTB))  PT Total Time (min): 30 min     Billable Minutes: Re-eval 20 and Therapeutic Activity 10 and again for 8 min. to get pt. BTB      Jarret Scott, PT  05/01/2018

## 2018-05-01 NOTE — ASSESSMENT & PLAN NOTE
58 y.o. female POD6 s/p L CMN, POD1 s/p R CMN    Pain control  PT/OT: WBAT  DVT PPx: lovenox, FCDs at all times when not ambulating  Abx: postop Ancef  Drain: none  [place]once: discontinue today    Dispo: per primary

## 2018-05-01 NOTE — PROGRESS NOTES
[facility]  Hematology/Oncology  Progress Note    Patient Name: Kerline Grossman  Admission Date: 4/20/2018  Hospital Length of Stay: 10 days  Code Status: Full Code     Subjective:     Interval History:     - reports pain involving b/l lower extremities.   - no fever/chills.     Medications:  Continuous Infusions:   ropivacaine (PF) 2 mg/ml (0.2%) 10 mL/hr (05/01/18 1036)     Scheduled Meds:   acetaminophen  1,000 mg Oral Q6H    enoxaparin  40 mg Subcutaneous Daily    lidocaine  1 patch Transdermal Q24H    magnesium oxide  800 mg Oral Daily    polyethylene glycol  17 g Oral Daily    pregabalin  150 mg Oral QHS    ramelteon  8 mg Oral QHS    senna-docusate 8.6-50 mg  2 tablet Oral Daily    [COMPLETED] sodium chloride 0.9%  250 mL Intravenous Once     PRN Meds:sodium chloride, bisacodyl, dextrose 50%, dextrose 50%, glucagon (human recombinant), insulin aspart U-100, omnipaque, ondansetron, oxyCODONE, promethazine (PHENERGAN) IVPB, promethazine, ramelteon, sodium chloride 0.9%, sodium chloride 0.9%, sodium chloride 0.9%, sodium chloride 0.9%, tiZANidine, white petrolatum     Review of Systems  Objective:     Vital Signs (Most Recent):  Temp: 97.8 °F (36.6 °C) (05/01/18 1203)  Pulse: (!) 111 (05/01/18 1304)  Resp: 18 (05/01/18 1304)  BP: 119/71 (05/01/18 1304)  SpO2: (!) 93 % (05/01/18 1304) Vital Signs (24h Range):  Temp:  [97.4 °F (36.3 °C)-99 °F (37.2 °C)] 97.8 °F (36.6 °C)  Pulse:  [] 111  Resp:  [18-24] 18  SpO2:  [91 %-98 %] 93 %  BP: ()/(52-84) 119/71  Arterial Line BP: (108-110)/(57-60) 110/60     Weight: 74 kg (163 lb 2.3 oz)  Body mass index is 26.33 kg/m².  Body surface area is 1.86 meters squared.      Intake/Output Summary (Last 24 hours) at 05/01/18 1329  Last data filed at 05/01/18 0227   Gross per 24 hour   Intake          1070.33 ml   Output             1800 ml   Net          -729.67 ml       Physical Exam  Constitutional: She is oriented to person, place, and  time. She appears well-developed and well-nourished. No distress.   HENT:   Head: Normocephalic and atraumatic.   Mouth/Throat: No oropharyngeal exudate.   Eyes: EOM are normal. Pupils are equal, round, and reactive to light. No scleral icterus.   Neck: Normal range of motion. Neck supple. No JVD present.   Cardiovascular: Regular rhythm and intact distal pulses.    Pulmonary/Chest: Effort normal and breath sounds normal. No respiratory distress. She has no wheezes. She has no rales.   Abdominal: Soft. Bowel sounds are normal. She exhibits no distension. There is no tenderness.   Musculoskeletal: She exhibits tenderness. She exhibits no edema.   Dressing intact bilaterally.   Neurological: She is alert and oriented to person, place, and time.   Skin: Skin is warm and dry. She is not diaphoretic. No erythema.     Significant Labs:   CBC:   Recent Labs  Lab 04/30/18  1120 05/01/18  0434 05/01/18  1302   WBC 13.47* 12.42 13.63*   HGB 9.0* 9.4* 9.3*   HCT 28.7* 29.8* 30.0*   * 124* 122*    and CMP:   Recent Labs  Lab 04/29/18  2317 04/30/18  0415 05/01/18  0058 05/01/18  0434    137  --  138   K 3.7 3.7 4.1 3.9    108  --  110   CO2 21* 21*  --  19*   GLU 96 74  --  73   BUN 16 15  --  14   CREATININE 1.0 0.9  --  0.9   CALCIUM 7.8* 7.7*  --  7.5*   PROT 5.1* 5.1*  --  5.0*   ALBUMIN 1.4* 1.4*  --  1.4*   BILITOT 0.5 0.6  --  0.5   ALKPHOS 335* 327*  --  305*   AST 58* 59*  --  67*   ALT 9* 9*  --  9*   ANIONGAP 8 8  --  9   EGFRNONAA >60.0 >60.0  --  >60.0       Diagnostic Results:  I have reviewed all pertinent imaging results/findings within the past 24 hours.    Assessment/Plan:     Active Diagnoses:    Diagnosis Date Noted POA    PRINCIPAL PROBLEM:  Metastatic breast cancer [C50.919] 04/21/2018 Yes    Malignant neoplasm of central portion of right breast in female, estrogen receptor positive [C50.111, Z17.0]  Not Applicable    Cardiac rhythm disorder or disturbance or change [I49.9]  Yes     CHF (congestive heart failure) [I50.9]  Yes    Right heart failure [I50.810] 04/26/2018 Yes    Acute hypoxemic respiratory failure [J96.01] 04/26/2018 Yes    Tachycardia [R00.0]  Yes    Lactic acidemia [E87.2]  Yes    Severe malnutrition [E43] 04/25/2018 Yes    Cancer of left femur [C40.22] 04/24/2018 Yes    Cancer of right femur [C40.21] 04/24/2018 Yes    Palliative care encounter [Z51.5] 04/23/2018 Not Applicable    Pain due to malignant neoplasm metastatic to bone [G89.3, C79.51] 04/23/2018 Yes    Anemia [D64.9] 04/22/2018 Yes    Thrombocytopenia, unspecified [D69.6] 04/22/2018 Yes    Hypomagnesemia [E83.42] 04/22/2018 Yes    Bony metastasis [C79.51] 04/21/2018 Yes    Pleural effusion [J90] 04/21/2018 Yes      Problems Resolved During this Admission:    Diagnosis Date Noted Date Resolved POA    Shock [R57.9] 05/01/2018 04/26/2018 Yes    Encounter for weaning from ventilator [Z99.11]  04/30/2018 Not Applicable    Encephalopathy, metabolic [G93.41] 04/21/2018 04/21/2018 Yes     Multiple Bony Metastasis  B/L Pathologic Fractures  - s/p L CMN POD6, s/p R CMN POD1  - appreciate orthopedics assistance in obtaining tissue for diagnosis. Path in process.   - outside path suggestive of breast primary with moderately differentiated adenocarcinoma  ER/NE+, HER2-.  - recommend radiation oncology evaluation for palliative therapy.  - if pathology confirmed for metastatic breast cancer then potentially we can consider letrozole/ palbociclib combination once clinically stable.   - obtain outside path slides for review by our Pathologists.  - consider MRI of Brain w w/o contrast once clinically stable.    - appreciate palliative care team's assistance  - will arrange for outpatient Medical Oncology Clinic follow up upon discharge.    - PT/OT  - Nutritional/Speech Eval        Thank you for your consult. I will follow-up with patient. Please contact us if you have any additional questions.     Babak Fink,  MD  Hematology/Oncology  [facility]    STAFF NOTE:  I have personally taken the history and examined this patient and agree with Dr. Fink's Note as stated above.

## 2018-05-01 NOTE — SUBJECTIVE & OBJECTIVE
Interval History: Patient lying in bed. Pain reasonably well controlled with epidural and prn pain meds. Son Kings and Daughter Guerda are in the room. They are hopeful for her recovery. Will need further discussion with oncology and primary team for plan of care.     Medications:  Continuous Infusions:   ropivacaine (PF) 2 mg/ml (0.2%) 10 mL/hr (05/01/18 0258)     Scheduled Meds:   acetaminophen  1,000 mg Oral Q6H    enoxaparin  40 mg Subcutaneous Daily    lidocaine  1 patch Transdermal Q24H    magnesium oxide  800 mg Oral Daily    magnesium sulfate IVPB  2 g Intravenous Once    polyethylene glycol  17 g Oral Daily    pregabalin  150 mg Oral QHS    ramelteon  8 mg Oral QHS    senna-docusate 8.6-50 mg  2 tablet Oral Daily     PRN Meds:sodium chloride, bisacodyl, dextrose 50%, dextrose 50%, glucagon (human recombinant), HYDROmorphone, HYDROmorphone, insulin aspart U-100, omnipaque, ondansetron, oxyCODONE, oxyCODONE, oxyCODONE, promethazine (PHENERGAN) IVPB, promethazine, ramelteon, sodium chloride 0.9%, sodium chloride 0.9%, sodium chloride 0.9%, sodium chloride 0.9%, white petrolatum    Objective:     Vital Signs (Most Recent):  Temp: 98.7 °F (37.1 °C) (05/01/18 0812)  Pulse: (!) 117 (05/01/18 0812)  Resp: 20 (05/01/18 0812)  BP: 139/84 (05/01/18 0812)  SpO2: (!) 92 % (05/01/18 0812) Vital Signs (24h Range):  Temp:  [97.4 °F (36.3 °C)-99 °F (37.2 °C)] 98.7 °F (37.1 °C)  Pulse:  [] 117  Resp:  [18-24] 20  SpO2:  [91 %-99 %] 92 %  BP: ()/(53-84) 139/84  Arterial Line BP: ()/(48-61) 110/60     Weight: 74 kg (163 lb 2.3 oz)  Body mass index is 26.33 kg/m².    Review of Symptoms  Symptom Assessment (ESAS 0-10 scale)  ESAS 0 1 2 3 4 5 6 7 8 9 10   Pain      x        Dyspnea x             Anxiety x             Nausea x             Depression  x             Anorexia x             Fatigue x             Insomnia x             Restlessness  x             Agitation x             CAM / Delirium  x__ --  ___+   Constipation     _x_ --  ___+   Diarrhea           _x_ --  ___+  Bowel Management Plan (BMP): Yes    Comments: miralax    Pain Assessment: Location: generalized, buttock, upper leg    OME in 24 hours: 0.5 mg dilaudid    Performance Status: 30    ECOG Performance Status Grade: 4 - Completely disabled    Physical Exam   Constitutional: She appears cachectic.   HENT:   Head: Normocephalic and atraumatic.   Neurological: She is alert.   Nursing note and vitals reviewed.      Significant Labs: All pertinent labs within the past 24 hours have been reviewed.  CBC:     Recent Labs  Lab 05/01/18 0434   WBC 12.42   HGB 9.4*   HCT 29.8*   MCV 90   *     BMP:    Recent Labs  Lab 05/01/18 0434   GLU 73      K 3.9      CO2 19*   BUN 14   CREATININE 0.9   CALCIUM 7.5*   MG 1.5*     LFT:  Lab Results   Component Value Date    AST 67 (H) 05/01/2018    ALKPHOS 305 (H) 05/01/2018    BILITOT 0.5 05/01/2018     Albumin:   Albumin   Date Value Ref Range Status   05/01/2018 1.4 (L) 3.5 - 5.2 g/dL Final     Protein:   Total Protein   Date Value Ref Range Status   05/01/2018 5.0 (L) 6.0 - 8.4 g/dL Final     Lactic acid:   Lab Results   Component Value Date    LACTATE 2.6 (H) 04/30/2018    LACTATE 2.4 (H) 04/30/2018       Significant Imaging: I have reviewed all pertinent imaging results/findings within the past 24 hours.    Advanced Directives::  Living Will: No  LaPOST: No  Do Not Resuscitate Status: No  Medical Power of : No    Decision-Making Capacity: Patient answered questions, Family answered questions    Living Arrangements: Lives in home, Lives >50 miles from [facility] [place] [facility]    Psychosocial/Cultural:  Patient's most important priorities:  Unable to assess.    Patient's biggest concerns/fears:  Unable to assess.    Previous death/end of life care history:  Unable to assess.    Patient's goals/hopes:  Unable to assess.    Spiritual:     F- Dora and Belief: Unable to assess.  I - Importance: Unable to  assess.  .C - Community: Unable to assess.  A - Address in Care: Unable to assess.

## 2018-05-01 NOTE — ANESTHESIA POST-OP PAIN MANAGEMENT
Acute Pain Service Progress Note    Kerline Grossman is a 58 y.o., female, [id number].    Surgery:  IM NAILING OF FEMUR - hana table - large c arm door side (Right), POD 1   IM NAILING OF FEMUR - hana table - large c arm door side (Left), POD 6    Catheter type: perineural  SIFI    Infusion type: Ropivacaine 0.2%  8 basal    Problem List:    Active Hospital Problems    Diagnosis  POA    *Metastatic breast cancer [C50.919]  Yes    Malignant neoplasm of central portion of right breast in female, estrogen receptor positive [C50.111, Z17.0]  Not Applicable    Cardiac rhythm disorder or disturbance or change [I49.9]  Yes    CHF (congestive heart failure) [I50.9]  Yes    Right heart failure [I50.810]  Yes    Acute hypoxemic respiratory failure [J96.01]  Yes    Tachycardia [R00.0]  Yes    Lactic acidemia [E87.2]  Yes    Severe malnutrition [E43]  Yes    Cancer of left femur [C40.22]  Yes    Cancer of right femur [C40.21]  Yes    Palliative care encounter [Z51.5]  Not Applicable    Pain due to malignant neoplasm metastatic to bone [G89.3, C79.51]  Yes    Anemia [D64.9]  Yes    Thrombocytopenia, unspecified [D69.6]  Yes    Hypomagnesemia [E83.42]  Yes    Bony metastasis [C79.51]  Yes     Xray metastatic survey shows lytic lesions of skull, lytic spine and rib lesions with pathologic fractures. Diffuse lytic lesions throughout entire central axial red marrow skeleton. Lytic lesions of proximal long bones and pathological fractures of ribs and right superior and inferior pubic ramus.      Pleural effusion [J90]  Yes      Resolved Hospital Problems    Diagnosis Date Resolved POA    Shock [R57.9] 04/26/2018 Yes    Encounter for weaning from ventilator [Z99.11] 04/30/2018 Not Applicable    Encephalopathy, metabolic [G93.41] 04/21/2018 Yes       Subjective:     General appearance of alert, oriented, no complaints   Pain with rest: 8    Numbers   Pain with movement: 8    Numbers   Side Effects    1. Pruritis No    2.  Nausea No    3. Motor Blockade No, 1=Ability to bend knees and ankles    4. Sedation No, 1=awake and alert    Objective:     Catheter site clean, dry, intact         Vitals   Vitals:    05/01/18 0700   BP:    Pulse: (!) 117   Resp:    Temp:         Labs    Admission on 04/20/2018   No results displayed because visit has over 200 results.           Meds   Current Facility-Administered Medications   Medication Dose Route Frequency Provider Last Rate Last Dose    0.9%  NaCl infusion (for blood administration)   Intravenous Q24H PRN Kings Garner MD        acetaminophen tablet 1,000 mg  1,000 mg Oral Q6H Sarai Coyne MD   1,000 mg at 04/30/18 1735    bisacodyl suppository 10 mg  10 mg Rectal Daily PRN Frank Argueta MD        dextrose 50% injection 12.5 g  12.5 g Intravenous PRN Reginald Romero MD   12.5 g at 04/28/18 0619    dextrose 50% injection 25 g  25 g Intravenous PRN KITTY Delatorre   25 g at 04/26/18 0627    enoxaparin injection 40 mg  40 mg Subcutaneous Daily Frank Argueta MD   40 mg at 04/30/18 1734    glucagon (human recombinant) injection 1 mg  1 mg Intramuscular PRN Reginald Romero MD        HYDROmorphone injection 0.5 mg  0.5 mg Intravenous Q3H PRN Atif Valencia MD   0.5 mg at 04/30/18 1550    HYDROmorphone injection 1 mg  1 mg Intravenous Q4H PRN Atif Valencia MD        insulin aspart U-100 pen 1-10 Units  1-10 Units Subcutaneous Q6H PRN Reginald Romero MD   4 Units at 04/25/18 2227    lidocaine 5 % patch 1 patch  1 patch Transdermal Q24H David Cruz MD   1 patch at 04/30/18 1756    magnesium oxide tablet 800 mg  800 mg Oral Daily Gordon Cross MD   800 mg at 04/30/18 1327    magnesium sulfate 2g in water 50mL IVPB (premix)  2 g Intravenous Once David Cruz MD        omnipaque oral solution 15 mL  15 mL Oral PRN Celena Wright MD   15 mL at 04/22/18 1116    ondansetron disintegrating tablet 8 mg  8 mg Oral Q8H PRN CHRISTOPHER DelatorreBS         oxyCODONE immediate release tablet 10 mg  10 mg Oral Q3H PRN Atif Valencia MD        oxyCODONE immediate release tablet 15 mg  15 mg Oral Q3H PRN Atif Valencia MD        oxyCODONE immediate release tablet 5 mg  5 mg Oral Q3H PRN Atif Valencia MD        polyethylene glycol packet 17 g  17 g Oral Daily Frank Argueta MD   17 g at 04/30/18 1327    pregabalin capsule 150 mg  150 mg Oral QHS Atif Valencia MD   150 mg at 04/30/18 2251    promethazine (PHENERGAN) 6.25 mg in dextrose 5 % 50 mL IVPB  6.25 mg Intravenous Q6H PRN Talib Loyd MD        promethazine tablet 25 mg  25 mg Oral Q6H PRN Kristan Calderon PA-C        ramelteon tablet 8 mg  8 mg Oral Nightly PRN Kelsey Torre MD        ramelteon tablet 8 mg  8 mg Oral QHS Kelsey Torre MD   8 mg at 04/30/18 2251    ropivacaine (PF) 2 mg/ml (0.2%) infusion  10 mL/hr Perineural Continuous Talib Loyd MD 10 mL/hr at 05/01/18 0258 10 mL/hr at 05/01/18 0258    senna-docusate 8.6-50 mg per tablet 2 tablet  2 tablet Oral Daily David Cruz MD   2 tablet at 04/30/18 1327    sodium chloride 0.9% flush 3 mL  3 mL Intravenous PRN Han Pelayo MD        sodium chloride 0.9% flush 3 mL  3 mL Intravenous PRN Han Pelayo MD        sodium chloride 0.9% flush 3 mL  3 mL Intravenous PRN Frank Argueta MD        sodium chloride 0.9% flush 5 mL  5 mL Intravenous PRN KITTY Delatorre        white petrolatum 41 % ointment   Topical (Top) PRN David Cruz MD            Anticoagulant dose: lovenox 40    Assessment:     Pain control inadequate    Plan:     .     Patient not requesting available PRN medications; reminded patient and family member there are available medications to aid in analgesia on patients request. Patient describes pain as mainly lower back sharp pain and cramping BLLE pain. Will discuss addition of muscle relaxant with team for spasm type pain described by patient and continue to encourage patient to request PRNs  as needed    Evaluator Sarai Coyne

## 2018-05-01 NOTE — PLAN OF CARE
Problem: Patient Care Overview  Goal: Plan of Care Review  Outcome: Ongoing (interventions implemented as appropriate)  Pt AAOx4. Family at bedside. Pt tolerating regular diet with no complaints of discomfort or nausea. Pain managed with Ropivacaine infusion @10ml/hr. Pt on tele, ST (115s), all other VSS on 4L NC. Ponce catheter intact and patent, adequate UOP overnight. 500cc NS bolus given. Call light in reach and bed in lowest position. Pt slept between care. Pt remains free of falls and injury. No acute events this shift. Will continue to monitor.

## 2018-05-01 NOTE — PLAN OF CARE
Problem: Physical Therapy Goal  Goal: Physical Therapy Goal  Goals to be met by: 5/15/2018    Patient will increase functional independence with mobility by performin. Supine to sit with Minimal Assistance  2. Sit to supine with Minimal Assistance  3. Sit to stand transfer with Minimal Assistance using RW  4. Gait  x 10 feet with Minimal Assistance using Rolling Walker.   5. Lower extremity exercise program x15 reps per handout, with Supervision to improve muscular strength and endurance.      Outcome: Ongoing (interventions implemented as appropriate)  Goals re-established and revised

## 2018-05-02 LAB
ALBUMIN SERPL BCP-MCNC: 1.4 G/DL
ALP SERPL-CCNC: 335 U/L
ALT SERPL W/O P-5'-P-CCNC: 6 U/L
ANION GAP SERPL CALC-SCNC: 10 MMOL/L
ANISOCYTOSIS BLD QL SMEAR: SLIGHT
AST SERPL-CCNC: 66 U/L
BACTERIA #/AREA URNS AUTO: ABNORMAL /HPF
BASOPHILS # BLD AUTO: ABNORMAL K/UL
BASOPHILS NFR BLD: 0 %
BILIRUB SERPL-MCNC: 0.7 MG/DL
BILIRUB UR QL STRIP: NEGATIVE
BLD PROD TYP BPU: NORMAL
BLD PROD TYP BPU: NORMAL
BLOOD UNIT EXPIRATION DATE: NORMAL
BLOOD UNIT EXPIRATION DATE: NORMAL
BLOOD UNIT TYPE CODE: 5100
BLOOD UNIT TYPE CODE: 5100
BLOOD UNIT TYPE: NORMAL
BLOOD UNIT TYPE: NORMAL
BUN SERPL-MCNC: 16 MG/DL
CALCIUM SERPL-MCNC: 8.1 MG/DL
CHLORIDE SERPL-SCNC: 111 MMOL/L
CLARITY UR REFRACT.AUTO: CLEAR
CO2 SERPL-SCNC: 19 MMOL/L
CODING SYSTEM: NORMAL
CODING SYSTEM: NORMAL
COLOR UR AUTO: YELLOW
CREAT SERPL-MCNC: 0.9 MG/DL
DIFFERENTIAL METHOD: ABNORMAL
DISPENSE STATUS: NORMAL
DISPENSE STATUS: NORMAL
EOSINOPHIL # BLD AUTO: ABNORMAL K/UL
EOSINOPHIL NFR BLD: 0 %
ERYTHROCYTE [DISTWIDTH] IN BLOOD BY AUTOMATED COUNT: 18.5 %
EST. GFR  (AFRICAN AMERICAN): >60 ML/MIN/1.73 M^2
EST. GFR  (NON AFRICAN AMERICAN): >60 ML/MIN/1.73 M^2
GLUCOSE SERPL-MCNC: 48 MG/DL
GLUCOSE UR QL STRIP: NEGATIVE
HCT VFR BLD AUTO: 31.3 %
HGB BLD-MCNC: 9.7 G/DL
HGB UR QL STRIP: ABNORMAL
HYPOCHROMIA BLD QL SMEAR: ABNORMAL
IMM GRANULOCYTES # BLD AUTO: ABNORMAL K/UL
IMM GRANULOCYTES NFR BLD AUTO: ABNORMAL %
KETONES UR QL STRIP: NEGATIVE
LEUKOCYTE ESTERASE UR QL STRIP: ABNORMAL
LYMPHOCYTES # BLD AUTO: ABNORMAL K/UL
LYMPHOCYTES NFR BLD: 18 %
MAGNESIUM SERPL-MCNC: 1.5 MG/DL
MCH RBC QN AUTO: 28.1 PG
MCHC RBC AUTO-ENTMCNC: 31 G/DL
MCV RBC AUTO: 91 FL
METAMYELOCYTES NFR BLD MANUAL: 1 %
MICROSCOPIC COMMENT: ABNORMAL
MONOCYTES # BLD AUTO: ABNORMAL K/UL
MONOCYTES NFR BLD: 6 %
MYELOCYTES NFR BLD MANUAL: 2 %
NEUTROPHILS NFR BLD: 71 %
NEUTS BAND NFR BLD MANUAL: 2 %
NITRITE UR QL STRIP: NEGATIVE
NRBC BLD-RTO: 3 /100 WBC
NUM UNITS TRANS PACKED RBC: NORMAL
NUM UNITS TRANS PACKED RBC: NORMAL
OVALOCYTES BLD QL SMEAR: ABNORMAL
PH UR STRIP: 6 [PH] (ref 5–8)
PHOSPHATE SERPL-MCNC: 2.8 MG/DL
PLATELET # BLD AUTO: 135 K/UL
PMV BLD AUTO: 12.7 FL
POCT GLUCOSE: 106 MG/DL (ref 70–110)
POCT GLUCOSE: 56 MG/DL (ref 70–110)
POCT GLUCOSE: 64 MG/DL (ref 70–110)
POCT GLUCOSE: 68 MG/DL (ref 70–110)
POCT GLUCOSE: 82 MG/DL (ref 70–110)
POIKILOCYTOSIS BLD QL SMEAR: SLIGHT
POLYCHROMASIA BLD QL SMEAR: ABNORMAL
POTASSIUM SERPL-SCNC: 3.9 MMOL/L
PROT SERPL-MCNC: 5.3 G/DL
PROT UR QL STRIP: NEGATIVE
RBC # BLD AUTO: 3.45 M/UL
RBC #/AREA URNS AUTO: 1 /HPF (ref 0–4)
SODIUM SERPL-SCNC: 140 MMOL/L
SP GR UR STRIP: 1.01 (ref 1–1.03)
SQUAMOUS #/AREA URNS AUTO: 2 /HPF
URN SPEC COLLECT METH UR: ABNORMAL
UROBILINOGEN UR STRIP-ACNC: NEGATIVE EU/DL
WBC # BLD AUTO: 15.15 K/UL
WBC #/AREA URNS AUTO: 3 /HPF (ref 0–5)
YEAST UR QL AUTO: ABNORMAL

## 2018-05-02 PROCEDURE — 25000003 PHARM REV CODE 250: Performed by: HOSPITALIST

## 2018-05-02 PROCEDURE — 99231 SBSQ HOSP IP/OBS SF/LOW 25: CPT | Mod: ,,, | Performed by: ANESTHESIOLOGY

## 2018-05-02 PROCEDURE — 83735 ASSAY OF MAGNESIUM: CPT

## 2018-05-02 PROCEDURE — 99233 SBSQ HOSP IP/OBS HIGH 50: CPT | Mod: ,,, | Performed by: HOSPITALIST

## 2018-05-02 PROCEDURE — 25000003 PHARM REV CODE 250: Performed by: ANESTHESIOLOGY

## 2018-05-02 PROCEDURE — 63600175 PHARM REV CODE 636 W HCPCS: Performed by: STUDENT IN AN ORGANIZED HEALTH CARE EDUCATION/TRAINING PROGRAM

## 2018-05-02 PROCEDURE — 80053 COMPREHEN METABOLIC PANEL: CPT

## 2018-05-02 PROCEDURE — 85027 COMPLETE CBC AUTOMATED: CPT

## 2018-05-02 PROCEDURE — 99231 SBSQ HOSP IP/OBS SF/LOW 25: CPT | Mod: ,,, | Performed by: INTERNAL MEDICINE

## 2018-05-02 PROCEDURE — 84100 ASSAY OF PHOSPHORUS: CPT

## 2018-05-02 PROCEDURE — 81001 URINALYSIS AUTO W/SCOPE: CPT

## 2018-05-02 PROCEDURE — 63600175 PHARM REV CODE 636 W HCPCS: Performed by: HOSPITALIST

## 2018-05-02 PROCEDURE — 36415 COLL VENOUS BLD VENIPUNCTURE: CPT

## 2018-05-02 PROCEDURE — 11000001 HC ACUTE MED/SURG PRIVATE ROOM

## 2018-05-02 PROCEDURE — 85007 BL SMEAR W/DIFF WBC COUNT: CPT

## 2018-05-02 PROCEDURE — 25000003 PHARM REV CODE 250: Performed by: STUDENT IN AN ORGANIZED HEALTH CARE EDUCATION/TRAINING PROGRAM

## 2018-05-02 RX ORDER — MAGNESIUM SULFATE HEPTAHYDRATE 40 MG/ML
2 INJECTION, SOLUTION INTRAVENOUS ONCE
Status: COMPLETED | OUTPATIENT
Start: 2018-05-02 | End: 2018-05-02

## 2018-05-02 RX ORDER — LORAZEPAM 0.5 MG/1
0.5 TABLET ORAL ONCE
Status: COMPLETED | OUTPATIENT
Start: 2018-05-02 | End: 2018-05-02

## 2018-05-02 RX ADMIN — MAGNESIUM SULFATE IN WATER 2 G: 40 INJECTION, SOLUTION INTRAVENOUS at 09:05

## 2018-05-02 RX ADMIN — STANDARDIZED SENNA CONCENTRATE AND DOCUSATE SODIUM 2 TABLET: 8.6; 5 TABLET, FILM COATED ORAL at 09:05

## 2018-05-02 RX ADMIN — ACETAMINOPHEN 1000 MG: 500 TABLET, FILM COATED ORAL at 12:05

## 2018-05-02 RX ADMIN — OXYCODONE HYDROCHLORIDE 5 MG: 5 TABLET ORAL at 03:05

## 2018-05-02 RX ADMIN — PREGABALIN 150 MG: 50 CAPSULE ORAL at 09:05

## 2018-05-02 RX ADMIN — ENOXAPARIN SODIUM 40 MG: 100 INJECTION SUBCUTANEOUS at 05:05

## 2018-05-02 RX ADMIN — LIDOCAINE 1 PATCH: 50 PATCH CUTANEOUS at 05:05

## 2018-05-02 RX ADMIN — ACETAMINOPHEN 1000 MG: 500 TABLET, FILM COATED ORAL at 06:05

## 2018-05-02 RX ADMIN — LORAZEPAM 0.5 MG: 0.5 TABLET ORAL at 03:05

## 2018-05-02 RX ADMIN — RAMELTEON 8 MG: 8 TABLET, FILM COATED ORAL at 09:05

## 2018-05-02 RX ADMIN — DEXTROSE MONOHYDRATE 12.5 G: 25 INJECTION, SOLUTION INTRAVENOUS at 06:05

## 2018-05-02 RX ADMIN — OXYCODONE HYDROCHLORIDE 5 MG: 5 TABLET ORAL at 05:05

## 2018-05-02 RX ADMIN — ACETAMINOPHEN 1000 MG: 500 TABLET, FILM COATED ORAL at 05:05

## 2018-05-02 RX ADMIN — MAGNESIUM OXIDE TAB 400 MG (241.3 MG ELEMENTAL MG) 800 MG: 400 (241.3 MG) TAB at 09:05

## 2018-05-02 NOTE — SUBJECTIVE & OBJECTIVE
"Principal Problem:Metastatic breast cancer    Principal Orthopedic Problem: B femur impending fractures s/p B CMN    Interval History: Patient seen and examined at bedside.  No acute events overnight.  Pain controlled. Has epidural in place. Evaluated by palliative and rad onc yesterday. Plan for radiation as outpatient.    Review of patient's allergies indicates:  No Known Allergies    Current Facility-Administered Medications   Medication    0.9%  NaCl infusion (for blood administration)    acetaminophen tablet 1,000 mg    bisacodyl suppository 10 mg    dextrose 50% injection 12.5 g    dextrose 50% injection 25 g    enoxaparin injection 40 mg    glucagon (human recombinant) injection 1 mg    insulin aspart U-100 pen 1-10 Units    lidocaine 5 % patch 1 patch    magnesium oxide tablet 800 mg    omnipaque oral solution 15 mL    ondansetron disintegrating tablet 8 mg    oxyCODONE immediate release tablet 5 mg    polyethylene glycol packet 17 g    pregabalin capsule 150 mg    promethazine (PHENERGAN) 6.25 mg in dextrose 5 % 50 mL IVPB    promethazine tablet 25 mg    ramelteon tablet 8 mg    ramelteon tablet 8 mg    ropivacaine (PF) 2 mg/ml (0.2%) infusion    senna-docusate 8.6-50 mg per tablet 2 tablet    sodium chloride 0.9% flush 3 mL    sodium chloride 0.9% flush 3 mL    sodium chloride 0.9% flush 3 mL    sodium chloride 0.9% flush 5 mL    tiZANidine tablet 4 mg    white petrolatum 41 % ointment     Objective:     Vital Signs (Most Recent):  Temp: 98.1 °F (36.7 °C) (05/01/18 1945)  Pulse: (!) 115 (05/02/18 0200)  Resp: 18 (05/01/18 1945)  BP: 123/77 (05/01/18 1945)  SpO2: (!) 94 % (05/01/18 1945) Vital Signs (24h Range):  Temp:  [97.6 °F (36.4 °C)-98.7 °F (37.1 °C)] 98.1 °F (36.7 °C)  Pulse:  [102-117] 115  Resp:  [18-20] 18  SpO2:  [92 %-96 %] 94 %  BP: ()/(52-84) 123/77     Weight: 74 kg (163 lb 2.3 oz)  Height: 5' 6" (167.6 cm)  Body mass index is 26.33 kg/m².      Intake/Output " Summary (Last 24 hours) at 05/02/18 0554  Last data filed at 05/01/18 2245   Gross per 24 hour   Intake              730 ml   Output                0 ml   Net              730 ml       Ortho/SPM Exam    AAOx4  NAD  Regular rhythm, tachycardic  Tachypnea     BLE:  L thigh dressings with stable, dry drainage  R thigh dressing minimal drainage on dressings  SILT and motor intact T/SP/DP  WWP extremities  FCDs in place and functioning    Significant Labs:   CBC:     Recent Labs  Lab 04/30/18  1120 05/01/18  0434 05/01/18  1302   WBC 13.47* 12.42 13.63*   HGB 9.0* 9.4* 9.3*   HCT 28.7* 29.8* 30.0*   * 124* 122*     CMP:     Recent Labs  Lab 05/01/18  0058 05/01/18  0434 05/01/18  1302   NA  --  138 137   K 4.1 3.9 3.8   CL  --  110 110   CO2  --  19* 18*   GLU  --  73 76   BUN  --  14 14   CREATININE  --  0.9 0.9   CALCIUM  --  7.5* 7.9*   PROT  --  5.0* 4.9*   ALBUMIN  --  1.4* 1.3*   BILITOT  --  0.5 0.7   ALKPHOS  --  305* 295*   AST  --  67* 59*   ALT  --  9* 7*   ANIONGAP  --  9 9   EGFRNONAA  --  >60.0 >60.0     All pertinent labs within the past 24 hours have been reviewed.    Significant Imaging: I have reviewed all pertinent imaging results/findings.

## 2018-05-02 NOTE — MEDICAL/APP STUDENT
Hospital Medicine  Progress note    [place] [facility] [place]. Cruz  Admit Date: 4/20/2018  VIGNESH 5/8/2018  Code status: Full Code    Principal Problem:  Metastatic breast cancer     Interval hx: 5/2:  Blood cultures NGTD, lactate 2.2, troponin 0.015, UA 1+ occult blood and trace leukocytes with few yeast and occasional bacteria. WBC increased to 15. Started on soft diet. Mg 1.5-replaced. Dietary Consult: Please order speech consult. Continue regular diet, texture per SLP. Continue Boost Plus TID. Speech Consult: Clinical evaluation of swallow complete. Recommend dental soft diet with thin liquids. Given pt with scattered dentition, she appears to have met max potential with SLP services at this time. Tapered to Room Air. MRI w and w/o contrast ordered. s/p radiation oncology evaluation: radiation treatment of each of the mailed femurs is recommended. 10 fractions of 3Gy each as outpatient.    5/1: Mg 1.5-replaced. Oncology consult: outside path suggestive of breast primary with moderately differentiated adenocarcinoma ER/UT+, HER 2-. Will f/u on path to confirm. Consider MRI brain w/ w/o contrast once medically stable.  Radiation Oncology consulted. Family wants to talk about options with Oncology and Radiation Oncology before finalizing any palliative care plans. 5L NC-taper. Systolic BP 70s this AM, up to 113 with IVF bolus. Chest Xray -Small lung volumes and bibasal patchy subsegmental opacities persist, similar to recent studies.  No new disease identified , NS bolus, Blood cultures, Lactic Acid, UA, CBC/CMP, Troponin ordered. Decreased prn oxycodone to 5mg q4hrs.     ROS     Pain Scale: Decreased to 6/10, Neck, Back, Legs  Respiratory: no cough or shortness of breath  Cardiovascular: no chest pain or palpitations  Gastrointestinal: no nausea or vomiting, no abdominal pain or change in bowel habits  Behavioral/Psych: no depression or anxiety      PEx  Temp:  [95.4 °F (35.2 °C)-98.1 °F (36.7 °C)]   Pulse:   [102-131]   Resp:  [16-18]   BP: ()/(68-77)   SpO2:  [93 %-96 %]     Intake/Output Summary (Last 24 hours) at 05/02/18 1523  Last data filed at 05/01/18 2245   Gross per 24 hour   Intake              730 ml   Output                0 ml   Net              730 ml       General Appearance: no acute distress   Heart: regular rate and rhythm  Respiratory: Normal respiratory effort, no crackles Room Air  Abdomen: Soft, non-tender; bowel sounds active  Skin: intact. IV sites ok  Neurologic:  No focal numbness or weakness. Pupils normal.  Mental status: Alert, oriented x 2, affect appropriate   Extremities: Limited motion BLE 2/2 pain, dressings from orthopedic surgery in place. Weakness right arm:  ok, but overall 2/5 power RUE. Difficulty lifting RUE off bed.     Recent Labs  Lab 05/01/18  0434 05/01/18  1302 05/02/18  0435   WBC 12.42 13.63* 15.15*   HGB 9.4* 9.3* 9.7*   HCT 29.8* 30.0* 31.3*   * 122* 135*       Recent Labs  Lab 04/30/18  0415 05/01/18  0058 05/01/18  0434 05/01/18  1302 05/02/18  0435     --  138 137 140   K 3.7 4.1 3.9 3.8 3.9     --  110 110 111*   CO2 21*  --  19* 18* 19*   BUN 15  --  14 14 16   CREATININE 0.9  --  0.9 0.9 0.9   GLU 74  --  73 76 48*   CALCIUM 7.7*  --  7.5* 7.9* 8.1*   MG 1.5* 1.5* 1.5*  --  1.5*   PHOS 2.7  --  3.0  --  2.8       Recent Labs  Lab 05/01/18  0434 05/01/18  1302 05/02/18  0435   ALKPHOS 305* 295* 335*   ALT 9* 7* 6*   AST 67* 59* 66*   ALBUMIN 1.4* 1.3* 1.4*   PROT 5.0* 4.9* 5.3*   BILITOT 0.5 0.7 0.7        Recent Labs  Lab 05/01/18  0116 05/01/18  0556 05/01/18  1312 05/02/18  0737 05/02/18  0923 05/02/18  1229   POCTGLUCOSE 109 81 70 56* 82 68*     Recent Labs      05/01/18   1302   TROPONINI  0.015       Scheduled Meds:   acetaminophen  1,000 mg Oral Q6H    enoxaparin  40 mg Subcutaneous Daily    lidocaine  1 patch Transdermal Q24H    LORazepam  0.5 mg Oral Once    magnesium oxide  800 mg Oral Daily    polyethylene glycol  17 g  Oral Daily    pregabalin  150 mg Oral QHS    ramelteon  8 mg Oral QHS    senna-docusate 8.6-50 mg  2 tablet Oral Daily     Continuous Infusions:   ropivacaine (PF) 2 mg/ml (0.2%) 10 mL/hr (05/01/18 1815)     As Needed:  sodium chloride, bisacodyl, dextrose 50%, dextrose 50%, glucagon (human recombinant), insulin aspart U-100, omnipaque, ondansetron, oxyCODONE, promethazine (PHENERGAN) IVPB, promethazine, ramelteon, sodium chloride 0.9%, sodium chloride 0.9%, sodium chloride 0.9%, sodium chloride 0.9%, tiZANidine, white petrolatum    Active Hospital Problems    Diagnosis  POA    *Metastatic breast cancer [C50.919]  Yes    Malnutrition of moderate degree [E44.0]  Yes    Malignant neoplasm of central portion of right breast in female, estrogen receptor positive [C50.111, Z17.0]  Not Applicable    Cardiac rhythm disorder or disturbance or change [I49.9]  Yes    CHF (congestive heart failure) [I50.9]  Yes    Right heart failure [I50.810]  Yes    Acute hypoxemic respiratory failure [J96.01]  Yes    Tachycardia [R00.0]  Yes    Lactic acidemia [E87.2]  Yes    Severe malnutrition [E43]  Yes    Cancer of left femur [C40.22]  Yes    Cancer of right femur [C40.21]  Yes    Palliative care encounter [Z51.5]  Not Applicable    Pain due to malignant neoplasm metastatic to bone [G89.3, C79.51]  Yes    Anemia [D64.9]  Yes    Thrombocytopenia, unspecified [D69.6]  Yes    Hypomagnesemia [E83.42]  Yes    Bony metastasis [C79.51]  Yes     Xray metastatic survey shows lytic lesions of skull, lytic spine and rib lesions with pathologic fractures. Diffuse lytic lesions throughout entire central axial red marrow skeleton. Lytic lesions of proximal long bones and pathological fractures of ribs and right superior and inferior pubic ramus.      Pleural effusion [J90]  Yes      Resolved Hospital Problems    Diagnosis Date Resolved POA    Shock [R57.9] 04/26/2018 Yes    Encounter for weaning from ventilator [Z99.11]  04/30/2018 Not Applicable    Encephalopathy, metabolic [G93.41] 04/21/2018 Yes       Overview  Mrs. Grossman is a 59 yo female who was brought to [facility] ED on 4/20/2018 for second opinion regarding recently diagnosed likely metastatic malignancy with unknown primary.     Assessment and Plan for Problems addressed today:    Metastatic breast cancer  Mets to bone  - oncology following, pt did not want to talk this afternoon after arriving to the floor from surgery, discussed with Dr. Fink (onc) he will see her tomorrow  - she may be a candidate for some oral therapy  - radiation oncology consult placed  - pain control (BP is sensitive to narcotics)  - palliative care saw her while she was in ICU, would continue discussions. Family wants to talk about options with Oncology and Radiation Oncology before finalizing any palliative care plans.   - 5/1 Oncology recs: outside path suggestive of breast primary with moderately differentiated adenocarcinoma ER/HI+, HER 2-. Will f/u on path to confirm. Consider MRI brain w/ w/o contrast once medically stable.   - s/p radiation oncology evaluation: radiation treatment of each of the mailed femurs is recommended. 10 fractions of 3Gy each as outpatient   - 5/2: MRI Brain w w/o contrast ordered.      Pathologic fracture of ribs pubic ramus  - s/p IM nailing femur x2     Hypoxemic respiratory failure  - pulmonary edema from IVF  - indeterminate diastolic function on 4/27 echo, PAP 53mmHg  - currently on 4L NC since arriving to floor from PACU  - 5/1: increased to 5L NC.   - 5/2: tapered to room air.      Hypotension -likely orthostasis   -5/1: Systolic BP 70s this AM, up to 113 with IVF bolus. Decreased prn oxycodone to 5mg q4hrs. NS bolus, Blood cultures, Lactic Acid, UA, CBC/CMP, Troponin ordered. Chest Xray: Small lung volumes and bibasal patchy subsegmental opacities persist, similar to recent studies.  No new disease identified   -5/2: Blood cultures NGTD, lactate 2.2, troponin  0.015, UA 1+ occult blood and trace leukocytes with few yeast and occasional bacteria. WBC increased to 15.     Lactic acidosis  - 2.7 overnight, responded to IVF  - does not appear infected  - again elevated to 2.4 after procedure, will trend  - 4/30: 2.6, monitor     Urinary incontinence   - rocha in place, consider switch to purwick when pt can tolerate     Pleural effusion  - likely metastatic, s/p sampling at OSH  - associated compressive atelectasis bilaterally  - multiple pulmonary micronodules     Anemia of Chronic Disease  - likely chronic disease with some blood loss during procedures  - iron studies pending: Iron 50 (wnl), TIBC 160 (low), Saturation 31 (wnl), Transferrin 108 (low)  - monitor with CBC daily  - 5/2: Hgb 9.7     Hypomagnesemia  -most likely 2/2 chronic disease  -4/22: 1.4-replaced  -5/1: 1.5-replaced  -5/2: 1.5-replaced     Hypophosphatemia   -4/22: 2.5 - replaced  - Most likely 2/2 chronic disease     Thrombocytopenia  -4/22: 122--> 165 resolved   -4/30: 115-->135 (5/2)  -Most likely 2/2 chronic disease     Congestive Heart Failure  Right Heart Failure  -4/27 Echo: Right Ventricular Enlargement with normal systolic function  -5/1:      Malnutrition     -Prealbumin of 4 . Dietary consulted: Please order speech consult. Continue regular diet, texture per       SLP. Continue Boost Plus TID.     -Speech Consult: Clinical evaluation of swallow complete. Recommend dental soft diet with thin liquids.    Given pt with scattered dentition, she appears to have met max potential with SLP services at this        time.     DVT PPx: Lovenox    Discharge plan and follow up  Pending family discussion    Provider    I personally scribed for David Cruz MD on 05/02/2018 at 3:20 PM. Electronically signed by kera Lemus on 05/02/2018 at 3:20 PM.

## 2018-05-02 NOTE — PROGRESS NOTES
Patient transferred to floor from 6th floor via bed. Patient is awake, alert, alert, and oriented. Skin is warm and dry. Pulses are present in all extremities. Neurovascularly intact. Some crackles noted to lung fields. Abdomen is soft and nontender with hypoactive bowel sounds. Ropivacaine infusing without difficulty. No immediate complaints of discomfort noted. Oriented to environment. Will continue to monitor.

## 2018-05-02 NOTE — CONSULTS
Patient Seen in: [facility] AND [facility] Emergency Department    History   Patient presents with:  Chest Pain Angina (cardiovascular)    Stated Complaint: chest pain, sent from [facility]     HPI    The patient is a 57-year-old male who presents to the emergency depa Pt being followed by RD. Pt was assessed for malnutrition on 4/26 and 5/1 (see full RD note) pt meets criteria for severe Malnutrition. RD will reassess at next regular f/u visit (5/4/)   Normal rate, regular rhythm, normal heart sounds and intact distal pulses. No murmur heard. Pulmonary/Chest: Effort normal and breath sounds normal.   Abdominal: Soft. Bowel sounds are normal. He exhibits no distension and no mass.  There is tenderness CONCLUSION:  1. Stable findings from May 11, 2015. 2. Normal heart and lungs. 3. Scoliosis. 4. Osteoarthritis. Cta Chest + Cta Abdomen Dissect Set(cpt=[id number])    2/21/2017  CONCLUSION:  1. No evidence of aortic dissection.   2. No evidence of a

## 2018-05-02 NOTE — PROGRESS NOTES
Hospital Medicine  Progress note     [place] [facility]. [facility]  Admit Date: 4/20/2018  VIGNESH 5/8/2018  Code status: Full Code     Principal Problem:  Metastatic breast cancer      Interval hx: 5/2:  Blood cultures NGTD, lactate 2.2, troponin 0.015, UA 1+ occult blood and trace leukocytes with few yeast and occasional bacteria. WBC increased to 15. Started on soft diet. Mg 1.5-replaced. Dietary Consult: Please order speech consult. Continue regular diet, texture per SLP. Continue Boost Plus TID. Speech Consult: Clinical evaluation of swallow complete. Recommend dental soft diet with thin liquids. Given pt with scattered dentition, she appears to have met max potential with SLP services at this time. Tapered to Room Air. MRI w and w/o contrast ordered. s/p radiation oncology evaluation: radiation treatment of each of the mailed femurs is recommended. 10 fractions of 3Gy each as outpatient.     5/1: Mg 1.5-replaced. Oncology consult: outside path suggestive of breast primary with moderately differentiated adenocarcinoma ER/OH+, HER 2-.While the morphology favors a breast primary, other gynecologic sources cannot be entirely ruled out Will f/u on path to confirm.MRI brain w/ w/o contrast today.  Radiation Oncology consulted. Family wants to talk about options with Oncology and Radiation Oncology before finalizing any palliative care plans. 5L NC-taper. Systolic BP 70s this AM, up to 113 with IVF bolus. Chest Xray -Small lung volumes and bibasal patchy subsegmental opacities persist, similar to recent studies.  No new disease identified , NS bolus, Blood cultures, Lactic Acid, UA, CBC/CMP, Troponin ordered. Decreased prn oxycodone to 5mg q4hrs.      ROS      Pain Scale: Decreased to 6/10, Neck, Back, Legs  Respiratory: no cough or shortness of breath  Cardiovascular: no chest pain or palpitations  Gastrointestinal: no nausea or vomiting, no abdominal pain or change in bowel habits  Behavioral/Psych: no depression  or anxiety        PEx  Temp:  [95.4 °F (35.2 °C)-98.1 °F (36.7 °C)]   Pulse:  [102-131]   Resp:  [16-18]   BP: ()/(68-77)   SpO2:  [93 %-96 %]      Intake/Output Summary (Last 24 hours) at 05/02/18 1523  Last data filed at 05/01/18 2245    Gross per 24 hour   Intake              730 ml   Output                0 ml   Net              730 ml         General Appearance: no acute distress   Heart: regular rate and rhythm  Respiratory: Normal respiratory effort, no crackles Room Air  Abdomen: Soft, non-tender; bowel sounds active  Skin: intact. IV sites ok  Neurologic:  No focal numbness or weakness. Pupils normal.  Mental status: Alert, oriented x 2, affect appropriate   Extremities: Limited motion BLE 2/2 pain, dressings from orthopedic surgery in place. Weakness right arm:  ok, but overall 2/5 power RUE. Difficulty lifting RUE off bed.      Recent Labs  Lab 05/01/18 0434 05/01/18  1302 05/02/18  0435   WBC 12.42 13.63* 15.15*   HGB 9.4* 9.3* 9.7*   HCT 29.8* 30.0* 31.3*   * 122* 135*         Recent Labs  Lab 04/30/18  0415 05/01/18  0058 05/01/18 0434 05/01/18  1302 05/02/18  0435     --  138 137 140   K 3.7 4.1 3.9 3.8 3.9     --  110 110 111*   CO2 21*  --  19* 18* 19*   BUN 15  --  14 14 16   CREATININE 0.9  --  0.9 0.9 0.9   GLU 74  --  73 76 48*   CALCIUM 7.7*  --  7.5* 7.9* 8.1*   MG 1.5* 1.5* 1.5*  --  1.5*   PHOS 2.7  --  3.0  --  2.8         Recent Labs  Lab 05/01/18  0434 05/01/18  1302 05/02/18  0435   ALKPHOS 305* 295* 335*   ALT 9* 7* 6*   AST 67* 59* 66*   ALBUMIN 1.4* 1.3* 1.4*   PROT 5.0* 4.9* 5.3*   BILITOT 0.5 0.7 0.7         Recent Labs  Lab 05/01/18  0116 05/01/18  0556 05/01/18  1312 05/02/18  0737 05/02/18  0923 05/02/18  1229   POCTGLUCOSE 109 81 70 56* 82 68*          Recent Labs      05/01/18   1302   TROPONINI  0.015         Scheduled Meds:   acetaminophen  1,000 mg Oral Q6H    enoxaparin  40 mg Subcutaneous Daily    lidocaine  1 patch Transdermal Q24H     LORazepam  0.5 mg Oral Once    magnesium oxide  800 mg Oral Daily    polyethylene glycol  17 g Oral Daily    pregabalin  150 mg Oral QHS    ramelteon  8 mg Oral QHS    senna-docusate 8.6-50 mg  2 tablet Oral Daily      Continuous Infusions:   ropivacaine (PF) 2 mg/ml (0.2%) 10 mL/hr (05/01/18 2695)      As Needed:  sodium chloride, bisacodyl, dextrose 50%, dextrose 50%, glucagon (human recombinant), insulin aspart U-100, omnipaque, ondansetron, oxyCODONE, promethazine (PHENERGAN) IVPB, promethazine, ramelteon, sodium chloride 0.9%, sodium chloride 0.9%, sodium chloride 0.9%, sodium chloride 0.9%, tiZANidine, white petrolatum            Active Hospital Problems     Diagnosis   POA    *Metastatic breast cancer [C50.919]   Yes    Malnutrition of moderate degree [E44.0]   Yes    Malignant neoplasm of central portion of right breast in female, estrogen receptor positive [C50.111, Z17.0]   Not Applicable    Cardiac rhythm disorder or disturbance or change [I49.9]   Yes    CHF (congestive heart failure) [I50.9]   Yes    Right heart failure [I50.810]   Yes    Acute hypoxemic respiratory failure [J96.01]   Yes    Tachycardia [R00.0]   Yes    Lactic acidemia [E87.2]   Yes    Severe malnutrition [E43]   Yes    Cancer of left femur [C40.22]   Yes    Cancer of right femur [C40.21]   Yes    Palliative care encounter [Z51.5]   Not Applicable    Pain due to malignant neoplasm metastatic to bone [G89.3, C79.51]   Yes    Anemia [D64.9]   Yes    Thrombocytopenia, unspecified [D69.6]   Yes    Hypomagnesemia [E83.42]   Yes    Bony metastasis [C79.51]   Yes       Xray metastatic survey shows lytic lesions of skull, lytic spine and rib lesions with pathologic fractures. Diffuse lytic lesions throughout entire central axial red marrow skeleton. Lytic lesions of proximal long bones and pathological fractures of ribs and right superior and inferior pubic ramus.       Pleural effusion [J90]   Yes       Resolved  Hospital Problems     Diagnosis Date Resolved POA    Shock [R57.9] 04/26/2018 Yes    Encounter for weaning from ventilator [Z99.11] 04/30/2018 Not Applicable    Encephalopathy, metabolic [G93.41] 04/21/2018 Yes         Overview  Mrs. Grossman is a 59 yo female who [place] [facility] [place] for second opinion regarding recently diagnosed likely metastatic malignancy with unknown primary.      Assessment and Plan for Problems addressed today:     Metastatic breast cancer  Mets to bone  - oncology following, pt did not want to talk this afternoon after arriving to the floor from surgery, discussed with Dr. Fink (onc) he will see her tomorrow  - she may be a candidate for some oral therapy  - radiation oncology consult placed  - pain control (BP is sensitive to narcotics)  - palliative care saw her while she was in ICU, would continue discussions. Family wants to talk about options with Oncology and Radiation Oncology before finalizing any palliative care plans.   - 5/1 Oncology recs: outside path suggestive of breast primary with moderately differentiated adenocarcinoma ER/ID+, HER 2-. Will f/u on path to confirm. Consider MRI brain w/ w/o contrast once medically stable.   - s/p radiation oncology evaluation: radiation treatment of each of the mailed femurs is recommended. 10 fractions of 3Gy each as outpatient   - 5/2: Right upper extremity weakness - MRI Brain w w/o contrast ordered.      Pathologic fracture of ribs pubic ramus  - s/p IM nailing femur x2     Hypoxemic respiratory failure  - pulmonary edema from IVF  - indeterminate diastolic function on 4/27 echo, PAP 53mmHg  - currently on 4L NC since arriving to floor from PACU  - 5/1: increased to 5L NC.   - 5/2: tapered to room air.      Hypotension -likely orthostasis   -5/1: Systolic BP 70s this AM, up to 113 with IVF bolus. Decreased prn oxycodone to 5mg q4hrs. NS bolus, Blood cultures, Lactic Acid, UA, CBC/CMP, Troponin ordered. Chest Xray: Small  lung volumes and bibasal patchy subsegmental opacities persist, similar to recent studies.  No new disease identified   -5/2: Blood cultures NGTD, lactate 2.2, troponin 0.015, UA 1+ occult blood and trace leukocytes with few yeast and occasional bacteria. WBC increased to 15.     Lactic acidosis  - 2.7 overnight, responded to IVF  - does not appear infected  - again elevated to 2.4 after procedure, will trend  - 4/30: 2.6, monitor     Urinary incontinence   - rocha in place, consider switch to purwick when pt can tolerate     Pleural effusion  - likely metastatic, s/p sampling at OSH  - associated compressive atelectasis bilaterally  - multiple pulmonary micronodules     Anemia of Chronic Disease  - likely chronic disease with some blood loss during procedures  - iron studies pending: Iron 50 (wnl), TIBC 160 (low), Saturation 31 (wnl), Transferrin 108 (low)  - monitor with CBC daily  - 5/2: Hgb 9.7     Hypomagnesemia  -most likely 2/2 chronic disease  -4/22: 1.4-replaced  -5/1: 1.5-replaced  -5/2: 1.5-replaced     Hypophosphatemia   -4/22: 2.5 - replaced  - Most likely 2/2 chronic disease     Thrombocytopenia  -4/22: 122--> 165 resolved   -4/30: 115-->135 (5/2)  -Most likely 2/2 chronic disease     Congestive Heart Failure  Right Heart Failure  -4/27 Echo: Right Ventricular Enlargement with normal systolic function  -5/1:      Malnutrition     -Prealbumin of 4 . Dietary consulted: Please order speech consult. Continue regular diet, texture per       SLP. Continue Boost Plus TID.     -Speech Consult: Clinical evaluation of swallow complete. Recommend dental soft diet with thin liquids.    Given pt with scattered dentition, she appears to have met max potential with SLP services at this        time.     DVT PPx: Lovenox     Discharge plan and follow up  Pending family discussion     Provider     I personally scribed for David Cruz MD on 05/02/2018 at 3:20 PM. Electronically signed by kera Fulton  Allan on 05/02/2018 at 3:20 PM.  The documentation recorded by the scribe accurately reflects service I personally performed and the decisions made by me.  David Cruz MD  Attending Staff Physician  [facility]  pager- [phone] Jcjwhqwdner - [phone]

## 2018-05-02 NOTE — NURSING
" on call for [facility] b notified of pt unable to complete MRI of brain on account of pt having a PNC for right hip. MD stated "ok to wait until tomorrow morning after PNC is paused to do MRI." will continue to monitor.   "

## 2018-05-02 NOTE — ANESTHESIA POST-OP PAIN MANAGEMENT
Acute Pain Service Progress Note    Kerline Grossman is a 58 y.o., female, [id number].       Surgery:  IM NAILING OF FEMUR - hana table - large c arm door side (Right), POD 2   IM NAILING OF FEMUR - hana table - large c arm door side (Left), POD 6     Catheter type: perineural  SIFI     Infusion type: Ropivacaine 0.2%  8 basal    Problem List:    Active Hospital Problems    Diagnosis  POA    *Metastatic breast cancer [C50.919]  Yes    Malnutrition of moderate degree [E44.0]  Yes    Malignant neoplasm of central portion of right breast in female, estrogen receptor positive [C50.111, Z17.0]  Not Applicable    Cardiac rhythm disorder or disturbance or change [I49.9]  Yes    CHF (congestive heart failure) [I50.9]  Yes    Right heart failure [I50.810]  Yes    Acute hypoxemic respiratory failure [J96.01]  Yes    Tachycardia [R00.0]  Yes    Lactic acidemia [E87.2]  Yes    Severe malnutrition [E43]  Yes    Cancer of left femur [C40.22]  Yes    Cancer of right femur [C40.21]  Yes    Palliative care encounter [Z51.5]  Not Applicable    Pain due to malignant neoplasm metastatic to bone [G89.3, C79.51]  Yes    Anemia [D64.9]  Yes    Thrombocytopenia, unspecified [D69.6]  Yes    Hypomagnesemia [E83.42]  Yes    Bony metastasis [C79.51]  Yes     Xray metastatic survey shows lytic lesions of skull, lytic spine and rib lesions with pathologic fractures. Diffuse lytic lesions throughout entire central axial red marrow skeleton. Lytic lesions of proximal long bones and pathological fractures of ribs and right superior and inferior pubic ramus.      Pleural effusion [J90]  Yes      Resolved Hospital Problems    Diagnosis Date Resolved POA    Shock [R57.9] 04/26/2018 Yes    Encounter for weaning from ventilator [Z99.11] 04/30/2018 Not Applicable    Encephalopathy, metabolic [G93.41] 04/21/2018 Yes       Subjective:                 General appearance of alert, oriented, no complaints              Pain with rest: 4     Numbers              Pain with movement: 6   Numbers              Side Effects                          1. Pruritis No                          2. Nausea No                          3. Motor Blockade No, 1=Ability to bend knees and ankles                          4. Sedation No, 1=awake and alert     Objective:                 Catheter site clean, dry, intact      Vitals   Vitals:    05/02/18 0743   BP:    Pulse: (!) 131   Resp:    Temp:         Labs    Admission on 04/20/2018   No results displayed because visit has over 200 results.           Meds   Current Facility-Administered Medications   Medication Dose Route Frequency Provider Last Rate Last Dose    0.9%  NaCl infusion (for blood administration)   Intravenous Q24H PRN Kings Garner MD        acetaminophen tablet 1,000 mg  1,000 mg Oral Q6H Sarai Coyne MD   1,000 mg at 05/02/18 0641    bisacodyl suppository 10 mg  10 mg Rectal Daily PRN Frank Argueta MD        dextrose 50% injection 12.5 g  12.5 g Intravenous PRN Reginald Romero MD   12.5 g at 04/28/18 0619    dextrose 50% injection 25 g  25 g Intravenous PRN KITTY Delatorre   25 g at 04/26/18 0627    enoxaparin injection 40 mg  40 mg Subcutaneous Daily Frank Argueta MD   40 mg at 05/01/18 1739    glucagon (human recombinant) injection 1 mg  1 mg Intramuscular PRN Reginald Romero MD        insulin aspart U-100 pen 1-10 Units  1-10 Units Subcutaneous Q6H PRN Reginald Romero MD   4 Units at 04/25/18 2227    lidocaine 5 % patch 1 patch  1 patch Transdermal Q24H David Cruz MD   1 patch at 05/01/18 1749    magnesium oxide tablet 800 mg  800 mg Oral Daily Gordon Cross MD   800 mg at 05/01/18 0909    magnesium sulfate 2g in water 50mL IVPB (premix)  2 g Intravenous Once David Cruz MD        omnipaque oral solution 15 mL  15 mL Oral PRN Celena Wright MD   15 mL at 04/22/18 1116    ondansetron disintegrating tablet 8 mg  8 mg Oral Q8H PRN Pretty Geiger  KITTY        oxyCODONE immediate release tablet 5 mg  5 mg Oral Q4H PRN David Cruz MD   5 mg at 05/02/18 0559    polyethylene glycol packet 17 g  17 g Oral Daily Frank Argueta MD   17 g at 05/01/18 0909    pregabalin capsule 150 mg  150 mg Oral QHS Atif Valencia MD   150 mg at 05/01/18 2111    promethazine (PHENERGAN) 6.25 mg in dextrose 5 % 50 mL IVPB  6.25 mg Intravenous Q6H PRN Talib Loyd MD        promethazine tablet 25 mg  25 mg Oral Q6H PRN Kristan Calderon PA-C        ramelteon tablet 8 mg  8 mg Oral Nightly PRN Kelsey Torre MD        ramelteon tablet 8 mg  8 mg Oral QHS Kelsey Torre MD   8 mg at 05/01/18 2112    ropivacaine (PF) 2 mg/ml (0.2%) infusion  10 mL/hr Perineural Continuous Talib Loyd MD 10 mL/hr at 05/01/18 1815 10 mL/hr at 05/01/18 1815    senna-docusate 8.6-50 mg per tablet 2 tablet  2 tablet Oral Daily David Cruz MD   2 tablet at 05/01/18 0909    sodium chloride 0.9% flush 3 mL  3 mL Intravenous PRN Han Pelayo MD        sodium chloride 0.9% flush 3 mL  3 mL Intravenous PRN Han Pelayo MD        sodium chloride 0.9% flush 3 mL  3 mL Intravenous PRN Frank Argueta MD        sodium chloride 0.9% flush 5 mL  5 mL Intravenous PRN KITTY Delatorre        tiZANidine tablet 4 mg  4 mg Oral Q8H PRN Sarai Coyne MD        white petrolatum 41 % ointment   Topical (Top) PRN David Cruz MD            Anticoagulant dose: lovenox 40 daily     Assessment:     Pain control adequate    Plan:     Patient doing well, continue present treatment.     Per patient and family members, pain control improved today. Worked with PT some yesterday. Only required 1 prn dose of oxycodone 5 mg. Encouraged patient and family to encourage patient with ongoing PT efforts. Plan to continue SIFI at this time.    Evaluator Sarai Coyne

## 2018-05-02 NOTE — ASSESSMENT & PLAN NOTE
"05/02/2018 Patient is comfortable. Anxious about having MRI-as they are transporting her now. Will follow. Family meeting when necessary data is obtained to have a realistic plan of care developed. I am happy to facilitate.  05/01/2018 Patient is comfortable with current epidural catheter by anesthesia. She needs pressure reduction strategy for pressure injury prevention. Discussed with nursing. Will change to oral pain meds when epidural catheter removal is contemplated.  Family is awaiting oncology recommendations for further "options". Not interested in discussing plans until then. If unable to ambulate options are different. Will continue to explore with family. Encourage family meeting to understand how we can manage her illness. I am happy to facilitate with primary team and oncology and orthopedics.  "

## 2018-05-02 NOTE — ASSESSMENT & PLAN NOTE
Encourage eating. Albumin 1.4.  Poor nutrition will be detrimental to improvement in functional status.

## 2018-05-02 NOTE — PROGRESS NOTES
[facility]  Orthopedics  Progress Note    Attg Note:  I agree with the above assessment and plan.  Up with PT yesterday.  Able to stand 1 minute with max assist.  Continue to work on strength/mobilization.  Breast CA on outside path.      Jd Cabrera MD      Patient Name: Kerline Grossman  MRN: [id number]  Admission Date: 4/20/2018  Hospital Length of Stay: 11 days  Attending Provider: David Cruz MD  Primary Care Provider: Primary Doctor No  Follow-up For: Procedure(s) (LRB):  IM NAILING OF FEMUR - hana table - large c arm door side (Right)    Post-Operative Day: 2 Days Post-Op  Subjective:     Principal Problem:Metastatic breast cancer    Principal Orthopedic Problem: B femur impending fractures s/p B CMN    Interval History: Patient seen and examined at bedside.  No acute events overnight.  Pain controlled. Has epidural in place. Evaluated by palliative and rad onc yesterday. Plan for radiation as outpatient. Stood with PT yesterday.    Review of patient's allergies indicates:  No Known Allergies    Current Facility-Administered Medications   Medication    0.9%  NaCl infusion (for blood administration)    acetaminophen tablet 1,000 mg    bisacodyl suppository 10 mg    dextrose 50% injection 12.5 g    dextrose 50% injection 25 g    enoxaparin injection 40 mg    glucagon (human recombinant) injection 1 mg    insulin aspart U-100 pen 1-10 Units    lidocaine 5 % patch 1 patch    magnesium oxide tablet 800 mg    omnipaque oral solution 15 mL    ondansetron disintegrating tablet 8 mg    oxyCODONE immediate release tablet 5 mg    polyethylene glycol packet 17 g    pregabalin capsule 150 mg    promethazine (PHENERGAN) 6.25 mg in dextrose 5 % 50 mL IVPB    promethazine tablet 25 mg    ramelteon tablet 8 mg    ramelteon tablet 8 mg    ropivacaine (PF) 2 mg/ml (0.2%) infusion    senna-docusate 8.6-50 mg per tablet 2 tablet    sodium chloride 0.9% flush 3 mL    sodium chloride  "0.9% flush 3 mL    sodium chloride 0.9% flush 3 mL    sodium chloride 0.9% flush 5 mL    tiZANidine tablet 4 mg    white petrolatum 41 % ointment     Objective:     Vital Signs (Most Recent):  Temp: 98.1 °F (36.7 °C) (05/01/18 1945)  Pulse: (!) 115 (05/02/18 0200)  Resp: 18 (05/01/18 1945)  BP: 123/77 (05/01/18 1945)  SpO2: (!) 94 % (05/01/18 1945) Vital Signs (24h Range):  Temp:  [97.6 °F (36.4 °C)-98.7 °F (37.1 °C)] 98.1 °F (36.7 °C)  Pulse:  [102-117] 115  Resp:  [18-20] 18  SpO2:  [92 %-96 %] 94 %  BP: ()/(52-84) 123/77     Weight: 74 kg (163 lb 2.3 oz)  Height: 5' 6" (167.6 cm)  Body mass index is 26.33 kg/m².      Intake/Output Summary (Last 24 hours) at 05/02/18 0554  Last data filed at 05/01/18 2245   Gross per 24 hour   Intake              730 ml   Output                0 ml   Net              730 ml       Ortho/SPM Exam    AAOx4  NAD  Regular rhythm, tachycardic  Tachypnea     BLE:  L thigh dressings with stable, dry drainage  R thigh dressing minimal drainage on dressings  SILT and motor intact T/SP/DP  WWP extremities  FCDs in place and functioning    Significant Labs:   CBC:     Recent Labs  Lab 04/30/18  1120 05/01/18  0434 05/01/18  1302   WBC 13.47* 12.42 13.63*   HGB 9.0* 9.4* 9.3*   HCT 28.7* 29.8* 30.0*   * 124* 122*     CMP:     Recent Labs  Lab 05/01/18  0058 05/01/18  0434 05/01/18  1302   NA  --  138 137   K 4.1 3.9 3.8   CL  --  110 110   CO2  --  19* 18*   GLU  --  73 76   BUN  --  14 14   CREATININE  --  0.9 0.9   CALCIUM  --  7.5* 7.9*   PROT  --  5.0* 4.9*   ALBUMIN  --  1.4* 1.3*   BILITOT  --  0.5 0.7   ALKPHOS  --  305* 295*   AST  --  67* 59*   ALT  --  9* 7*   ANIONGAP  --  9 9   EGFRNONAA  --  >60.0 >60.0     All pertinent labs within the past 24 hours have been reviewed.    Significant Imaging: I have reviewed all pertinent imaging results/findings.    Assessment/Plan:     Bony metastasis    58 y.o. female POD7 s/p L CMN, POD2 s/p R CMN    Pain control  PT/OT: " WBAT  DVT PPx: lovenox, FCDs at all times when not ambulating  Abx: postop Ancef completed  Drain: none  Ponce: continued per primary, recommend discontinuation as soon as possible.     Dispo: per primary              Aayush Gifford MD  Orthopedics  [facility]

## 2018-05-02 NOTE — NURSING TRANSFER
Nursing Transfer Note      5/1/2018     Transfer From: 629 to 501    Transfer via bed    Transfer with 4L NC to O2, cardiac monitoring, patient belongings    Transported by Tech and RN    Medicines sent: Ropivacaine infusion in place    Chart send with patient: Yes    Notified: son at bedside and assisted with transfer    Patient reassessed at: 05/01/18 4748     Upon arrival to floor: cardiac monitor applied, patient oriented to room, call bell in reach and bed in lowest position, 4L NC in place. No needs or distress at this time. RN Chayo given report and verbalized understanding.

## 2018-05-02 NOTE — PROGRESS NOTES
"[facility]  Palliative Medicine  Progress Note    Patient Name: Kerline Grossman  MRN: [id number]  Admission Date: 4/20/2018  Hospital Length of Stay: 11 days  Code Status: Full Code   Attending Provider: David Cruz MD  Consulting Provider: Debo Rosario MD  Primary Care Physician: Primary Doctor No  Principal Problem:Metastatic breast cancer    Patient information was obtained from patient and past medical records.      Assessment/Plan:     * Metastatic breast cancer    MRI brain being done now.        Palliative care encounter    05/02/2018 Patient is comfortable. Anxious about having MRI-as they are transporting her now. Will follow. Family meeting when necessary data is obtained to have a realistic plan of care developed. I am happy to facilitate.  05/01/2018 Patient is comfortable with current epidural catheter by anesthesia. She needs pressure reduction strategy for pressure injury prevention. Discussed with nursing. Will change to oral pain meds when epidural catheter removal is contemplated.  Family is awaiting oncology recommendations for further "options". Not interested in discussing plans until then. If unable to ambulate options are different. Will continue to explore with family. Encourage family meeting to understand how we can manage her illness. I am happy to facilitate with primary team and oncology and orthopedics.        Severe malnutrition    Encourage eating. Albumin 1.4.  Poor nutrition will be detrimental to improvement in functional status.            I will follow-up with patient. Please contact us if you have any additional questions.    Subjective:     Chief Complaint:   Chief Complaint   Patient presents with    Generalized Body Aches     Pt reports having all over body pain. Pt reports having cancer "all over". Pt reports care at [facility], had fluid taken off lungs this AM. O2 in triage 98%, denies SOB.       HPI:   59 yo woman with medical history " significant for chronic mood disorder who presented to OSH with ZACH, hypercalcemia, and lytic lesions concerning for MM.  Workup for MM negative thus far.  Her [place] [facility], so the family had her discharged and [place] [facility] [place].  She arrived hypotensive, which may have been 2/2 opiates (received morphine at OSH).  Hypotension resolved with IVF and only required brief stay under ICU care before being transferred to the floor.    Palliative care consulted for goals of care.  See clinical review 4/23/18 with Dr. Rosario regarding preliminary pathology findings.     She states she is in pain, 8/10, primarily in her back.  When she takes pain medications she says she falls asleep only for 15-20 minutes before waking up with pain 8/10.  Family at bedside confirms she does this throughout the day even without pain medications.  She reports pain level remains at an 8 despite taking meds.    Last filled prescriptions [facility] on [address] [address] and [employer] 3/2017  paliperidone ER 6mg tabs (2 tabs qAM)  oxcarbazepime 300mg 2 tabs bid  Levothyroxine 25mcg qday  Benztropine 1mg bid  Trazodone 100mg 2 tabs qhs    Hospital Course:  No notes on file    Interval History: Patient is anxious about having MRI. Ativan administered orally. Reassurance provided. Brother in room from [place].  Medications:  Continuous Infusions:   ropivacaine (PF) 2 mg/ml (0.2%) 10 mL/hr (05/01/18 1815)     Scheduled Meds:   acetaminophen  1,000 mg Oral Q6H    enoxaparin  40 mg Subcutaneous Daily    lidocaine  1 patch Transdermal Q24H    LORazepam  0.5 mg Oral Once    magnesium oxide  800 mg Oral Daily    polyethylene glycol  17 g Oral Daily    pregabalin  150 mg Oral QHS    ramelteon  8 mg Oral QHS    senna-docusate 8.6-50 mg  2 tablet Oral Daily     PRN Meds:sodium chloride, bisacodyl, dextrose 50%, dextrose 50%, glucagon (human recombinant), insulin aspart U-100, omnipaque,  ondansetron, oxyCODONE, promethazine (PHENERGAN) IVPB, promethazine, ramelteon, sodium chloride 0.9%, sodium chloride 0.9%, sodium chloride 0.9%, sodium chloride 0.9%, tiZANidine, white petrolatum    Objective:     Vital Signs (Most Recent):  Temp: (!) 95.4 °F (35.2 °C) (05/02/18 1205)  Pulse: 110 (05/02/18 1205)  Resp: 16 (05/02/18 1205)  BP: 115/77 (05/02/18 1205)  SpO2: 96 % (05/02/18 1205) Vital Signs (24h Range):  Temp:  [95.4 °F (35.2 °C)-98.1 °F (36.7 °C)] 95.4 °F (35.2 °C)  Pulse:  [102-131] 110  Resp:  [16-18] 16  SpO2:  [93 %-96 %] 96 %  BP: ()/(68-77) 115/77     Weight: 74 kg (163 lb 2.3 oz)  Body mass index is 26.33 kg/m².    Review of Symptoms  Symptom Assessment (ESAS 0-10 scale)  ESAS 0 1 2 3 4 5 6 7 8 9 10   Pain      x        Dyspnea x             Anxiety   x           Nausea x             Depression  x             Anorexia   x           Fatigue   x           Insomnia x             Restlessness  x             Agitation x             CAM / Delirium x__ --  ___+   Constipation     _x_ --  ___+   Diarrhea           x__ --  ___+  Bowel Management Plan (BMP): Yes    Comments:     Pain Assessment: Location: back    OME in 24 hours: has epidural pain pump and some oral meds.    Performance Status: 30    ECOG Performance Status Grade: 4 - Completely disabled    Physical Exam   Constitutional: She appears cachectic. She is cooperative. She has a sickly appearance.   Neurological: She is alert.   Vitals reviewed.      Significant Labs: All pertinent labs within the past 24 hours have been reviewed.  CBC:     Recent Labs  Lab 05/02/18 0435   WBC 15.15*   HGB 9.7*   HCT 31.3*   MCV 91   *     BMP:    Recent Labs  Lab 05/02/18 0435   GLU 48*      K 3.9   *   CO2 19*   BUN 16   CREATININE 0.9   CALCIUM 8.1*   MG 1.5*     LFT:  Lab Results   Component Value Date    AST 66 (H) 05/02/2018    ALKPHOS 335 (H) 05/02/2018    BILITOT 0.7 05/02/2018     Albumin:   Albumin   Date Value Ref Range  Status   05/02/2018 1.4 (L) 3.5 - 5.2 g/dL Final     Protein:   Total Protein   Date Value Ref Range Status   05/02/2018 5.3 (L) 6.0 - 8.4 g/dL Final     Lactic acid:   Lab Results   Component Value Date    LACTATE 2.2 05/01/2018    LACTATE 2.6 (H) 04/30/2018       Significant Imaging: I have reviewed all pertinent imaging results/findings within the past 24 hours.    Advanced Directives::  Living Will: No  LaPOST: No  Do Not Resuscitate Status: No  Medical Power of : Yes. Agent's Name: Hector SMITH. Agent's Contact Number:     Decision-Making Capacity: Patient answered questions, Family answered questions    Living Arrangements[place] Lives alone[place] [facility] [place]    Psychosocial/Cultural:  Patient's most important priorities:  Unable to assess.    Patient's biggest concerns/fears:  Unable to assess.    Previous death/end of life care history:  Unable to assess.    Patient's goals/hopes:  Unable to assess.    Spiritual:     F- Dora and Belief: Unable to assess.  I - Importance:Unable to assess..  C - Community:Unable to assess.  A - Address in Care: Unable to assess.      > 50% of 20 min visit spent in chart review, face to face discussion of goals of care,  symptom assessment, coordination of care and emotional support.    Debo Rosario MD  Palliative Medicine  [facility]

## 2018-05-02 NOTE — SUBJECTIVE & OBJECTIVE
Interval History: Patient is anxious about having MRI. Ativan administered orally. Reassurance provided. Brother in room from [place].  Medications:  Continuous Infusions:   ropivacaine (PF) 2 mg/ml (0.2%) 10 mL/hr (05/01/18 1815)     Scheduled Meds:   acetaminophen  1,000 mg Oral Q6H    enoxaparin  40 mg Subcutaneous Daily    lidocaine  1 patch Transdermal Q24H    LORazepam  0.5 mg Oral Once    magnesium oxide  800 mg Oral Daily    polyethylene glycol  17 g Oral Daily    pregabalin  150 mg Oral QHS    ramelteon  8 mg Oral QHS    senna-docusate 8.6-50 mg  2 tablet Oral Daily     PRN Meds:sodium chloride, bisacodyl, dextrose 50%, dextrose 50%, glucagon (human recombinant), insulin aspart U-100, omnipaque, ondansetron, oxyCODONE, promethazine (PHENERGAN) IVPB, promethazine, ramelteon, sodium chloride 0.9%, sodium chloride 0.9%, sodium chloride 0.9%, sodium chloride 0.9%, tiZANidine, white petrolatum    Objective:     Vital Signs (Most Recent):  Temp: (!) 95.4 °F (35.2 °C) (05/02/18 1205)  Pulse: 110 (05/02/18 1205)  Resp: 16 (05/02/18 1205)  BP: 115/77 (05/02/18 1205)  SpO2: 96 % (05/02/18 1205) Vital Signs (24h Range):  Temp:  [95.4 °F (35.2 °C)-98.1 °F (36.7 °C)] 95.4 °F (35.2 °C)  Pulse:  [102-131] 110  Resp:  [16-18] 16  SpO2:  [93 %-96 %] 96 %  BP: ()/(68-77) 115/77     Weight: 74 kg (163 lb 2.3 oz)  Body mass index is 26.33 kg/m².    Review of Symptoms  Symptom Assessment (ESAS 0-10 scale)  ESAS 0 1 2 3 4 5 6 7 8 9 10   Pain      x        Dyspnea x             Anxiety   x           Nausea x             Depression  x             Anorexia   x           Fatigue   x           Insomnia x             Restlessness  x             Agitation x             CAM / Delirium x__ --  ___+   Constipation     _x_ --  ___+   Diarrhea           x__ --  ___+  Bowel Management Plan (BMP): Yes    Comments:     Pain Assessment: Location: back    OME in 24 hours: has epidural pain pump and some oral meds.    Performance  Status: 30    ECOG Performance Status Grade: 4 - Completely disabled    Physical Exam   Constitutional: She appears cachectic. She is cooperative. She has a sickly appearance.   Neurological: She is alert.   Vitals reviewed.      Significant Labs: All pertinent labs within the past 24 hours have been reviewed.  CBC:     Recent Labs  Lab 05/02/18  0435   WBC 15.15*   HGB 9.7*   HCT 31.3*   MCV 91   *     BMP:    Recent Labs  Lab 05/02/18  0435   GLU 48*      K 3.9   *   CO2 19*   BUN 16   CREATININE 0.9   CALCIUM 8.1*   MG 1.5*     LFT:  Lab Results   Component Value Date    AST 66 (H) 05/02/2018    ALKPHOS 335 (H) 05/02/2018    BILITOT 0.7 05/02/2018     Albumin:   Albumin   Date Value Ref Range Status   05/02/2018 1.4 (L) 3.5 - 5.2 g/dL Final     Protein:   Total Protein   Date Value Ref Range Status   05/02/2018 5.3 (L) 6.0 - 8.4 g/dL Final     Lactic acid:   Lab Results   Component Value Date    LACTATE 2.2 05/01/2018    LACTATE 2.6 (H) 04/30/2018       Significant Imaging: I have reviewed all pertinent imaging results/findings within the past 24 hours.    Advanced Directives::  Living Will: No  LaPOST: No  Do Not Resuscitate Status: No  Medical Power of : Yes. Agent's Name: Hector SMITH. Agent's Contact Number:     Decision-Making Capacity: Patient answered questions, Family answered questions    Living Arrangements: Lives alone, [place] from [facility]    Psychosocial/Cultural:  Patient's most important priorities:  Unable to assess.    Patient's biggest concerns/fears:  Unable to assess.    Previous death/end of life care history:  Unable to assess.    Patient's goals/hopes:  Unable to assess.    Spiritual:     F- Dora and Belief: Unable to assess.  I - Importance:Unable to assess..  C - Community:Unable to assess.  A - Address in Care: Unable to assess.

## 2018-05-02 NOTE — ASSESSMENT & PLAN NOTE
58 y.o. female POD7 s/p L CMN, POD2 s/p R CMN    Pain control  PT/OT: WBAT  DVT PPx: lovenox, FCDs at all times when not ambulating  Abx: postop Ancef completed  Drain: none  Ponce: continued per primary, recommend discontinuation as soon as possible.     Dispo: per primary

## 2018-05-03 PROBLEM — R29.898 WEAKNESS OF RIGHT UPPER EXTREMITY: Status: ACTIVE | Noted: 2018-05-03

## 2018-05-03 PROBLEM — D72.829 LEUKOCYTOSIS: Status: ACTIVE | Noted: 2018-05-03

## 2018-05-03 LAB
ALBUMIN SERPL BCP-MCNC: 1.4 G/DL
ALP SERPL-CCNC: 333 U/L
ALT SERPL W/O P-5'-P-CCNC: <5 U/L
ANION GAP SERPL CALC-SCNC: 11 MMOL/L
ANISOCYTOSIS BLD QL SMEAR: SLIGHT
AST SERPL-CCNC: 57 U/L
BASOPHILS # BLD AUTO: ABNORMAL K/UL
BASOPHILS NFR BLD: 0 %
BILIRUB SERPL-MCNC: 0.5 MG/DL
BUN SERPL-MCNC: 18 MG/DL
CALCIUM SERPL-MCNC: 8 MG/DL
CHLORIDE SERPL-SCNC: 110 MMOL/L
CO2 SERPL-SCNC: 16 MMOL/L
CREAT SERPL-MCNC: 0.8 MG/DL
DIFFERENTIAL METHOD: ABNORMAL
EOSINOPHIL # BLD AUTO: ABNORMAL K/UL
EOSINOPHIL NFR BLD: 3 %
ERYTHROCYTE [DISTWIDTH] IN BLOOD BY AUTOMATED COUNT: 18.7 %
EST. GFR  (AFRICAN AMERICAN): >60 ML/MIN/1.73 M^2
EST. GFR  (NON AFRICAN AMERICAN): >60 ML/MIN/1.73 M^2
GLUCOSE SERPL-MCNC: 52 MG/DL
HCT VFR BLD AUTO: 32.4 %
HGB BLD-MCNC: 10 G/DL
HYPOCHROMIA BLD QL SMEAR: ABNORMAL
IMM GRANULOCYTES # BLD AUTO: ABNORMAL K/UL
IMM GRANULOCYTES NFR BLD AUTO: ABNORMAL %
LYMPHOCYTES # BLD AUTO: ABNORMAL K/UL
LYMPHOCYTES NFR BLD: 24 %
MAGNESIUM SERPL-MCNC: 1.6 MG/DL
MCH RBC QN AUTO: 29.1 PG
MCHC RBC AUTO-ENTMCNC: 30.9 G/DL
MCV RBC AUTO: 94 FL
METAMYELOCYTES NFR BLD MANUAL: 1 %
MONOCYTES # BLD AUTO: ABNORMAL K/UL
MONOCYTES NFR BLD: 4 %
MYELOCYTES NFR BLD MANUAL: 1 %
NEUTROPHILS NFR BLD: 66 %
NEUTS BAND NFR BLD MANUAL: 1 %
NRBC BLD-RTO: 2 /100 WBC
PHOSPHATE SERPL-MCNC: 2.9 MG/DL
PLATELET # BLD AUTO: 116 K/UL
PLATELET BLD QL SMEAR: ABNORMAL
PMV BLD AUTO: 13 FL
POCT GLUCOSE: 73 MG/DL (ref 70–110)
POCT GLUCOSE: 96 MG/DL (ref 70–110)
POLYCHROMASIA BLD QL SMEAR: ABNORMAL
POTASSIUM SERPL-SCNC: 4.1 MMOL/L
PROT SERPL-MCNC: 5.2 G/DL
RBC # BLD AUTO: 3.44 M/UL
SODIUM SERPL-SCNC: 137 MMOL/L
WBC # BLD AUTO: 14.67 K/UL

## 2018-05-03 PROCEDURE — 25000003 PHARM REV CODE 250: Performed by: HOSPITALIST

## 2018-05-03 PROCEDURE — 25000003 PHARM REV CODE 250: Performed by: ANESTHESIOLOGY

## 2018-05-03 PROCEDURE — 25000003 PHARM REV CODE 250: Performed by: STUDENT IN AN ORGANIZED HEALTH CARE EDUCATION/TRAINING PROGRAM

## 2018-05-03 PROCEDURE — 83735 ASSAY OF MAGNESIUM: CPT

## 2018-05-03 PROCEDURE — 25500020 PHARM REV CODE 255: Performed by: HOSPITALIST

## 2018-05-03 PROCEDURE — 85027 COMPLETE CBC AUTOMATED: CPT

## 2018-05-03 PROCEDURE — 80053 COMPREHEN METABOLIC PANEL: CPT

## 2018-05-03 PROCEDURE — 84100 ASSAY OF PHOSPHORUS: CPT

## 2018-05-03 PROCEDURE — 85007 BL SMEAR W/DIFF WBC COUNT: CPT

## 2018-05-03 PROCEDURE — 99231 SBSQ HOSP IP/OBS SF/LOW 25: CPT | Mod: ,,, | Performed by: ANESTHESIOLOGY

## 2018-05-03 PROCEDURE — 11000001 HC ACUTE MED/SURG PRIVATE ROOM

## 2018-05-03 PROCEDURE — 99233 SBSQ HOSP IP/OBS HIGH 50: CPT | Mod: ,,, | Performed by: INTERNAL MEDICINE

## 2018-05-03 PROCEDURE — A9585 GADOBUTROL INJECTION: HCPCS | Performed by: HOSPITALIST

## 2018-05-03 PROCEDURE — 99233 SBSQ HOSP IP/OBS HIGH 50: CPT | Mod: ,,, | Performed by: HOSPITALIST

## 2018-05-03 PROCEDURE — 63600175 PHARM REV CODE 636 W HCPCS: Performed by: ANESTHESIOLOGY

## 2018-05-03 PROCEDURE — 63600175 PHARM REV CODE 636 W HCPCS: Performed by: STUDENT IN AN ORGANIZED HEALTH CARE EDUCATION/TRAINING PROGRAM

## 2018-05-03 PROCEDURE — 36415 COLL VENOUS BLD VENIPUNCTURE: CPT

## 2018-05-03 RX ORDER — GADOBUTROL 604.72 MG/ML
8 INJECTION INTRAVENOUS
Status: COMPLETED | OUTPATIENT
Start: 2018-05-03 | End: 2018-05-03

## 2018-05-03 RX ORDER — SODIUM BICARBONATE 650 MG/1
1 TABLET ORAL 2 TIMES DAILY
Status: DISCONTINUED | OUTPATIENT
Start: 2018-05-03 | End: 2018-05-14 | Stop reason: HOSPADM

## 2018-05-03 RX ADMIN — OXYCODONE HYDROCHLORIDE 5 MG: 5 TABLET ORAL at 06:05

## 2018-05-03 RX ADMIN — MAGNESIUM OXIDE TAB 400 MG (241.3 MG ELEMENTAL MG) 800 MG: 400 (241.3 MG) TAB at 09:05

## 2018-05-03 RX ADMIN — GADOBUTROL 8 ML: 604.72 INJECTION INTRAVENOUS at 10:05

## 2018-05-03 RX ADMIN — RAMELTEON 8 MG: 8 TABLET, FILM COATED ORAL at 10:05

## 2018-05-03 RX ADMIN — STANDARDIZED SENNA CONCENTRATE AND DOCUSATE SODIUM 2 TABLET: 8.6; 5 TABLET, FILM COATED ORAL at 09:05

## 2018-05-03 RX ADMIN — OXYCODONE HYDROCHLORIDE 5 MG: 5 TABLET ORAL at 05:05

## 2018-05-03 RX ADMIN — ACETAMINOPHEN 1000 MG: 500 TABLET, FILM COATED ORAL at 12:05

## 2018-05-03 RX ADMIN — ACETAMINOPHEN 1000 MG: 500 TABLET, FILM COATED ORAL at 05:05

## 2018-05-03 RX ADMIN — SODIUM BICARBONATE 650 MG TABLET 650 MG: at 09:05

## 2018-05-03 RX ADMIN — PREGABALIN 150 MG: 50 CAPSULE ORAL at 10:05

## 2018-05-03 RX ADMIN — LIDOCAINE 1 PATCH: 50 PATCH CUTANEOUS at 05:05

## 2018-05-03 RX ADMIN — ROPIVACAINE HYDROCHLORIDE 10 ML/HR: 2 INJECTION, SOLUTION EPIDURAL; INFILTRATION at 02:05

## 2018-05-03 RX ADMIN — ENOXAPARIN SODIUM 40 MG: 100 INJECTION SUBCUTANEOUS at 05:05

## 2018-05-03 RX ADMIN — SODIUM BICARBONATE 650 MG TABLET 650 MG: at 10:05

## 2018-05-03 NOTE — PROGRESS NOTES
Hospital Medicine  Progress note     Team: [facility] HOSP MED B Dr. Cruz  Admit Date: 4/20/2018  VIGNESH 5/8/2018  Code status: Full Code     Principal Problem:  Metastatic breast cancer      Interval hx: 5/3: MRI Brain w w/o contrast today. Bicarb 16-replaced. Remove rocha. Discussed working on increasing ambulation now that rocha is removed. Transition to oral pain medication: oxycodone 5mg q4hrs. 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 Diffuse osseous metastatic disease involving the calvarium, clivus, and visualized upper cervical spine, noting posterior bulging at the body of C2. MRI brachial pelxus with and without contrast for RUE weakness      5/2:  Blood cultures NGTD, lactate 2.2, troponin 0.015, UA 1+ occult blood and trace leukocytes with few yeast and occasional bacteria. WBC increased to 15. Started on soft diet. Mg 1.5-replaced. Dietary Consult: Please order speech consult. Continue regular diet, texture per SLP. Continue Boost Plus TID. Speech Consult: Clinical evaluation of swallow complete. Recommend dental soft diet with thin liquids. Given pt with scattered dentition, she appears to have met max potential with SLP services at this time. Tapered to Room Air. MRI w and w/o contrast ordered. s/p radiation oncology evaluation: radiation treatment of each of the mailed femurs is recommended. 10 fractions of 3Gy each as outpatient.     5/1: Mg 1.5-replaced. Oncology consult: outside path suggestive of breast primary with moderately differentiated adenocarcinoma ER/DE+, HER 2-. Will f/u on path to confirm. Consider MRI brain w/ w/o contrast once medically stable.  Radiation Oncology consulted. Family wants to talk about options with Oncology and Radiation Oncology before finalizing any palliative care plans. 5L NC-taper. Systolic BP 70s this AM, up to 113 with  IVF bolus. Chest Xray -Small lung volumes and bibasal patchy subsegmental opacities persist, similar to recent studies.  No new disease identified , NS bolus, Blood cultures, Lactic Acid, UA, CBC/CMP, Troponin ordered. Decreased prn oxycodone to 5mg q4hrs.      ROS      Pain Scale: Decreased to 8/10, Neck, Legs  Respiratory: no cough or shortness of breath  Cardiovascular: no chest pain or palpitations  Gastrointestinal: no nausea or vomiting, no abdominal pain or change in bowel habits  Behavioral/Psych: no depression or anxiety        PEx  Temp:  [95.4 °F (35.2 °C)-97.8 °F (36.6 °C)]   Pulse:  [110-127]   Resp:  [16-18]   BP: ()/(56-77)   SpO2:  [93 %-98 %]      Intake/Output Summary (Last 24 hours) at 05/03/18 0744  Last data filed at 05/03/18 0700    Gross per 24 hour   Intake                0 ml   Output              900 ml   Net             -900 ml         General Appearance: no acute distress   Heart: regular rate and rhythm  Respiratory: Normal respiratory effort, no crackles Room Air  Abdomen: Soft, non-tender; bowel sounds active  Skin: intact. IV sites ok  Neurologic:  No focal numbness or weakness. Pupils normal.  Mental status: Alert, oriented x 2, affect appropriate   Extremities: Limited motion BLE 2/2 pain, dressings from orthopedic surgery in place. Weakness right arm:  ok, but overall 2/5 power RUE. Difficulty lifting RUE off bed.      Recent Labs  Lab 05/01/18  0434 05/01/18  1302 05/02/18  0435   WBC 12.42 13.63* 15.15*   HGB 9.4* 9.3* 9.7*   HCT 29.8* 30.0* 31.3*   * 122* 135*         Recent Labs  Lab 04/30/18  0415 05/01/18  0058 05/01/18  0434 05/01/18  1302 05/02/18  0435     --  138 137 140   K 3.7 4.1 3.9 3.8 3.9     --  110 110 111*   CO2 21*  --  19* 18* 19*   BUN 15  --  14 14 16   CREATININE 0.9  --  0.9 0.9 0.9   GLU 74  --  73 76 48*   CALCIUM 7.7*  --  7.5* 7.9* 8.1*   MG 1.5* 1.5* 1.5*  --  1.5*   PHOS 2.7  --  3.0  --  2.8         Recent Labs  Lab  05/01/18  0434 05/01/18  1302 05/02/18  0435   ALKPHOS 305* 295* 335*   ALT 9* 7* 6*   AST 67* 59* 66*   ALBUMIN 1.4* 1.3* 1.4*   PROT 5.0* 4.9* 5.3*   BILITOT 0.5 0.7 0.7         Recent Labs  Lab 05/01/18  1312 05/02/18  0737 05/02/18  0923 05/02/18  1229 05/02/18  1849 05/02/18  1923   POCTGLUCOSE 70 56* 82 68* 64* 106          Recent Labs      05/01/18   1302   TROPONINI  0.015         Scheduled Meds:   acetaminophen  1,000 mg Oral Q6H    enoxaparin  40 mg Subcutaneous Daily    lidocaine  1 patch Transdermal Q24H    magnesium oxide  800 mg Oral Daily    polyethylene glycol  17 g Oral Daily    pregabalin  150 mg Oral QHS    ramelteon  8 mg Oral QHS    senna-docusate 8.6-50 mg  2 tablet Oral Daily      Continuous Infusions:   ropivacaine (PF) 2 mg/ml (0.2%) 10 mL/hr (05/03/18 0217)      As Needed:  sodium chloride, bisacodyl, dextrose 50%, dextrose 50%, glucagon (human recombinant), insulin aspart U-100, omnipaque, ondansetron, oxyCODONE, promethazine (PHENERGAN) IVPB, promethazine, ramelteon, sodium chloride 0.9%, sodium chloride 0.9%, sodium chloride 0.9%, sodium chloride 0.9%, tiZANidine, white petrolatum            Active Hospital Problems     Diagnosis   POA    *Metastatic breast cancer [C50.919]   Yes    Malnutrition of moderate degree [E44.0]   Yes    Malignant neoplasm of central portion of right breast in female, estrogen receptor positive [C50.111, Z17.0]   Not Applicable    Cardiac rhythm disorder or disturbance or change [I49.9]   Yes    CHF (congestive heart failure) [I50.9]   Yes    Right heart failure [I50.810]   Yes    Acute hypoxemic respiratory failure [J96.01]   Yes    Tachycardia [R00.0]   Yes    Lactic acidemia [E87.2]   Yes    Severe malnutrition [E43]   Yes    Cancer of left femur [C40.22]   Yes    Cancer of right femur [C40.21]   Yes    Palliative care encounter [Z51.5]   Not Applicable    Pain due to malignant neoplasm metastatic to bone [G89.3, C79.51]   Yes     Anemia [D64.9]   Yes    Thrombocytopenia, unspecified [D69.6]   Yes    Hypomagnesemia [E83.42]   Yes    Bony metastasis [C79.51]   Yes       Xray metastatic survey shows lytic lesions of skull, lytic spine and rib lesions with pathologic fractures. Diffuse lytic lesions throughout entire central axial red marrow skeleton. Lytic lesions of proximal long bones and pathological fractures of ribs and right superior and inferior pubic ramus.       Pleural effusion [J90]   Yes       Resolved Hospital Problems     Diagnosis Date Resolved POA    Shock [R57.9] 04/26/2018 Yes    Encounter for weaning from ventilator [Z99.11] 04/30/2018 Not Applicable    Encephalopathy, metabolic [G93.41] 04/21/2018 Yes         Overview  Mrs. Grossman is a 59 yo female who was brought [facility] ED on 4/20/2018 for second opinion regarding recently diagnosed likely metastatic malignancy with unknown primary.      Assessment and Plan for Problems addressed today:     Metastatic breast cancer  Mets to bone  - oncology following, pt did not want to talk this afternoon after arriving to the floor from surgery, discussed with Dr. Fink (onc) he will see her tomorrow  - she may be a candidate for some oral therapy  - radiation oncology consult placed  - pain control (BP is sensitive to narcotics)  - palliative care saw her while she was in ICU, would continue discussions. Family wants to talk about options with Oncology and Radiation Oncology before finalizing any palliative care plans.   - 5/1 Oncology recs: outside path suggestive of breast primary with moderately differentiated adenocarcinoma ER/ME+, HER 2-. Will f/u on path to confirm. Consider MRI brain w/ w/o contrast once medically stable.   - s/p radiation oncology evaluation: radiation treatment of each of the mailed femurs is recommended. 10 fractions of 3Gy each as outpatient   - 5/2: Right upper extremity weakness-MRI Brain w w/o contrast ordered.         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 Diffuse osseous metastatic disease involving the calvarium, clivus, and visualized upper cervical spine, noting posterior bulging at the body of C2. Transitioned to oral pain medications: oxycodone 5mg PRN q4hrs.       Right upper extremity weakness - mentions started after fixation of right femur. MRI brachial pelxus with and without contrast for RUE weakness         Pathologic fracture of ribs pubic ramus  - s/p IM nailing femur x2     Hypoxemic respiratory failure  - pulmonary edema from IVF  - indeterminate diastolic function on 4/27 echo, PAP 53mmHg  - currently on 4L NC since arriving to floor from PACU  - 5/1: increased to 5L NC.   - 5/2: tapered to room air.      Hypotension -likely orthostasis   -5/1: Systolic BP 70s this AM, up to 113 with IVF bolus. Decreased prn oxycodone to 5mg q4hrs. NS bolus, Blood cultures, Lactic Acid, UA, CBC/CMP, Troponin ordered. Chest Xray: Small lung volumes and bibasal patchy subsegmental opacities persist, similar to recent studies.  No new disease identified   -5/2: Blood cultures NGTD, lactate 2.2, troponin 0.015, UA 1+ occult blood and trace leukocytes with few yeast and occasional bacteria. WBC increased to 15.     Non-Anion Gap Metabolic Acidosis  -5/3: bicarb 16-replaced     Lactic acidosis  - 2.7 overnight, responded to IVF  - does not appear infected  - again elevated to 2.4 after procedure, will trend  - 4/30: 2.6, monitor     Urinary incontinence   - rocha in place, consider switch to purwick when pt can tolerate  - 5/3: remove rocha     Pleural effusion  - likely metastatic, s/p sampling at OSH  - associated compressive atelectasis bilaterally  - multiple pulmonary micronodules     Anemia of Chronic Disease  - likely chronic disease with some blood loss during procedures  - iron studies pending:  Iron 50 (wnl), TIBC 160 (low), Saturation 31 (wnl), Transferrin 108 (low)  - monitor with CBC daily  - 5/2: Hgb 9.7     Hypomagnesemia  -most likely 2/2 chronic disease  -4/22: 1.4-replaced  -5/1: 1.5-replaced  -5/2: 1.5-replaced     Hypophosphatemia   -4/22: 2.5 - replaced  - Most likely 2/2 chronic disease     Thrombocytopenia  -4/22: 122--> 165 resolved   -4/30: 115-->135 (5/2)  -Most likely 2/2 chronic disease     Congestive Heart Failure  Right Heart Failure  -4/27 Echo: Right Ventricular Enlargement with normal systolic function  -5/1:      Malnutrition     -Prealbumin of 4 . Dietary consulted: Please order speech consult. Continue regular diet, texture per SLP. Continue Boost Plus TID.     -Speech Consult: Clinical evaluation of swallow complete. Recommend dental soft diet with thin liquids.    Given pt with scattered dentition, she appears to have met max potential with SLP services at this        time.    Leucocytosis 14 - Afebrile monitor    Thrombocytopenia 116     DVT PPx: Lovenox     Discharge plan and follow up -SNF     Provider     I personally scribed for David Cruz MD on 05/03/2018 at 12:56 PM. Electronically signed by kera Lemus on 05/03/2018 at 12:56 PM.  The documentation recorded by the scribe accurately reflects service I personally performed and the decisions made by me.  David Cruz MD  Attending Staff Physician  [facility]  pager- [phone]  [facility] - [phone]

## 2018-05-03 NOTE — ASSESSMENT & PLAN NOTE
"05/03/2018 Awaiting oncology discussion about treatment options. She has widely metastatic disease. Her discharge options are limited related to her insurance (Medicaid) Will continue to follow.  05/02/2018 Patient is comfortable. Anxious about having MRI-as they are transporting her now. Will follow. Family meeting when necessary data is obtained to have a realistic plan of care developed. I am happy to facilitate.  05/01/2018 Patient is comfortable with current epidural catheter by anesthesia. She needs pressure reduction strategy for pressure injury prevention. Discussed with nursing. Will change to oral pain meds when epidural catheter removal is contemplated.  Family is awaiting oncology recommendations for further "options". Not interested in discussing plans until then. If unable to ambulate options are different. Will continue to explore with family. Encourage family meeting to understand how we can manage her illness. I am happy to facilitate with primary team and oncology and orthopedics.  "

## 2018-05-03 NOTE — ADDENDUM NOTE
Addendum  created 05/03/18 1010 by Nilam Reyes RN    Anesthesia Event edited, LDA removed, Order list changed

## 2018-05-03 NOTE — MEDICAL/APP STUDENT
Hospital Medicine  Progress note    Team: [facility] MED B Dr. Cruz  Admit Date: 4/20/2018  VIGNESH 5/8/2018  Code status: Full Code    Principal Problem:  Metastatic breast cancer     Interval hx: 5/3: MRI Brain w w/o contrast today. Bicarb 16-replaced. Remove rocha. Discussed working on increasing ambulation now that rocha is removed. Transition to oral pain medication: oxycodone 5mg q4hrs. 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 Diffuse osseous metastatic disease involving the calvarium, clivus, and visualized upper cervical spine, noting posterior bulging at the body of C2.    5/2:  Blood cultures NGTD, lactate 2.2, troponin 0.015, UA 1+ occult blood and trace leukocytes with few yeast and occasional bacteria. WBC increased to 15. Started on soft diet. Mg 1.5-replaced. Dietary Consult: Please order speech consult. Continue regular diet, texture per SLP. Continue Boost Plus TID. Speech Consult: Clinical evaluation of swallow complete. Recommend dental soft diet with thin liquids. Given pt with scattered dentition, she appears to have met max potential with SLP services at this time. Tapered to Room Air. MRI w and w/o contrast ordered. s/p radiation oncology evaluation: radiation treatment of each of the mailed femurs is recommended. 10 fractions of 3Gy each as outpatient.    5/1: Mg 1.5-replaced. Oncology consult: outside path suggestive of breast primary with moderately differentiated adenocarcinoma ER/CT+, HER 2-. Will f/u on path to confirm. Consider MRI brain w/ w/o contrast once medically stable.  Radiation Oncology consulted. Family wants to talk about options with Oncology and Radiation Oncology before finalizing any palliative care plans. 5L NC-taper. Systolic BP 70s this AM, up to 113 with IVF bolus. Chest Xray -Small lung volumes and bibasal patchy  subsegmental opacities persist, similar to recent studies.  No new disease identified , NS bolus, Blood cultures, Lactic Acid, UA, CBC/CMP, Troponin ordered. Decreased prn oxycodone to 5mg q4hrs.     ROS     Pain Scale: Decreased to 8/10, Neck, Legs  Respiratory: no cough or shortness of breath  Cardiovascular: no chest pain or palpitations  Gastrointestinal: no nausea or vomiting, no abdominal pain or change in bowel habits  Behavioral/Psych: no depression or anxiety      PEx  Temp:  [95.4 °F (35.2 °C)-97.8 °F (36.6 °C)]   Pulse:  [110-127]   Resp:  [16-18]   BP: ()/(56-77)   SpO2:  [93 %-98 %]     Intake/Output Summary (Last 24 hours) at 05/03/18 0744  Last data filed at 05/03/18 0700   Gross per 24 hour   Intake                0 ml   Output              900 ml   Net             -900 ml       General Appearance: no acute distress   Heart: regular rate and rhythm  Respiratory: Normal respiratory effort, no crackles Room Air  Abdomen: Soft, non-tender; bowel sounds active  Skin: intact. IV sites ok  Neurologic:  No focal numbness or weakness. Pupils normal.  Mental status: Alert, oriented x 2, affect appropriate   Extremities: Limited motion BLE 2/2 pain, dressings from orthopedic surgery in place. Weakness right arm:  ok, but overall 2/5 power RUE. Difficulty lifting RUE off bed.     Recent Labs  Lab 05/01/18  0434 05/01/18  1302 05/02/18  0435   WBC 12.42 13.63* 15.15*   HGB 9.4* 9.3* 9.7*   HCT 29.8* 30.0* 31.3*   * 122* 135*       Recent Labs  Lab 04/30/18  0415 05/01/18  0058 05/01/18  0434 05/01/18  1302 05/02/18  0435     --  138 137 140   K 3.7 4.1 3.9 3.8 3.9     --  110 110 111*   CO2 21*  --  19* 18* 19*   BUN 15  --  14 14 16   CREATININE 0.9  --  0.9 0.9 0.9   GLU 74  --  73 76 48*   CALCIUM 7.7*  --  7.5* 7.9* 8.1*   MG 1.5* 1.5* 1.5*  --  1.5*   PHOS 2.7  --  3.0  --  2.8       Recent Labs  Lab 05/01/18  0434 05/01/18  1302 05/02/18  0435   ALKPHOS 305* 295* 335*   ALT 9*  7* 6*   AST 67* 59* 66*   ALBUMIN 1.4* 1.3* 1.4*   PROT 5.0* 4.9* 5.3*   BILITOT 0.5 0.7 0.7        Recent Labs  Lab 05/01/18  1312 05/02/18  0737 05/02/18  0923 05/02/18  1229 05/02/18  1849 05/02/18  1923   POCTGLUCOSE 70 56* 82 68* 64* 106     Recent Labs      05/01/18   1302   TROPONINI  0.015       Scheduled Meds:   acetaminophen  1,000 mg Oral Q6H    enoxaparin  40 mg Subcutaneous Daily    lidocaine  1 patch Transdermal Q24H    magnesium oxide  800 mg Oral Daily    polyethylene glycol  17 g Oral Daily    pregabalin  150 mg Oral QHS    ramelteon  8 mg Oral QHS    senna-docusate 8.6-50 mg  2 tablet Oral Daily     Continuous Infusions:   ropivacaine (PF) 2 mg/ml (0.2%) 10 mL/hr (05/03/18 0217)     As Needed:  sodium chloride, bisacodyl, dextrose 50%, dextrose 50%, glucagon (human recombinant), insulin aspart U-100, omnipaque, ondansetron, oxyCODONE, promethazine (PHENERGAN) IVPB, promethazine, ramelteon, sodium chloride 0.9%, sodium chloride 0.9%, sodium chloride 0.9%, sodium chloride 0.9%, tiZANidine, white petrolatum    Active Hospital Problems    Diagnosis  POA    *Metastatic breast cancer [C50.919]  Yes    Malnutrition of moderate degree [E44.0]  Yes    Malignant neoplasm of central portion of right breast in female, estrogen receptor positive [C50.111, Z17.0]  Not Applicable    Cardiac rhythm disorder or disturbance or change [I49.9]  Yes    CHF (congestive heart failure) [I50.9]  Yes    Right heart failure [I50.810]  Yes    Acute hypoxemic respiratory failure [J96.01]  Yes    Tachycardia [R00.0]  Yes    Lactic acidemia [E87.2]  Yes    Severe malnutrition [E43]  Yes    Cancer of left femur [C40.22]  Yes    Cancer of right femur [C40.21]  Yes    Palliative care encounter [Z51.5]  Not Applicable    Pain due to malignant neoplasm metastatic to bone [G89.3, C79.51]  Yes    Anemia [D64.9]  Yes    Thrombocytopenia, unspecified [D69.6]  Yes    Hypomagnesemia [E83.42]  Yes    Bony  metastasis [C79.51]  Yes     Xray metastatic survey shows lytic lesions of skull, lytic spine and rib lesions with pathologic fractures. Diffuse lytic lesions throughout entire central axial red marrow skeleton. Lytic lesions of proximal long bones and pathological fractures of ribs and right superior and inferior pubic ramus.      Pleural effusion [J90]  Yes      Resolved Hospital Problems    Diagnosis Date Resolved POA    Shock [R57.9] 04/26/2018 Yes    Encounter for weaning from ventilator [Z99.11] 04/30/2018 Not Applicable    Encephalopathy, metabolic [G93.41] 04/21/2018 Yes       Overview  Mrs. Grossman is a 59 yo female who [place] [facility] [place] for second opinion regarding recently diagnosed likely metastatic malignancy with unknown primary.     Assessment and Plan for Problems addressed today:    Metastatic breast cancer  Mets to bone  - oncology following, pt did not want to talk this afternoon after arriving to the floor from surgery, discussed with Dr. Fink (onc) he will see her tomorrow  - she may be a candidate for some oral therapy  - radiation oncology consult placed  - pain control (BP is sensitive to narcotics)  - palliative care saw her while she was in ICU, would continue discussions. Family wants to talk about options with Oncology and Radiation Oncology before finalizing any palliative care plans.   - 5/1 Oncology recs: outside path suggestive of breast primary with moderately differentiated adenocarcinoma ER/ID+, HER 2-. Will f/u on path to confirm. Consider MRI brain w/ w/o contrast once medically stable.   - s/p radiation oncology evaluation: radiation treatment of each of the mailed femurs is recommended. 10 fractions of 3Gy each as outpatient   - 5/2: Right upper extremity weakness-MRI Brain w w/o contrast ordered.         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 Diffuse osseous metastatic disease involving the calvarium, clivus, and visualized upper cervical spine, noting posterior bulging at the body of C2. Transitioned to oral pain medications: oxycodone 5mg PRN q4hrs.      Pathologic fracture of ribs pubic ramus  - s/p IM nailing femur x2     Hypoxemic respiratory failure  - pulmonary edema from IVF  - indeterminate diastolic function on 4/27 echo, PAP 53mmHg  - currently on 4L NC since arriving to floor from PACU  - 5/1: increased to 5L NC.   - 5/2: tapered to room air.      Hypotension -likely orthostasis   -5/1: Systolic BP 70s this AM, up to 113 with IVF bolus. Decreased prn oxycodone to 5mg q4hrs. NS bolus, Blood cultures, Lactic Acid, UA, CBC/CMP, Troponin ordered. Chest Xray: Small lung volumes and bibasal patchy subsegmental opacities persist, similar to recent studies.  No new disease identified   -5/2: Blood cultures NGTD, lactate 2.2, troponin 0.015, UA 1+ occult blood and trace leukocytes with few yeast and occasional bacteria. WBC increased to 15.    Non-Anion Gap Metabolic Acidosis  -5/3: bicarb 16-replaced    Lactic acidosis  - 2.7 overnight, responded to IVF  - does not appear infected  - again elevated to 2.4 after procedure, will trend  - 4/30: 2.6, monitor     Urinary incontinence   - rocha in place, consider switch to purwick when pt can tolerate  - 5/3: remove rocha     Pleural effusion  - likely metastatic, s/p sampling at OSH  - associated compressive atelectasis bilaterally  - multiple pulmonary micronodules     Anemia of Chronic Disease  - likely chronic disease with some blood loss during procedures  - iron studies pending: Iron 50 (wnl), TIBC 160 (low), Saturation 31 (wnl), Transferrin 108 (low)  - monitor with CBC daily  - 5/2: Hgb 9.7     Hypomagnesemia  -most likely 2/2 chronic disease  -4/22: 1.4-replaced  -5/1: 1.5-replaced  -5/2: 1.5-replaced     Hypophosphatemia   -4/22: 2.5 - replaced  -  Most likely 2/2 chronic disease     Thrombocytopenia  -4/22: 122--> 165 resolved   -4/30: 115-->135 (5/2)  -Most likely 2/2 chronic disease     Congestive Heart Failure  Right Heart Failure  -4/27 Echo: Right Ventricular Enlargement with normal systolic function  -5/1:      Malnutrition     -Prealbumin of 4 . Dietary consulted: Please order speech consult. Continue regular diet, texture per SLP. Continue Boost Plus TID.     -Speech Consult: Clinical evaluation of swallow complete. Recommend dental soft diet with thin liquids.    Given pt with scattered dentition, she appears to have met max potential with SLP services at this        time.     DVT PPx: Lovenox    Discharge plan and follow up  Pending family discussion    Provider    I personally scribed for David Cruz MD on 05/03/2018 at 12:56 PM. Electronically signed by kera Lemus on 05/03/2018 at 12:56 PM.

## 2018-05-03 NOTE — ANESTHESIA POST-OP PAIN MANAGEMENT
Acute Pain Service Progress Note    Kerline Grossman is a 58 y.o., female, [id number].    Surgery:  IM NAILING OF FEMUR - hana table - large c arm door side (Right), POD 3   IM NAILING OF FEMUR - hana table - large c arm door side (Left), POD 8     Catheter type: perineural  SIFI     Infusion type: Ropivacaine 0.2%  8 basal    Problem List:    Active Hospital Problems    Diagnosis  POA    *Metastatic breast cancer [C50.919]  Yes    Malnutrition of moderate degree [E44.0]  Yes    Malignant neoplasm of central portion of right breast in female, estrogen receptor positive [C50.111, Z17.0]  Not Applicable    Cardiac rhythm disorder or disturbance or change [I49.9]  Yes    CHF (congestive heart failure) [I50.9]  Yes    Right heart failure [I50.810]  Yes    Acute hypoxemic respiratory failure [J96.01]  Yes    Tachycardia [R00.0]  Yes    Lactic acidemia [E87.2]  Yes    Severe malnutrition [E43]  Yes    Cancer of left femur [C40.22]  Yes    Cancer of right femur [C40.21]  Yes    Palliative care encounter [Z51.5]  Not Applicable    Pain due to malignant neoplasm metastatic to bone [G89.3, C79.51]  Yes    Anemia [D64.9]  Yes    Thrombocytopenia, unspecified [D69.6]  Yes    Hypomagnesemia [E83.42]  Yes    Bony metastasis [C79.51]  Yes     Xray metastatic survey shows lytic lesions of skull, lytic spine and rib lesions with pathologic fractures. Diffuse lytic lesions throughout entire central axial red marrow skeleton. Lytic lesions of proximal long bones and pathological fractures of ribs and right superior and inferior pubic ramus.      Pleural effusion [J90]  Yes      Resolved Hospital Problems    Diagnosis Date Resolved POA    Shock [R57.9] 04/26/2018 Yes    Encounter for weaning from ventilator [Z99.11] 04/30/2018 Not Applicable    Encephalopathy, metabolic [G93.41] 04/21/2018 Yes       Subjective:                 General appearance of alert, oriented, no complaints              Pain with rest: 4     Numbers              Pain with movement: 6   Numbers              Side Effects                          1. Pruritis No                          2. Nausea No                          3. Motor Blockade No, 1=Ability to bend knees and ankles                          4. Sedation No, 1=awake and alert     Objective:                 Catheter site clean, dry, intact      Vitals   Vitals:    05/03/18 0400   BP: (!) 107/56   Pulse: (!) 119   Resp: 18   Temp: 36.5 °C (97.7 °F)        Labs    Admission on 04/20/2018   No results displayed because visit has over 200 results.           Meds   Current Facility-Administered Medications   Medication Dose Route Frequency Provider Last Rate Last Dose    0.9%  NaCl infusion (for blood administration)   Intravenous Q24H PRN Kings Garner MD        acetaminophen tablet 1,000 mg  1,000 mg Oral Q6H Sarai Coyne MD   1,000 mg at 05/02/18 1733    bisacodyl suppository 10 mg  10 mg Rectal Daily PRN Frank Argueta MD        dextrose 50% injection 12.5 g  12.5 g Intravenous PRN Reginald Romero MD   12.5 g at 05/02/18 1852    dextrose 50% injection 25 g  25 g Intravenous PRN KITTY Delatorre   25 g at 04/26/18 0627    enoxaparin injection 40 mg  40 mg Subcutaneous Daily Frank Argueta MD   40 mg at 05/02/18 1732    glucagon (human recombinant) injection 1 mg  1 mg Intramuscular PRN Reginald Romero MD        insulin aspart U-100 pen 1-10 Units  1-10 Units Subcutaneous Q6H PRN Reginald Romero MD   4 Units at 04/25/18 2227    lidocaine 5 % patch 1 patch  1 patch Transdermal Q24H David Cruz MD   1 patch at 05/02/18 1733    magnesium oxide tablet 800 mg  800 mg Oral Daily Gordon Cross MD   800 mg at 05/02/18 0924    omnipaque oral solution 15 mL  15 mL Oral PRN Celena Wright MD   15 mL at 04/22/18 1116    ondansetron disintegrating tablet 8 mg  8 mg Oral Q8H PRN KITTY Delatorre        oxyCODONE immediate release tablet 5 mg  5 mg Oral Q4H  PRN David Cruz MD   5 mg at 05/03/18 0658    polyethylene glycol packet 17 g  17 g Oral Daily Frank Argueta MD   17 g at 05/01/18 0909    pregabalin capsule 150 mg  150 mg Oral QHS Atif Valencia MD   150 mg at 05/02/18 2132    promethazine (PHENERGAN) 6.25 mg in dextrose 5 % 50 mL IVPB  6.25 mg Intravenous Q6H PRN Talib Loyd MD        promethazine tablet 25 mg  25 mg Oral Q6H PRN Kristan Calderon PA-C        ramelteon tablet 8 mg  8 mg Oral Nightly PRN Kelsey Torre MD        ramelteon tablet 8 mg  8 mg Oral QHS Kelsey Torre MD   8 mg at 05/02/18 2132    ropivacaine (PF) 2 mg/ml (0.2%) infusion  10 mL/hr Perineural Continuous Talib Loyd MD 10 mL/hr at 05/03/18 0217 10 mL/hr at 05/03/18 0217    senna-docusate 8.6-50 mg per tablet 2 tablet  2 tablet Oral Daily David Cruz MD   2 tablet at 05/02/18 0924    sodium chloride 0.9% flush 3 mL  3 mL Intravenous PRN Han Pelayo MD        sodium chloride 0.9% flush 3 mL  3 mL Intravenous PRN Han Pelayo MD        sodium chloride 0.9% flush 3 mL  3 mL Intravenous PRN Frank Argueta MD        sodium chloride 0.9% flush 5 mL  5 mL Intravenous PRN IKTTY Delatorre        tiZANidine tablet 4 mg  4 mg Oral Q8H PRN Sarai Coyne MD        white petrolatum 41 % ointment   Topical (Top) PRN David Cruz MD            Anticoagulant dose: lovenox 40 daily     Assessment:     Pain control adequate    Plan:     .   PNC paused this AM. Patient very anxious about pausing catheter as she describes her pain as an 8/10 at all times. Discussed with patient and son at bedside regarding patient not asking for pain medication that she has available and occasionally refusing medication. Encouraged patient to take oral medication to assist with pain if she is continuously at an 8/10.     Plan to reassess patient's pain this AM and resume PNC vs pull catheter.  07:05 AM  Evaluator Sarai Coyne      Patient seen again on  rounds with staff after pausing PNC; patient and family counseled again on importance of oral pain medications to aid in pain control and need for PNC to be removed for MRI. In addition, discussed again that PNC is most useful in acute post operative period and patient is now POD 3 which is when we typically remove them anyway. Patient continues to complain of substantial pain but pain often described in back and pelvis. PNC removed with blue tip intact. Acute pain to sign off at this time; please contact us with questions and/or concerns.  Sarai Coyne MD  9:11 AM

## 2018-05-03 NOTE — PT/OT/SLP PROGRESS
Occupational Therapy      Patient Name:  Kerline Grossman   MRN:  [id number]    Patient not seen today secondary to polite refusal.  Pt reports that she had a very long day and was not feeling well.  Pt requested that OT attempt tomorrow. Will follow-up tomorrow.    Guerda Schulz, OT  5/3/2018

## 2018-05-03 NOTE — ASSESSMENT & PLAN NOTE
58 y.o. female POD8 s/p L CMN, POD3 s/p R CMN    Pain control  PT/OT: WBAT  DVT PPx: lovenox, FCDs at all times when not ambulating  Abx: postop Ancef completed  Drain: none  Ponce: continued per primary, recommend discontinuation as soon as possible.   MRI brain pending per primary    Dispo: per primary

## 2018-05-03 NOTE — PROGRESS NOTES
[facility]  Hematology/Oncology  Progress Note    Patient Name: Kerline Grossman  Admission Date: 4/20/2018  Hospital Length of Stay: 12 days  Code Status: Full Code     Subjective:     Interval History:     - reports pain involving b/l lower extremities.   - no fever/chills.  - appears reserved today.   - brother at bedside.      Medications:  Continuous Infusions:    Scheduled Meds:   acetaminophen  1,000 mg Oral Q6H    enoxaparin  40 mg Subcutaneous Daily    lidocaine  1 patch Transdermal Q24H    magnesium oxide  800 mg Oral Daily    polyethylene glycol  17 g Oral Daily    pregabalin  150 mg Oral QHS    ramelteon  8 mg Oral QHS    senna-docusate 8.6-50 mg  2 tablet Oral Daily    sodium bicarbonate  1 tablet Oral BID     PRN Meds:sodium chloride, bisacodyl, dextrose 50%, dextrose 50%, glucagon (human recombinant), insulin aspart U-100, omnipaque, ondansetron, oxyCODONE, promethazine (PHENERGAN) IVPB, promethazine, ramelteon, sodium chloride 0.9%, sodium chloride 0.9%, sodium chloride 0.9%, sodium chloride 0.9%, tiZANidine, white petrolatum     Review of Systems  Objective:     Vital Signs (Most Recent):  Temp: 98.1 °F (36.7 °C) (05/03/18 1120)  Pulse: (!) 124 (05/03/18 1145)  Resp: 16 (05/03/18 1120)  BP: (!) 101/58 (05/03/18 1120)  SpO2: 99 % (05/03/18 1120) Vital Signs (24h Range):  Temp:  [96 °F (35.6 °C)-98.1 °F (36.7 °C)] 98.1 °F (36.7 °C)  Pulse:  [] 124  Resp:  [16-20] 16  SpO2:  [93 %-99 %] 99 %  BP: (101-120)/(56-76) 101/58     Weight: 74 kg (163 lb 2.3 oz)  Body mass index is 26.33 kg/m².  Body surface area is 1.86 meters squared.      Intake/Output Summary (Last 24 hours) at 05/03/18 1548  Last data filed at 05/03/18 0700   Gross per 24 hour   Intake                0 ml   Output              900 ml   Net             -900 ml       Physical Exam  Constitutional: Non-conversant. No distress.   HENT:   Head: Normocephalic and atraumatic.   Mouth/Throat: No oropharyngeal  exudate.   Eyes: EOM are normal. Pupils are equal, round, and reactive to light. No scleral icterus.   Neck: Normal range of motion. Neck supple.  Cardiovascular: Regular rhythm and intact distal pulses.    Pulmonary/Chest: Effort normal and breath sounds normal. No respiratory distress.   Abdominal: Soft. Bowel sounds are normal. She exhibits no distension. There is no tenderness.   Musculoskeletal: She exhibits tenderness. She exhibits no edema.   Dressing intact bilaterally.       Significant Labs:   CBC:     Recent Labs  Lab 05/02/18  0435 05/03/18  0609   WBC 15.15* 14.67*   HGB 9.7* 10.0*   HCT 31.3* 32.4*   * 116*    and CMP:     Recent Labs  Lab 05/02/18  0435 05/03/18  0609    137   K 3.9 4.1   * 110   CO2 19* 16*   GLU 48* 52*   BUN 16 18   CREATININE 0.9 0.8   CALCIUM 8.1* 8.0*   PROT 5.3* 5.2*   ALBUMIN 1.4* 1.4*   BILITOT 0.7 0.5   ALKPHOS 335* 333*   AST 66* 57*   ALT 6* <5*   ANIONGAP 10 11   EGFRNONAA >60.0 >60.0       Diagnostic Results:  I have reviewed all pertinent imaging results/findings within the past 24 hours.    Assessment/Plan:     Active Diagnoses:    Diagnosis Date Noted POA    PRINCIPAL PROBLEM:  Metastatic breast cancer [C50.919] 04/21/2018 Yes    Weakness of right upper extremity [R29.898] 05/03/2018 Yes    Leukocytosis [D72.829] 05/03/2018 Yes    Malnutrition of moderate degree [E44.0] 05/01/2018 Yes    Malignant neoplasm of central portion of right breast in female, estrogen receptor positive [C50.111, Z17.0]  Not Applicable    Cardiac rhythm disorder or disturbance or change [I49.9]  Yes    CHF (congestive heart failure) [I50.9]  Yes    Right heart failure [I50.810] 04/26/2018 Yes    Acute hypoxemic respiratory failure [J96.01] 04/26/2018 Yes    Tachycardia [R00.0]  Yes    Lactic acidemia [E87.2]  Yes    Severe malnutrition [E43] 04/25/2018 Yes    Cancer of left femur [C40.22] 04/24/2018 Yes    Cancer of right femur [C40.21] 04/24/2018 Yes     Palliative care encounter [Z51.5] 04/23/2018 Not Applicable    Pain due to malignant neoplasm metastatic to bone [G89.3, C79.51] 04/23/2018 Yes    Anemia [D64.9] 04/22/2018 Yes    Thrombocytopenia [D69.6] 04/22/2018 Yes    Hypomagnesemia [E83.42] 04/22/2018 Yes    Bony metastasis [C79.51] 04/21/2018 Yes    Pleural effusion [J90] 04/21/2018 Yes      Problems Resolved During this Admission:    Diagnosis Date Noted Date Resolved POA    Shock [R57.9] 05/01/2018 04/26/2018 Yes    Encounter for weaning from ventilator [Z99.11]  04/30/2018 Not Applicable    Encephalopathy, metabolic [G93.41] 04/21/2018 04/21/2018 Yes     Leptomeningeal Carcinomatosis  Multiple Bony Metastasis  B/L Pathologic Fractures  Metastatic Breast Cancer     - biopsy obtained from left femur suggestive of metastatic carcinoma likely of breast origin with weakly positive ER/NM+. Findings similar to outside path.   - given the change in her personality and difficulty with swallowing a MRI of Brain was requested to r/o any metastatic disease.   - unfortunately, MRI is suggestive of Leptomeningeal Carcinomatosis. Her overall prognosis is extremely poor now averaging about 2.5 months.   - offering intrathecal chemotherapy or systemic hormonal therapy would not prolong her survival significantly.   - would focus on quality of life and comfort measures at this time.   - discussed with patient's brother our concerns and recommendations. Will have formal conversation with children tomorrow.   - appreciate orthopedics and palliative care teams' assistance.      Thank you for your consult. I will follow-up with patient. Please contact us if you have any additional questions.     Babak Fink MD  Hematology/Oncology  [facility]    STAFF NOTE:  I have personally taken the history and examined this patient and agree with Dr. Fink's Note as stated above.

## 2018-05-03 NOTE — PT/OT/SLP PROGRESS
Physical Therapy      Patient Name:  Kerline Grossman   MRN:  [id number]    Patient not seen today secondary to polite refusal.  Pt reports that she had a very long day and was not feeling well.  Pt requested that PT attempt tomorrow. Will follow-up tomorrow.    Gorge Huertas, PT

## 2018-05-03 NOTE — PROGRESS NOTES
[facility]  Orthopedics  Progress Note    Patient Name: Kerline Grossman  MRN: [id number]  Admission Date: 4/20/2018  Hospital Length of Stay: 12 days  Attending Provider: David Cruz MD  Primary Care Provider: Primary Doctor No  Follow-up For: Procedure(s) (LRB):  IM NAILING OF FEMUR - hana table - large c arm door side (Right)    Post-Operative Day: 3 Days Post-Op  Subjective:     Principal Problem:Metastatic breast cancer    Principal Orthopedic Problem: B femur impending fractures s/p B CMN    Interval History: Patient seen and examined at bedside.  No acute events overnight. Pain stable. MRI brain per primary pending.    Review of patient's allergies indicates:  No Known Allergies    Current Facility-Administered Medications   Medication    0.9%  NaCl infusion (for blood administration)    acetaminophen tablet 1,000 mg    bisacodyl suppository 10 mg    dextrose 50% injection 12.5 g    dextrose 50% injection 25 g    enoxaparin injection 40 mg    glucagon (human recombinant) injection 1 mg    insulin aspart U-100 pen 1-10 Units    lidocaine 5 % patch 1 patch    magnesium oxide tablet 800 mg    omnipaque oral solution 15 mL    ondansetron disintegrating tablet 8 mg    oxyCODONE immediate release tablet 5 mg    polyethylene glycol packet 17 g    pregabalin capsule 150 mg    promethazine (PHENERGAN) 6.25 mg in dextrose 5 % 50 mL IVPB    promethazine tablet 25 mg    ramelteon tablet 8 mg    ramelteon tablet 8 mg    ropivacaine (PF) 2 mg/ml (0.2%) infusion    senna-docusate 8.6-50 mg per tablet 2 tablet    sodium chloride 0.9% flush 3 mL    sodium chloride 0.9% flush 3 mL    sodium chloride 0.9% flush 3 mL    sodium chloride 0.9% flush 5 mL    tiZANidine tablet 4 mg    white petrolatum 41 % ointment     Objective:     Vital Signs (Most Recent):  Temp: 97.7 °F (36.5 °C) (05/03/18 0400)  Pulse: (!) 119 (05/03/18 0400)  Resp: 18 (05/03/18 0400)  BP: (!) 107/56 (05/03/18  "0400)  SpO2: 98 % (05/03/18 0400) Vital Signs (24h Range):  Temp:  [95.4 °F (35.2 °C)-97.8 °F (36.6 °C)] 97.7 °F (36.5 °C)  Pulse:  [110-131] 119  Resp:  [16-18] 18  SpO2:  [93 %-98 %] 98 %  BP: ()/(56-77) 107/56     Weight: 74 kg (163 lb 2.3 oz)  Height: 5' 6" (167.6 cm)  Body mass index is 26.33 kg/m².      Intake/Output Summary (Last 24 hours) at 05/03/18 0551  Last data filed at 05/03/18 0400   Gross per 24 hour   Intake                0 ml   Output              500 ml   Net             -500 ml       Ortho/SPM Exam    AAOx4  NAD  Regular rhythm, tachycardic    BLE:  L thigh dressings with stable, dry drainage  R thigh dressing minimal drainage on dressings  SILT and motor intact T/SP/DP  WWP extremities  FCDs in place and functioning    Significant Labs:   CBC:     Recent Labs  Lab 05/01/18  1302 05/02/18  0435   WBC 13.63* 15.15*   HGB 9.3* 9.7*   HCT 30.0* 31.3*   * 135*     CMP:     Recent Labs  Lab 05/01/18  1302 05/02/18  0435    140   K 3.8 3.9    111*   CO2 18* 19*   GLU 76 48*   BUN 14 16   CREATININE 0.9 0.9   CALCIUM 7.9* 8.1*   PROT 4.9* 5.3*   ALBUMIN 1.3* 1.4*   BILITOT 0.7 0.7   ALKPHOS 295* 335*   AST 59* 66*   ALT 7* 6*   ANIONGAP 9 10   EGFRNONAA >60.0 >60.0     All pertinent labs within the past 24 hours have been reviewed.    Significant Imaging: I have reviewed all pertinent imaging results/findings.    Assessment/Plan:     Bony metastasis    58 y.o. female POD8 s/p L CMN, POD3 s/p R CMN    Pain control  PT/OT: WBAT  DVT PPx: lovenox, FCDs at all times when not ambulating  Abx: postop Ancef completed  Drain: none  Ponce: continued per primary, recommend discontinuation as soon as possible.   MRI brain pending per primary    Dispo: per primary              Aayush Gifford MD  Orthopedics  [facility]    Attg Note:  I agree with the above assessment and plan.  Slow to mobilize with PT.  Pain issues.  Rad onc plans for future radiation treatment " of femora.      Jd Cabrera MD

## 2018-05-03 NOTE — PROGRESS NOTES
"[facility]  Palliative Medicine  Progress Note    Patient Name: Kerline Grossman  MRN: [id number]  Admission Date: 4/20/2018  Hospital Length of Stay: 12 days  Code Status: Full Code   Attending Provider: David Cruz MD  Consulting Provider: Debo Rosario MD  Primary Care Physician: Primary Doctor No  Principal Problem:Metastatic breast cancer    Patient information was obtained from past medical records.      Assessment/Plan:     Palliative care encounter    05/03/2018 Awaiting oncology discussion about treatment options. She has widely metastatic disease. Her discharge options are limited related to her insurance (Medicaid) Will continue to follow.  05/02/2018 Patient is comfortable. Anxious about having MRI-as they are transporting her now. Will follow. Family meeting when necessary data is obtained to have a realistic plan of care developed. I am happy to facilitate.  05/01/2018 Patient is comfortable with current epidural catheter by anesthesia. She needs pressure reduction strategy for pressure injury prevention. Discussed with nursing. Will change to oral pain meds when epidural catheter removal is contemplated.  Family is awaiting oncology recommendations for further "options". Not interested in discussing plans until then. If unable to ambulate options are different. Will continue to explore with family. Encourage family meeting to understand how we can manage her illness. I am happy to facilitate with primary team and oncology and orthopedics.            I will follow-up with patient. Please contact us if you have any additional questions.    Subjective:     Chief Complaint:   Chief Complaint   Patient presents with    Generalized Body Aches     Pt reports having all over body pain. Pt reports having cancer "all over". Pt reports care at [facility], had fluid taken off lungs this AM. O2 in triage 98%, denies SOB.       HPI:   57 yo woman with medical history significant for chronic " mood disorder who presented to OSH with ZACH, hypercalcemia, and lytic lesions concerning for MM.  Workup for MM negative thus far.  Her family wanted to transfer her to [facility], but it was denied, so the family had her discharged and transported her to [facility] themselves.  She arrived hypotensive, which may have been 2/2 opiates (received morphine at OSH).  Hypotension resolved with IVF and only required brief stay under ICU care before being transferred to the floor.    Palliative care consulted for goals of care.  See clinical review 4/23/18 with Dr. Rosario regarding preliminary pathology findings.     She states she is in pain, 8/10, primarily in her back.  When she takes pain medications she says she falls asleep only for 15-20 minutes before waking up with pain 8/10.  Family at bedside confirms she does this throughout the day even without pain medications.  She reports pain level remains at an 8 despite taking meds.    Last filled prescriptions [facility] on [facility] [address] and [facility] 3/2017  paliperidone ER 6mg tabs (2 tabs qAM)  oxcarbazepime 300mg 2 tabs bid  Levothyroxine 25mcg qday  Benztropine 1mg bid  Trazodone 100mg 2 tabs qhs    Hospital Course:  No notes on file    No new subjective & objective note has been filed under this hospital service since the last note was generated.      Will continue to follow for goals of care,  symptom assessment, coordination of care and emotional support.    Debo Rosario MD  Palliative Medicine  [facility]

## 2018-05-03 NOTE — SUBJECTIVE & OBJECTIVE
"Principal Problem:Metastatic breast cancer    Principal Orthopedic Problem: B femur impending fractures s/p B CMN    Interval History: Patient seen and examined at bedside.  No acute events overnight. Pain stable. MRI brain per primary pending.    Review of patient's allergies indicates:  No Known Allergies    Current Facility-Administered Medications   Medication    0.9%  NaCl infusion (for blood administration)    acetaminophen tablet 1,000 mg    bisacodyl suppository 10 mg    dextrose 50% injection 12.5 g    dextrose 50% injection 25 g    enoxaparin injection 40 mg    glucagon (human recombinant) injection 1 mg    insulin aspart U-100 pen 1-10 Units    lidocaine 5 % patch 1 patch    magnesium oxide tablet 800 mg    omnipaque oral solution 15 mL    ondansetron disintegrating tablet 8 mg    oxyCODONE immediate release tablet 5 mg    polyethylene glycol packet 17 g    pregabalin capsule 150 mg    promethazine (PHENERGAN) 6.25 mg in dextrose 5 % 50 mL IVPB    promethazine tablet 25 mg    ramelteon tablet 8 mg    ramelteon tablet 8 mg    ropivacaine (PF) 2 mg/ml (0.2%) infusion    senna-docusate 8.6-50 mg per tablet 2 tablet    sodium chloride 0.9% flush 3 mL    sodium chloride 0.9% flush 3 mL    sodium chloride 0.9% flush 3 mL    sodium chloride 0.9% flush 5 mL    tiZANidine tablet 4 mg    white petrolatum 41 % ointment     Objective:     Vital Signs (Most Recent):  Temp: 97.7 °F (36.5 °C) (05/03/18 0400)  Pulse: (!) 119 (05/03/18 0400)  Resp: 18 (05/03/18 0400)  BP: (!) 107/56 (05/03/18 0400)  SpO2: 98 % (05/03/18 0400) Vital Signs (24h Range):  Temp:  [95.4 °F (35.2 °C)-97.8 °F (36.6 °C)] 97.7 °F (36.5 °C)  Pulse:  [110-131] 119  Resp:  [16-18] 18  SpO2:  [93 %-98 %] 98 %  BP: ()/(56-77) 107/56     Weight: 74 kg (163 lb 2.3 oz)  Height: 5' 6" (167.6 cm)  Body mass index is 26.33 kg/m².      Intake/Output Summary (Last 24 hours) at 05/03/18 2694  Last data filed at 05/03/18 4476   " Gross per 24 hour   Intake                0 ml   Output              500 ml   Net             -500 ml       Ortho/SPM Exam    AAOx4  NAD  Regular rhythm, tachycardic    BLE:  L thigh dressings with stable, dry drainage  R thigh dressing minimal drainage on dressings  SILT and motor intact T/SP/DP  WWP extremities  FCDs in place and functioning    Significant Labs:   CBC:     Recent Labs  Lab 05/01/18  1302 05/02/18  0435   WBC 13.63* 15.15*   HGB 9.3* 9.7*   HCT 30.0* 31.3*   * 135*     CMP:     Recent Labs  Lab 05/01/18  1302 05/02/18  0435    140   K 3.8 3.9    111*   CO2 18* 19*   GLU 76 48*   BUN 14 16   CREATININE 0.9 0.9   CALCIUM 7.9* 8.1*   PROT 4.9* 5.3*   ALBUMIN 1.3* 1.4*   BILITOT 0.7 0.7   ALKPHOS 295* 335*   AST 59* 66*   ALT 7* 6*   ANIONGAP 9 10   EGFRNONAA >60.0 >60.0     All pertinent labs within the past 24 hours have been reviewed.    Significant Imaging: I have reviewed all pertinent imaging results/findings.

## 2018-05-03 NOTE — PROGRESS NOTES
Pt tolerated MRI well moved to bed and placed back to Tele Monitor / transport arrive to take pt back to room

## 2018-05-04 PROBLEM — C79.49 MENINGEAL CARCINOMATOSIS: Status: ACTIVE | Noted: 2018-05-04

## 2018-05-04 PROBLEM — C80.1 MENINGEAL CARCINOMATOSIS: Status: ACTIVE | Noted: 2018-05-04

## 2018-05-04 LAB
ALBUMIN SERPL BCP-MCNC: 1.3 G/DL
ALP SERPL-CCNC: 317 U/L
ALT SERPL W/O P-5'-P-CCNC: 6 U/L
ANION GAP SERPL CALC-SCNC: 7 MMOL/L
ANISOCYTOSIS BLD QL SMEAR: SLIGHT
AST SERPL-CCNC: 54 U/L
BASOPHILS NFR BLD: 0 %
BILIRUB SERPL-MCNC: 0.5 MG/DL
BUN SERPL-MCNC: 20 MG/DL
BURR CELLS BLD QL SMEAR: ABNORMAL
CALCIUM SERPL-MCNC: 8 MG/DL
CHLORIDE SERPL-SCNC: 109 MMOL/L
CO2 SERPL-SCNC: 22 MMOL/L
CREAT SERPL-MCNC: 0.8 MG/DL
DIFFERENTIAL METHOD: ABNORMAL
EOSINOPHIL NFR BLD: 0 %
ERYTHROCYTE [DISTWIDTH] IN BLOOD BY AUTOMATED COUNT: 18.7 %
EST. GFR  (AFRICAN AMERICAN): >60 ML/MIN/1.73 M^2
EST. GFR  (NON AFRICAN AMERICAN): >60 ML/MIN/1.73 M^2
GLUCOSE SERPL-MCNC: 69 MG/DL
HCT VFR BLD AUTO: 28.3 %
HGB BLD-MCNC: 8.8 G/DL
HYPOCHROMIA BLD QL SMEAR: ABNORMAL
IMM GRANULOCYTES # BLD AUTO: ABNORMAL K/UL
IMM GRANULOCYTES NFR BLD AUTO: ABNORMAL %
LYMPHOCYTES NFR BLD: 19 %
MAGNESIUM SERPL-MCNC: 1.5 MG/DL
MCH RBC QN AUTO: 28.2 PG
MCHC RBC AUTO-ENTMCNC: 31.1 G/DL
MCV RBC AUTO: 91 FL
MONOCYTES NFR BLD: 11 %
MYELOCYTES NFR BLD MANUAL: 2 %
NEUTROPHILS NFR BLD: 66 %
NEUTS BAND NFR BLD MANUAL: 2 %
NRBC BLD-RTO: 2 /100 WBC
OVALOCYTES BLD QL SMEAR: ABNORMAL
PHOSPHATE SERPL-MCNC: 3.1 MG/DL
PLATELET # BLD AUTO: 136 K/UL
PLATELET BLD QL SMEAR: ABNORMAL
PMV BLD AUTO: 12.5 FL
POCT GLUCOSE: 125 MG/DL (ref 70–110)
POCT GLUCOSE: 99 MG/DL (ref 70–110)
POIKILOCYTOSIS BLD QL SMEAR: SLIGHT
POLYCHROMASIA BLD QL SMEAR: ABNORMAL
POTASSIUM SERPL-SCNC: 3.8 MMOL/L
PROT SERPL-MCNC: 5 G/DL
RBC # BLD AUTO: 3.12 M/UL
SCHISTOCYTES BLD QL SMEAR: PRESENT
SODIUM SERPL-SCNC: 138 MMOL/L
WBC # BLD AUTO: 13.83 K/UL

## 2018-05-04 PROCEDURE — 99232 SBSQ HOSP IP/OBS MODERATE 35: CPT | Mod: ,,, | Performed by: HOSPITALIST

## 2018-05-04 PROCEDURE — 80053 COMPREHEN METABOLIC PANEL: CPT

## 2018-05-04 PROCEDURE — 11000001 HC ACUTE MED/SURG PRIVATE ROOM

## 2018-05-04 PROCEDURE — 84100 ASSAY OF PHOSPHORUS: CPT

## 2018-05-04 PROCEDURE — 25000003 PHARM REV CODE 250: Performed by: HOSPITALIST

## 2018-05-04 PROCEDURE — 25000003 PHARM REV CODE 250: Performed by: ANESTHESIOLOGY

## 2018-05-04 PROCEDURE — 99231 SBSQ HOSP IP/OBS SF/LOW 25: CPT | Mod: ,,, | Performed by: INTERNAL MEDICINE

## 2018-05-04 PROCEDURE — 99233 SBSQ HOSP IP/OBS HIGH 50: CPT | Mod: ,,, | Performed by: INTERNAL MEDICINE

## 2018-05-04 PROCEDURE — 36415 COLL VENOUS BLD VENIPUNCTURE: CPT

## 2018-05-04 PROCEDURE — 63600175 PHARM REV CODE 636 W HCPCS: Performed by: STUDENT IN AN ORGANIZED HEALTH CARE EDUCATION/TRAINING PROGRAM

## 2018-05-04 PROCEDURE — 63600175 PHARM REV CODE 636 W HCPCS: Performed by: HOSPITALIST

## 2018-05-04 PROCEDURE — 25000003 PHARM REV CODE 250: Performed by: STUDENT IN AN ORGANIZED HEALTH CARE EDUCATION/TRAINING PROGRAM

## 2018-05-04 PROCEDURE — 83735 ASSAY OF MAGNESIUM: CPT

## 2018-05-04 RX ORDER — MAGNESIUM SULFATE HEPTAHYDRATE 40 MG/ML
2 INJECTION, SOLUTION INTRAVENOUS ONCE
Status: COMPLETED | OUTPATIENT
Start: 2018-05-04 | End: 2018-05-04

## 2018-05-04 RX ADMIN — MAGNESIUM SULFATE IN WATER 2 G: 40 INJECTION, SOLUTION INTRAVENOUS at 09:05

## 2018-05-04 RX ADMIN — MAGNESIUM OXIDE TAB 400 MG (241.3 MG ELEMENTAL MG) 800 MG: 400 (241.3 MG) TAB at 09:05

## 2018-05-04 RX ADMIN — RAMELTEON 8 MG: 8 TABLET, FILM COATED ORAL at 09:05

## 2018-05-04 RX ADMIN — SODIUM BICARBONATE 650 MG TABLET 650 MG: at 09:05

## 2018-05-04 RX ADMIN — OXYCODONE HYDROCHLORIDE 5 MG: 5 TABLET ORAL at 09:05

## 2018-05-04 RX ADMIN — OXYCODONE HYDROCHLORIDE 5 MG: 5 TABLET ORAL at 04:05

## 2018-05-04 RX ADMIN — OXYCODONE HYDROCHLORIDE 5 MG: 5 TABLET ORAL at 03:05

## 2018-05-04 RX ADMIN — PREGABALIN 150 MG: 50 CAPSULE ORAL at 09:05

## 2018-05-04 RX ADMIN — ACETAMINOPHEN 1000 MG: 500 TABLET, FILM COATED ORAL at 12:05

## 2018-05-04 RX ADMIN — ENOXAPARIN SODIUM 40 MG: 100 INJECTION SUBCUTANEOUS at 04:05

## 2018-05-04 RX ADMIN — STANDARDIZED SENNA CONCENTRATE AND DOCUSATE SODIUM 2 TABLET: 8.6; 5 TABLET, FILM COATED ORAL at 09:05

## 2018-05-04 RX ADMIN — LIDOCAINE 1 PATCH: 50 PATCH CUTANEOUS at 05:05

## 2018-05-04 RX ADMIN — ACETAMINOPHEN 1000 MG: 500 TABLET, FILM COATED ORAL at 05:05

## 2018-05-04 NOTE — PLAN OF CARE
Problem: Patient Care Overview  Goal: Plan of Care Review  Outcome: Ongoing (interventions implemented as appropriate)  Patient resting in bed comfortably. IV intact with no signs of irritation. Fall precautions maintained with no falls noted. Call light in reach bed locked and in lowest position. Non-skid socks on while out of bed. Patient instructed to call for assistance. Skin integrity maintained as patient is assisted with frequent positioning. C/o pain managed with prn meds. No other complaints or concerns. Progressing towards goals. Will continue to monitor and follow plan of care.

## 2018-05-04 NOTE — PT/OT/SLP PROGRESS
"Occupational Therapy      Patient Name:  Kerline Grossman   MRN:  [id number]    Patient not seen today secondary to Other (Comment) (Pt refused 2* "I'm having a really bad day".). Will follow-up tomorrow.    JUSTIN De Souza  5/4/2018  "

## 2018-05-04 NOTE — PROGRESS NOTES
Hospital Medicine  Progress note     Team: [facility] HOSP MED B Dr. Cruz  Admit Date: 4/20/2018  VIGNESH 5/8/2018  Code status: Full Code     Principal Problem:  Metastatic breast cancer      Interval hx: 5/4: Mg 1.5-replaced. 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 Recommend focusing on quality of life and comfort measures. Family discussion with oncology to occur. Given RUE weakness, MRI brachial plexus tomorrow.      5/3: MRI Brain w w/o contrast today. Bicarb 16-replaced. Remove rocha. Discussed working on increasing ambulation now that rocha is removed. Transition to oral pain medication: oxycodone 5mg q4hrs. 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 Diffuse osseous metastatic disease involving the calvarium, clivus, and visualized upper cervical spine, noting posterior bulging at the body of C2.     5/2:  Blood cultures NGTD, lactate 2.2, troponin 0.015, UA 1+ occult blood and trace leukocytes with few yeast and occasional bacteria. WBC increased to 15. Started on soft diet. Mg 1.5-replaced. Dietary Consult: Please order speech consult. Continue regular diet, texture per SLP. Continue Boost Plus TID. Speech Consult: Clinical evaluation of swallow complete. Recommend dental soft diet with thin liquids. Given pt with scattered dentition, she appears to have met max potential with SLP services at this time. Tapered to Room Air. MRI w and w/o contrast ordered. s/p radiation oncology evaluation: radiation treatment of each of the mailed femurs is recommended. 10 fractions of 3Gy each as outpatient.     5/1: Mg 1.5-replaced. Oncology consult: outside path suggestive of breast primary with moderately differentiated  adenocarcinoma ER/RI+, HER 2-. Will f/u on path to confirm. Consider MRI brain w/ w/o contrast once medically stable.  Radiation Oncology consulted. Family wants to talk about options with Oncology and Radiation Oncology before finalizing any palliative care plans. 5L NC-taper. Systolic BP 70s this AM, up to 113 with IVF bolus. Chest Xray -Small lung volumes and bibasal patchy subsegmental opacities persist, similar to recent studies.  No new disease identified , NS bolus, Blood cultures, Lactic Acid, UA, CBC/CMP, Troponin ordered. Decreased prn oxycodone to 5mg q4hrs.      ROS   Has not been able to stand  Pain Scale:  8/10, low back, legs, headache  Respiratory: no cough or shortness of breath  Cardiovascular: no chest pain or palpitations  Gastrointestinal: no nausea or vomiting, no abdominal pain or change in bowel habits  Behavioral/Psych: no depression or anxiety        PEx  Temp:  [97.1 °F (36.2 °C)-98.4 °F (36.9 °C)]   Pulse:  [108-130]   Resp:  [18-20]   BP: ()/(55-63)   SpO2:  [93 %-98 %]      Intake/Output Summary (Last 24 hours) at 05/04/18 1320  Last data filed at 05/04/18 0800    Gross per 24 hour   Intake              600 ml   Output             1400 ml   Net             -800 ml         General Appearance: no acute distress   Heart: regular rate and rhythm  Respiratory: Normal respiratory effort, no crackles Room Air  Abdomen: Soft, non-tender; bowel sounds active  Skin: intact. IV sites ok  Neurologic:  No focal numbness or weakness. Pupils normal.  Mental status: Alert, oriented x 2, affect appropriate   Extremities: Limited motion BLE 2/2 pain, dressings from orthopedic surgery in place. Weakness right arm:  ok, but overall 2/5 power RUE. Difficulty lifting RUE off bed.      Recent Labs  Lab 05/02/18  0435 05/03/18  0609 05/04/18  0554   WBC 15.15* 14.67* 13.83*   HGB 9.7* 10.0* 8.8*   HCT 31.3* 32.4* 28.3*   * 116* 136*         Recent Labs  Lab 05/02/18  0435 05/03/18  0609  05/04/18  0553    137 138   K 3.9 4.1 3.8   * 110 109   CO2 19* 16* 22*   BUN 16 18 20   CREATININE 0.9 0.8 0.8   GLU 48* 52* 69*   CALCIUM 8.1* 8.0* 8.0*   MG 1.5* 1.6 1.5*   PHOS 2.8 2.9 3.1         Recent Labs  Lab 05/02/18  0435 05/03/18  0609 05/04/18  0553   ALKPHOS 335* 333* 317*   ALT 6* <5* 6*   AST 66* 57* 54*   ALBUMIN 1.4* 1.4* 1.3*   PROT 5.3* 5.2* 5.0*   BILITOT 0.7 0.5 0.5         Recent Labs  Lab 05/02/18  1849 05/02/18  1923 05/03/18  0911 05/03/18  1736 05/04/18  0145 05/04/18  1312   POCTGLUCOSE 64* 106 73 96 99 125*      No results for input(s): CPK, CPKMB, MB, TROPONINI in the last 72 hours.     Scheduled Meds:   acetaminophen  1,000 mg Oral Q6H    enoxaparin  40 mg Subcutaneous Daily    lidocaine  1 patch Transdermal Q24H    magnesium oxide  800 mg Oral Daily    polyethylene glycol  17 g Oral Daily    pregabalin  150 mg Oral QHS    ramelteon  8 mg Oral QHS    senna-docusate 8.6-50 mg  2 tablet Oral Daily    sodium bicarbonate  1 tablet Oral BID      Continuous Infusions:  As Needed:  sodium chloride, bisacodyl, dextrose 50%, dextrose 50%, glucagon (human recombinant), insulin aspart U-100, omnipaque, ondansetron, oxyCODONE, promethazine (PHENERGAN) IVPB, promethazine, ramelteon, sodium chloride 0.9%, sodium chloride 0.9%, sodium chloride 0.9%, sodium chloride 0.9%, tiZANidine, white petrolatum            Active Hospital Problems     Diagnosis   POA    *Metastatic breast cancer [C50.919]   Yes    Weakness of right upper extremity [R29.898]   Yes    Leukocytosis [D72.829]   Yes    Malnutrition of moderate degree [E44.0]   Yes    Malignant neoplasm of central portion of right breast in female, estrogen receptor positive [C50.111, Z17.0]   Not Applicable    Cardiac rhythm disorder or disturbance or change [I49.9]   Yes    CHF (congestive heart failure) [I50.9]   Yes    Right heart failure [I50.810]   Yes    Acute hypoxemic respiratory failure [J96.01]   Yes     Tachycardia [R00.0]   Yes    Lactic acidemia [E87.2]   Yes    Severe malnutrition [E43]   Yes    Cancer of left femur [C40.22]   Yes    Cancer of right femur [C40.21]   Yes    Palliative care encounter [Z51.5]   Not Applicable    Pain due to malignant neoplasm metastatic to bone [G89.3, C79.51]   Yes    Anemia [D64.9]   Yes    Thrombocytopenia [D69.6]   Yes    Hypomagnesemia [E83.42]   Yes    Bony metastasis [C79.51]   Yes       Xray metastatic survey shows lytic lesions of skull, lytic spine and rib lesions with pathologic fractures. Diffuse lytic lesions throughout entire central axial red marrow skeleton. Lytic lesions of proximal long bones and pathological fractures of ribs and right superior and inferior pubic ramus.       Pleural effusion [J90]   Yes       Resolved Hospital Problems     Diagnosis Date Resolved POA    Shock [R57.9] 04/26/2018 Yes    Encounter for weaning from ventilator [Z99.11] 04/30/2018 Not Applicable    Encephalopathy, metabolic [G93.41] 04/21/2018 Yes         Overview  Mrs. Grossman is a 59 yo female who was brought to [place] ED on 4/20/2018 for second opinion regarding recently diagnosed likely metastatic malignancy with unknown primary.      Assessment and Plan for Problems addressed today:     Metastatic breast cancer  Mets to bone  - oncology following, pt did not want to talk this afternoon after arriving to the floor from surgery, discussed with Dr. Fink (onc) he will see her tomorrow  - she may be a candidate for some oral therapy  - radiation oncology consult placed  - pain control (BP is sensitive to narcotics)  - palliative care saw her while she was in ICU, would continue discussions. Family wants to talk about options with Oncology and Radiation Oncology before finalizing any palliative care plans.   - 5/1 Oncology recs: outside path suggestive of breast primary with moderately differentiated adenocarcinoma ER/GA+, HER 2-. Will f/u on path to confirm. Consider MRI  brain w/ w/o contrast once medically stable.   - s/p radiation oncology evaluation: radiation treatment of each of the mailed femurs is recommended. 10 fractions of 3Gy each as outpatient   - 5/2: Right upper extremity weakness-MRI Brain w w/o contrast ordered.         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 Diffuse osseous metastatic disease involving the calvarium, clivus, and visualized upper cervical spine, noting posterior bulging at the body of C2. Transitioned to oral pain medications: oxycodone 5mg PRN q4hrs.      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 Recommend focusing on quality of life and comfort measures. MRI brachial plexus tomorrow for RUE weakness.      Pathologic fracture of ribs pubic ramus  - s/p IM nailing femur x2     Hypoxemic respiratory failure  - pulmonary edema from IVF  - indeterminate diastolic function on 4/27 echo, PAP 53mmHg  - currently on 4L NC since arriving to floor from PACU  - 5/1: increased to 5L NC.   - 5/2: tapered to room air.      Hypotension -likely orthostasis   -5/1: Systolic BP 70s this AM, up to 113 with IVF bolus. Decreased prn oxycodone to 5mg q4hrs. NS bolus, Blood cultures, Lactic Acid, UA, CBC/CMP, Troponin ordered. Chest Xray: Small lung volumes and bibasal patchy subsegmental opacities persist, similar to recent studies.  No new disease identified   -5/2: Blood cultures NGTD, lactate 2.2, troponin 0.015, UA 1+ occult blood and trace leukocytes with few yeast and occasional bacteria. WBC increased to 15.     Non-Anion Gap Metabolic Acidosis  -5/3: bicarb 16-replaced  -5/4: bicarb 22     Lactic acidosis  - 2.7 overnight, responded to IVF  -  does not appear infected  - again elevated to 2.4 after procedure, will trend  - 4/30: 2.6, monitor     Urinary incontinence   - rocha in place, consider switch to purwick when pt can tolerate  - 5/3: remove rocha     Pleural effusion  - likely metastatic, s/p sampling at OSH  - associated compressive atelectasis bilaterally  - multiple pulmonary micronodules     Anemia of Chronic Disease  - likely chronic disease with some blood loss during procedures  - iron studies pending: Iron 50 (wnl), TIBC 160 (low), Saturation 31 (wnl), Transferrin 108 (low)  - monitor with CBC daily  - 5/2: Hgb 9.7  - 5/4: Hgb 8.8     Hypomagnesemia  -most likely 2/2 chronic disease  -4/22: 1.4-replaced  -5/1: 1.5-replaced  -5/2: 1.5-replaced  -5/4: 1.5-replaced     Hypophosphatemia   -4/22: 2.5 - replaced  - Most likely 2/2 chronic disease     Thrombocytopenia  -4/22: 122--> 165 resolved   -4/30: 115-->135 (5/2)  -Most likely 2/2 chronic disease     Congestive Heart Failure  Right Heart Failure  -4/27 Echo: Right Ventricular Enlargement with normal systolic function  -5/1:      Malnutrition     -Prealbumin of 4 . Dietary consulted: Please order speech consult. Continue regular diet, texture per SLP. Continue Boost Plus TID.     -Speech Consult: Clinical evaluation of swallow complete. Recommend dental soft diet with thin liquids. Given pt with scattered dentition, she appears to have met max potential with SLP services at this time.     DVT PPx: Lovenox     Discharge plan and follow up  Pending family discussion     Provider     I personally scribed for David Cruz MD on 05/04/2018 at 1:20 PM. Electronically signed by kera Lemus on 05/04/2018 at 1:20 PM.  The documentation recorded by the scribe accurately reflects service I personally performed and the decisions made by me.  David Cruz MD  Attending Staff Physician  [facility]  pager- [phone]  [employer] - [phone]

## 2018-05-04 NOTE — ASSESSMENT & PLAN NOTE
58 y.o. female POD9 s/p L CMN, POD4 s/p R CMN    Pain control: reinforce with patient appropriate use of medications and it is okay to request pain medications  PT/OT: WBAT  DVT PPx: lovenox, FCDs at all times when not ambulating  Abx: postop Ancef completed  Drain: none  Ponce: continued per primary, recommend discontinuation as soon as possible.     Dispo: per primary

## 2018-05-04 NOTE — PROGRESS NOTES
[facility]  Orthopedics  Progress Note    Patient Name: Kerline Grossman  MRN: [id number]  Admission Date: 4/20/2018  Hospital Length of Stay: 13 days  Attending Provider: David Cruz MD  Primary Care Provider: Primary Doctor No  Follow-up For: Procedure(s) (LRB):  IM NAILING OF FEMUR - hana table - large c arm door side (Right)    Post-Operative Day: 4 Days Post-Op  Subjective:     Principal Problem:Metastatic breast cancer    Principal Orthopedic Problem: B femur impending fractures s/p B CMN    Interval History: Patient seen and examined at bedside.  No acute events overnight. Pain stable. 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 Poor prognosis per heme onc of about 2.5 months. Pain regimen reviewed with anesthesia pain management, who state patient has been declining pain medications and multimodal regimen despite her reported pain. Has declined some of scheduled acetaminophen. Anesthesia spent about 30 minutes discussing plan and educating patient about proper use of medications yesterday, particularly the use of opioid medications.     Review of patient's allergies indicates:  No Known Allergies    Current Facility-Administered Medications   Medication    0.9%  NaCl infusion (for blood administration)    acetaminophen tablet 1,000 mg    bisacodyl suppository 10 mg    dextrose 50% injection 12.5 g    dextrose 50% injection 25 g    enoxaparin injection 40 mg    glucagon (human recombinant) injection 1 mg    insulin aspart U-100 pen 1-10 Units    lidocaine 5 % patch 1 patch    magnesium oxide tablet 800 mg    omnipaque oral solution 15 mL    ondansetron disintegrating tablet 8 mg    oxyCODONE immediate release tablet 5 mg    polyethylene glycol packet 17 g    pregabalin capsule 150 mg    promethazine (PHENERGAN) 6.25 mg in dextrose 5 % 50 mL IVPB    promethazine tablet 25 mg    ramelteon tablet 8 mg    ramelteon tablet 8 mg     "senna-docusate 8.6-50 mg per tablet 2 tablet    sodium bicarbonate tablet 650 mg    sodium chloride 0.9% flush 3 mL    sodium chloride 0.9% flush 3 mL    sodium chloride 0.9% flush 3 mL    sodium chloride 0.9% flush 5 mL    tiZANidine tablet 4 mg    white petrolatum 41 % ointment     Objective:     Vital Signs (Most Recent):  Temp: 97.8 °F (36.6 °C) (05/03/18 2232)  Pulse: (!) 119 (05/03/18 2232)  Resp: 18 (05/03/18 2232)  BP: 104/63 (05/03/18 2232)  SpO2: 98 % (05/03/18 2232) Vital Signs (24h Range):  Temp:  [97.1 °F (36.2 °C)-98.1 °F (36.7 °C)] 97.8 °F (36.6 °C)  Pulse:  [] 119  Resp:  [16-20] 18  SpO2:  [93 %-99 %] 98 %  BP: (101-104)/(57-63) 104/63     Weight: 74 kg (163 lb 2.3 oz)  Height: 5' 6" (167.6 cm)  Body mass index is 26.33 kg/m².      Intake/Output Summary (Last 24 hours) at 05/04/18 0536  Last data filed at 05/03/18 0700   Gross per 24 hour   Intake                0 ml   Output              400 ml   Net             -400 ml       Ortho/SPM Exam    AAOx4  NAD  Regular rhythm, tachycardic    BLE:  L thigh dressings with stable, dry drainage  R thigh dressing minimal drainage on dressings  SILT and motor intact T/SP/DP  WWP extremities  FCDs in place and functioning    Significant Labs:   CBC:     Recent Labs  Lab 05/03/18  0609   WBC 14.67*   HGB 10.0*   HCT 32.4*   *     CMP:     Recent Labs  Lab 05/03/18  0609      K 4.1      CO2 16*   GLU 52*   BUN 18   CREATININE 0.8   CALCIUM 8.0*   PROT 5.2*   ALBUMIN 1.4*   BILITOT 0.5   ALKPHOS 333*   AST 57*   ALT <5*   ANIONGAP 11   EGFRNONAA >60.0     All pertinent labs within the past 24 hours have been reviewed.    Significant Imaging: I have reviewed all pertinent imaging results/findings.    Assessment/Plan:     Bony metastasis    58 y.o. female POD9 s/p L CMN, POD4 s/p R CMN    Pain control: reinforce with patient appropriate use of medications and it is okay to request pain medications  PT/OT: WBAT  DVT PPx: lovenox, FCDs " at all times when not ambulating  Abx: postop Ancef completed  Drain: none  Ponce: continued per primary, recommend discontinuation as soon as possible.     Dispo: per primary              Aayush Gifford MD  Orthopedics  [facility]    Attg Note:  I agree with the above assessment and plan.  Declined PT yesterday.  Has also declined some of the multimodal pain management.  Continue to encourage mobilization.  Some mets to brain on MRI.    Jd Cabrera MD

## 2018-05-04 NOTE — ASSESSMENT & PLAN NOTE
"05/04/2018 Discussed with Dr. Fink. Patient has been informed of her prognosis. A comfort plan of care is recommended. Kelly JESI and I went to patient room. Her son and daughter were both in the room looking at their phones. They said they did not want to talk. Patient said she was comfortable and also did not want to talk. Will follow up.   05/03/2018 Awaiting oncology discussion about treatment options. She has widely metastatic disease. Her discharge options are limited related to her insurance (Medicaid) Will continue to follow.  05/02/2018 Patient is comfortable. Anxious about having MRI-as they are transporting her now. Will follow. Family meeting when necessary data is obtained to have a realistic plan of care developed. I am happy to facilitate.  05/01/2018 Patient is comfortable with current epidural catheter by anesthesia. She needs pressure reduction strategy for pressure injury prevention. Discussed with nursing. Will change to oral pain meds when epidural catheter removal is contemplated.  Family is awaiting oncology recommendations for further "options". Not interested in discussing plans until then. If unable to ambulate options are different. Will continue to explore with family. Encourage family meeting to understand how we can manage her illness. I am happy to facilitate with primary team and oncology and orthopedics.  "

## 2018-05-04 NOTE — PROGRESS NOTES
[facility]  Hematology/Oncology  Progress Note    Patient Name: Kerline Grossman  Admission Date: 4/20/2018  Hospital Length of Stay: 13 days  Code Status: Full Code     Subjective:     Interval History:     - reports pain involving b/l lower extremities.   - no fever/chills.  - more conversant today.  - family at bedside.      Medications:  Continuous Infusions:    Scheduled Meds:   acetaminophen  1,000 mg Oral Q6H    enoxaparin  40 mg Subcutaneous Daily    lidocaine  1 patch Transdermal Q24H    magnesium oxide  800 mg Oral Daily    polyethylene glycol  17 g Oral Daily    pregabalin  150 mg Oral QHS    ramelteon  8 mg Oral QHS    senna-docusate 8.6-50 mg  2 tablet Oral Daily    sodium bicarbonate  1 tablet Oral BID     PRN Meds:sodium chloride, bisacodyl, dextrose 50%, dextrose 50%, glucagon (human recombinant), insulin aspart U-100, omnipaque, ondansetron, oxyCODONE, promethazine (PHENERGAN) IVPB, promethazine, ramelteon, sodium chloride 0.9%, sodium chloride 0.9%, sodium chloride 0.9%, sodium chloride 0.9%, tiZANidine, white petrolatum     Review of Systems  Objective:     Vital Signs (Most Recent):  Temp: 98.4 °F (36.9 °C) (05/04/18 1153)  Pulse: (!) 124 (05/04/18 1153)  Resp: 18 (05/04/18 1153)  BP: (!) 91/55 (05/04/18 1153)  SpO2: (!) 94 % (05/04/18 1153) Vital Signs (24h Range):  Temp:  [97.1 °F (36.2 °C)-98.4 °F (36.9 °C)] 98.4 °F (36.9 °C)  Pulse:  [108-130] 124  Resp:  [18-20] 18  SpO2:  [93 %-98 %] 94 %  BP: ()/(55-63) 91/55     Weight: 74 kg (163 lb 2.3 oz)  Body mass index is 26.33 kg/m².  Body surface area is 1.86 meters squared.      Intake/Output Summary (Last 24 hours) at 05/04/18 1406  Last data filed at 05/04/18 1357   Gross per 24 hour   Intake             1080 ml   Output             2000 ml   Net             -920 ml       Physical Exam  Constitutional: Non-conversant. No distress.   HENT:   Head: Normocephalic and atraumatic.   Mouth/Throat: No oropharyngeal  exudate.   Eyes: EOM are normal. Pupils are equal, round, and reactive to light. No scleral icterus.   Neck: Normal range of motion. Neck supple.  Cardiovascular: Regular rhythm and intact distal pulses.    Pulmonary/Chest: Effort normal and breath sounds normal. No respiratory distress.   Abdominal: Soft. Bowel sounds are normal. She exhibits no distension. There is no tenderness.   Musculoskeletal: She exhibits tenderness. She exhibits no edema.   Dressing intact bilaterally.       Significant Labs:   CBC:     Recent Labs  Lab 05/03/18  0609 05/04/18  0554   WBC 14.67* 13.83*   HGB 10.0* 8.8*   HCT 32.4* 28.3*   * 136*    and CMP:     Recent Labs  Lab 05/03/18  0609 05/04/18  0553    138   K 4.1 3.8    109   CO2 16* 22*   GLU 52* 69*   BUN 18 20   CREATININE 0.8 0.8   CALCIUM 8.0* 8.0*   PROT 5.2* 5.0*   ALBUMIN 1.4* 1.3*   BILITOT 0.5 0.5   ALKPHOS 333* 317*   AST 57* 54*   ALT <5* 6*   ANIONGAP 11 7*   EGFRNONAA >60.0 >60.0       Diagnostic Results:  I have reviewed all pertinent imaging results/findings within the past 24 hours.    Assessment/Plan:     Active Diagnoses:    Diagnosis Date Noted POA    PRINCIPAL PROBLEM:  Metastatic breast cancer [C50.919] 04/21/2018 Yes    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, C80.1] 05/04/2018 Yes    Weakness of right upper extremity [R29.898] 05/03/2018 Yes    Leukocytosis [D72.829] 05/03/2018 Yes    Malnutrition of moderate degree [E44.0] 05/01/2018 Yes    Malignant neoplasm of central portion of right breast in female, estrogen receptor positive [C50.111, Z17.0]  Not Applicable    Cardiac rhythm disorder or disturbance or change [I49.9]  Yes    CHF (congestive heart failure) [I50.9]  Yes    Right heart failure [I50.810] 04/26/2018 Yes    Acute hypoxemic respiratory failure [J96.01] 04/26/2018 Yes    Tachycardia [R00.0]  Yes    Lactic acidemia [E87.2]  Yes    Severe malnutrition [E43] 04/25/2018 Yes    Cancer of left femur [C40.22] 04/24/2018 Yes     Cancer of right femur [C40.21] 04/24/2018 Yes    Palliative care encounter [Z51.5] 04/23/2018 Not Applicable    Pain due to malignant neoplasm metastatic to bone [G89.3, C79.51] 04/23/2018 Yes    Anemia [D64.9] 04/22/2018 Yes    Thrombocytopenia [D69.6] 04/22/2018 Yes    Hypomagnesemia [E83.42] 04/22/2018 Yes    Bony metastasis [C79.51] 04/21/2018 Yes    Pleural effusion [J90] 04/21/2018 Yes      Problems Resolved During this Admission:    Diagnosis Date Noted Date Resolved POA    Shock [R57.9] 05/01/2018 04/26/2018 Yes    Encounter for weaning from ventilator [Z99.11]  04/30/2018 Not Applicable    Encephalopathy, metabolic [G93.41] 04/21/2018 04/21/2018 Yes     Leptomeningeal Carcinomatosis  Multiple Bony Metastasis  B/L Pathologic Fractures  Metastatic Breast Cancer     - biopsy obtained from left femur suggestive of metastatic carcinoma likely of breast origin with weakly positive ER/FL+. Findings similar to outside path.   - unfortunately, MRI is suggestive of Leptomeningeal Carcinomatosis. Her overall prognosis is extremely poor now averaging about 2.5 months.   - offering intrathecal chemotherapy or systemic hormonal therapy would not prolong her survival significantly.   - would focus on quality of life and comfort measures at this time.   - discussed with patient and family at length today of our concerns and recommendations.   - orthopedics and palliative care staff, Dr. Rosario, also notified of our conversation with family today.  - Likely patient and family will make decisions regarding next step in terms of care in the next 1-2 days. If she decides she wants to proceed towards comfort measures, then very likely she would benefit from inpatient hospice permitting her insurance.     - appreciate orthopedics and palliative care teams' assistance.      Thank you for your consult. I will follow-up with patient. Please contact us if you have any additional questions.     Babak Fink,  MD  Hematology/Oncology  [facility]    STAFF NOTE:  I have personally taken the history and examined this patient and agree with Dr. Fink's Note as stated above.

## 2018-05-04 NOTE — MEDICAL/APP STUDENT
Hospital Medicine  Progress note    Team: [facility] MED B Dr. Cruz  Admit Date: 4/20/2018  VIGNESH 5/8/2018  Code status: Full Code    Principal Problem:  Metastatic breast cancer     Interval hx: 5/4: Mg 1.5-replaced. 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 Recommend focusing on quality of life and comfort measures. Family discussion with oncology to occur. Given RUE weakness, MRI brachial plexus tomorrow.     5/3: MRI Brain w w/o contrast today. Bicarb 16-replaced. Remove rocha. Discussed working on increasing ambulation now that rocha is removed. Transition to oral pain medication: oxycodone 5mg q4hrs. 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 Diffuse osseous metastatic disease involving the calvarium, clivus, and visualized upper cervical spine, noting posterior bulging at the body of C2.    5/2:  Blood cultures NGTD, lactate 2.2, troponin 0.015, UA 1+ occult blood and trace leukocytes with few yeast and occasional bacteria. WBC increased to 15. Started on soft diet. Mg 1.5-replaced. Dietary Consult: Please order speech consult. Continue regular diet, texture per SLP. Continue Boost Plus TID. Speech Consult: Clinical evaluation of swallow complete. Recommend dental soft diet with thin liquids. Given pt with scattered dentition, she appears to have met max potential with SLP services at this time. Tapered to Room Air. MRI w and w/o contrast ordered. s/p radiation oncology evaluation: radiation treatment of each of the mailed femurs is recommended. 10 fractions of 3Gy each as outpatient.    5/1: Mg 1.5-replaced. Oncology consult: outside path suggestive of breast primary with moderately differentiated adenocarcinoma  ER/SD+, HER 2-. Will f/u on path to confirm. Consider MRI brain w/ w/o contrast once medically stable.  Radiation Oncology consulted. Family wants to talk about options with Oncology and Radiation Oncology before finalizing any palliative care plans. 5L NC-taper. Systolic BP 70s this AM, up to 113 with IVF bolus. Chest Xray -Small lung volumes and bibasal patchy subsegmental opacities persist, similar to recent studies.  No new disease identified , NS bolus, Blood cultures, Lactic Acid, UA, CBC/CMP, Troponin ordered. Decreased prn oxycodone to 5mg q4hrs.     ROS   Has not been able to stand  Pain Scale:  8/10, low back, legs, headache  Respiratory: no cough or shortness of breath  Cardiovascular: no chest pain or palpitations  Gastrointestinal: no nausea or vomiting, no abdominal pain or change in bowel habits  Behavioral/Psych: no depression or anxiety      PEx  Temp:  [97.1 °F (36.2 °C)-98.4 °F (36.9 °C)]   Pulse:  [108-130]   Resp:  [18-20]   BP: ()/(55-63)   SpO2:  [93 %-98 %]     Intake/Output Summary (Last 24 hours) at 05/04/18 1320  Last data filed at 05/04/18 0800   Gross per 24 hour   Intake              600 ml   Output             1400 ml   Net             -800 ml       General Appearance: no acute distress   Heart: regular rate and rhythm  Respiratory: Normal respiratory effort, no crackles Room Air  Abdomen: Soft, non-tender; bowel sounds active  Skin: intact. IV sites ok  Neurologic:  No focal numbness or weakness. Pupils normal.  Mental status: Alert, oriented x 2, affect appropriate   Extremities: Limited motion BLE 2/2 pain, dressings from orthopedic surgery in place. Weakness right arm:  ok, but overall 2/5 power RUE. Difficulty lifting RUE off bed.     Recent Labs  Lab 05/02/18  0435 05/03/18  0609 05/04/18  0554   WBC 15.15* 14.67* 13.83*   HGB 9.7* 10.0* 8.8*   HCT 31.3* 32.4* 28.3*   * 116* 136*       Recent Labs  Lab 05/02/18  0435 05/03/18  0609 05/04/18  0553    137 138    K 3.9 4.1 3.8   * 110 109   CO2 19* 16* 22*   BUN 16 18 20   CREATININE 0.9 0.8 0.8   GLU 48* 52* 69*   CALCIUM 8.1* 8.0* 8.0*   MG 1.5* 1.6 1.5*   PHOS 2.8 2.9 3.1       Recent Labs  Lab 05/02/18  0435 05/03/18  0609 05/04/18  0553   ALKPHOS 335* 333* 317*   ALT 6* <5* 6*   AST 66* 57* 54*   ALBUMIN 1.4* 1.4* 1.3*   PROT 5.3* 5.2* 5.0*   BILITOT 0.7 0.5 0.5        Recent Labs  Lab 05/02/18  1849 05/02/18  1923 05/03/18  0911 05/03/18  1736 05/04/18  0145 05/04/18  1312   POCTGLUCOSE 64* 106 73 96 99 125*     No results for input(s): CPK, CPKMB, MB, TROPONINI in the last 72 hours.    Scheduled Meds:   acetaminophen  1,000 mg Oral Q6H    enoxaparin  40 mg Subcutaneous Daily    lidocaine  1 patch Transdermal Q24H    magnesium oxide  800 mg Oral Daily    polyethylene glycol  17 g Oral Daily    pregabalin  150 mg Oral QHS    ramelteon  8 mg Oral QHS    senna-docusate 8.6-50 mg  2 tablet Oral Daily    sodium bicarbonate  1 tablet Oral BID     Continuous Infusions:    As Needed:  sodium chloride, bisacodyl, dextrose 50%, dextrose 50%, glucagon (human recombinant), insulin aspart U-100, omnipaque, ondansetron, oxyCODONE, promethazine (PHENERGAN) IVPB, promethazine, ramelteon, sodium chloride 0.9%, sodium chloride 0.9%, sodium chloride 0.9%, sodium chloride 0.9%, tiZANidine, white petrolatum    Active Hospital Problems    Diagnosis  POA    *Metastatic breast cancer [C50.919]  Yes    Weakness of right upper extremity [R29.898]  Yes    Leukocytosis [D72.829]  Yes    Malnutrition of moderate degree [E44.0]  Yes    Malignant neoplasm of central portion of right breast in female, estrogen receptor positive [C50.111, Z17.0]  Not Applicable    Cardiac rhythm disorder or disturbance or change [I49.9]  Yes    CHF (congestive heart failure) [I50.9]  Yes    Right heart failure [I50.810]  Yes    Acute hypoxemic respiratory failure [J96.01]  Yes    Tachycardia [R00.0]  Yes    Lactic acidemia [E87.2]  Yes     Severe malnutrition [E43]  Yes    Cancer of left femur [C40.22]  Yes    Cancer of right femur [C40.21]  Yes    Palliative care encounter [Z51.5]  Not Applicable    Pain due to malignant neoplasm metastatic to bone [G89.3, C79.51]  Yes    Anemia [D64.9]  Yes    Thrombocytopenia [D69.6]  Yes    Hypomagnesemia [E83.42]  Yes    Bony metastasis [C79.51]  Yes     Xray metastatic survey shows lytic lesions of skull, lytic spine and rib lesions with pathologic fractures. Diffuse lytic lesions throughout entire central axial red marrow skeleton. Lytic lesions of proximal long bones and pathological fractures of ribs and right superior and inferior pubic ramus.      Pleural effusion [J90]  Yes      Resolved Hospital Problems    Diagnosis Date Resolved POA    Shock [R57.9] 04/26/2018 Yes    Encounter for weaning from ventilator [Z99.11] 04/30/2018 Not Applicable    Encephalopathy, metabolic [G93.41] 04/21/2018 Yes       Overview  Mrs. Grossman is a 57 yo female who was brought to [facility] on 4/20/2018 for second opinion regarding recently diagnosed likely metastatic malignancy with unknown primary.     Assessment and Plan for Problems addressed today:    Metastatic breast cancer  Mets to bone  - oncology following, pt did not want to talk this afternoon after arriving to the floor from surgery, discussed with Dr. Fink (onc) he will see her tomorrow  - she may be a candidate for some oral therapy  - radiation oncology consult placed  - pain control (BP is sensitive to narcotics)  - palliative care saw her while she was in ICU, would continue discussions. Family wants to talk about options with Oncology and Radiation Oncology before finalizing any palliative care plans.   - 5/1 Oncology recs: outside path suggestive of breast primary with moderately differentiated adenocarcinoma ER/RI+, HER 2-. Will f/u on path to confirm. Consider MRI brain w/ w/o contrast once medically stable.   - s/p radiation oncology evaluation:  radiation treatment of each of the mailed femurs is recommended. 10 fractions of 3Gy each as outpatient   - 5/2: Right upper extremity weakness-MRI Brain w w/o contrast ordered.         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 Diffuse osseous metastatic disease involving the calvarium, clivus, and visualized upper cervical spine, noting posterior bulging at the body of C2. Transitioned to oral pain medications: oxycodone 5mg PRN q4hrs.      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 Recommend focusing on quality of life and comfort measures. MRI brachial plexus tomorrow for RUE weakness.      Pathologic fracture of ribs pubic ramus  - s/p IM nailing femur x2     Hypoxemic respiratory failure  - pulmonary edema from IVF  - indeterminate diastolic function on 4/27 echo, PAP 53mmHg  - currently on 4L NC since arriving to floor from PACU  - 5/1: increased to 5L NC.   - 5/2: tapered to room air.      Hypotension -likely orthostasis   -5/1: Systolic BP 70s this AM, up to 113 with IVF bolus. Decreased prn oxycodone to 5mg q4hrs. NS bolus, Blood cultures, Lactic Acid, UA, CBC/CMP, Troponin ordered. Chest Xray: Small lung volumes and bibasal patchy subsegmental opacities persist, similar to recent studies.  No new disease identified   -5/2: Blood cultures NGTD, lactate 2.2, troponin 0.015, UA 1+ occult blood and trace leukocytes with few yeast and occasional bacteria. WBC increased to 15.    Non-Anion Gap Metabolic Acidosis  -5/3: bicarb 16-replaced  -5/4: bicarb 22    Lactic acidosis  - 2.7 overnight, responded to IVF  - does not appear infected  - again elevated to 2.4 after procedure, will trend  - 4/30:  2.6, monitor     Urinary incontinence   - rocha in place, consider switch to purwick when pt can tolerate  - 5/3: remove rocha     Pleural effusion  - likely metastatic, s/p sampling at OSH  - associated compressive atelectasis bilaterally  - multiple pulmonary micronodules     Anemia of Chronic Disease  - likely chronic disease with some blood loss during procedures  - iron studies pending: Iron 50 (wnl), TIBC 160 (low), Saturation 31 (wnl), Transferrin 108 (low)  - monitor with CBC daily  - 5/2: Hgb 9.7  - 5/4: Hgb 8.8     Hypomagnesemia  -most likely 2/2 chronic disease  -4/22: 1.4-replaced  -5/1: 1.5-replaced  -5/2: 1.5-replaced  -5/4: 1.5-replaced     Hypophosphatemia   -4/22: 2.5 - replaced  - Most likely 2/2 chronic disease     Thrombocytopenia  -4/22: 122--> 165 resolved   -4/30: 115-->135 (5/2)  -Most likely 2/2 chronic disease     Congestive Heart Failure  Right Heart Failure  -4/27 Echo: Right Ventricular Enlargement with normal systolic function  -5/1:      Malnutrition     -Prealbumin of 4 . Dietary consulted: Please order speech consult. Continue regular diet, texture per SLP. Continue Boost Plus TID.     -Speech Consult: Clinical evaluation of swallow complete. Recommend dental soft diet with thin liquids. Given pt with scattered dentition, she appears to have met max potential with SLP services at this time.     DVT PPx: Lovenox    Discharge plan and follow up  Pending family discussion    Provider    I personally scribed for Dvaid Cruz MD on 05/04/2018 at 1:20 PM. Electronically signed by kera Lemus on 05/04/2018 at 1:20 PM.

## 2018-05-04 NOTE — PLAN OF CARE
"Palliative Care:    Made visit with [facility] MD Dr. Rosario this afternoon. Per chart and MD, Oncology discussed with pt and her children at bedside their concerns and recommendations.    Per pt and Son, pt not interested in talking about "it" at this time as she is still processing the information.     Emotional Support provided and informed pt that we will assist when ready.        Discharge Options if family chooses Comfort:    Pt has Medicaid- [facility].    1) Home with Hospice and Family Assistance  2) Hospice in Nursing Home.     3) Inpatient hospice will be difficult as pt only has 3 inpatient days per Medicaid.       Will follow up as appropriate.    Kelly Trotter LCSW, ACHP-SW    "

## 2018-05-04 NOTE — PT/OT/SLP PROGRESS
Physical Therapy      Patient Name:  Kerline Grossman   MRN:  [id number]    Patient not seen today secondary to refusal 2/2 having a bad day. Will follow-up tomorrow.    Gorge Huertas, PT  5/4/2018

## 2018-05-04 NOTE — PROGRESS NOTES
"[facility]  Palliative Medicine  Progress Note    Patient Name: Kerline Grossman  MRN: [id number]  Admission Date: 4/20/2018  Hospital Length of Stay: 13 days  Code Status: Full Code   Attending Provider: David Cruz MD  Consulting Provider: Debo Rosario MD  Primary Care Physician: Primary Doctor No  Principal Problem:Metastatic breast cancer    Patient information was obtained from patient, past medical records and ER records.      Assessment/Plan:     Palliative care encounter    05/04/2018 Discussed with Dr. Fink. Patient has been informed of her prognosis. A comfort plan of care is recommended. Kelly JESI and I went to patient room. Her son and daughter were both in the room looking at their phones. They said they did not want to talk. Patient said she was comfortable and also did not want to talk. Will follow up.   05/03/2018 Awaiting oncology discussion about treatment options. She has widely metastatic disease. Her discharge options are limited related to her insurance (Medicaid) Will continue to follow.  05/02/2018 Patient is comfortable. Anxious about having MRI-as they are transporting her now. Will follow. Family meeting when necessary data is obtained to have a realistic plan of care developed. I am happy to facilitate.  05/01/2018 Patient is comfortable with current epidural catheter by anesthesia. She needs pressure reduction strategy for pressure injury prevention. Discussed with nursing. Will change to oral pain meds when epidural catheter removal is contemplated.  Family is awaiting oncology recommendations for further "options". Not interested in discussing plans until then. If unable to ambulate options are different. Will continue to explore with family. Encourage family meeting to understand how we can manage her illness. I am happy to facilitate with primary team and oncology and orthopedics.            I will follow-up with patient. Please contact us if you have any " "additional questions.    Subjective:     Chief Complaint:   Chief Complaint   Patient presents with    Generalized Body Aches     Pt reports having all over body pain. Pt reports having cancer "all over". Pt reports care at [facility], had fluid taken off lungs this AM. O2 in triage 98%, denies SOB.       HPI:   57 yo woman with medical history significant for chronic mood disorder who presented to OSH with ZACH, hypercalcemia, and lytic lesions concerning for MM.  Workup for MM negative thus far.  Her [place] [facility], so the family had her discharged and [place] [facility] [place]  She arrived hypotensive, which may have been 2/2 opiates (received morphine at OSH).  Hypotension resolved with IVF and only required brief stay under ICU care before being transferred to the floor.    Palliative care consulted for goals of care.  See clinical review 4/23/18 with Dr. Rosario regarding preliminary pathology findings.     She states she is in pain, 8/10, primarily in her back.  When she takes pain medications she says she falls asleep only for 15-20 minutes before waking up with pain 8/10.  Family at bedside confirms she does this throughout the day even without pain medications.  She reports pain level remains at an 8 despite taking meds.    Last filled prescriptions [facility] on [address] and Decisyon 3/2017  paliperidone ER 6mg tabs (2 tabs qAM)  oxcarbazepime 300mg 2 tabs bid  Levothyroxine 25mcg qday  Benztropine 1mg bid  Trazodone 100mg 2 tabs qhs    Hospital Course:  No notes on file    No new subjective & objective note has been filed under this hospital service since the last note was generated.      > 50% of 10 min visit spent in chart review, face to face discussion of goals of care,  symptom assessment, coordination of care and emotional support.    Debo Rosario MD  Palliative Medicine  [facility]            "

## 2018-05-04 NOTE — SUBJECTIVE & OBJECTIVE
Principal Problem:Metastatic breast cancer    Principal Orthopedic Problem: B femur impending fractures s/p B CMN    Interval History: Patient seen and examined at bedside.  No acute events overnight. Pain stable. 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 Poor prognosis per heme onc of about 2.5 months. Pain regimen reviewed with anesthesia pain management, who state patient has been declining pain medications and multimodal regimen despite her reported pain. Has declined some of scheduled acetaminophen. Anesthesia spent about 30 minutes discussing plan and educating patient about proper use of medications yesterday, particularly the use of opioid medications.     Review of patient's allergies indicates:  No Known Allergies    Current Facility-Administered Medications   Medication    0.9%  NaCl infusion (for blood administration)    acetaminophen tablet 1,000 mg    bisacodyl suppository 10 mg    dextrose 50% injection 12.5 g    dextrose 50% injection 25 g    enoxaparin injection 40 mg    glucagon (human recombinant) injection 1 mg    insulin aspart U-100 pen 1-10 Units    lidocaine 5 % patch 1 patch    magnesium oxide tablet 800 mg    omnipaque oral solution 15 mL    ondansetron disintegrating tablet 8 mg    oxyCODONE immediate release tablet 5 mg    polyethylene glycol packet 17 g    pregabalin capsule 150 mg    promethazine (PHENERGAN) 6.25 mg in dextrose 5 % 50 mL IVPB    promethazine tablet 25 mg    ramelteon tablet 8 mg    ramelteon tablet 8 mg    senna-docusate 8.6-50 mg per tablet 2 tablet    sodium bicarbonate tablet 650 mg    sodium chloride 0.9% flush 3 mL    sodium chloride 0.9% flush 3 mL    sodium chloride 0.9% flush 3 mL    sodium chloride 0.9% flush 5 mL    tiZANidine tablet 4 mg    white petrolatum 41 % ointment     Objective:     Vital Signs (Most Recent):  Temp: 97.8 °F (36.6 °C) (05/03/18 2232)  Pulse: (!) 119 (05/03/18 2232)  Resp: 18  "(05/03/18 2232)  BP: 104/63 (05/03/18 2232)  SpO2: 98 % (05/03/18 2232) Vital Signs (24h Range):  Temp:  [97.1 °F (36.2 °C)-98.1 °F (36.7 °C)] 97.8 °F (36.6 °C)  Pulse:  [] 119  Resp:  [16-20] 18  SpO2:  [93 %-99 %] 98 %  BP: (101-104)/(57-63) 104/63     Weight: 74 kg (163 lb 2.3 oz)  Height: 5' 6" (167.6 cm)  Body mass index is 26.33 kg/m².      Intake/Output Summary (Last 24 hours) at 05/04/18 0536  Last data filed at 05/03/18 0700   Gross per 24 hour   Intake                0 ml   Output              400 ml   Net             -400 ml       Ortho/SPM Exam    AAOx4  NAD  Regular rhythm, tachycardic    BLE:  L thigh dressings with stable, dry drainage  R thigh dressing minimal drainage on dressings  SILT and motor intact T/SP/DP  WWP extremities  FCDs in place and functioning    Significant Labs:   CBC:     Recent Labs  Lab 05/03/18  0609   WBC 14.67*   HGB 10.0*   HCT 32.4*   *     CMP:     Recent Labs  Lab 05/03/18  0609      K 4.1      CO2 16*   GLU 52*   BUN 18   CREATININE 0.8   CALCIUM 8.0*   PROT 5.2*   ALBUMIN 1.4*   BILITOT 0.5   ALKPHOS 333*   AST 57*   ALT <5*   ANIONGAP 11   EGFRNONAA >60.0     All pertinent labs within the past 24 hours have been reviewed.    Significant Imaging: I have reviewed all pertinent imaging results/findings.  "

## 2018-05-05 LAB
ALBUMIN SERPL BCP-MCNC: 1.4 G/DL
ALP SERPL-CCNC: 314 U/L
ALT SERPL W/O P-5'-P-CCNC: 6 U/L
ANION GAP SERPL CALC-SCNC: 11 MMOL/L
ANISOCYTOSIS BLD QL SMEAR: SLIGHT
AST SERPL-CCNC: 51 U/L
BASOPHILS # BLD AUTO: ABNORMAL K/UL
BASOPHILS NFR BLD: 0 %
BILIRUB SERPL-MCNC: 0.6 MG/DL
BUN SERPL-MCNC: 17 MG/DL
CALCIUM SERPL-MCNC: 8 MG/DL
CHLORIDE SERPL-SCNC: 106 MMOL/L
CO2 SERPL-SCNC: 21 MMOL/L
CREAT SERPL-MCNC: 0.8 MG/DL
DIFFERENTIAL METHOD: ABNORMAL
EOSINOPHIL # BLD AUTO: ABNORMAL K/UL
EOSINOPHIL NFR BLD: 3 %
ERYTHROCYTE [DISTWIDTH] IN BLOOD BY AUTOMATED COUNT: 18.9 %
EST. GFR  (AFRICAN AMERICAN): >60 ML/MIN/1.73 M^2
EST. GFR  (NON AFRICAN AMERICAN): >60 ML/MIN/1.73 M^2
GLUCOSE SERPL-MCNC: 83 MG/DL
HCT VFR BLD AUTO: 30.6 %
HGB BLD-MCNC: 9.6 G/DL
IMM GRANULOCYTES # BLD AUTO: ABNORMAL K/UL
IMM GRANULOCYTES NFR BLD AUTO: ABNORMAL %
LYMPHOCYTES # BLD AUTO: ABNORMAL K/UL
LYMPHOCYTES NFR BLD: 15 %
MAGNESIUM SERPL-MCNC: 1.6 MG/DL
MCH RBC QN AUTO: 29 PG
MCHC RBC AUTO-ENTMCNC: 31.4 G/DL
MCV RBC AUTO: 92 FL
METAMYELOCYTES NFR BLD MANUAL: 1 %
MONOCYTES # BLD AUTO: ABNORMAL K/UL
MONOCYTES NFR BLD: 6 %
MYELOCYTES NFR BLD MANUAL: 1 %
NEUTROPHILS NFR BLD: 74 %
NRBC BLD-RTO: 2 /100 WBC
OVALOCYTES BLD QL SMEAR: ABNORMAL
PHOSPHATE SERPL-MCNC: 3 MG/DL
PLATELET # BLD AUTO: 153 K/UL
PLATELET BLD QL SMEAR: ABNORMAL
PMV BLD AUTO: 13.3 FL
POCT GLUCOSE: 102 MG/DL (ref 70–110)
POCT GLUCOSE: 88 MG/DL (ref 70–110)
POCT GLUCOSE: 92 MG/DL (ref 70–110)
POIKILOCYTOSIS BLD QL SMEAR: SLIGHT
POLYCHROMASIA BLD QL SMEAR: ABNORMAL
POTASSIUM SERPL-SCNC: 3.7 MMOL/L
PROT SERPL-MCNC: 5.4 G/DL
RBC # BLD AUTO: 3.31 M/UL
SODIUM SERPL-SCNC: 138 MMOL/L
WBC # BLD AUTO: 13.67 K/UL

## 2018-05-05 PROCEDURE — 99232 SBSQ HOSP IP/OBS MODERATE 35: CPT | Mod: ,,, | Performed by: HOSPITALIST

## 2018-05-05 PROCEDURE — 80053 COMPREHEN METABOLIC PANEL: CPT

## 2018-05-05 PROCEDURE — 25000003 PHARM REV CODE 250: Performed by: STUDENT IN AN ORGANIZED HEALTH CARE EDUCATION/TRAINING PROGRAM

## 2018-05-05 PROCEDURE — 25000003 PHARM REV CODE 250: Performed by: ANESTHESIOLOGY

## 2018-05-05 PROCEDURE — 27000221 HC OXYGEN, UP TO 24 HOURS

## 2018-05-05 PROCEDURE — 25000003 PHARM REV CODE 250: Performed by: HOSPITALIST

## 2018-05-05 PROCEDURE — 36415 COLL VENOUS BLD VENIPUNCTURE: CPT

## 2018-05-05 PROCEDURE — 85007 BL SMEAR W/DIFF WBC COUNT: CPT

## 2018-05-05 PROCEDURE — 11000001 HC ACUTE MED/SURG PRIVATE ROOM

## 2018-05-05 PROCEDURE — 25000003 PHARM REV CODE 250: Performed by: PHYSICIAN ASSISTANT

## 2018-05-05 PROCEDURE — 94761 N-INVAS EAR/PLS OXIMETRY MLT: CPT

## 2018-05-05 PROCEDURE — 84100 ASSAY OF PHOSPHORUS: CPT

## 2018-05-05 PROCEDURE — 85027 COMPLETE CBC AUTOMATED: CPT

## 2018-05-05 PROCEDURE — 83735 ASSAY OF MAGNESIUM: CPT

## 2018-05-05 PROCEDURE — 63600175 PHARM REV CODE 636 W HCPCS: Performed by: STUDENT IN AN ORGANIZED HEALTH CARE EDUCATION/TRAINING PROGRAM

## 2018-05-05 RX ORDER — AMOXICILLIN 250 MG
2 CAPSULE ORAL ONCE
Status: COMPLETED | OUTPATIENT
Start: 2018-05-05 | End: 2018-05-05

## 2018-05-05 RX ADMIN — SODIUM BICARBONATE 650 MG TABLET 650 MG: at 08:05

## 2018-05-05 RX ADMIN — PREGABALIN 150 MG: 50 CAPSULE ORAL at 08:05

## 2018-05-05 RX ADMIN — STANDARDIZED SENNA CONCENTRATE AND DOCUSATE SODIUM 2 TABLET: 8.6; 5 TABLET, FILM COATED ORAL at 08:05

## 2018-05-05 RX ADMIN — MAGNESIUM OXIDE TAB 400 MG (241.3 MG ELEMENTAL MG) 800 MG: 400 (241.3 MG) TAB at 08:05

## 2018-05-05 RX ADMIN — RAMELTEON 8 MG: 8 TABLET, FILM COATED ORAL at 08:05

## 2018-05-05 RX ADMIN — ENOXAPARIN SODIUM 40 MG: 100 INJECTION SUBCUTANEOUS at 04:05

## 2018-05-05 RX ADMIN — ACETAMINOPHEN 1000 MG: 500 TABLET, FILM COATED ORAL at 12:05

## 2018-05-05 RX ADMIN — OXYCODONE HYDROCHLORIDE 5 MG: 5 TABLET ORAL at 12:05

## 2018-05-05 RX ADMIN — OXYCODONE HYDROCHLORIDE 5 MG: 5 TABLET ORAL at 06:05

## 2018-05-05 RX ADMIN — ACETAMINOPHEN 1000 MG: 500 TABLET, FILM COATED ORAL at 06:05

## 2018-05-05 NOTE — PROGRESS NOTES
Hospital Medicine  Progress note     Team: [facility] B Dr. Cruz  Admit Date: 4/20/2018  VIGNESH 5/8/2018  Code status: Full Code     Principal Problem:  Metastatic breast cancer      Interval hx: 5/5: MRI brachial plexus scheduled today. Palliative care has seen patient to discuss comfort care plan with her and her family; they do not currently want to start this conversation and wish to revisit this later.        5/4: Mg 1.5-replaced. 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 Recommend focusing on quality of life and comfort measures. Family discussion with oncology to occur. Given RUE weakness, MRI brachial plexus tomorrow.      5/3: MRI Brain w w/o contrast today. Bicarb 16-replaced. Remove rocha. Discussed working on increasing ambulation now that rocha is removed. Transition to oral pain medication: oxycodone 5mg q4hrs. 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 Diffuse osseous metastatic disease involving the calvarium, clivus, and visualized upper cervical spine, noting posterior bulging at the body of C2.     5/2:  Blood cultures NGTD, lactate 2.2, troponin 0.015, UA 1+ occult blood and trace leukocytes with few yeast and occasional bacteria. WBC increased to 15. Started on soft diet. Mg 1.5-replaced. Dietary Consult: Please order speech consult. Continue regular diet, texture per SLP. Continue Boost Plus TID. Speech Consult: Clinical evaluation of swallow complete. Recommend dental soft diet with thin liquids. Given pt with scattered dentition, she appears to have met max potential with SLP services at this time. Tapered to Room Air. MRI w and w/o contrast ordered. s/p radiation oncology evaluation: radiation  treatment of each of the mailed femurs is recommended. 10 fractions of 3Gy each as outpatient.     5/1: Mg 1.5-replaced. Oncology consult: outside path suggestive of breast primary with moderately differentiated adenocarcinoma ER/NV+, HER 2-. Will f/u on path to confirm. Consider MRI brain w/ w/o contrast once medically stable.  Radiation Oncology consulted. Family wants to talk about options with Oncology and Radiation Oncology before finalizing any palliative care plans. 5L NC-taper. Systolic BP 70s this AM, up to 113 with IVF bolus. Chest Xray -Small lung volumes and bibasal patchy subsegmental opacities persist, similar to recent studies.  No new disease identified , NS bolus, Blood cultures, Lactic Acid, UA, CBC/CMP, Troponin ordered. Decreased prn oxycodone to 5mg q4hrs.      ROS   Not able to stand  Pain Scale:  8/10, low back and neck, leg pain resolved  Respiratory: no cough or shortness of breath  Cardiovascular: no chest pain or palpitations  Gastrointestinal: no nausea or vomiting, no abdominal pain or change in bowel habits  Behavioral/Psych: no depression or anxiety        PEx  Temp:  [96.8 °F (36 °C)-99.6 °F (37.6 °C)]   Pulse:  [106-122]   Resp:  [16-20]   BP: ()/(56-62)   SpO2:  [81 %-100 %]      Intake/Output Summary (Last 24 hours) at 05/05/18 1311  Last data filed at 05/05/18 1255    Gross per 24 hour   Intake              480 ml   Output             2500 ml   Net            -2020 ml         General Appearance: no acute distress   Heart: regular rate and rhythm  Respiratory: Normal respiratory effort, no crackles   Abdomen: Soft, non-tender; bowel sounds active  Skin: intact. IV sites ok  Neurologic:  No focal numbness. Right arm weakness: 2/5.    Mental status: Alert, oriented x 2, affect appropriate   Extremities: Cannot stand without assistance 2/2 UE weakness     Recent Labs  Lab 05/03/18  0609 05/04/18  0554 05/05/18  0352   WBC 14.67* 13.83* 13.67*   HGB 10.0* 8.8* 9.6*   HCT 32.4*  28.3* 30.6*   * 136* 153         Recent Labs  Lab 05/03/18  0609 05/04/18  0553 05/05/18  0352    138 138   K 4.1 3.8 3.7    109 106   CO2 16* 22* 21*   BUN 18 20 17   CREATININE 0.8 0.8 0.8   GLU 52* 69* 83   CALCIUM 8.0* 8.0* 8.0*   MG 1.6 1.5* 1.6   PHOS 2.9 3.1 3.0         Recent Labs  Lab 05/03/18  0609 05/04/18  0553 05/05/18  0352   ALKPHOS 333* 317* 314*   ALT <5* 6* 6*   AST 57* 54* 51*   ALBUMIN 1.4* 1.3* 1.4*   PROT 5.2* 5.0* 5.4*   BILITOT 0.5 0.5 0.6         Recent Labs  Lab 05/03/18  0911 05/03/18  1736 05/04/18  0145 05/04/18  1312 05/04/18  2356 05/05/18  0633   POCTGLUCOSE 73 96 99 125* 102 92      No results for input(s): CPK, CPKMB, MB, TROPONINI in the last 72 hours.     Scheduled Meds:   acetaminophen  1,000 mg Oral Q6H    enoxaparin  40 mg Subcutaneous Daily    lidocaine  1 patch Transdermal Q24H    magnesium oxide  800 mg Oral Daily    polyethylene glycol  17 g Oral Daily    pregabalin  150 mg Oral QHS    ramelteon  8 mg Oral QHS    senna-docusate 8.6-50 mg  2 tablet Oral Daily    sodium bicarbonate  1 tablet Oral BID      Continuous Infusions:  As Needed:  sodium chloride, bisacodyl, dextrose 50%, dextrose 50%, glucagon (human recombinant), insulin aspart U-100, omnipaque, ondansetron, oxyCODONE, promethazine (PHENERGAN) IVPB, promethazine, ramelteon, sodium chloride 0.9%, sodium chloride 0.9%, sodium chloride 0.9%, sodium chloride 0.9%, tiZANidine, white petrolatum            Active Hospital Problems     Diagnosis   POA    *Metastatic breast cancer [C50.919]   Yes    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, 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]   Yes    Weakness of right upper extremity [R29.898]   Yes    Leukocytosis [D72.829]   Yes    Malnutrition of moderate degree [E44.0]   Yes    Malignant neoplasm of central portion of right breast in female, estrogen receptor positive [C50.111, Z17.0]   Not Applicable    Cardiac rhythm disorder or disturbance or change [I49.9]   Yes    CHF (congestive  heart failure) [I50.9]   Yes    Right heart failure [I50.810]   Yes    Acute hypoxemic respiratory failure [J96.01]   Yes    Tachycardia [R00.0]   Yes    Lactic acidemia [E87.2]   Yes    Severe malnutrition [E43]   Yes    Cancer of left femur [C40.22]   Yes    Cancer of right femur [C40.21]   Yes    Palliative care encounter [Z51.5]   Not Applicable    Pain due to malignant neoplasm metastatic to bone [G89.3, C79.51]   Yes    Anemia [D64.9]   Yes    Thrombocytopenia [D69.6]   Yes    Hypomagnesemia [E83.42]   Yes    Bony metastasis [C79.51]   Yes       Xray metastatic survey shows lytic lesions of skull, lytic spine and rib lesions with pathologic fractures. Diffuse lytic lesions throughout entire central axial red marrow skeleton. Lytic lesions of proximal long bones and pathological fractures of ribs and right superior and inferior pubic ramus.       Pleural effusion [J90]   Yes       Resolved Hospital Problems     Diagnosis Date Resolved POA    Shock [R57.9] 04/26/2018 Yes    Encounter for weaning from ventilator [Z99.11] 04/30/2018 Not Applicable    Encephalopathy, metabolic [G93.41] 04/21/2018 Yes         Overview  Mrs. Grossman is a 57 yo female who was brought [place] [facility] [place] on 4/20/2018 for second opinion regarding recently diagnosed likely metastatic malignancy with unknown primary.      Assessment and Plan for Problems addressed today:     Metastatic breast cancer  Mets to bone  - oncology following, pt did not want to talk this afternoon after arriving to the floor from surgery, discussed with Dr. Fink (onc) he will see her tomorrow  - she may be a candidate for some oral therapy  - radiation oncology consult placed  - pain control (BP is sensitive to narcotics)  - palliative care saw her while she was in ICU, would continue discussions. Family wants to talk about options with Oncology and Radiation Oncology before finalizing any palliative care plans.   - 5/1 Oncology recs: outside path  suggestive of breast primary with moderately differentiated adenocarcinoma ER/HI+, HER 2-. Will f/u on path to confirm. Consider MRI brain w/ w/o contrast once medically stable.   - s/p radiation oncology evaluation: radiation treatment of each of the mailed femurs is recommended. 10 fractions of 3Gy each as outpatient   - 5/2: Right upper extremity weakness-MRI Brain w w/o contrast ordered.         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 Diffuse osseous metastatic disease involving the calvarium, clivus, and visualized upper cervical spine, noting posterior bulging at the body of C2. Transitioned to oral pain medications: oxycodone 5mg PRN q4hrs.      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 Recommend focusing on quality of life and comfort measures. MRI brachial plexus tomorrow for RUE weakness.   - 5/5: Palliative care has seen patient to discuss comfort care plan with her and her family; they do not currently want to start this conversation and wish to revisit this later.        Pathologic fracture of ribs pubic ramus  - s/p IM nailing femur x2     Hypoxemic respiratory failure  - pulmonary edema from IVF  - indeterminate diastolic function on 4/27 echo, PAP 53mmHg  - currently on 4L NC since arriving to floor from PACU  - 5/1: increased to 5L NC.   - 5/2: tapered to room air.      Hypotension -likely orthostasis   -5/1: Systolic BP 70s this AM, up to 113 with IVF bolus. Decreased prn oxycodone to 5mg q4hrs. NS bolus, Blood cultures, Lactic Acid, UA, CBC/CMP, Troponin ordered. Chest Xray: Small lung volumes and bibasal patchy subsegmental opacities persist, similar to recent studies.  No new  disease identified   -5/2: Blood cultures NGTD, lactate 2.2, troponin 0.015, UA 1+ occult blood and trace leukocytes with few yeast and occasional bacteria. WBC increased to 15.     Non-Anion Gap Metabolic Acidosis  -5/3: bicarb 16-replaced  -5/4: bicarb 22     Lactic acidosis  - 2.7 overnight, responded to IVF  - does not appear infected  - again elevated to 2.4 after procedure, will trend  - 4/30: 2.6, monitor     Urinary incontinence   - rocha in place, consider switch to purwick when pt can tolerate  - 5/3: removed rocha     Pleural effusion  - likely metastatic, s/p sampling at OSH  - associated compressive atelectasis bilaterally  - multiple pulmonary micronodules     Anemia of Chronic Disease  - likely chronic disease with some blood loss during procedures  - iron studies pending: Iron 50 (wnl), TIBC 160 (low), Saturation 31 (wnl), Transferrin 108 (low)  - monitor with CBC daily  - 5/2: Hgb 9.7  - 5/4: Hgb 8.8  -5/5: Hgb stabilized: 9.6     Hypomagnesemia  -most likely 2/2 chronic disease  -4/22: 1.4-replaced  -5/1: 1.5-replaced  -5/2: 1.5-replaced  -5/4: 1.5-replaced     Hypophosphatemia   -4/22: 2.5 - replaced  - Most likely 2/2 chronic disease     Thrombocytopenia, resolved  -4/22: 122--> 165 resolved   -4/30: 115-->135 (5/2)  -Most likely 2/2 chronic disease  -5/5: 136-->153, resolved     Congestive Heart Failure  Right Heart Failure  -4/27 Echo: Right Ventricular Enlargement with normal systolic function  -5/1:      Malnutrition     -Prealbumin of 4 . Dietary consulted: Please order speech consult. Continue regular diet, texture per SLP. Continue Boost Plus TID.     -Speech Consult: Clinical evaluation of swallow complete. Recommend dental soft diet with thin liquids. Given pt with scattered dentition, she appears to have met max potential with SLP services at this time.     DVT PPx: Enoxaparin 40mg daily     Discharge plan and follow up  Pending family discussion     Provider     Scribe  Attestation: I personally scribed for David Cruz MD on 05/05/2018 at 1:04 PM. Electronically signed by kera Beltre on 05/05/2018 at 1:04 PM  The documentation recorded by the scribe accurately reflects service I personally performed and the decisions made by me.  David Cruz MD  Attending Staff Physician  [facility] Medicine  pager- [phone]  [employer] - [phone]

## 2018-05-05 NOTE — PT/OT/SLP PROGRESS
Occupational Therapy      Patient Name:  Kerline Grossman   MRN:  [id number]    Patient not seen today secondary to Other (Comment) (Pt refused.). Will follow-up tomorrow.    JUSTIN De Souza  5/5/2018

## 2018-05-05 NOTE — NURSING
Patient refused MRI this morning and this afternoon.  States she just wants to rest and it is too much for the afternoon. Arielle Hu LPN

## 2018-05-05 NOTE — MEDICAL/APP STUDENT
Hospital Medicine  Progress note    [place] [facility] [place]. Cruz  Admit Date: 4/20/2018  VIGNESH 5/8/2018  Code status: Full Code    Principal Problem:  Metastatic breast cancer     Interval hx: 5/5: MRI brachial plexus scheduled today. Palliative care has seen patient to discuss comfort care plan with her and her family; they do not currently want to start this conversation and wish to revisit this later.       5/4: Mg 1.5-replaced. 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 Recommend focusing on quality of life and comfort measures. Family discussion with oncology to occur. Given RUE weakness, MRI brachial plexus tomorrow.     5/3: MRI Brain w w/o contrast today. Bicarb 16-replaced. Remove rocha. Discussed working on increasing ambulation now that rocha is removed. Transition to oral pain medication: oxycodone 5mg q4hrs. 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 Diffuse osseous metastatic disease involving the calvarium, clivus, and visualized upper cervical spine, noting posterior bulging at the body of C2.    5/2:  Blood cultures NGTD, lactate 2.2, troponin 0.015, UA 1+ occult blood and trace leukocytes with few yeast and occasional bacteria. WBC increased to 15. Started on soft diet. Mg 1.5-replaced. Dietary Consult: Please order speech consult. Continue regular diet, texture per SLP. Continue Boost Plus TID. Speech Consult: Clinical evaluation of swallow complete. Recommend dental soft diet with thin liquids. Given pt with scattered dentition, she appears to have met max potential with SLP services at this time. Tapered to Room Air. MRI w and w/o contrast ordered. s/p radiation oncology evaluation: radiation  treatment of each of the mailed femurs is recommended. 10 fractions of 3Gy each as outpatient.    5/1: Mg 1.5-replaced. Oncology consult: outside path suggestive of breast primary with moderately differentiated adenocarcinoma ER/MI+, HER 2-. Will f/u on path to confirm. Consider MRI brain w/ w/o contrast once medically stable.  Radiation Oncology consulted. Family wants to talk about options with Oncology and Radiation Oncology before finalizing any palliative care plans. 5L NC-taper. Systolic BP 70s this AM, up to 113 with IVF bolus. Chest Xray -Small lung volumes and bibasal patchy subsegmental opacities persist, similar to recent studies.  No new disease identified , NS bolus, Blood cultures, Lactic Acid, UA, CBC/CMP, Troponin ordered. Decreased prn oxycodone to 5mg q4hrs.     ROS   Not able to stand  Pain Scale:  8/10, low back and neck, leg pain resolved  Respiratory: no cough or shortness of breath  Cardiovascular: no chest pain or palpitations  Gastrointestinal: no nausea or vomiting, no abdominal pain or change in bowel habits  Behavioral/Psych: no depression or anxiety      PEx  Temp:  [96.8 °F (36 °C)-99.6 °F (37.6 °C)]   Pulse:  [106-122]   Resp:  [16-20]   BP: ()/(56-62)   SpO2:  [81 %-100 %]     Intake/Output Summary (Last 24 hours) at 05/05/18 1311  Last data filed at 05/05/18 1255   Gross per 24 hour   Intake              480 ml   Output             2500 ml   Net            -2020 ml       General Appearance: no acute distress   Heart: regular rate and rhythm  Respiratory: Normal respiratory effort, no crackles   Abdomen: Soft, non-tender; bowel sounds active  Skin: intact. IV sites ok  Neurologic:  No focal numbness. Right arm weakness: 2/5.    Mental status: Alert, oriented x 2, affect appropriate   Extremities: Cannot stand without assistance 2/2 UE weakness    Recent Labs  Lab 05/03/18  0609 05/04/18  0554 05/05/18  0352   WBC 14.67* 13.83* 13.67*   HGB 10.0* 8.8* 9.6*   HCT 32.4* 28.3* 30.6*    * 136* 153       Recent Labs  Lab 05/03/18  0609 05/04/18  0553 05/05/18  0352    138 138   K 4.1 3.8 3.7    109 106   CO2 16* 22* 21*   BUN 18 20 17   CREATININE 0.8 0.8 0.8   GLU 52* 69* 83   CALCIUM 8.0* 8.0* 8.0*   MG 1.6 1.5* 1.6   PHOS 2.9 3.1 3.0       Recent Labs  Lab 05/03/18  0609 05/04/18  0553 05/05/18  0352   ALKPHOS 333* 317* 314*   ALT <5* 6* 6*   AST 57* 54* 51*   ALBUMIN 1.4* 1.3* 1.4*   PROT 5.2* 5.0* 5.4*   BILITOT 0.5 0.5 0.6        Recent Labs  Lab 05/03/18  0911 05/03/18  1736 05/04/18  0145 05/04/18  1312 05/04/18  2356 05/05/18  0633   POCTGLUCOSE 73 96 99 125* 102 92     No results for input(s): CPK, CPKMB, MB, TROPONINI in the last 72 hours.    Scheduled Meds:   acetaminophen  1,000 mg Oral Q6H    enoxaparin  40 mg Subcutaneous Daily    lidocaine  1 patch Transdermal Q24H    magnesium oxide  800 mg Oral Daily    polyethylene glycol  17 g Oral Daily    pregabalin  150 mg Oral QHS    ramelteon  8 mg Oral QHS    senna-docusate 8.6-50 mg  2 tablet Oral Daily    sodium bicarbonate  1 tablet Oral BID     Continuous Infusions:    As Needed:  sodium chloride, bisacodyl, dextrose 50%, dextrose 50%, glucagon (human recombinant), insulin aspart U-100, omnipaque, ondansetron, oxyCODONE, promethazine (PHENERGAN) IVPB, promethazine, ramelteon, sodium chloride 0.9%, sodium chloride 0.9%, sodium chloride 0.9%, sodium chloride 0.9%, tiZANidine, white petrolatum    Active Hospital Problems    Diagnosis  POA    *Metastatic breast cancer [C50.919]  Yes    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, 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]  Yes    Weakness of right upper extremity [R29.898]  Yes    Leukocytosis [D72.829]  Yes    Malnutrition of moderate degree [E44.0]  Yes    Malignant neoplasm of central portion of right breast in female, estrogen receptor positive [C50.111, Z17.0]  Not Applicable    Cardiac rhythm disorder or disturbance or change [I49.9]  Yes    CHF (congestive heart failure) [I50.9]  Yes     Right heart failure [I50.810]  Yes    Acute hypoxemic respiratory failure [J96.01]  Yes    Tachycardia [R00.0]  Yes    Lactic acidemia [E87.2]  Yes    Severe malnutrition [E43]  Yes    Cancer of left femur [C40.22]  Yes    Cancer of right femur [C40.21]  Yes    Palliative care encounter [Z51.5]  Not Applicable    Pain due to malignant neoplasm metastatic to bone [G89.3, C79.51]  Yes    Anemia [D64.9]  Yes    Thrombocytopenia [D69.6]  Yes    Hypomagnesemia [E83.42]  Yes    Bony metastasis [C79.51]  Yes     Xray metastatic survey shows lytic lesions of skull, lytic spine and rib lesions with pathologic fractures. Diffuse lytic lesions throughout entire central axial red marrow skeleton. Lytic lesions of proximal long bones and pathological fractures of ribs and right superior and inferior pubic ramus.      Pleural effusion [J90]  Yes      Resolved Hospital Problems    Diagnosis Date Resolved POA    Shock [R57.9] 04/26/2018 Yes    Encounter for weaning from ventilator [Z99.11] 04/30/2018 Not Applicable    Encephalopathy, metabolic [G93.41] 04/21/2018 Yes       Overview  Mrs. Grossman is a 59 yo female who was brought to [facility] [place] [facility] ED on 4/20/2018 for second opinion regarding recently diagnosed likely metastatic malignancy with unknown primary.     Assessment and Plan for Problems addressed today:    Metastatic breast cancer  Mets to bone  - oncology following, pt did not want to talk this afternoon after arriving to the floor from surgery, discussed with Dr. Fink (onc) he will see her tomorrow  - she may be a candidate for some oral therapy  - radiation oncology consult placed  - pain control (BP is sensitive to narcotics)  - palliative care saw her while she was in ICU, would continue discussions. Family wants to talk about options with Oncology and Radiation Oncology before finalizing any palliative care plans.   - 5/1 Oncology recs: outside path suggestive of breast primary with moderately differentiated  adenocarcinoma ER/LA+, HER 2-. Will f/u on path to confirm. Consider MRI brain w/ w/o contrast once medically stable.   - s/p radiation oncology evaluation: radiation treatment of each of the mailed femurs is recommended. 10 fractions of 3Gy each as outpatient   - 5/2: Right upper extremity weakness-MRI Brain w w/o contrast ordered.         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 Diffuse osseous metastatic disease involving the calvarium, clivus, and visualized upper cervical spine, noting posterior bulging at the body of C2. Transitioned to oral pain medications: oxycodone 5mg PRN q4hrs.      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 Recommend focusing on quality of life and comfort measures. MRI brachial plexus tomorrow for RUE weakness.   - 5/5: Palliative care has seen patient to discuss comfort care plan with her and her family; they do not currently want to start this conversation and wish to revisit this later.        Pathologic fracture of ribs pubic ramus  - s/p IM nailing femur x2     Hypoxemic respiratory failure  - pulmonary edema from IVF  - indeterminate diastolic function on 4/27 echo, PAP 53mmHg  - currently on 4L NC since arriving to floor from PACU  - 5/1: increased to 5L NC.   - 5/2: tapered to room air.      Hypotension -likely orthostasis   -5/1: Systolic BP 70s this AM, up to 113 with IVF bolus. Decreased prn oxycodone to 5mg q4hrs. NS bolus, Blood cultures, Lactic Acid, UA, CBC/CMP, Troponin ordered. Chest Xray: Small lung volumes and bibasal patchy subsegmental opacities persist, similar to recent studies.  No new disease identified   -5/2: Blood cultures NGTD, lactate 2.2,  troponin 0.015, UA 1+ occult blood and trace leukocytes with few yeast and occasional bacteria. WBC increased to 15.    Non-Anion Gap Metabolic Acidosis  -5/3: bicarb 16-replaced  -5/4: bicarb 22    Lactic acidosis  - 2.7 overnight, responded to IVF  - does not appear infected  - again elevated to 2.4 after procedure, will trend  - 4/30: 2.6, monitor     Urinary incontinence   - rocha in place, consider switch to purwick when pt can tolerate  - 5/3: removed rocha     Pleural effusion  - likely metastatic, s/p sampling at OSH  - associated compressive atelectasis bilaterally  - multiple pulmonary micronodules     Anemia of Chronic Disease  - likely chronic disease with some blood loss during procedures  - iron studies pending: Iron 50 (wnl), TIBC 160 (low), Saturation 31 (wnl), Transferrin 108 (low)  - monitor with CBC daily  - 5/2: Hgb 9.7  - 5/4: Hgb 8.8  -5/5: Hgb stabilized: 9.6     Hypomagnesemia  -most likely 2/2 chronic disease  -4/22: 1.4-replaced  -5/1: 1.5-replaced  -5/2: 1.5-replaced  -5/4: 1.5-replaced     Hypophosphatemia   -4/22: 2.5 - replaced  - Most likely 2/2 chronic disease     Thrombocytopenia, resolved  -4/22: 122--> 165 resolved   -4/30: 115-->135 (5/2)  -Most likely 2/2 chronic disease  -5/5: 136-->153, resolved     Congestive Heart Failure  Right Heart Failure  -4/27 Echo: Right Ventricular Enlargement with normal systolic function  -5/1:      Malnutrition     -Prealbumin of 4 . Dietary consulted: Please order speech consult. Continue regular diet, texture per SLP. Continue Boost Plus TID.     -Speech Consult: Clinical evaluation of swallow complete. Recommend dental soft diet with thin liquids. Given pt with scattered dentition, she appears to have met max potential with SLP services at this time.     DVT PPx: Enoxaparin 40mg daily    Discharge plan and follow up  Pending family discussion    Provider    Scribe Attestation: I personally scribed for David Cruz MD on 05/05/2018  at 1:04 PM. Electronically signed by kera Beltre on 05/05/2018 at 1:04 PM.

## 2018-05-05 NOTE — PLAN OF CARE
Problem: Patient Care Overview  Goal: Plan of Care Review  Patient AAOx4, son and daughter at bedside.  Patient has refused MRI twice today, call placed to IM B team to notify them.  Spoke with Dr Cruz and notified him of this.  He stated patient probably going on hospice.  Patient has urinated via pure whick today with 1100ml output. Patient has had pain medicine and fluid intake encouraged.  IV remains intact and dressing clean and dry. Arielle Hu LPN

## 2018-05-05 NOTE — PT/OT/SLP PROGRESS
Physical Therapy      Patient Name:  Kerline Grossman   MRN:  [id number]    Patient not seen today secondary to: Attempted on two occasions, on first attempt patient declined treatment as she was too drowsy and on second attempt Patient declined treatment as she was about to go for an MRI. . Will follow-up on next scheduled visit.    Og Cherry, PTA

## 2018-05-06 LAB
ALBUMIN SERPL BCP-MCNC: 1.3 G/DL
ALP SERPL-CCNC: 279 U/L
ALT SERPL W/O P-5'-P-CCNC: 6 U/L
ANION GAP SERPL CALC-SCNC: 9 MMOL/L
ANISOCYTOSIS BLD QL SMEAR: SLIGHT
AST SERPL-CCNC: 54 U/L
BACTERIA BLD CULT: NORMAL
BACTERIA BLD CULT: NORMAL
BASOPHILS # BLD AUTO: ABNORMAL K/UL
BASOPHILS NFR BLD: 1 %
BILIRUB SERPL-MCNC: 0.6 MG/DL
BUN SERPL-MCNC: 15 MG/DL
CALCIUM SERPL-MCNC: 8 MG/DL
CHLORIDE SERPL-SCNC: 109 MMOL/L
CO2 SERPL-SCNC: 22 MMOL/L
CREAT SERPL-MCNC: 0.7 MG/DL
DIFFERENTIAL METHOD: ABNORMAL
EOSINOPHIL # BLD AUTO: ABNORMAL K/UL
EOSINOPHIL NFR BLD: 2 %
ERYTHROCYTE [DISTWIDTH] IN BLOOD BY AUTOMATED COUNT: 18.5 %
EST. GFR  (AFRICAN AMERICAN): >60 ML/MIN/1.73 M^2
EST. GFR  (NON AFRICAN AMERICAN): >60 ML/MIN/1.73 M^2
GLUCOSE SERPL-MCNC: 68 MG/DL
HCT VFR BLD AUTO: 27.3 %
HGB BLD-MCNC: 8.7 G/DL
IMM GRANULOCYTES # BLD AUTO: ABNORMAL K/UL
IMM GRANULOCYTES NFR BLD AUTO: ABNORMAL %
LYMPHOCYTES # BLD AUTO: ABNORMAL K/UL
LYMPHOCYTES NFR BLD: 29 %
MAGNESIUM SERPL-MCNC: 1.5 MG/DL
MCH RBC QN AUTO: 29.1 PG
MCHC RBC AUTO-ENTMCNC: 31.9 G/DL
MCV RBC AUTO: 91 FL
METAMYELOCYTES NFR BLD MANUAL: 2 %
MONOCYTES # BLD AUTO: ABNORMAL K/UL
MONOCYTES NFR BLD: 3 %
MYELOCYTES NFR BLD MANUAL: 2 %
NEUTROPHILS NFR BLD: 57 %
NEUTS BAND NFR BLD MANUAL: 4 %
NRBC BLD-RTO: 2 /100 WBC
OVALOCYTES BLD QL SMEAR: ABNORMAL
PHOSPHATE SERPL-MCNC: 3.3 MG/DL
PLATELET # BLD AUTO: 171 K/UL
PMV BLD AUTO: 12.9 FL
POIKILOCYTOSIS BLD QL SMEAR: SLIGHT
POLYCHROMASIA BLD QL SMEAR: ABNORMAL
POTASSIUM SERPL-SCNC: 3.5 MMOL/L
PROT SERPL-MCNC: 5 G/DL
RBC # BLD AUTO: 2.99 M/UL
SMUDGE CELLS BLD QL SMEAR: PRESENT
SODIUM SERPL-SCNC: 140 MMOL/L
WBC # BLD AUTO: 13.22 K/UL

## 2018-05-06 PROCEDURE — 80053 COMPREHEN METABOLIC PANEL: CPT

## 2018-05-06 PROCEDURE — 85007 BL SMEAR W/DIFF WBC COUNT: CPT

## 2018-05-06 PROCEDURE — 25000003 PHARM REV CODE 250: Performed by: STUDENT IN AN ORGANIZED HEALTH CARE EDUCATION/TRAINING PROGRAM

## 2018-05-06 PROCEDURE — 36415 COLL VENOUS BLD VENIPUNCTURE: CPT

## 2018-05-06 PROCEDURE — 85027 COMPLETE CBC AUTOMATED: CPT

## 2018-05-06 PROCEDURE — 99233 SBSQ HOSP IP/OBS HIGH 50: CPT | Mod: ,,, | Performed by: INTERNAL MEDICINE

## 2018-05-06 PROCEDURE — 83735 ASSAY OF MAGNESIUM: CPT

## 2018-05-06 PROCEDURE — 25000003 PHARM REV CODE 250: Performed by: HOSPITALIST

## 2018-05-06 PROCEDURE — 25000003 PHARM REV CODE 250: Performed by: ANESTHESIOLOGY

## 2018-05-06 PROCEDURE — 11000001 HC ACUTE MED/SURG PRIVATE ROOM

## 2018-05-06 PROCEDURE — 63600175 PHARM REV CODE 636 W HCPCS: Performed by: HOSPITALIST

## 2018-05-06 PROCEDURE — 84100 ASSAY OF PHOSPHORUS: CPT

## 2018-05-06 PROCEDURE — 63600175 PHARM REV CODE 636 W HCPCS: Performed by: STUDENT IN AN ORGANIZED HEALTH CARE EDUCATION/TRAINING PROGRAM

## 2018-05-06 PROCEDURE — 99232 SBSQ HOSP IP/OBS MODERATE 35: CPT | Mod: ,,, | Performed by: HOSPITALIST

## 2018-05-06 RX ORDER — MAGNESIUM SULFATE HEPTAHYDRATE 40 MG/ML
2 INJECTION, SOLUTION INTRAVENOUS ONCE
Status: DISCONTINUED | OUTPATIENT
Start: 2018-05-06 | End: 2018-05-06

## 2018-05-06 RX ORDER — MAGNESIUM SULFATE HEPTAHYDRATE 40 MG/ML
2 INJECTION, SOLUTION INTRAVENOUS ONCE
Status: COMPLETED | OUTPATIENT
Start: 2018-05-06 | End: 2018-05-06

## 2018-05-06 RX ADMIN — ENOXAPARIN SODIUM 40 MG: 100 INJECTION SUBCUTANEOUS at 05:05

## 2018-05-06 RX ADMIN — SODIUM BICARBONATE 650 MG TABLET 650 MG: at 12:05

## 2018-05-06 RX ADMIN — MAGNESIUM OXIDE TAB 400 MG (241.3 MG ELEMENTAL MG) 800 MG: 400 (241.3 MG) TAB at 12:05

## 2018-05-06 RX ADMIN — LIDOCAINE 1 PATCH: 50 PATCH CUTANEOUS at 06:05

## 2018-05-06 RX ADMIN — OXYCODONE HYDROCHLORIDE 5 MG: 5 TABLET ORAL at 12:05

## 2018-05-06 RX ADMIN — ACETAMINOPHEN 1000 MG: 500 TABLET, FILM COATED ORAL at 12:05

## 2018-05-06 RX ADMIN — MAGNESIUM SULFATE IN WATER 2 G: 40 INJECTION, SOLUTION INTRAVENOUS at 10:05

## 2018-05-06 RX ADMIN — ACETAMINOPHEN 1000 MG: 500 TABLET, FILM COATED ORAL at 05:05

## 2018-05-06 RX ADMIN — POLYETHYLENE GLYCOL 3350 17 G: 17 POWDER, FOR SOLUTION ORAL at 12:05

## 2018-05-06 RX ADMIN — STANDARDIZED SENNA CONCENTRATE AND DOCUSATE SODIUM 2 TABLET: 8.6; 5 TABLET, FILM COATED ORAL at 12:05

## 2018-05-06 NOTE — NURSING
Oncology team at bedside, patient finally agreed to take her morning medication and pain medication.  These were given to patient with no concerns.  Son is at bedside.  Arielle Hu LPN

## 2018-05-06 NOTE — PROGRESS NOTES
[facility]  Orthopedics  Progress Note    Patient Name: Kerline Grossman  MRN: [id number]  Admission Date: 4/20/2018  Hospital Length of Stay: 15 days  Attending Provider: David Cruz MD  Primary Care Provider: Primary Doctor No  Follow-up For: Procedure(s) (LRB):  IM NAILING OF FEMUR - hana table - large c arm door side (Right)    Post-Operative Day: 6 Days Post-Op  Subjective:     Principal Problem:Metastatic breast cancer    Principal Orthopedic Problem: B femur impending fractures s/p B CMN    Interval History: Patient seen and examined at bedside.  No acute events overnight. Pain stable. Declined PT for past 2 days. Heme onc discussing dispo options between inpatient hospice and home with hospice.     Review of patient's allergies indicates:  No Known Allergies    Current Facility-Administered Medications   Medication    0.9%  NaCl infusion (for blood administration)    acetaminophen tablet 1,000 mg    bisacodyl suppository 10 mg    dextrose 50% injection 12.5 g    dextrose 50% injection 25 g    enoxaparin injection 40 mg    glucagon (human recombinant) injection 1 mg    insulin aspart U-100 pen 1-10 Units    lidocaine 5 % patch 1 patch    magnesium oxide tablet 800 mg    omnipaque oral solution 15 mL    ondansetron disintegrating tablet 8 mg    oxyCODONE immediate release tablet 5 mg    polyethylene glycol packet 17 g    pregabalin capsule 150 mg    promethazine (PHENERGAN) 6.25 mg in dextrose 5 % 50 mL IVPB    promethazine tablet 25 mg    ramelteon tablet 8 mg    ramelteon tablet 8 mg    senna-docusate 8.6-50 mg per tablet 2 tablet    sodium bicarbonate tablet 650 mg    sodium chloride 0.9% flush 3 mL    sodium chloride 0.9% flush 3 mL    sodium chloride 0.9% flush 3 mL    sodium chloride 0.9% flush 5 mL    tiZANidine tablet 4 mg    white petrolatum 41 % ointment     Objective:     Vital Signs (Most Recent):  Temp: 97.8 °F (36.6 °C) (05/06/18 0419)  Pulse: (!)  "117 (05/06/18 0419)  Resp: 18 (05/06/18 0419)  BP: (!) 95/58 (05/06/18 0419)  SpO2: 98 % (05/06/18 0419) Vital Signs (24h Range):  Temp:  [96.8 °F (36 °C)-99.3 °F (37.4 °C)] 97.8 °F (36.6 °C)  Pulse:  [106-126] 117  Resp:  [16-20] 18  SpO2:  [81 %-100 %] 98 %  BP: ()/(58-72) 95/58     Weight: 74 kg (163 lb 2.3 oz)  Height: 5' 6" (167.6 cm)  Body mass index is 26.33 kg/m².      Intake/Output Summary (Last 24 hours) at 05/06/18 0630  Last data filed at 05/06/18 0455   Gross per 24 hour   Intake              250 ml   Output             2425 ml   Net            -2175 ml       Ortho/SPM Exam    AAOx4  NAD  Regular rhythm, tachycardic    BLE:  L thigh dressings with stable, dry drainage  R thigh dressing minimal drainage on dressings  SILT and motor intact T/SP/DP  WWP extremities  FCDs in place and functioning    Significant Labs:   CBC:     Recent Labs  Lab 05/05/18  0352 05/06/18  0401   WBC 13.67* 13.22*   HGB 9.6* 8.7*   HCT 30.6* 27.3*    171     CMP:     Recent Labs  Lab 05/05/18  0352 05/06/18  0401    140   K 3.7 3.5    109   CO2 21* 22*   GLU 83 68*   BUN 17 15   CREATININE 0.8 0.7   CALCIUM 8.0* 8.0*   PROT 5.4* 5.0*   ALBUMIN 1.4* 1.3*   BILITOT 0.6 0.6   ALKPHOS 314* 279*   AST 51* 54*   ALT 6* 6*   ANIONGAP 11 9   EGFRNONAA >60.0 >60.0     All pertinent labs within the past 24 hours have been reviewed.    Significant Imaging: I have reviewed all pertinent imaging results/findings.    Assessment/Plan:     Bony metastasis    58 y.o. female POD11 s/p L CMN, POD6 s/p R CMN    Pain control: per primary and heme onc  PT/OT: WBAT  DVT PPx: lovenox, FCDs at all times when not ambulating  Abx: postop Ancef completed  Drain: none  Ponce: discontinued    Dispo: per primary, likely hospice versus home with hospice              Aayush Gifford MD  Orthopedics  [facility]  "

## 2018-05-06 NOTE — HOSPITAL COURSE
05/06/2018   MELONY TRAYLOR. Still with difficulty swallowing, however pain appears better controlled today.

## 2018-05-06 NOTE — PROGRESS NOTES
Hospital Medicine  Progress note     [place] [facility] [place] [place]  Admit Date: 4/20/2018  VIGNESH 5/8/2018  Code status: Full Code     Principal Problem:  Metastatic breast cancer      Interval hx: 5/6: MRA of brachial plexus deferred yesterday; May still complete today. Hypomagnesemia repleted. Patient has 7/10 pain of neck and back. Discussed advanced directives, namely DNR. Heme/onc discussed palliative vs. Hospice (inpatient vs. Home) with the patient, leaning towards hospice.          5/5: MRA brachial plexus scheduled today. Palliative care has seen patient to discuss comfort care plan with her and her family; they do not currently want to start this conversation and wish to revisit this later.        5/4: Mg 1.5-replaced. 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 Recommend focusing on quality of life and comfort measures. Family discussion with oncology to occur. Given RUE weakness, MRI brachial plexus tomorrow.      5/3: MRI Brain w w/o contrast today. Bicarb 16-replaced. Remove rocha. Discussed working on increasing ambulation now that rocha is removed. Transition to oral pain medication: oxycodone 5mg q4hrs. 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 Diffuse osseous metastatic disease involving the calvarium, clivus, and visualized upper cervical spine, noting posterior bulging at the body of C2.     5/2:  Blood cultures NGTD, lactate 2.2, troponin 0.015, UA 1+ occult blood and trace leukocytes with few yeast and occasional bacteria. WBC increased to 15. Started on soft diet. Mg 1.5-replaced. Dietary Consult: Please order speech consult. Continue regular diet, texture per SLP. Continue Boost Plus TID. Speech  Consult: Clinical evaluation of swallow complete. Recommend dental soft diet with thin liquids. Given pt with scattered dentition, she appears to have met max potential with SLP services at this time. Tapered to Room Air. MRI w and w/o contrast ordered. s/p radiation oncology evaluation: radiation treatment of each of the mailed femurs is recommended. 10 fractions of 3Gy each as outpatient.     5/1: Mg 1.5-replaced. Oncology consult: outside path suggestive of breast primary with moderately differentiated adenocarcinoma ER/MA+, HER 2-. Will f/u on path to confirm. Consider MRI brain w/ w/o contrast once medically stable.  Radiation Oncology consulted. Family wants to talk about options with Oncology and Radiation Oncology before finalizing any palliative care plans. 5L NC-taper. Systolic BP 70s this AM, up to 113 with IVF bolus. Chest Xray -Small lung volumes and bibasal patchy subsegmental opacities persist, similar to recent studies.  No new disease identified , NS bolus, Blood cultures, Lactic Acid, UA, CBC/CMP, Troponin ordered. Decreased prn oxycodone to 5mg q4hrs.      ROS   Not able to stand, RUE weakness  Pain Scale:  8/10, low back and neck, leg pain resolved  Respiratory: no cough or shortness of breath  Cardiovascular: no chest pain or palpitations  Gastrointestinal: no nausea or vomiting, no abdominal pain or change in bowel habits  Behavioral/Psych: no depression or anxiety        PEx  Temp:  [97.7 °F (36.5 °C)-98.3 °F (36.8 °C)]   Pulse:  [102-126]   Resp:  [14-20]   BP: ()/(55-76)   SpO2:  [87 %-98 %]      Intake/Output Summary (Last 24 hours) at 05/06/18 1556  Last data filed at 05/06/18 1400    Gross per 24 hour   Intake              300 ml   Output             2275 ml   Net            -1975 ml         General Appearance: no acute distress   Heart: regular rate and rhythm  Respiratory: Normal respiratory effort, no crackles   Abdomen: Soft, non-tender; bowel sounds active  Skin: intact. IV  sites ok  Neurologic:  No focal numbness. Right arm weakness: 2/5.    Mental status: Alert, oriented x 2, affect appropriate   Extremities: Cannot stand without assistance 2/2 UE weakness     Recent Labs  Lab 05/04/18  0554 05/05/18  0352 05/06/18  0401   WBC 13.83* 13.67* 13.22*   HGB 8.8* 9.6* 8.7*   HCT 28.3* 30.6* 27.3*   * 153 171         Recent Labs  Lab 05/04/18  0553 05/05/18  0352 05/06/18  0401    138 140   K 3.8 3.7 3.5    106 109   CO2 22* 21* 22*   BUN 20 17 15   CREATININE 0.8 0.8 0.7   GLU 69* 83 68*   CALCIUM 8.0* 8.0* 8.0*   MG 1.5* 1.6 1.5*   PHOS 3.1 3.0 3.3         Recent Labs  Lab 05/04/18  0553 05/05/18  0352 05/06/18  0401   ALKPHOS 317* 314* 279*   ALT 6* 6* 6*   AST 54* 51* 54*   ALBUMIN 1.3* 1.4* 1.3*   PROT 5.0* 5.4* 5.0*   BILITOT 0.5 0.6 0.6         Recent Labs  Lab 05/03/18  1736 05/04/18  0145 05/04/18  1312 05/04/18  2356 05/05/18  0633 05/05/18  1753   POCTGLUCOSE 96 99 125* 102 92 88         Scheduled Meds:   acetaminophen  1,000 mg Oral Q6H    enoxaparin  40 mg Subcutaneous Daily    lidocaine  1 patch Transdermal Q24H    magnesium oxide  800 mg Oral Daily    polyethylene glycol  17 g Oral Daily    pregabalin  150 mg Oral QHS    ramelteon  8 mg Oral QHS    senna-docusate 8.6-50 mg  2 tablet Oral Daily    sodium bicarbonate  1 tablet Oral BID      Continuous Infusions:  As Needed:  sodium chloride, bisacodyl, dextrose 50%, dextrose 50%, glucagon (human recombinant), insulin aspart U-100, omnipaque, ondansetron, oxyCODONE, promethazine (PHENERGAN) IVPB, promethazine, ramelteon, sodium chloride 0.9%, sodium chloride 0.9%, sodium chloride 0.9%, sodium chloride 0.9%, tiZANidine, white petrolatum            Active Hospital Problems     Diagnosis   POA    *Metastatic breast cancer [C50.919]   Yes    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, 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]   Yes    Weakness of right upper extremity [R29.898]   Yes    Leukocytosis [D72.829]   Yes    Malnutrition of moderate  degree [E44.0]   Yes    Malignant neoplasm of central portion of right breast in female, estrogen receptor positive [C50.111, Z17.0]   Not Applicable    Cardiac rhythm disorder or disturbance or change [I49.9]   Yes    CHF (congestive heart failure) [I50.9]   Yes    Right heart failure [I50.810]   Yes    Acute hypoxemic respiratory failure [J96.01]   Yes    Tachycardia [R00.0]   Yes    Lactic acidemia [E87.2]   Yes    Severe malnutrition [E43]   Yes    Cancer of left femur [C40.22]   Yes    Cancer of right femur [C40.21]   Yes    Palliative care encounter [Z51.5]   Not Applicable    Pain due to malignant neoplasm metastatic to bone [G89.3, C79.51]   Yes    Anemia [D64.9]   Yes    Thrombocytopenia [D69.6]   Yes    Hypomagnesemia [E83.42]   Yes    Bony metastasis [C79.51]   Yes       Xray metastatic survey shows lytic lesions of skull, lytic spine and rib lesions with pathologic fractures. Diffuse lytic lesions throughout entire central axial red marrow skeleton. Lytic lesions of proximal long bones and pathological fractures of ribs and right superior and inferior pubic ramus.       Pleural effusion [J90]   Yes       Resolved Hospital Problems     Diagnosis Date Resolved POA    Shock [R57.9] 04/26/2018 Yes    Encounter for weaning from ventilator [Z99.11] 04/30/2018 Not Applicable    Encephalopathy, metabolic [G93.41] 04/21/2018 Yes         Overview  Mrs. Grossman is a 59 yo female who was brought to [facility] [place] [facility] on 4/20/2018 for second opinion regarding recently diagnosed likely metastatic malignancy with unknown primary.      Assessment and Plan for Problems addressed today:     Metastatic breast cancer  Pain due to malignant neoplasm metastatic to bone    Meningeal carcinomatosis     Palliative Care Encounter  - oncology following, pt did not want to talk this afternoon after arriving to the floor from surgery, discussed with Dr. Fink (onc) he will see her tomorrow  - she may be a candidate for some  oral therapy  - radiation oncology consult placed  - pain control (BP is sensitive to narcotics)  - palliative care saw her while she was in ICU, would continue discussions. Family wants to talk about options with Oncology and Radiation Oncology before finalizing any palliative care plans.   - 5/1 Oncology recs: outside path suggestive of breast primary with moderately differentiated adenocarcinoma ER/CO+, HER 2-. Will f/u on path to confirm. Consider MRI brain w/ w/o contrast once medically stable.   - s/p radiation oncology evaluation: radiation treatment of each of the mailed femurs is recommended. 10 fractions of 3Gy each as outpatient   - 5/2: Right upper extremity weakness-MRI Brain w w/o contrast ordered.         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 Diffuse osseous metastatic disease involving the calvarium, clivus, and visualized upper cervical spine, noting posterior bulging at the body of C2. Transitioned to oral pain medications: oxycodone 5mg PRN q4hrs.      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 Recommend focusing on quality of life and comfort measures. MRI brachial plexus tomorrow for RUE weakness.   - 5/5: Palliative care has seen patient to discuss comfort care plan with her and her family; they do not currently want to start this conversation and wish to revisit this later.  -5/6: MRA of brachial plexus deferred yesterday; May still complete today. Discussed advanced directives, namely DNR. Heme/onc discussed palliative vs. Hospice (inpatient vs. Home) with the patient, leaning towards hospice. Continue pain management with lidocaine patch and Acetominophen.        Pathologic  fracture of ribs pubic ramus  - s/p IM nailing femur x2     Hypoxemic respiratory failure  - pulmonary edema from IVF  - indeterminate diastolic function on 4/27 echo, PAP 53mmHg  - currently on 4L NC since arriving to floor from PACU  - 5/1: increased to 5L NC.   - 5/2: tapered to room air.      Hypotension -likely orthostasis   -5/1: Systolic BP 70s this AM, up to 113 with IVF bolus. Decreased prn oxycodone to 5mg q4hrs. NS bolus, Blood cultures, Lactic Acid, UA, CBC/CMP, Troponin ordered. Chest Xray: Small lung volumes and bibasal patchy subsegmental opacities persist, similar to recent studies.  No new disease identified   -5/2: Blood cultures NGTD, lactate 2.2, troponin 0.015, UA 1+ occult blood and trace leukocytes with few yeast and occasional bacteria. WBC increased to 15.     Non-Anion Gap Metabolic Acidosis  -5/3: bicarb 16-replaced  -5/4: bicarb 22  -5/6: bicarb still at 22     Lactic acidosis  - 2.7 overnight, responded to IVF  - does not appear infected  - again elevated to 2.4 after procedure, will trend  - 4/30: 2.6, monitor     Urinary incontinence   - rocha in place, consider switch to purwick when pt can tolerate  - 5/3: removed rocha     Pleural effusion  - likely metastatic, s/p sampling at OSH  - associated compressive atelectasis bilaterally  - multiple pulmonary micronodules     Anemia of Chronic Disease, stable  - likely chronic disease with some blood loss during procedures  - iron studies pending: Iron 50 (wnl), TIBC 160 (low), Saturation 31 (wnl), Transferrin 108 (low)  - monitor with CBC daily  - 5/2: Hgb 9.7  -5/6: Hgb 9.6--> 8.7     Hypomagnesemia  -most likely 2/2 chronic disease  -4/22: 1.4-replaced  -5/1: 1.5-replaced  -5/2: 1.5-replaced  -5/4: 1.5-replaced  -5/6: 1.5-replaced     Hypophosphatemia, resolved   -4/22: 2.5 - replaced  - Most likely 2/2 chronic disease     Thrombocytopenia, resolved  -4/22: 122--> 165 resolved   -4/30: 115-->135 (5/2)  -Most likely 2/2 chronic  disease  -5/5: 136-->153, resolved     Congestive Heart Failure  Right Heart Failure  -4/27 Echo: Right Ventricular Enlargement with normal systolic function  -5/1:      Malnutrition     -Prealbumin of 4 . Dietary consulted: Please order speech consult. Continue regular diet, texture per SLP. Continue Boost Plus TID.     -Speech Consult: Clinical evaluation of swallow complete. Recommend dental soft diet with thin liquids. Given pt with scattered dentition, she appears to have met max potential with SLP services at this time.     DVT PPx: Enoxaparin 40mg daily     Discharge plan and follow up  Pending family discussion      Provider     Scribe Attestation: I personally scribed for David Cruz MD on 05/06/2018 at 3:38 PM. Electronically signed by kera Beltre on 05/06/2018 at 3:38 PM.

## 2018-05-06 NOTE — PROGRESS NOTES
[facility]  Hematology/Oncology  Progress Note    Patient Name: Kerline Grossman  Admission Date: 4/20/2018  Hospital Length of Stay: 15 days  Code Status: Full Code     Subjective:     HPI:  History taken from patient who is not a precise historian and review of medical records that arrived with patient (only paper records available without actual imaging uploaded to our system)  59 yo woman with presumed no known past medical history as she doesn't have a PCP aside from psych history on psych meds (cannot obtain med list from [facility] at this time as it is too late) had presented to an OSH with progressive decline over the past month with weakness, which finally acutely worsened to what she described as urinary and fecal incontinence with bilateral lower extremity weakness for a few weeks before finally slipping and falling on her own soil and getting taken to a hospital by her fiance.  From review of records, she was admitted with ZACH and hypercalcemia and multiple lytic lesions of unknown origin.  Based on workup, suspicion may have been multiple myeloma as on 4/10 SPEP (pattern c/w acute phase reaction without M spike), beta 1 globulin 0.3, beta 2 globulin 0.4, kappa 95.9, lambda 82.3, k:l ratio 1.17.  She had a CT head done in 4/12/18 that showed multiple mixed sclerotic and lytic lesions that extend to the dura, extend to the inner and outer table with thin extra cranial soft tissue compoenent.  Mastoid effusion and fluid in her ear was also noted as well as a 4mm CSF hygroma.  4/13 CT chest/abdomen/pelvis without contrast showed numerable lytic lesions, pathologic subacute fracture of ribs, large bilateral effusions, but no masses were identified.  There were fluid filled, non dilated loops of small bowel but large bowel did have signs of air fluid levels (no mention of obstruction).  4/13 biopsy of ?right iliac crest, but path report unavailable to me at this time and patient's son reports  "that it was inconclusive so per the son and the patient, repeat biopsy of left iliac crest was done 4/20 in addition to diagnostic thoracentesis from right chest.  Other workup included 4/15 (ind: thrombocytopenia result: negative for schistocytes), 4/16 PTH-rp (15; WNL for reference range) and 4/17 UPEP negative for M spike.  ROS 2 weeks prior to admission:  Chills, nightsweats, decreased appetite with nausea and vomiting.  Denies early satiety.  Despite pending results of thoracentesis and bone marrow biopsy, son felt he needed another opinion regarding her care and wished for her to be transferred.  When he was told that transfer was denied, she was discharged and family brought the patient over to [facility] for further evaluation. Upon arrival to the ICU she was found to be hypotensive.  Per ICU note, "Patient had SBP ~ 80 which responded well to 1 L of NS and MAPs were then > 65 so decision initially was to admit to hospital medicine. However, blood pressure then dropped again to systolic of 80s and patient was accepted to critical care medicine. "  CXR here shows elevated right hemidiaphragm, bilateral rib fractures, and multiple lytic lesions. Right pleural effusion, and left lung with pleural thickening or parenchymal scarring with subsegmental atelectasis    She currently has pain in her chest bilaterally that is worsened when she tries to reposition herself, but denies fevers, cough, or worsening shortness of breath.  Denies abdominal pain at this time.  Sensation intact in her legs but she feels weak.  Reports during her hospital stay at OSH, she coughed up phlegm with blood tinged sputum.      Past medical history: no PCP, never had mammogram, colonoscopy, or cervical cancer screening. Only significant for psych history, all medications from [email or web address] near [facility] (too late to obtain Rx list at this time).    Family history: Mother CAD, father prostate cancer.  13 siblings " total with 1 sister breast cancer diagnosed in her 60s that recurred after mastectomy.  One sister and brother with sarcoidosis.  Social history: Former cook.  Denies environmental hazard exposure.  Lives with fiance.  Independent in all ADLs at baseline before recent decline in the past month or two.  Started smoking at age 16 and quit at 33, 0.5 ppd, 8.5 pack years. History of smoking cocaine.    Allergies: NKDA.        Medications:  Continuous Infusions:   lactated ringers 50 mL/hr at 04/25/18 0241     Scheduled Meds:   ibuprofen  400 mg Oral Q8H    lidocaine  1 patch Transdermal Q24H    ropivacaine (PF) 5 mg/ml (0.5%)        senna-docusate 8.6-50 mg  2 tablet Oral Daily     PRN Meds:acetaminophen, dextrose 50%, omnipaque, ondansetron, oxyCODONE, sodium chloride 0.9%, white petrolatum     Review of Systems   Constitutional: Positive for appetite change and fatigue. Negative for chills and fever.   HENT: Positive for trouble swallowing.    Respiratory: Negative for chest tightness and wheezing.    Cardiovascular: Negative for chest pain and palpitations.   Gastrointestinal: Positive for abdominal pain. Negative for vomiting.   Musculoskeletal: Positive for back pain.   Neurological: Positive for weakness (RUE. Also generalized.).     Objective:     Vital Signs (Most Recent):  Temp: 98.6 °F (37 °C) (04/25/18 0839)  Pulse: (!) 114 (04/25/18 0839)  Resp: 20 (04/25/18 0839)  BP: 130/82 (04/25/18 0839)  SpO2: (!) 89 % (04/25/18 0841) Vital Signs (24h Range):  Temp:  [97.6 °F (36.4 °C)-98.6 °F (37 °C)] 98.6 °F (37 °C)  Pulse:  [103-115] 114  Resp:  [18-20] 20  SpO2:  [84 %-95 %] 89 %  BP: (116-133)/(68-85) 130/82     Weight: 68.6 kg (151 lb 3.8 oz)  Body mass index is 24.41 kg/m².  Body surface area is 1.79 meters squared.      Intake/Output Summary (Last 24 hours) at 04/25/18 1003  Last data filed at 04/24/18 1830   Gross per 24 hour   Intake             1250 ml   Output                0 ml   Net             1250  ml     Physical Exam   Constitutional: She is oriented to person, place, and time. She appears well-developed. No distress.   HENT:   Head: Normocephalic and atraumatic.   Eyes: Conjunctivae and EOM are normal.   Cardiovascular: S1 normal and S2 normal.  Tachycardia present.    Pulmonary/Chest: No accessory muscle usage. Tachypnea noted.   Abdominal: Soft. There is tenderness (mild epigastric).   Musculoskeletal: She exhibits tenderness (L knee). She exhibits no edema.   Neurological: She is alert and oriented to person, place, and time.   Skin: Skin is warm and dry.   Nursing note and vitals reviewed.    Significant Labs:   Recent Results (from the past 24 hour(s))   Lactic acid, plasma    Collection Time: 04/26/18 11:37 AM   Result Value Ref Range    Lactate (Lactic Acid) 2.1 0.5 - 2.2 mmol/L   CBC auto differential    Collection Time: 04/26/18 11:37 AM   Result Value Ref Range    WBC 11.86 3.90 - 12.70 K/uL    RBC 3.47 (L) 4.00 - 5.40 M/uL    Hemoglobin 9.8 (L) 12.0 - 16.0 g/dL    Hematocrit 30.3 (L) 37.0 - 48.5 %    MCV 87 82 - 98 fL    MCH 28.2 27.0 - 31.0 pg    MCHC 32.3 32.0 - 36.0 g/dL    RDW 16.9 (H) 11.5 - 14.5 %    Platelets 152 150 - 350 K/uL    MPV 12.0 9.2 - 12.9 fL    Immature Granulocytes CANCELED 0.0 - 0.5 %    Immature Grans (Abs) CANCELED 0.00 - 0.04 K/uL    nRBC 2 (A) 0 /100 WBC    Gran% 78.0 (H) 38.0 - 73.0 %    Lymph% 11.0 (L) 18.0 - 48.0 %    Mono% 5.0 4.0 - 15.0 %    Eosinophil% 2.0 0.0 - 8.0 %    Basophil% 0.0 0.0 - 1.9 %    Bands 4.0 %    Platelet Estimate Appears normal     Aniso Slight     Poik Slight     Poly Occasional     Ovalocytes Occasional     Startex Cells Occasional     Schistocytes Present     Differential Method Manual    POCT glucose    Collection Time: 04/26/18 11:53 AM   Result Value Ref Range    POCT Glucose 79 70 - 110 mg/dL   Basic metabolic panel    Collection Time: 04/26/18  1:01 PM   Result Value Ref Range    Sodium 138 136 - 145 mmol/L    Potassium 4.1 3.5 - 5.1 mmol/L     Chloride 111 (H) 95 - 110 mmol/L    CO2 17 (L) 23 - 29 mmol/L    Glucose 150 (H) 70 - 110 mg/dL    BUN, Bld 17 6 - 20 mg/dL    Creatinine 1.2 0.5 - 1.4 mg/dL    Calcium 7.8 (L) 8.7 - 10.5 mg/dL    Anion Gap 10 8 - 16 mmol/L    eGFR if African American 58.4 (A) >60 mL/min/1.73 m^2    eGFR if non African American 50.6 (A) >60 mL/min/1.73 m^2   Magnesium    Collection Time: 04/26/18  1:01 PM   Result Value Ref Range    Magnesium 1.9 1.6 - 2.6 mg/dL   Phosphorus    Collection Time: 04/26/18  1:01 PM   Result Value Ref Range    Phosphorus 3.2 2.7 - 4.5 mg/dL   POCT glucose    Collection Time: 04/26/18  1:08 PM   Result Value Ref Range    POCT Glucose 152 (H) 70 - 110 mg/dL   Lactic acid, plasma    Collection Time: 04/26/18  2:35 PM   Result Value Ref Range    Lactate (Lactic Acid) 4.2 (HH) 0.5 - 2.2 mmol/L   ISTAT PROCEDURE    Collection Time: 04/26/18  2:36 PM   Result Value Ref Range    POC PH 7.438 7.35 - 7.45    POC PCO2 23.2 (LL) 35 - 45 mmHg    POC PO2 43 (LL) 80 - 100 mmHg    POC HCO3 15.7 (L) 24 - 28 mmol/L    POC BE -8 -2 to 2 mmol/L    POC SATURATED O2 82 (L) 95 - 100 %    POC TCO2 16 (L) 23 - 27 mmol/L    Time Notifed: 1441     Sample ARTERIAL     Site [facility]     Allens Test N/A     DelSys Nasal Can     Mode SPONT     Flow 2    Lactic acid, plasma    Collection Time: 04/26/18  6:02 PM   Result Value Ref Range    Lactate (Lactic Acid) 3.4 (H) 0.5 - 2.2 mmol/L   POCT glucose    Collection Time: 04/26/18  6:10 PM   Result Value Ref Range    POCT Glucose 108 70 - 110 mg/dL   ISTAT PROCEDURE    Collection Time: 04/26/18  6:12 PM   Result Value Ref Range    POC PH 7.470 (H) 7.35 - 7.45    POC PCO2 24.4 (LL) 35 - 45 mmHg    POC PO2 42 (LL) 80 - 100 mmHg    POC HCO3 17.8 (L) 24 - 28 mmol/L    POC BE -6 -2 to 2 mmol/L    POC SATURATED O2 82 (L) 95 - 100 %    POC TCO2 19 (L) 23 - 27 mmol/L    Verbal Result Readback Performed Yes     Provider Credentials: MD     Provider Notified: SPIER     Time Notifed: 1818      Sample ARTERIAL     Site [facility]     Allens Test N/A     DelSys Nasal Can     Mode SPONT     Flow 2     FiO2 30     Sp02 94    Lactic acid, plasma    Collection Time: 04/26/18  9:40 PM   Result Value Ref Range    Lactate (Lactic Acid) 2.8 (H) 0.5 - 2.2 mmol/L   Comprehensive metabolic panel    Collection Time: 04/26/18  9:40 PM   Result Value Ref Range    Sodium 140 136 - 145 mmol/L    Potassium 4.0 3.5 - 5.1 mmol/L    Chloride 112 (H) 95 - 110 mmol/L    CO2 19 (L) 23 - 29 mmol/L    Glucose 98 70 - 110 mg/dL    BUN, Bld 16 6 - 20 mg/dL    Creatinine 1.1 0.5 - 1.4 mg/dL    Calcium 7.6 (L) 8.7 - 10.5 mg/dL    Total Protein 4.3 (L) 6.0 - 8.4 g/dL    Albumin 1.3 (L) 3.5 - 5.2 g/dL    Total Bilirubin 0.3 0.1 - 1.0 mg/dL    Alkaline Phosphatase 351 (H) 55 - 135 U/L    AST 69 (H) 10 - 40 U/L    ALT 10 10 - 44 U/L    Anion Gap 9 8 - 16 mmol/L    eGFR if African American >60.0 >60 mL/min/1.73 m^2    eGFR if non  56.3 (A) >60 mL/min/1.73 m^2   Magnesium    Collection Time: 04/26/18  9:40 PM   Result Value Ref Range    Magnesium 1.8 1.6 - 2.6 mg/dL   POCT glucose    Collection Time: 04/27/18 12:31 AM   Result Value Ref Range    POCT Glucose 103 70 - 110 mg/dL   Lactic acid, plasma    Collection Time: 04/27/18  3:55 AM   Result Value Ref Range    Lactate (Lactic Acid) 1.8 0.5 - 2.2 mmol/L   CBC with Automated Differential    Collection Time: 04/27/18  3:55 AM   Result Value Ref Range    WBC 12.26 3.90 - 12.70 K/uL    RBC 3.17 (L) 4.00 - 5.40 M/uL    Hemoglobin 8.9 (L) 12.0 - 16.0 g/dL    Hematocrit 27.6 (L) 37.0 - 48.5 %    MCV 87 82 - 98 fL    MCH 28.1 27.0 - 31.0 pg    MCHC 32.2 32.0 - 36.0 g/dL    RDW 17.4 (H) 11.5 - 14.5 %    Platelets 154 150 - 350 K/uL    MPV 12.1 9.2 - 12.9 fL    Immature Granulocytes CANCELED 0.0 - 0.5 %    Immature Grans (Abs) CANCELED 0.00 - 0.04 K/uL    Lymph # CANCELED 1.0 - 4.8 K/uL    Mono # CANCELED 0.3 - 1.0 K/uL    Eos # CANCELED 0.0 - 0.5 K/uL    Baso # CANCELED 0.00 - 0.20  K/uL    nRBC 3 (A) 0 /100 WBC    Gran% 57.0 38.0 - 73.0 %    Lymph% 26.0 18.0 - 48.0 %    Mono% 7.0 4.0 - 15.0 %    Eosinophil% 1.0 0.0 - 8.0 %    Basophil% 0.0 0.0 - 1.9 %    Bands 4.0 %    Metamyelocytes 3.0 %    Myelocytes 2.0 %    Aniso Slight     Poik Slight     Poly Occasional     Ovalocytes Occasional     [place] Cells Occasional     Differential Method Manual    Comprehensive Metabolic Panel (CMP)    Collection Time: 04/27/18  3:55 AM   Result Value Ref Range    Sodium 141 136 - 145 mmol/L    Potassium 4.0 3.5 - 5.1 mmol/L    Chloride 114 (H) 95 - 110 mmol/L    CO2 17 (L) 23 - 29 mmol/L    Glucose 79 70 - 110 mg/dL    BUN, Bld 15 6 - 20 mg/dL    Creatinine 1.1 0.5 - 1.4 mg/dL    Calcium 7.6 (L) 8.7 - 10.5 mg/dL    Total Protein 4.3 (L) 6.0 - 8.4 g/dL    Albumin 1.3 (L) 3.5 - 5.2 g/dL    Total Bilirubin 0.3 0.1 - 1.0 mg/dL    Alkaline Phosphatase 353 (H) 55 - 135 U/L    AST 68 (H) 10 - 40 U/L    ALT 8 (L) 10 - 44 U/L    Anion Gap 10 8 - 16 mmol/L    eGFR if African American >60.0 >60 mL/min/1.73 m^2    eGFR if non  56.3 (A) >60 mL/min/1.73 m^2   Magnesium    Collection Time: 04/27/18  3:55 AM   Result Value Ref Range    Magnesium 1.6 1.6 - 2.6 mg/dL   Phosphorus    Collection Time: 04/27/18  3:55 AM   Result Value Ref Range    Phosphorus 3.0 2.7 - 4.5 mg/dL   POCT glucose    Collection Time: 04/27/18  5:38 AM   Result Value Ref Range    POCT Glucose 70 70 - 110 mg/dL     Diagnostic Results:  CXR 04/27/18:  Endotracheal tube is no longer noted.  Blunting of the right costophrenic sulcus appears slightly more pronounced on this examination than on the previous study referenced above, which may relate to a minimally increased volume of pleural fluid on this side, but overall, allowing for a poorer inspiratory depth on the current examination, there has been no significant interval change in the appearance of the chest since 04/26/2018.  Numerous osseous abnormalities have been delineated on the  report of an osseous metastatic survey performed 04/23/2018.    Assessment/Plan:       Leptomeningeal Carcinomatosis  Multiple Bony Metastasis  B/L Pathologic Fractures  Metastatic Breast Cancer     · Biopsy obtained from left femur suggestive of metastatic carcinoma likely of breast origin with weakly positive ER/GA+. Findings similar to outside path.   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 Her overall prognosis is extremely poor now averaging about 2.5 months, but more realistically weeks.  · Intrathecal chemotherapy and systemic hormonal therapy would not have a significant impact on survival and might actually be harmful.  · Would focus on quality of life and comfort measures at this time.   · An extensive discussion was had with the family today, one son and one daughter were present in which  Ms. Grossman participated to the best of her ability  · Ms. Grossman gave the impression of being amenable to hospice care and did express a preference for home hospice, however the family needs to discuss whether or not they will be able to meet her needs.  · Overall impression of meeting is that they are considering hospice but at this point are still weighing inpatient vs outpatient.  · Will ask case management to provide more information on inpatient and outpatient hospice services.  · Appreciate orthopedics and palliative care teams' assistance.  · Recommend case management provide hospice information to assist family in locating a provider or facility if they so choose.                 Jay Zarate II, KITTY  Hematology/Oncology  [facility]      I have reviewed the notes, assessments, and/or procedures performed by the housestaff, as above.  I have personally interviewed and examined the patient at the beside, and rounded with the housestaff. I concur with her/his assessment and plan and the documentation of Kerline Grossman.      Long conversation with pt and fly about dx,  poor prognosis (terminal), and lack of efficacious therapies given her poor PS and rapid decline.  She is amenable to hospice.      SW should arrange family meeting with primary team attending and all of family members to finalize these arrangements.      SW should arrange meeting with hospice representatives and patient and family here at the hospital.      I, Dr. Moreno Park, personally spent more than  45 mins during this encounter, greater than 50% was spent in direct counseling and/or coordination of care.     Moreno Park M.D., M.S., F.A.C.P.  Hematology/Oncology Attending  [facility]

## 2018-05-06 NOTE — ASSESSMENT & PLAN NOTE
58 y.o. female POD11 s/p L CMN, POD6 s/p R CMN    Pain control: per primary and heme onc  PT/OT: WBAT  DVT PPx: lovenox, FCDs at all times when not ambulating  Abx: postop Ancef completed  Drain: none  Ponce: discontinued    Dispo: per primary, likely hospice versus home with hospice

## 2018-05-06 NOTE — MEDICAL/APP STUDENT
Hospital Medicine  Progress note    Team: [facility] B Dr. Cruz  Admit Date: 4/20/2018  VIGNESH 5/8/2018  Code status: Full Code    Principal Problem:  Metastatic breast cancer     Interval hx: 5/6: MRA of brachial plexus deferred yesterday; May still complete today. Hypomagnesemia repleted. Patient has 7/10 pain of neck and back. Discussed advanced directives, namely DNR. Heme/onc discussed palliative vs. Hospice (inpatient vs. Home) with the patient, leaning towards hospice.         5/5: MRA brachial plexus scheduled today. Palliative care has seen patient to discuss comfort care plan with her and her family; they do not currently want to start this conversation and wish to revisit this later.       5/4: Mg 1.5-replaced. 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 Recommend focusing on quality of life and comfort measures. Family discussion with oncology to occur. Given RUE weakness, MRI brachial plexus tomorrow.     5/3: MRI Brain w w/o contrast today. Bicarb 16-replaced. Remove rocha. Discussed working on increasing ambulation now that rocha is removed. Transition to oral pain medication: oxycodone 5mg q4hrs. 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 Diffuse osseous metastatic disease involving the calvarium, clivus, and visualized upper cervical spine, noting posterior bulging at the body of C2.    5/2:  Blood cultures NGTD, lactate 2.2, troponin 0.015, UA 1+ occult blood and trace leukocytes with few yeast and occasional bacteria. WBC increased to 15. Started on soft diet. Mg 1.5-replaced. Dietary Consult: Please order speech consult. Continue regular diet, texture per SLP. Continue Boost Plus TID. Speech Consult:  Clinical evaluation of swallow complete. Recommend dental soft diet with thin liquids. Given pt with scattered dentition, she appears to have met max potential with SLP services at this time. Tapered to Room Air. MRI w and w/o contrast ordered. s/p radiation oncology evaluation: radiation treatment of each of the mailed femurs is recommended. 10 fractions of 3Gy each as outpatient.    5/1: Mg 1.5-replaced. Oncology consult: outside path suggestive of breast primary with moderately differentiated adenocarcinoma ER/CO+, HER 2-. Will f/u on path to confirm. Consider MRI brain w/ w/o contrast once medically stable.  Radiation Oncology consulted. Family wants to talk about options with Oncology and Radiation Oncology before finalizing any palliative care plans. 5L NC-taper. Systolic BP 70s this AM, up to 113 with IVF bolus. Chest Xray -Small lung volumes and bibasal patchy subsegmental opacities persist, similar to recent studies.  No new disease identified , NS bolus, Blood cultures, Lactic Acid, UA, CBC/CMP, Troponin ordered. Decreased prn oxycodone to 5mg q4hrs.     ROS   Not able to stand, RUE weakness  Pain Scale:  8/10, low back and neck, leg pain resolved  Respiratory: no cough or shortness of breath  Cardiovascular: no chest pain or palpitations  Gastrointestinal: no nausea or vomiting, no abdominal pain or change in bowel habits  Behavioral/Psych: no depression or anxiety      PEx  Temp:  [97.7 °F (36.5 °C)-98.3 °F (36.8 °C)]   Pulse:  [102-126]   Resp:  [14-20]   BP: ()/(55-76)   SpO2:  [87 %-98 %]     Intake/Output Summary (Last 24 hours) at 05/06/18 1556  Last data filed at 05/06/18 1400   Gross per 24 hour   Intake              300 ml   Output             2275 ml   Net            -1975 ml       General Appearance: no acute distress   Heart: regular rate and rhythm  Respiratory: Normal respiratory effort, no crackles   Abdomen: Soft, non-tender; bowel sounds active  Skin: intact. IV sites  ok  Neurologic:  No focal numbness. Right arm weakness: 2/5.    Mental status: Alert, oriented x 2, affect appropriate   Extremities: Cannot stand without assistance 2/2 UE weakness    Recent Labs  Lab 05/04/18  0554 05/05/18  0352 05/06/18  0401   WBC 13.83* 13.67* 13.22*   HGB 8.8* 9.6* 8.7*   HCT 28.3* 30.6* 27.3*   * 153 171       Recent Labs  Lab 05/04/18  0553 05/05/18  0352 05/06/18  0401    138 140   K 3.8 3.7 3.5    106 109   CO2 22* 21* 22*   BUN 20 17 15   CREATININE 0.8 0.8 0.7   GLU 69* 83 68*   CALCIUM 8.0* 8.0* 8.0*   MG 1.5* 1.6 1.5*   PHOS 3.1 3.0 3.3       Recent Labs  Lab 05/04/18  0553 05/05/18  0352 05/06/18  0401   ALKPHOS 317* 314* 279*   ALT 6* 6* 6*   AST 54* 51* 54*   ALBUMIN 1.3* 1.4* 1.3*   PROT 5.0* 5.4* 5.0*   BILITOT 0.5 0.6 0.6        Recent Labs  Lab 05/03/18  1736 05/04/18  0145 05/04/18  1312 05/04/18  2356 05/05/18  0633 05/05/18  1753   POCTGLUCOSE 96 99 125* 102 92 88       Scheduled Meds:   acetaminophen  1,000 mg Oral Q6H    enoxaparin  40 mg Subcutaneous Daily    lidocaine  1 patch Transdermal Q24H    magnesium oxide  800 mg Oral Daily    polyethylene glycol  17 g Oral Daily    pregabalin  150 mg Oral QHS    ramelteon  8 mg Oral QHS    senna-docusate 8.6-50 mg  2 tablet Oral Daily    sodium bicarbonate  1 tablet Oral BID     Continuous Infusions:    As Needed:  sodium chloride, bisacodyl, dextrose 50%, dextrose 50%, glucagon (human recombinant), insulin aspart U-100, omnipaque, ondansetron, oxyCODONE, promethazine (PHENERGAN) IVPB, promethazine, ramelteon, sodium chloride 0.9%, sodium chloride 0.9%, sodium chloride 0.9%, sodium chloride 0.9%, tiZANidine, white petrolatum    Active Hospital Problems    Diagnosis  POA    *Metastatic breast cancer [C50.919]  Yes    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, 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]  Yes    Weakness of right upper extremity [R29.898]  Yes    Leukocytosis [D72.829]  Yes    Malnutrition of moderate degree [E44.0]  Yes     Malignant neoplasm of central portion of right breast in female, estrogen receptor positive [C50.111, Z17.0]  Not Applicable    Cardiac rhythm disorder or disturbance or change [I49.9]  Yes    CHF (congestive heart failure) [I50.9]  Yes    Right heart failure [I50.810]  Yes    Acute hypoxemic respiratory failure [J96.01]  Yes    Tachycardia [R00.0]  Yes    Lactic acidemia [E87.2]  Yes    Severe malnutrition [E43]  Yes    Cancer of left femur [C40.22]  Yes    Cancer of right femur [C40.21]  Yes    Palliative care encounter [Z51.5]  Not Applicable    Pain due to malignant neoplasm metastatic to bone [G89.3, C79.51]  Yes    Anemia [D64.9]  Yes    Thrombocytopenia [D69.6]  Yes    Hypomagnesemia [E83.42]  Yes    Bony metastasis [C79.51]  Yes     Xray metastatic survey shows lytic lesions of skull, lytic spine and rib lesions with pathologic fractures. Diffuse lytic lesions throughout entire central axial red marrow skeleton. Lytic lesions of proximal long bones and pathological fractures of ribs and right superior and inferior pubic ramus.      Pleural effusion [J90]  Yes      Resolved Hospital Problems    Diagnosis Date Resolved POA    Shock [R57.9] 04/26/2018 Yes    Encounter for weaning from ventilator [Z99.11] 04/30/2018 Not Applicable    Encephalopathy, metabolic [G93.41] 04/21/2018 Yes       Overview  Mrs. Grossman is a 57 yo female who was brought to [facility] ED on 4/20/2018 for second opinion regarding recently diagnosed likely metastatic malignancy with unknown primary.     Assessment and Plan for Problems addressed today:    Metastatic breast cancer  Pain due to malignant neoplasm metastatic to bone    Meningeal carcinomatosis     Palliative Care Encounter  - oncology following, pt did not want to talk this afternoon after arriving to the floor from surgery, discussed with Dr. Fink (onc) he will see her tomorrow  - she may be a candidate for some oral therapy  - radiation oncology consult placed  -  pain control (BP is sensitive to narcotics)  - palliative care saw her while she was in ICU, would continue discussions. Family wants to talk about options with Oncology and Radiation Oncology before finalizing any palliative care plans.   - 5/1 Oncology recs: outside path suggestive of breast primary with moderately differentiated adenocarcinoma ER/NM+, HER 2-. Will f/u on path to confirm. Consider MRI brain w/ w/o contrast once medically stable.   - s/p radiation oncology evaluation: radiation treatment of each of the mailed femurs is recommended. 10 fractions of 3Gy each as outpatient   - 5/2: Right upper extremity weakness-MRI Brain w w/o contrast ordered.         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 Diffuse osseous metastatic disease involving the calvarium, clivus, and visualized upper cervical spine, noting posterior bulging at the body of C2. Transitioned to oral pain medications: oxycodone 5mg PRN q4hrs.      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 Recommend focusing on quality of life and comfort measures. MRI brachial plexus tomorrow for RUE weakness.   - 5/5: Palliative care has seen patient to discuss comfort care plan with her and her family; they do not currently want to start this conversation and wish to revisit this later.  -5/6: MRA of brachial plexus deferred yesterday; May still complete today. Discussed advanced directives, namely DNR. Heme/onc discussed palliative vs. Hospice (inpatient vs. Home) with the patient, leaning towards hospice. Continue pain management with lidocaine patch and Acetominophen.        Pathologic fracture of ribs pubic ramus  - s/p IM nailing femur  x2     Hypoxemic respiratory failure  - pulmonary edema from IVF  - indeterminate diastolic function on 4/27 echo, PAP 53mmHg  - currently on 4L NC since arriving to floor from PACU  - 5/1: increased to 5L NC.   - 5/2: tapered to room air.      Hypotension -likely orthostasis   -5/1: Systolic BP 70s this AM, up to 113 with IVF bolus. Decreased prn oxycodone to 5mg q4hrs. NS bolus, Blood cultures, Lactic Acid, UA, CBC/CMP, Troponin ordered. Chest Xray: Small lung volumes and bibasal patchy subsegmental opacities persist, similar to recent studies.  No new disease identified   -5/2: Blood cultures NGTD, lactate 2.2, troponin 0.015, UA 1+ occult blood and trace leukocytes with few yeast and occasional bacteria. WBC increased to 15.    Non-Anion Gap Metabolic Acidosis  -5/3: bicarb 16-replaced  -5/4: bicarb 22  -5/6: bicarb still at 22    Lactic acidosis  - 2.7 overnight, responded to IVF  - does not appear infected  - again elevated to 2.4 after procedure, will trend  - 4/30: 2.6, monitor     Urinary incontinence   - rocha in place, consider switch to purwick when pt can tolerate  - 5/3: removed rocha     Pleural effusion  - likely metastatic, s/p sampling at OSH  - associated compressive atelectasis bilaterally  - multiple pulmonary micronodules     Anemia of Chronic Disease, stable  - likely chronic disease with some blood loss during procedures  - iron studies pending: Iron 50 (wnl), TIBC 160 (low), Saturation 31 (wnl), Transferrin 108 (low)  - monitor with CBC daily  - 5/2: Hgb 9.7  -5/6: Hgb 9.6--> 8.7     Hypomagnesemia  -most likely 2/2 chronic disease  -4/22: 1.4-replaced  -5/1: 1.5-replaced  -5/2: 1.5-replaced  -5/4: 1.5-replaced  -5/6: 1.5-replaced     Hypophosphatemia, resolved   -4/22: 2.5 - replaced  - Most likely 2/2 chronic disease     Thrombocytopenia, resolved  -4/22: 122--> 165 resolved   -4/30: 115-->135 (5/2)  -Most likely 2/2 chronic disease  -5/5: 136-->153, resolved     Congestive Heart  Failure  Right Heart Failure  -4/27 Echo: Right Ventricular Enlargement with normal systolic function  -5/1:      Malnutrition     -Prealbumin of 4 . Dietary consulted: Please order speech consult. Continue regular diet, texture per SLP. Continue Boost Plus TID.     -Speech Consult: Clinical evaluation of swallow complete. Recommend dental soft diet with thin liquids. Given pt with scattered dentition, she appears to have met max potential with SLP services at this time.     DVT PPx: Enoxaparin 40mg daily    Discharge plan and follow up  Pending family discussion    Provider    Scribe Attestation: I personally scribed for David Cruz MD on 05/06/2018 at 3:38 PM. Electronically signed by kera Beltre on 05/06/2018 at 3:38 PM.

## 2018-05-06 NOTE — SUBJECTIVE & OBJECTIVE
"Principal Problem:Metastatic breast cancer    Principal Orthopedic Problem: B femur impending fractures s/p B CMN    Interval History: Patient seen and examined at bedside.  No acute events overnight. Pain stable. Declined PT for past 2 days. Heme onc discussing dispo options between inpatient hospice and home with hospice.     Review of patient's allergies indicates:  No Known Allergies    Current Facility-Administered Medications   Medication    0.9%  NaCl infusion (for blood administration)    acetaminophen tablet 1,000 mg    bisacodyl suppository 10 mg    dextrose 50% injection 12.5 g    dextrose 50% injection 25 g    enoxaparin injection 40 mg    glucagon (human recombinant) injection 1 mg    insulin aspart U-100 pen 1-10 Units    lidocaine 5 % patch 1 patch    magnesium oxide tablet 800 mg    omnipaque oral solution 15 mL    ondansetron disintegrating tablet 8 mg    oxyCODONE immediate release tablet 5 mg    polyethylene glycol packet 17 g    pregabalin capsule 150 mg    promethazine (PHENERGAN) 6.25 mg in dextrose 5 % 50 mL IVPB    promethazine tablet 25 mg    ramelteon tablet 8 mg    ramelteon tablet 8 mg    senna-docusate 8.6-50 mg per tablet 2 tablet    sodium bicarbonate tablet 650 mg    sodium chloride 0.9% flush 3 mL    sodium chloride 0.9% flush 3 mL    sodium chloride 0.9% flush 3 mL    sodium chloride 0.9% flush 5 mL    tiZANidine tablet 4 mg    white petrolatum 41 % ointment     Objective:     Vital Signs (Most Recent):  Temp: 97.8 °F (36.6 °C) (05/06/18 0419)  Pulse: (!) 117 (05/06/18 0419)  Resp: 18 (05/06/18 0419)  BP: (!) 95/58 (05/06/18 0419)  SpO2: 98 % (05/06/18 0419) Vital Signs (24h Range):  Temp:  [96.8 °F (36 °C)-99.3 °F (37.4 °C)] 97.8 °F (36.6 °C)  Pulse:  [106-126] 117  Resp:  [16-20] 18  SpO2:  [81 %-100 %] 98 %  BP: ()/(58-72) 95/58     Weight: 74 kg (163 lb 2.3 oz)  Height: 5' 6" (167.6 cm)  Body mass index is 26.33 kg/m².      Intake/Output Summary " (Last 24 hours) at 05/06/18 0630  Last data filed at 05/06/18 0455   Gross per 24 hour   Intake              250 ml   Output             2425 ml   Net            -2175 ml       Ortho/SPM Exam    AAOx4  NAD  Regular rhythm, tachycardic    BLE:  L thigh dressings with stable, dry drainage  R thigh dressing minimal drainage on dressings  SILT and motor intact T/SP/DP  WWP extremities  FCDs in place and functioning    Significant Labs:   CBC:     Recent Labs  Lab 05/05/18  0352 05/06/18  0401   WBC 13.67* 13.22*   HGB 9.6* 8.7*   HCT 30.6* 27.3*    171     CMP:     Recent Labs  Lab 05/05/18  0352 05/06/18  0401    140   K 3.7 3.5    109   CO2 21* 22*   GLU 83 68*   BUN 17 15   CREATININE 0.8 0.7   CALCIUM 8.0* 8.0*   PROT 5.4* 5.0*   ALBUMIN 1.4* 1.3*   BILITOT 0.6 0.6   ALKPHOS 314* 279*   AST 51* 54*   ALT 6* 6*   ANIONGAP 11 9   EGFRNONAA >60.0 >60.0     All pertinent labs within the past 24 hours have been reviewed.    Significant Imaging: I have reviewed all pertinent imaging results/findings.

## 2018-05-06 NOTE — PT/OT/SLP PROGRESS
Physical Therapy      Patient Name:  Kerline Grossman   MRN:  [id number]    Patient not seen today secondary to On first attempt Patient was sleeping and on second attempt Patient declined treatment. Will follow-up on next scheduled visit.    Og Cherry, PTA

## 2018-05-07 LAB
ALBUMIN SERPL BCP-MCNC: 1.4 G/DL
ALP SERPL-CCNC: 265 U/L
ALT SERPL W/O P-5'-P-CCNC: 7 U/L
ANION GAP SERPL CALC-SCNC: 8 MMOL/L
ANISOCYTOSIS BLD QL SMEAR: SLIGHT
AST SERPL-CCNC: 52 U/L
BASOPHILS # BLD AUTO: ABNORMAL K/UL
BASOPHILS NFR BLD: 0 %
BILIRUB SERPL-MCNC: 0.5 MG/DL
BUN SERPL-MCNC: 14 MG/DL
CALCIUM SERPL-MCNC: 8 MG/DL
CHLORIDE SERPL-SCNC: 107 MMOL/L
CO2 SERPL-SCNC: 23 MMOL/L
CREAT SERPL-MCNC: 0.8 MG/DL
DIFFERENTIAL METHOD: ABNORMAL
EOSINOPHIL # BLD AUTO: ABNORMAL K/UL
EOSINOPHIL NFR BLD: 1 %
ERYTHROCYTE [DISTWIDTH] IN BLOOD BY AUTOMATED COUNT: 18.5 %
EST. GFR  (AFRICAN AMERICAN): >60 ML/MIN/1.73 M^2
EST. GFR  (NON AFRICAN AMERICAN): >60 ML/MIN/1.73 M^2
GLUCOSE SERPL-MCNC: 71 MG/DL
HCT VFR BLD AUTO: 26 %
HGB BLD-MCNC: 8.2 G/DL
IMM GRANULOCYTES # BLD AUTO: ABNORMAL K/UL
IMM GRANULOCYTES NFR BLD AUTO: ABNORMAL %
LYMPHOCYTES # BLD AUTO: ABNORMAL K/UL
LYMPHOCYTES NFR BLD: 24 %
MAGNESIUM SERPL-MCNC: 1.7 MG/DL
MCH RBC QN AUTO: 29 PG
MCHC RBC AUTO-ENTMCNC: 31.5 G/DL
MCV RBC AUTO: 92 FL
MONOCYTES # BLD AUTO: ABNORMAL K/UL
MONOCYTES NFR BLD: 4 %
MYELOCYTES NFR BLD MANUAL: 5 %
NEUTROPHILS NFR BLD: 66 %
NRBC BLD-RTO: 2 /100 WBC
OVALOCYTES BLD QL SMEAR: ABNORMAL
PHOSPHATE SERPL-MCNC: 3.7 MG/DL
PLATELET # BLD AUTO: 182 K/UL
PLATELET BLD QL SMEAR: ABNORMAL
PMV BLD AUTO: 12.9 FL
POIKILOCYTOSIS BLD QL SMEAR: SLIGHT
POLYCHROMASIA BLD QL SMEAR: ABNORMAL
POTASSIUM SERPL-SCNC: 3.6 MMOL/L
PROT SERPL-MCNC: 5 G/DL
RBC # BLD AUTO: 2.83 M/UL
SODIUM SERPL-SCNC: 138 MMOL/L
WBC # BLD AUTO: 11.7 K/UL

## 2018-05-07 PROCEDURE — 85007 BL SMEAR W/DIFF WBC COUNT: CPT

## 2018-05-07 PROCEDURE — 25000003 PHARM REV CODE 250: Performed by: STUDENT IN AN ORGANIZED HEALTH CARE EDUCATION/TRAINING PROGRAM

## 2018-05-07 PROCEDURE — 36415 COLL VENOUS BLD VENIPUNCTURE: CPT

## 2018-05-07 PROCEDURE — 85027 COMPLETE CBC AUTOMATED: CPT

## 2018-05-07 PROCEDURE — 94761 N-INVAS EAR/PLS OXIMETRY MLT: CPT

## 2018-05-07 PROCEDURE — 84100 ASSAY OF PHOSPHORUS: CPT

## 2018-05-07 PROCEDURE — 11000001 HC ACUTE MED/SURG PRIVATE ROOM

## 2018-05-07 PROCEDURE — 25000003 PHARM REV CODE 250: Performed by: ANESTHESIOLOGY

## 2018-05-07 PROCEDURE — 83735 ASSAY OF MAGNESIUM: CPT

## 2018-05-07 PROCEDURE — 80053 COMPREHEN METABOLIC PANEL: CPT

## 2018-05-07 PROCEDURE — 63600175 PHARM REV CODE 636 W HCPCS: Performed by: HOSPITALIST

## 2018-05-07 PROCEDURE — 25000003 PHARM REV CODE 250: Performed by: HOSPITALIST

## 2018-05-07 PROCEDURE — 97803 MED NUTRITION INDIV SUBSEQ: CPT

## 2018-05-07 PROCEDURE — 27000221 HC OXYGEN, UP TO 24 HOURS

## 2018-05-07 PROCEDURE — 99232 SBSQ HOSP IP/OBS MODERATE 35: CPT | Mod: ,,, | Performed by: HOSPITALIST

## 2018-05-07 PROCEDURE — 86580 TB INTRADERMAL TEST: CPT | Performed by: HOSPITALIST

## 2018-05-07 PROCEDURE — 99232 SBSQ HOSP IP/OBS MODERATE 35: CPT | Mod: ,,, | Performed by: INTERNAL MEDICINE

## 2018-05-07 RX ORDER — MORPHINE SULFATE 2 MG/ML
2 INJECTION, SOLUTION INTRAMUSCULAR; INTRAVENOUS
Status: DISCONTINUED | OUTPATIENT
Start: 2018-05-07 | End: 2018-05-14 | Stop reason: HOSPADM

## 2018-05-07 RX ADMIN — ACETAMINOPHEN 1000 MG: 500 TABLET, FILM COATED ORAL at 06:05

## 2018-05-07 RX ADMIN — SODIUM BICARBONATE 650 MG TABLET 650 MG: at 09:05

## 2018-05-07 RX ADMIN — RAMELTEON 8 MG: 8 TABLET, FILM COATED ORAL at 09:05

## 2018-05-07 RX ADMIN — OXYCODONE HYDROCHLORIDE 5 MG: 5 TABLET ORAL at 01:05

## 2018-05-07 RX ADMIN — ACETAMINOPHEN 1000 MG: 500 TABLET, FILM COATED ORAL at 02:05

## 2018-05-07 RX ADMIN — OXYCODONE HYDROCHLORIDE 5 MG: 5 TABLET ORAL at 06:05

## 2018-05-07 RX ADMIN — PREGABALIN 150 MG: 50 CAPSULE ORAL at 09:05

## 2018-05-07 RX ADMIN — OXYCODONE HYDROCHLORIDE 5 MG: 5 TABLET ORAL at 09:05

## 2018-05-07 RX ADMIN — Medication 5 UNITS: at 05:05

## 2018-05-07 RX ADMIN — LIDOCAINE 1 PATCH: 50 PATCH CUTANEOUS at 06:05

## 2018-05-07 NOTE — NURSING
Patient disoriented to situation. Patient stating no one has been in to check on her all day. Refusing all interventions including medications and physical therapy. Will continue to monitor.

## 2018-05-07 NOTE — PT/OT/SLP PROGRESS
Occupational Therapy      Patient Name:  Kerline Grossman   MRN:  [id number]    Patient not seen today secondary to pt refusal to participate with therapy 2/2 not feeling well. Pt educated on the benefits and importance of participating with therapy to maximize recovery. OT also educated pt that because she has refused OT treatment 3x consecutively, she will likely be discharged from OT if she continues to refuse for the 4th consecutive visit. Pt remained adamant and continued to refuse..    Guerda Schulz, OT  5/7/2018

## 2018-05-07 NOTE — PROGRESS NOTES
" [facility]  Adult Nutrition  Progress Note    SUMMARY       Recommendations    Recommendation/Intervention: Recommend Mech Soft diet. Encourage PO intake > 50% EEN/EPN + ONS TID. RD to Follow  Goals: Meet % EEN, EPN  Nutrition Goal Status: goal not met  Communication of RD Recs: reviewed with RN    Reason for Assessment    Reason for Assessment: RD follow-up  Diagnosis: other (see comments) (Adenocarcinoma)  Relevant Medical History: metastatic breast cancer  Interdisciplinary Rounds: did not attend  General Information Comments: Pt on Mech Soft diet, but not consuming anything PO. Pt currently refusing Meds and Procedures. Family is trying to come to terms w/ poor prognosis and having a hard time deciding on best way to proceed.  Nutrition Discharge Planning: Adequate nutrition    Nutrition Risk Screen    Nutrition Risk Screen: no indicators present    Nutrition/Diet History    Patient Reported Diet/Restrictions/Preferences: general  Do you have any cultural, spiritual, Religion conflicts, given your current situation?: none reported  Factors Affecting Nutritional Intake: difficulty/impaired swallowing    Anthropometrics    Temp: 97.7 °F (36.5 °C)  Height Method: Stated  Height: 5' 6" (167.6 cm)  Height (inches): 66 in  Weight Method: Bed Scale  Weight: 74 kg (163 lb 2.3 oz)  Weight (lb): 163.14 lb  Ideal Body Weight (IBW), Female: 130 lb  % Ideal Body Weight, Female (lb): 125.49 lb  BMI (Calculated): 26.4  BMI Grade: 25 - 29.9 - overweight  Usual Body Weight (UBW), kg:  (DARSHAN)       Lab/Procedures/Meds    Pertinent Labs Reviewed: reviewed  Pertinent Labs Comments:   [facility]  Lab Results   Component Value Date    CALCIUM 8.0 (L) 05/07/2018      ALT 7 (L) 05/07/2018    AST 52 (H) 05/07/2018    ALKPHOS 265 (H) 05/07/2018      ALBUMIN 1.4 (L) 05/07/2018         Pertinent Medications Reviewed: reviewed  Pertinent Medications Comments:   Scheduled Meds:   magnesium oxide  800 mg Daily    " polyethylene glycol  17 g Daily    senna-docusate 8.6-50 mg  2 tablet Daily     Continuous Infusions:  PRN Meds:. insulin aspart U-100, promethazine (PHENERGAN) IVPB,  Physical Findings/Assessment    Overall Physical Appearance: nourished, weak  Oral/Mouth Cavity: WDL  Skin: incision(s)    Estimated/Assessed Needs    Weight Used For Calorie Calculations: 68.6 kg (151 lb 3.8 oz)  Energy Calorie Requirements (kcal): 2058 kcal/d  Energy Need Method: Kcal/kg (30 kcal/kg)  Protein Requirements:  g/d (1.2-1.5 g/kg)  Weight Used For Protein Calculations: 68.6 kg (151 lb 3.8 oz)     Fluid Need Method: other (see comments) (Per MD or 1 mL/kcal)  RDA Method (mL): 2058         Nutrition Prescription Ordered    Current Diet Order: Regular  Oral Nutrition Supplement: Boost Plus TID    Evaluation of Received Nutrient/Fluid Intake    IV Fluid (mL):  (PRN)  I/O: -7.9L Since admit     Intake/Output Summary (Last 24 hours) at 05/07/18 0830  Last data filed at 05/07/18 0352   Gross per 24 hour   Intake               50 ml   Output             1750 ml   Net            -1700 ml       Comments: LBM: 5/4/18  % Intake of Estimated Energy Needs: 0 - 25 %  % Meal Intake: NPO    Nutrition Risk    Level of Risk/Frequency of Follow-up: high     Assessment and Plan    Severe malnutrition      Nutrition Problem  Inadequate energy intake    Related to (etiology):   Decreased appetite 2/2 general pain    Signs and Symptoms (as evidenced by):   PO intake 0% for 8 Days    Nutrition Diagnosis Status:   continues                 Monitor and Evaluation    Food and Nutrient Intake: energy intake, food and beverage intake, enteral nutrition intake  Food and Nutrient Administration: diet order, enteral and parenteral nutrition administration  Physical Activity and Function: nutrition-related ADLs and IADLs  Anthropometric Measurements: weight, weight change  Biochemical Data, Medical Tests and Procedures: lipid profile, inflammatory profile,  glucose/endocrine profile, electrolyte and renal panel, gastrointestinal profile  Nutrition-Focused Physical Findings: overall appearance     Nutrition Follow-Up    RD Follow-up?: Yes

## 2018-05-07 NOTE — PT/OT/SLP PROGRESS
Physical Therapy      Patient Name:  Kerline Grossman   MRN:  [id number]    Patient not seen today secondary to declining to work with therapy 2/2 not feeling well. PT informed pt that she has refused treatment 4 times in a row and would likely be discharged from PT if she continued to refuse. Pt again declined to be seen   .   Miguel Patel, PT

## 2018-05-07 NOTE — ASSESSMENT & PLAN NOTE
Nutrition Problem  Inadequate energy intake    Related to (etiology):   Decreased appetite 2/2 general pain    Signs and Symptoms (as evidenced by):   PO intake 0% for 8 Days    Nutrition Diagnosis Status:   continues

## 2018-05-07 NOTE — PLAN OF CARE
Discharge planning: Called Leonarda Grossman (oldest daughter)  [phone] to discuss discharge planning. Discussed options and family is not able to provide care at home. Choice is [facility] with [facility] hosp[facility]. Referral sent to [facility] and spoke with Scooter on phone. Email request sent to [facility] to have referral sent to [facility]

## 2018-05-07 NOTE — PROGRESS NOTES
Hospital Medicine  Progress note     Team: [facility] [place] [facility] MED B Dr. Cruz  Admit Date: 4/20/2018  VIGNESH 5/8/2018  Code status: Full Code     Principal Problem:  Metastatic breast cancer      Interval hx:   5/1: Mg 1.5-replaced. Oncology consult: outside path suggestive of breast primary with moderately differentiated adenocarcinoma ER/NM+, HER 2-. Will f/u on path to confirm. Consider MRI brain w/ w/o contrast once medically stable.  Radiation Oncology consulted. Family wants to talk about options with Oncology and Radiation Oncology before finalizing any palliative care plans. 5L NC-taper. Systolic BP 70s this AM, up to 113 with IVF bolus. Chest Xray -Small lung volumes and bibasal patchy subsegmental opacities persist, similar to recent studies.  No new disease identified , NS bolus, Blood cultures, Lactic Acid, UA, CBC/CMP, Troponin ordered. Decreased prn oxycodone to 5mg q4hrs.   5/2:  Blood cultures NGTD, lactate 2.2, troponin 0.015, UA 1+ occult blood and trace leukocytes with few yeast and occasional bacteria. WBC increased to 15. Started on soft diet. Mg 1.5-replaced. Dietary Consult: Please order speech consult. Continue regular diet, texture per SLP. Continue Boost Plus TID. Speech Consult: Clinical evaluation of swallow complete. Recommend dental soft diet with thin liquids. Given pt with scattered dentition, she appears to have met max potential with SLP services at this time. Tapered to Room Air. MRI w and w/o contrast ordered. s/p radiation oncology evaluation: radiation treatment of each of the mailed femurs is recommended. 10 fractions of 3Gy each as outpatient.  5/3: MRI Brain w w/o contrast today. Bicarb 16-replaced. Remove rocha. Discussed working on increasing ambulation now that rocha is removed. Transition to oral pain medication: oxycodone 5mg q4hrs. 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 Diffuse osseous metastatic disease involving the calvarium, clivus, and visualized upper cervical spine, noting posterior bulging at the body of C2.  5/4: Mg 1.5-replaced. 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 Recommend focusing on quality of life and comfort measures. Family discussion with oncology to occur. Given RUE weakness, MRI brachial plexus tomorrow.   5/5: MRA brachial plexus scheduled today. Palliative care has seen patient to discuss comfort care plan with her and her family; they do not currently want to start this conversation and wish to revisit this later  5/6: MRA of brachial plexus deferred yesterday; May still complete today. Hypomagnesemia repleted. Patient has 7/10 pain of neck and back. Discussed advanced directives, namely DNR. Heme/onc discussed palliative vs. Hospice (inpatient vs. Home) with the patient, leaning towards hospice  5/7: Not talking to much this morning, 10/10 neck pain. DNR was discussed with patient, she states she has other things on her mind right now. Family decision will need to be made but possible hospice.          ROS   Not able to stand, RUE weakness  Pain Scale:  10/10 neck pain this AM, improved   Respiratory: no cough or shortness of breath  Cardiovascular: no chest pain or palpitations  Gastrointestinal: no nausea or vomiting, no abdominal pain or change in bowel habits  Behavioral/Psych: no depression or anxiety        PEx  Temp:  [96.8 °F (36 °C)-98.3 °F (36.8 °C)]   Pulse:  [100-122]   Resp:  [14-16]   BP: (112-128)/(55-76)   SpO2:  [87 %-100 %]      Intake/Output Summary (Last 24 hours) at 05/07/18 2205  Last data filed at 05/07/18 2398    Gross per 24 hour   Intake               50 ml   Output             1750 ml   Net            -1700 ml         General  Appearance: no acute distress   Heart: regular rate and rhythm  Respiratory: Normal respiratory effort, no crackles   Abdomen: Soft, non-tender; bowel sounds active  Skin: intact. IV sites ok  Neurologic:  No focal numbness. Right arm weakness: 2/5.    Mental status: Alert, oriented x 2, affect appropriate   Extremities: Cannot stand without assistance 2/2 UE weakness     Recent Labs  Lab 05/05/18  0352 05/06/18  0401 05/07/18  0428   WBC 13.67* 13.22* 11.70   HGB 9.6* 8.7* 8.2*   HCT 30.6* 27.3* 26.0*    171 182         Recent Labs  Lab 05/05/18  0352 05/06/18  0401 05/07/18  0428    140 138   K 3.7 3.5 3.6    109 107   CO2 21* 22* 23   BUN 17 15 14   CREATININE 0.8 0.7 0.8   GLU 83 68* 71   CALCIUM 8.0* 8.0* 8.0*   MG 1.6 1.5* 1.7   PHOS 3.0 3.3 3.7         Recent Labs  Lab 05/05/18  0352 05/06/18  0401 05/07/18  0428   ALKPHOS 314* 279* 265*   ALT 6* 6* 7*   AST 51* 54* 52*   ALBUMIN 1.4* 1.3* 1.4*   PROT 5.4* 5.0* 5.0*   BILITOT 0.6 0.6 0.5         Recent Labs  Lab 05/03/18  1736 05/04/18  0145 05/04/18  1312 05/04/18  2356 05/05/18  0633 05/05/18  1753   POCTGLUCOSE 96 99 125* 102 92 88         Scheduled Meds:   acetaminophen  1,000 mg Oral Q6H    enoxaparin  40 mg Subcutaneous Daily    lidocaine  1 patch Transdermal Q24H    magnesium oxide  800 mg Oral Daily    polyethylene glycol  17 g Oral Daily    pregabalin  150 mg Oral QHS    ramelteon  8 mg Oral QHS    senna-docusate 8.6-50 mg  2 tablet Oral Daily    sodium bicarbonate  1 tablet Oral BID      Continuous Infusions:  As Needed:  sodium chloride, bisacodyl, dextrose 50%, dextrose 50%, glucagon (human recombinant), insulin aspart U-100, morphine, omnipaque, ondansetron, oxyCODONE, promethazine (PHENERGAN) IVPB, promethazine, ramelteon, sodium chloride 0.9%, sodium chloride 0.9%, sodium chloride 0.9%, sodium chloride 0.9%, tiZANidine, white petrolatum            Active Hospital Problems     Diagnosis   POA    *Metastatic breast  cancer [C50.919]   Yes    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, 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]   Yes    Weakness of right upper extremity [R29.898]   Yes    Leukocytosis [D72.829]   Yes    Malnutrition of moderate degree [E44.0]   Yes    Malignant neoplasm of central portion of right breast in female, estrogen receptor positive [C50.111, Z17.0]   Not Applicable    Cardiac rhythm disorder or disturbance or change [I49.9]   Yes    CHF (congestive heart failure) [I50.9]   Yes    Right heart failure [I50.810]   Yes    Acute hypoxemic respiratory failure [J96.01]   Yes    Tachycardia [R00.0]   Yes    Lactic acidemia [E87.2]   Yes    Severe malnutrition [E43]   Yes    Cancer of left femur [C40.22]   Yes    Cancer of right femur [C40.21]   Yes    Palliative care encounter [Z51.5]   Not Applicable    Pain due to malignant neoplasm metastatic to bone [G89.3, C79.51]   Yes    Anemia [D64.9]   Yes    Thrombocytopenia [D69.6]   Yes    Hypomagnesemia [E83.42]   Yes    Bony metastasis [C79.51]   Yes       Xray metastatic survey shows lytic lesions of skull, lytic spine and rib lesions with pathologic fractures. Diffuse lytic lesions throughout entire central axial red marrow skeleton. Lytic lesions of proximal long bones and pathological fractures of ribs and right superior and inferior pubic ramus.       Pleural effusion [J90]   Yes       Resolved Hospital Problems     Diagnosis Date Resolved POA    Shock [R57.9] 04/26/2018 Yes    Encounter for weaning from ventilator [Z99.11] 04/30/2018 Not Applicable    Encephalopathy, metabolic [G93.41] 04/21/2018 Yes         Overview  Mrs. Grossman is a 57 yo female who was brought to [facility] [place] [facility] ED on 4/20/2018 for second opinion regarding recently diagnosed likely metastatic malignancy with unknown primary.      Assessment and Plan for Problems addressed today:     Metastatic breast cancer  Pain due to malignant neoplasm metastatic to bone    Meningeal carcinomatosis     Palliative Care Encounter  -  oncology following, pt did not want to talk this afternoon after arriving to the floor from surgery, discussed with Dr. Fink (onc) he will see her tomorrow  - she may be a candidate for some oral therapy  - radiation oncology consult placed  - pain control (BP is sensitive to narcotics)  - palliative care saw her while she was in ICU, would continue discussions. Family wants to talk about options with Oncology and Radiation Oncology before finalizing any palliative care plans.   - 5/1 Oncology recs: outside path suggestive of breast primary with moderately differentiated adenocarcinoma ER/MI+, HER 2-. Will f/u on path to confirm. Consider MRI brain w/ w/o contrast once medically stable.   - s/p radiation oncology evaluation: radiation treatment of each of the mailed femurs is recommended. 10 fractions of 3Gy each as outpatient   - 5/2: Right upper extremity weakness-MRI Brain w w/o contrast ordered.         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 Diffuse osseous metastatic disease involving the calvarium, clivus, and visualized upper cervical spine, noting posterior bulging at the body of C2. Transitioned to oral pain medications: oxycodone 5mg PRN q4hrs.      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 Recommend focusing on quality of life and comfort measures. MRI brachial plexus tomorrow for RUE weakness.   - 5/5: Palliative care has seen patient to discuss comfort care plan with her and her family; they do not currently want to start this conversation and wish to revisit this later.  -5/6: MRA of brachial plexus deferred yesterday; May still complete today. Discussed advanced directives, namely  DNR. Heme/onc discussed palliative vs. Hospice (inpatient vs. Home) with the patient, leaning towards hospice. Continue pain management with lidocaine patch and Acetominophen.     5/7: MRA brachial plexus patient not interested. Discussed DNR status but patient states she has other things on her mind. Family discussion leaning towards hospice.      Pathologic fracture of ribs pubic ramus  - s/p IM nailing femur x2     Hypoxemic respiratory failure  - pulmonary edema from IVF  - indeterminate diastolic function on 4/27 echo, PAP 53mmHg  - currently on 4L NC since arriving to floor from PACU  - 5/1: increased to 5L NC.   - 5/2: tapered to room air.      Hypotension -likely orthostasis   -5/1: Systolic BP 70s this AM, up to 113 with IVF bolus. Decreased prn oxycodone to 5mg q4hrs. NS bolus, Blood cultures, Lactic Acid, UA, CBC/CMP, Troponin ordered. Chest Xray: Small lung volumes and bibasal patchy subsegmental opacities persist, similar to recent studies.  No new disease identified   -5/2: Blood cultures NGTD, lactate 2.2, troponin 0.015, UA 1+ occult blood and trace leukocytes with few yeast and occasional bacteria. WBC increased to 15.     Non-Anion Gap Metabolic Acidosis  -5/3: bicarb 16-replaced  -5/4: bicarb 22  -5/6: bicarb still at 22     Lactic acidosis  - 2.7 overnight, responded to IVF  - does not appear infected  - again elevated to 2.4 after procedure, will trend  - 4/30: 2.6, monitor     Urinary incontinence   - rocha in place, consider switch to purwick when pt can tolerate  - 5/3: removed rocha     Pleural effusion  - likely metastatic, s/p sampling at OSH  - associated compressive atelectasis bilaterally  - multiple pulmonary micronodules     Anemia of Chronic Disease, stable  - likely chronic disease with some blood loss during procedures  - iron studies pending: Iron 50 (wnl), TIBC 160 (low), Saturation 31 (wnl), Transferrin 108 (low)  - monitor with CBC daily  - 5/2: Hgb 9.7  -5/6: Hgb 9.6-->  8.7----> 8.2     Hypomagnesemia  -most likely 2/2 chronic disease  -4/22: 1.4-replaced  -5/1: 1.5-replaced  -5/2: 1.5-replaced  -5/4: 1.5-replaced  -5/6: 1.5-replaced     Hypophosphatemia, resolved   -4/22: 2.5 - replaced  - Most likely 2/2 chronic disease     Thrombocytopenia, resolved  -4/22: 122--> 165 resolved   -4/30: 115-->135 (5/2)  -Most likely 2/2 chronic disease  -5/5: 136-->153, resolved     Congestive Heart Failure  Right Heart Failure  -4/27 Echo: Right Ventricular Enlargement with normal systolic function  -5/1:      Malnutrition     -Prealbumin of 4 . Dietary consulted: Please order speech consult. Continue regular diet, texture per SLP. Continue Boost Plus TID.     -Speech Consult: Clinical evaluation of swallow complete. Recommend dental soft diet with thin liquids. Given pt with scattered dentition, she appears to have met max potential with SLP services at this time.     DVT PPx: Enoxaparin 40mg daily     Discharge plan and follow up  Pending family discussion      Provider     I personally scribed for David Cruz MD on 05/07/2018 at 11:27 AM. Electronically signed by kera Lipscomb III on 05/07/2018 at 11:27 AM  The documentation recorded by the scribe accurately reflects service I personally performed and the decisions made by me.  David Cruz MD  Attending Staff Physician  [facility]  pager- [phone]  [facility] - [phone]

## 2018-05-07 NOTE — MEDICAL/APP STUDENT
Hospital Medicine  Progress note    Team: [facility] HOSP MED B Dr. Cruz  Admit Date: 4/20/2018  VIGNESH 5/8/2018  Code status: Full Code    Principal Problem:  Metastatic breast cancer     Interval hx:   5/1: Mg 1.5-replaced. Oncology consult: outside path suggestive of breast primary with moderately differentiated adenocarcinoma ER/MI+, HER 2-. Will f/u on path to confirm. Consider MRI brain w/ w/o contrast once medically stable.  Radiation Oncology consulted. Family wants to talk about options with Oncology and Radiation Oncology before finalizing any palliative care plans. 5L NC-taper. Systolic BP 70s this AM, up to 113 with IVF bolus. Chest Xray -Small lung volumes and bibasal patchy subsegmental opacities persist, similar to recent studies.  No new disease identified , NS bolus, Blood cultures, Lactic Acid, UA, CBC/CMP, Troponin ordered. Decreased prn oxycodone to 5mg q4hrs.   5/2:  Blood cultures NGTD, lactate 2.2, troponin 0.015, UA 1+ occult blood and trace leukocytes with few yeast and occasional bacteria. WBC increased to 15. Started on soft diet. Mg 1.5-replaced. Dietary Consult: Please order speech consult. Continue regular diet, texture per SLP. Continue Boost Plus TID. Speech Consult: Clinical evaluation of swallow complete. Recommend dental soft diet with thin liquids. Given pt with scattered dentition, she appears to have met max potential with SLP services at this time. Tapered to Room Air. MRI w and w/o contrast ordered. s/p radiation oncology evaluation: radiation treatment of each of the mailed femurs is recommended. 10 fractions of 3Gy each as outpatient.  5/3: MRI Brain w w/o contrast today. Bicarb 16-replaced. Remove rocha. Discussed working on increasing ambulation now that rocha is removed. Transition to oral pain medication: oxycodone 5mg q4hrs. 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 Diffuse osseous metastatic disease involving the calvarium, clivus, and visualized upper cervical spine, noting posterior bulging at the body of C2.  5/4: Mg 1.5-replaced. 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 Recommend focusing on quality of life and comfort measures. Family discussion with oncology to occur. Given RUE weakness, MRI brachial plexus tomorrow.   5/5: MRA brachial plexus scheduled today. Palliative care has seen patient to discuss comfort care plan with her and her family; they do not currently want to start this conversation and wish to revisit this later  5/6: MRA of brachial plexus deferred yesterday; May still complete today. Hypomagnesemia repleted. Patient has 7/10 pain of neck and back. Discussed advanced directives, namely DNR. Heme/onc discussed palliative vs. Hospice (inpatient vs. Home) with the patient, leaning towards hospice  5/7: Not talking to much this morning, 10/10 neck pain. DNR was discussed with patient, she states she has other things on her mind right now. Family decision will need to be made but possible hospice.        ROS   Not able to stand, RUE weakness  Pain Scale:  10/10 neck pain  Respiratory: no cough or shortness of breath  Cardiovascular: no chest pain or palpitations  Gastrointestinal: no nausea or vomiting, no abdominal pain or change in bowel habits  Behavioral/Psych: no depression or anxiety      PEx  Temp:  [96.8 °F (36 °C)-98.3 °F (36.8 °C)]   Pulse:  [100-122]   Resp:  [14-16]   BP: (112-128)/(55-76)   SpO2:  [87 %-100 %]     Intake/Output Summary (Last 24 hours) at 05/07/18 0657  Last data filed at 05/07/18 0358   Gross per 24 hour   Intake               50 ml   Output             1750 ml   Net            -1700 ml       General Appearance: no acute distress    Heart: regular rate and rhythm  Respiratory: Normal respiratory effort, no crackles   Abdomen: Soft, non-tender; bowel sounds active  Skin: intact. IV sites ok  Neurologic:  No focal numbness. Right arm weakness: 2/5.    Mental status: Alert, oriented x 2, affect appropriate   Extremities: Cannot stand without assistance 2/2 UE weakness    Recent Labs  Lab 05/05/18  0352 05/06/18  0401 05/07/18  0428   WBC 13.67* 13.22* 11.70   HGB 9.6* 8.7* 8.2*   HCT 30.6* 27.3* 26.0*    171 182       Recent Labs  Lab 05/05/18  0352 05/06/18  0401 05/07/18  0428    140 138   K 3.7 3.5 3.6    109 107   CO2 21* 22* 23   BUN 17 15 14   CREATININE 0.8 0.7 0.8   GLU 83 68* 71   CALCIUM 8.0* 8.0* 8.0*   MG 1.6 1.5* 1.7   PHOS 3.0 3.3 3.7       Recent Labs  Lab 05/05/18  0352 05/06/18  0401 05/07/18  0428   ALKPHOS 314* 279* 265*   ALT 6* 6* 7*   AST 51* 54* 52*   ALBUMIN 1.4* 1.3* 1.4*   PROT 5.4* 5.0* 5.0*   BILITOT 0.6 0.6 0.5        Recent Labs  Lab 05/03/18  1736 05/04/18  0145 05/04/18  1312 05/04/18  2356 05/05/18  0633 05/05/18  1753   POCTGLUCOSE 96 99 125* 102 92 88       Scheduled Meds:   acetaminophen  1,000 mg Oral Q6H    enoxaparin  40 mg Subcutaneous Daily    lidocaine  1 patch Transdermal Q24H    magnesium oxide  800 mg Oral Daily    polyethylene glycol  17 g Oral Daily    pregabalin  150 mg Oral QHS    ramelteon  8 mg Oral QHS    senna-docusate 8.6-50 mg  2 tablet Oral Daily    sodium bicarbonate  1 tablet Oral BID     Continuous Infusions:    As Needed:  sodium chloride, bisacodyl, dextrose 50%, dextrose 50%, glucagon (human recombinant), insulin aspart U-100, morphine, omnipaque, ondansetron, oxyCODONE, promethazine (PHENERGAN) IVPB, promethazine, ramelteon, sodium chloride 0.9%, sodium chloride 0.9%, sodium chloride 0.9%, sodium chloride 0.9%, tiZANidine, white petrolatum    Active Hospital Problems    Diagnosis  POA    *Metastatic breast cancer [C50.919]  Yes    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, 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]  Yes    Weakness of right upper extremity [R29.898]  Yes    Leukocytosis [D72.829]  Yes    Malnutrition of moderate degree [E44.0]  Yes    Malignant neoplasm of central portion of right breast in female, estrogen receptor positive [C50.111, Z17.0]  Not Applicable    Cardiac rhythm disorder or disturbance or change [I49.9]  Yes    CHF (congestive heart failure) [I50.9]  Yes    Right heart failure [I50.810]  Yes    Acute hypoxemic respiratory failure [J96.01]  Yes    Tachycardia [R00.0]  Yes    Lactic acidemia [E87.2]  Yes    Severe malnutrition [E43]  Yes    Cancer of left femur [C40.22]  Yes    Cancer of right femur [C40.21]  Yes    Palliative care encounter [Z51.5]  Not Applicable    Pain due to malignant neoplasm metastatic to bone [G89.3, C79.51]  Yes    Anemia [D64.9]  Yes    Thrombocytopenia [D69.6]  Yes    Hypomagnesemia [E83.42]  Yes    Bony metastasis [C79.51]  Yes     Xray metastatic survey shows lytic lesions of skull, lytic spine and rib lesions with pathologic fractures. Diffuse lytic lesions throughout entire central axial red marrow skeleton. Lytic lesions of proximal long bones and pathological fractures of ribs and right superior and inferior pubic ramus.      Pleural effusion [J90]  Yes      Resolved Hospital Problems    Diagnosis Date Resolved POA    Shock [R57.9] 04/26/2018 Yes    Encounter for weaning from ventilator [Z99.11] 04/30/2018 Not Applicable    Encephalopathy, metabolic [G93.41] 04/21/2018 Yes       Overview  Mrs. Grossman is a 57 yo female who was brought to [facility] ED on 4/20/2018 for second opinion regarding recently diagnosed likely metastatic malignancy with unknown primary.      Assessment and Plan for Problems addressed today:     Metastatic breast cancer  Pain due to malignant neoplasm metastatic to bone    Meningeal carcinomatosis     Palliative Care Encounter  - oncology following, pt did not want to talk this afternoon after  arriving to the floor from surgery, discussed with Dr. Fink (onc) he will see her tomorrow  - she may be a candidate for some oral therapy  - radiation oncology consult placed  - pain control (BP is sensitive to narcotics)  - palliative care saw her while she was in ICU, would continue discussions. Family wants to talk about options with Oncology and Radiation Oncology before finalizing any palliative care plans.   - 5/1 Oncology recs: outside path suggestive of breast primary with moderately differentiated adenocarcinoma ER/VA+, HER 2-. Will f/u on path to confirm. Consider MRI brain w/ w/o contrast once medically stable.   - s/p radiation oncology evaluation: radiation treatment of each of the mailed femurs is recommended. 10 fractions of 3Gy each as outpatient   - 5/2: Right upper extremity weakness-MRI Brain w w/o contrast ordered.         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 Diffuse osseous metastatic disease involving the calvarium, clivus, and visualized upper cervical spine, noting posterior bulging at the body of C2. Transitioned to oral pain medications: oxycodone 5mg PRN q4hrs.      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 Recommend focusing on quality of life and comfort measures. MRI brachial plexus tomorrow for RUE weakness.   - 5/5: Palliative care has seen patient to discuss comfort care plan with her and her family; they do not currently want to start this conversation and wish to revisit this later.  -5/6: MRA of brachial plexus deferred yesterday; May still complete today. Discussed advanced directives, namely DNR. Heme/onc discussed palliative vs. Hospice (inpatient vs.  Home) with the patient, leaning towards hospice. Continue pain management with lidocaine patch and Acetominophen.     5/7: MRA brachial plexus patient not interested. Discussed DNR status but patient states she has other things on her mind. Family discussion leaning towards hospice.      Pathologic fracture of ribs pubic ramus  - s/p IM nailing femur x2     Hypoxemic respiratory failure  - pulmonary edema from IVF  - indeterminate diastolic function on 4/27 echo, PAP 53mmHg  - currently on 4L NC since arriving to floor from PACU  - 5/1: increased to 5L NC.   - 5/2: tapered to room air.      Hypotension -likely orthostasis   -5/1: Systolic BP 70s this AM, up to 113 with IVF bolus. Decreased prn oxycodone to 5mg q4hrs. NS bolus, Blood cultures, Lactic Acid, UA, CBC/CMP, Troponin ordered. Chest Xray: Small lung volumes and bibasal patchy subsegmental opacities persist, similar to recent studies.  No new disease identified   -5/2: Blood cultures NGTD, lactate 2.2, troponin 0.015, UA 1+ occult blood and trace leukocytes with few yeast and occasional bacteria. WBC increased to 15.     Non-Anion Gap Metabolic Acidosis  -5/3: bicarb 16-replaced  -5/4: bicarb 22  -5/6: bicarb still at 22     Lactic acidosis  - 2.7 overnight, responded to IVF  - does not appear infected  - again elevated to 2.4 after procedure, will trend  - 4/30: 2.6, monitor     Urinary incontinence   - rocha in place, consider switch to purwick when pt can tolerate  - 5/3: removed rocha     Pleural effusion  - likely metastatic, s/p sampling at OSH  - associated compressive atelectasis bilaterally  - multiple pulmonary micronodules     Anemia of Chronic Disease, stable  - likely chronic disease with some blood loss during procedures  - iron studies pending: Iron 50 (wnl), TIBC 160 (low), Saturation 31 (wnl), Transferrin 108 (low)  - monitor with CBC daily  - 5/2: Hgb 9.7  -5/6: Hgb 9.6--> 8.7----> 8.2     Hypomagnesemia  -most likely 2/2 chronic  disease  -4/22: 1.4-replaced  -5/1: 1.5-replaced  -5/2: 1.5-replaced  -5/4: 1.5-replaced  -5/6: 1.5-replaced     Hypophosphatemia, resolved   -4/22: 2.5 - replaced  - Most likely 2/2 chronic disease     Thrombocytopenia, resolved  -4/22: 122--> 165 resolved   -4/30: 115-->135 (5/2)  -Most likely 2/2 chronic disease  -5/5: 136-->153, resolved     Congestive Heart Failure  Right Heart Failure  -4/27 Echo: Right Ventricular Enlargement with normal systolic function  -5/1:      Malnutrition     -Prealbumin of 4 . Dietary consulted: Please order speech consult. Continue regular diet, texture per SLP. Continue Boost Plus TID.     -Speech Consult: Clinical evaluation of swallow complete. Recommend dental soft diet with thin liquids. Given pt with scattered dentition, she appears to have met max potential with SLP services at this time.     DVT PPx: Enoxaparin 40mg daily     Discharge plan and follow up  Pending family discussion     Provider    I personally scribed for David Cruz MD on 05/07/2018 at 11:27 AM. Electronically signed by kera Lipscomb III on 05/07/2018 at 11:27 AM

## 2018-05-07 NOTE — PT/OT/SLP DISCHARGE
Occupational Therapy Discharge Summary    Kerline Grossman  MRN: [id number]   Principal Problem: Metastatic breast cancer      Patient Discharged from acute Occupational Therapy on 5/7/2018.  Please refer to prior OT note dated 5/1/2018  for functional status.    Assessment:      Patient is no longer making progress. Pt also has refused 4 consecutive OT visits despite max education on participating with therapy to maximize recovery.     Objective:     GOALS:    Occupational Therapy Goals        Problem: Occupational Therapy Goal    Goal Priority Disciplines Outcome Interventions   Occupational Therapy Goal     OT, PT/OT Ongoing (interventions implemented as appropriate)    Description:  Goals to be met by: 5/15/18     Patient will increase functional independence with ADLs by performing:    UE Dressing with Minimal Assistance.  LE Dressing with Minimal Assistance.  Grooming while EOB with Minimal Assistance.  Toileting from bedside commode with Minimal Assistance for hygiene and clothing management.   Rolling to Bilateral with Minimal Assistance.   Supine to sit with Minimal Assistance.  Stand pivot transfers with Moderate Assistance.  Toilet transfer to bedside commode with Moderate Assistance.                      Reasons for Discontinuation of Therapy Services  Transfer to alternate level of care.      Plan:     Patient Discharged to: Per chart, referral is being sent to [facility] for residential placement      Guerda Schulz, OT  5/7/2018

## 2018-05-07 NOTE — PROGRESS NOTES
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] 05/04/2018 Yes    Weakness of right upper extremity [R29.898] 05/03/2018 Yes    Leukocytosis [D72.829] 05/03/2018 Yes    Malnutrition of moderate degree [E44.0] 05/01/2018 Yes    Malignant neoplasm of central portion of right breast in female, estrogen receptor positive [C50.111, Z17.0]  Not Applicable    Cardiac rhythm disorder or disturbance or change [I49.9]  Yes    CHF (congestive heart failure) [I50.9]  Yes    Right heart failure [I50.810] 04/26/2018 Yes    Acute hypoxemic respiratory failure [J96.01] 04/26/2018 Yes    Tachycardia [R00.0]  Yes    Lactic acidemia [E87.2]  Yes    Severe malnutrition [E43] 04/25/2018 Yes    Cancer of left femur [C40.22] 04/24/2018 Yes    Cancer of right femur [C40.21] 04/24/2018 Yes    Palliative care encounter [Z51.5] 04/23/2018 Not Applicable    Pain due to malignant neoplasm metastatic to bone [G89.3, C79.51] 04/23/2018 Yes    Anemia [D64.9] 04/22/2018 Yes    Thrombocytopenia [D69.6] 04/22/2018 Yes    Hypomagnesemia [E83.42] 04/22/2018 Yes    Bony metastasis [C79.51] 04/21/2018 Yes    Pleural effusion [J90] 04/21/2018 Yes      Problems Resolved During this Admission:    Diagnosis Date Noted Date Resolved POA    Shock [R57.9] 05/01/2018 04/26/2018 Yes    Encounter for weaning from ventilator [Z99.11]  04/30/2018 Not Applicable    Encephalopathy, metabolic [G93.41] 04/21/2018 04/21/2018 Yes  "    Palliative care encounter / Goals of care  5/7/18 met with pt. She is aware that she has a terminal diagnosis. She states she would not want any chemo anyway as a family member recently passed and did poorly on it.   She is aware that there is not good therapy option as per Dr. Park's note  Main complaint is back pain when moving and dry nose with Oxygen. Otherwise feels ok.   Discussed in detail with oldest son Chauncey. He understands the current situation and they have been looking at discharge options.   The children (she has 10) have visited [facility] and wish for the pt. To go there, if accepted.   There is no option of home hospice as everyone is working.   Chauncey is leaving [facility] for job training today and asks to call his sister Leonarda ([phone] ) for any questions.   We have discussed DNR status and he agrees. However, there is no legal power of . Will discuss with pt. On next visit.   Appears that current pain Rx. Is sufficient. Would continue the same.      I will follow along with you. Please call [phone] with questions.     Subjective:     HPI: Kerline Grossman is a 59 yo woman with medical history significant for chronic mood disorder who presented to OSH with ZACH, hypercalcemia, and lytic lesions concerning for MM.  Workup for MM negative thus far.  Her family wanted to transfer her to [facility], but it was denied, so the family had her discharged and transported her to [facility] themselves.  She arrived hypotensive, which may have been 2/2 opiates (received morphine at OSH).  Hypotension resolved with IVF and only required brief stay under ICU care before being transferred to the floor.    Interval History: no new findings over the weekend.   Pt. Resting comfortably this AM but reports she feels best laying flat on her back. When she moves she has pain.   Does not tolerate NC oxygen well as it "dries her nose".   No family at the bedside.   Spoke with oldest son Chauncey Young ([phone]) over " the phone.     Medications:  Continuous Infusions:  Scheduled Meds:   acetaminophen  1,000 mg Oral Q6H    enoxaparin  40 mg Subcutaneous Daily    lidocaine  1 patch Transdermal Q24H    magnesium oxide  800 mg Oral Daily    polyethylene glycol  17 g Oral Daily    pregabalin  150 mg Oral QHS    ramelteon  8 mg Oral QHS    senna-docusate 8.6-50 mg  2 tablet Oral Daily    sodium bicarbonate  1 tablet Oral BID     PRN Meds:sodium chloride, bisacodyl, dextrose 50%, dextrose 50%, glucagon (human recombinant), insulin aspart U-100, morphine, omnipaque, ondansetron, oxyCODONE, promethazine (PHENERGAN) IVPB, promethazine, ramelteon, sodium chloride 0.9%, sodium chloride 0.9%, sodium chloride 0.9%, sodium chloride 0.9%, tiZANidine, white petrolatum    Review of Systems   Constitutional: Negative for chills and fever.   HENT: Negative for hearing loss.    Respiratory: Positive for shortness of breath. Negative for cough and hemoptysis.    Cardiovascular: Negative for chest pain and palpitations.   Gastrointestinal: Negative for abdominal pain, constipation, diarrhea, heartburn and vomiting.   Genitourinary: Negative for dysuria.   Musculoskeletal: Positive for back pain.   Neurological: Negative for dizziness.     Objective:     Vital Signs (Most Recent):  Temp: 97.8 °F (36.6 °C) (05/07/18 1106)  Pulse: 99 (05/07/18 0803)  Resp: 16 (05/07/18 1106)  BP: 122/71 (05/07/18 1106)  SpO2: (!) 91 % (05/06/18 1909) Vital Signs (24h Range):  Temp:  [96.8 °F (36 °C)-98.3 °F (36.8 °C)] 97.8 °F (36.6 °C)  Pulse:  [] 99  Resp:  [16-17] 16  SpO2:  [91 %-100 %] 91 %  BP: (112-125)/(55-71) 122/71     Physical Exam   Constitutional: She is oriented to person, place, and time. She appears well-developed and well-nourished. No distress.   Eyes: Right eye exhibits no discharge.   Neck: No JVD present.   Cardiovascular: Normal rate.    No murmur heard.  Pulmonary/Chest: Effort normal. No respiratory distress.   Abdominal: Soft.    Musculoskeletal: She exhibits no edema.   Neurological: She is alert and oriented to person, place, and time.       Laboratory:   CBC:   Recent Labs  Lab 05/07/18  0428   WBC 11.70   RBC 2.83*   HGB 8.2*   HCT 26.0*      MCV 92   MCH 29.0   MCHC 31.5*       CMP:   Recent Labs  Lab 05/07/18  0428   GLU 71   CALCIUM 8.0*   ALBUMIN 1.4*   PROT 5.0*      K 3.6   CO2 23      BUN 14   CREATININE 0.8   ALKPHOS 265*   ALT 7*   AST 52*   BILITOT 0.5       POCT Glucose   Date Value Ref Range Status   05/05/2018 88 70 - 110 mg/dL Final   05/05/2018 92 70 - 110 mg/dL Final   05/04/2018 102 70 - 110 mg/dL Final   05/04/2018 125 (H) 70 - 110 mg/dL Final         Significant Imaging:     > 50% of 45 min visit spent in chart review, face to face discussion of goals of care,  symptom assessment, coordination of care and emotional support.    Steve Bunch MD  Palliative Medicine  [facility]

## 2018-05-07 NOTE — NURSING
Pt woke up with severe pain to the back of the head and neck area states pain is 10/10. Daughter requesting IV pain medication to control pain at a quicker rate. TONEY Chow PA-C notified and Morphine 2mg q 1hr ordered.

## 2018-05-08 LAB
ALBUMIN SERPL BCP-MCNC: 1.5 G/DL
ALP SERPL-CCNC: 280 U/L
ALT SERPL W/O P-5'-P-CCNC: 6 U/L
ANION GAP SERPL CALC-SCNC: 10 MMOL/L
ANISOCYTOSIS BLD QL SMEAR: SLIGHT
AST SERPL-CCNC: 58 U/L
BASOPHILS # BLD AUTO: ABNORMAL K/UL
BASOPHILS NFR BLD: 1 %
BILIRUB SERPL-MCNC: 0.5 MG/DL
BUN SERPL-MCNC: 13 MG/DL
CALCIUM SERPL-MCNC: 7.9 MG/DL
CHLORIDE SERPL-SCNC: 108 MMOL/L
CO2 SERPL-SCNC: 22 MMOL/L
CREAT SERPL-MCNC: 0.7 MG/DL
DIFFERENTIAL METHOD: ABNORMAL
EOSINOPHIL # BLD AUTO: ABNORMAL K/UL
EOSINOPHIL NFR BLD: 1 %
ERYTHROCYTE [DISTWIDTH] IN BLOOD BY AUTOMATED COUNT: 18.6 %
EST. GFR  (AFRICAN AMERICAN): >60 ML/MIN/1.73 M^2
EST. GFR  (NON AFRICAN AMERICAN): >60 ML/MIN/1.73 M^2
GLUCOSE SERPL-MCNC: 68 MG/DL
HCT VFR BLD AUTO: 27.7 %
HGB BLD-MCNC: 8.4 G/DL
IMM GRANULOCYTES # BLD AUTO: ABNORMAL K/UL
IMM GRANULOCYTES NFR BLD AUTO: ABNORMAL %
LYMPHOCYTES # BLD AUTO: ABNORMAL K/UL
LYMPHOCYTES NFR BLD: 18 %
MAGNESIUM SERPL-MCNC: 1.5 MG/DL
MCH RBC QN AUTO: 28.5 PG
MCHC RBC AUTO-ENTMCNC: 30.3 G/DL
MCV RBC AUTO: 94 FL
METAMYELOCYTES NFR BLD MANUAL: 2 %
MONOCYTES # BLD AUTO: ABNORMAL K/UL
MONOCYTES NFR BLD: 8 %
MYELOCYTES NFR BLD MANUAL: 2 %
NEUTROPHILS NFR BLD: 63 %
NEUTS BAND NFR BLD MANUAL: 5 %
NRBC BLD-RTO: 3 /100 WBC
OVALOCYTES BLD QL SMEAR: ABNORMAL
PHOSPHATE SERPL-MCNC: 3.6 MG/DL
PLATELET # BLD AUTO: 193 K/UL
PMV BLD AUTO: 12.1 FL
POIKILOCYTOSIS BLD QL SMEAR: SLIGHT
POLYCHROMASIA BLD QL SMEAR: ABNORMAL
POTASSIUM SERPL-SCNC: 3.7 MMOL/L
PROT SERPL-MCNC: 5.1 G/DL
RBC # BLD AUTO: 2.95 M/UL
SODIUM SERPL-SCNC: 140 MMOL/L
WBC # BLD AUTO: 13.03 K/UL

## 2018-05-08 PROCEDURE — 25000003 PHARM REV CODE 250: Performed by: ANESTHESIOLOGY

## 2018-05-08 PROCEDURE — 85027 COMPLETE CBC AUTOMATED: CPT

## 2018-05-08 PROCEDURE — 80053 COMPREHEN METABOLIC PANEL: CPT

## 2018-05-08 PROCEDURE — 84100 ASSAY OF PHOSPHORUS: CPT

## 2018-05-08 PROCEDURE — 25000003 PHARM REV CODE 250: Performed by: HOSPITALIST

## 2018-05-08 PROCEDURE — 99233 SBSQ HOSP IP/OBS HIGH 50: CPT | Mod: ,,, | Performed by: INTERNAL MEDICINE

## 2018-05-08 PROCEDURE — 36415 COLL VENOUS BLD VENIPUNCTURE: CPT

## 2018-05-08 PROCEDURE — 85007 BL SMEAR W/DIFF WBC COUNT: CPT

## 2018-05-08 PROCEDURE — 25000003 PHARM REV CODE 250: Performed by: STUDENT IN AN ORGANIZED HEALTH CARE EDUCATION/TRAINING PROGRAM

## 2018-05-08 PROCEDURE — 11000001 HC ACUTE MED/SURG PRIVATE ROOM

## 2018-05-08 PROCEDURE — 83735 ASSAY OF MAGNESIUM: CPT

## 2018-05-08 RX ADMIN — ACETAMINOPHEN 1000 MG: 500 TABLET, FILM COATED ORAL at 02:05

## 2018-05-08 RX ADMIN — SODIUM BICARBONATE 650 MG TABLET 650 MG: at 10:05

## 2018-05-08 RX ADMIN — OXYCODONE HYDROCHLORIDE 5 MG: 5 TABLET ORAL at 02:05

## 2018-05-08 RX ADMIN — PREGABALIN 150 MG: 50 CAPSULE ORAL at 10:05

## 2018-05-08 RX ADMIN — OXYCODONE HYDROCHLORIDE 5 MG: 5 TABLET ORAL at 10:05

## 2018-05-08 RX ADMIN — RAMELTEON 8 MG: 8 TABLET, FILM COATED ORAL at 10:05

## 2018-05-08 NOTE — PLAN OF CARE
Palliative Care:    Visit made to bedside with [facility] MD Dr. Bunch. Assisted with hospice education: home vs inpatient. Dtr Leonarda at bedside and verbalized concern that despite having 9 siblings, she is one of the only two children that lives locally. Pt concerned with placement in nursing home because of bad care.    Dtr visiting additional nursing homes today. Emotional Support provided.  Will follow up and provide support as needed.      Kelly Trotter, KAMRON, ACHP-SW

## 2018-05-08 NOTE — PLAN OF CARE
05/08/2018      Kerline Grossman  [address] [facility] [address]          Hospital Medicine Dept.  [facility] [address] [place] [id number] [phone] after hours  [phone] fax Kerline Grossman has been hospitalized at the [facility] since 4/20/2018.  Please excuse all family members from duties. They will be needed urgently for the care of Mrs. Grossman. Please contact me if you have any questions.                  __________________________  Ravin Shaikh MD  05/08/2018

## 2018-05-08 NOTE — PLAN OF CARE
Problem: Patient Care Overview  Goal: Plan of Care Review  Outcome: Ongoing (interventions implemented as appropriate)  Pt in no acute distress. Pain well-controlled with Oxycodone. Pt refused accuchecks and wedge repositioning this shift. Pt frequently removes oxygen. Family remains at bedside. Call light within reach. Q2H rounding, fall precautions in place. Will continue to monitor.

## 2018-05-08 NOTE — PROGRESS NOTES
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] 05/04/2018 Yes    Weakness of right upper extremity [R29.898] 05/03/2018 Yes    Leukocytosis [D72.829] 05/03/2018 Yes    Malnutrition of moderate degree [E44.0] 05/01/2018 Yes    Malignant neoplasm of central portion of right breast in female, estrogen receptor positive [C50.111, Z17.0]  Not Applicable    Cardiac rhythm disorder or disturbance or change [I49.9]  Yes    CHF (congestive heart failure) [I50.9]  Yes    Right heart failure [I50.810] 04/26/2018 Yes    Acute hypoxemic respiratory failure [J96.01] 04/26/2018 Yes    Tachycardia [R00.0]  Yes    Lactic acidemia [E87.2]  Yes    Severe malnutrition [E43] 04/25/2018 Yes    Cancer of left femur [C40.22] 04/24/2018 Yes    Cancer of right femur [C40.21] 04/24/2018 Yes    Palliative care encounter [Z51.5] 04/23/2018 Not Applicable    Pain due to malignant neoplasm metastatic to bone [G89.3, C79.51] 04/23/2018 Yes    Anemia [D64.9] 04/22/2018 Yes    Thrombocytopenia [D69.6] 04/22/2018 Yes    Hypomagnesemia [E83.42] 04/22/2018 Yes    Bony metastasis [C79.51] 04/21/2018 Yes    Pleural effusion [J90] 04/21/2018 Yes      Problems Resolved During this Admission:    Diagnosis Date Noted Date Resolved POA    Shock [R57.9] 05/01/2018 04/26/2018 Yes    Encounter for weaning from ventilator [Z99.11]  04/30/2018 Not Applicable    Encephalopathy, metabolic [G93.41] 04/21/2018 04/21/2018 Yes  "    Palliative Care encounter / goals of care discussion:     - met with daughter Leonarda Grossman at the bedside.   - goals of care discussed.   - Pt. Asked why she was not getting more physical therapy and why she was told her surgery would help her walk but now she is just laying in bed.   - PT/Ot note reviewed. appears pt. Not participating in therapy.   - wishes further defined and it seems that despite the pt. Anger about her diagnosis and lack of strength she realizes that there is no good therapeutic option for the extent of her disease. Daughter confirms her understanding of this and educates the pt. She would be "too weak for treatment". Pt. Understands.   - Ms. Grossman expressed to me that it would be important for her not to suffer and to be comfortable.   - we have discussed options of home vs. Facility based hospice and the pt. And family are interested.   - it seems that home hospice is not a good option due to lack of family support to provide 24 hrs coverage.   - daughter agrees to tour facilities today to determine of NH with hospice would be a viable option ( pt. Only has 3 days of inpatient hospice benefits via Medicaid).   - will revisit and fruther define wishes.     - POA discussed as prior form lacking witnessed signature. Pt. Confirms 1st POA Son Dion and 2nd POA daughter Leonarda.   - We have not discussed code status as pt. Was not willing to talk about it currently. Will re-visit.        I will follow along with you. Please call [phone] with questions.     Subjective:     HPI: Kerline Grossman is a 57 yo woman with medical history significant for chronic mood disorder who presented to OSH with ZACH, hypercalcemia, and lytic lesions concerning for MM.  Workup for MM negative thus far.  Her [place] [facility], so the family had her discharged and [place] [facility] [place].  She arrived hypotensive, which may have been 2/2 opiates (received morphine at " "OSH).  Hypotension resolved with IVF and only required brief stay under ICU care before being transferred to the floor.    Interval History: met with daughter Leonarda and JOEL Mendoza at the bedside.   Pt. Complains that she has been "wet all night and was not changed". She reports that she had feces left on her buttocks after cleaning as well.       Medications:  Continuous Infusions:  Scheduled Meds:   acetaminophen  1,000 mg Oral Q6H    enoxaparin  40 mg Subcutaneous Daily    lidocaine  1 patch Transdermal Q24H    magnesium oxide  800 mg Oral Daily    polyethylene glycol  17 g Oral Daily    pregabalin  150 mg Oral QHS    ramelteon  8 mg Oral QHS    senna-docusate 8.6-50 mg  2 tablet Oral Daily    sodium bicarbonate  1 tablet Oral BID     PRN Meds:sodium chloride, bisacodyl, dextrose 50%, dextrose 50%, glucagon (human recombinant), insulin aspart U-100, morphine, omnipaque, ondansetron, oxyCODONE, promethazine (PHENERGAN) IVPB, promethazine, ramelteon, sodium chloride 0.9%, sodium chloride 0.9%, sodium chloride 0.9%, sodium chloride 0.9%, tiZANidine, white petrolatum        Review of Systems   Constitutional: Negative for fever.   Respiratory: Negative for cough and shortness of breath.    Cardiovascular: Negative for chest pain.   Musculoskeletal: Positive for back pain and myalgias.     Objective:     Vital Signs (Most Recent):  Temp: 97.5 °F (36.4 °C) (05/08/18 1240)  Pulse: 110 (05/08/18 1240)  Resp: 18 (05/08/18 1240)  BP: 128/74 (05/08/18 1240)  SpO2: (!) 92 % (05/08/18 1240) Vital Signs (24h Range):  Temp:  [97.5 °F (36.4 °C)-97.9 °F (36.6 °C)] 97.5 °F (36.4 °C)  Pulse:  [106-125] 110  Resp:  [16-18] 18  SpO2:  [86 %-97 %] 92 %  BP: (112-128)/(66-74) 128/74     Physical Exam  No exam today  Laboratory:   CBC:   Recent Labs  Lab 05/08/18  0508   WBC 13.03*   RBC 2.95*   HGB 8.4*   HCT 27.7*      MCV 94   MCH 28.5   MCHC 30.3*       CMP:   Recent Labs  Lab 05/08/18  0507   GLU 68*   CALCIUM 7.9* "   ALBUMIN 1.5*   PROT 5.1*      K 3.7   CO2 22*      BUN 13   CREATININE 0.7   ALKPHOS 280*   ALT 6*   AST 58*   BILITOT 0.5       POCT Glucose   Date Value Ref Range Status   05/05/2018 88 70 - 110 mg/dL Final         Significant Imaging:     > 50% of 50 min visit spent in chart review, face to face discussion of goals of care,  symptom assessment, coordination of care and emotional support.    Steve Bunch MD  Palliative Medicine  [facility]

## 2018-05-08 NOTE — PROGRESS NOTES
[facility]  Orthopedics  Progress Note    Patient Name: Kerline Grossman  MRN: [id number]  Admission Date: 4/20/2018  Hospital Length of Stay: 17 days  Attending Provider: Ravin Shaikh MD  Primary Care Provider: Primary Doctor No  Follow-up For: Procedure(s) (LRB):  IM NAILING OF FEMUR - hana table - large c arm door side (Right)    Post-Operative Day: 8 Days Post-Op  Subjective:     Principal Problem:Metastatic breast cancer    Principal Orthopedic Problem: B femur impending fractures s/p B CMN    Interval History: Patient seen and examined at bedside.  No acute events overnight. Pain stable, but complains of neck pain and leg pain. Educated patient on proper use of pain medications. Has declined PT/OT for past 4 attempts, therefore PT/OT discontinued.    Review of patient's allergies indicates:  No Known Allergies    Current Facility-Administered Medications   Medication    0.9%  NaCl infusion (for blood administration)    acetaminophen tablet 1,000 mg    bisacodyl suppository 10 mg    dextrose 50% injection 12.5 g    dextrose 50% injection 25 g    enoxaparin injection 40 mg    glucagon (human recombinant) injection 1 mg    insulin aspart U-100 pen 1-10 Units    lidocaine 5 % patch 1 patch    magnesium oxide tablet 800 mg    morphine injection 2 mg    omnipaque oral solution 15 mL    ondansetron disintegrating tablet 8 mg    oxyCODONE immediate release tablet 5 mg    polyethylene glycol packet 17 g    pregabalin capsule 150 mg    promethazine (PHENERGAN) 6.25 mg in dextrose 5 % 50 mL IVPB    promethazine tablet 25 mg    ramelteon tablet 8 mg    ramelteon tablet 8 mg    senna-docusate 8.6-50 mg per tablet 2 tablet    sodium bicarbonate tablet 650 mg    sodium chloride 0.9% flush 3 mL    sodium chloride 0.9% flush 3 mL    sodium chloride 0.9% flush 3 mL    sodium chloride 0.9% flush 5 mL    tiZANidine tablet 4 mg    white petrolatum 41 % ointment     Objective:     Vital  "Signs (Most Recent):  Temp: 97.9 °F (36.6 °C) (05/08/18 0737)  Pulse: 110 (05/08/18 0737)  Resp: 18 (05/08/18 0737)  BP: 112/66 (05/08/18 0737)  SpO2: (!) 86 % (refusing oxygen) (05/08/18 0737) Vital Signs (24h Range):  Temp:  [97.7 °F (36.5 °C)-97.9 °F (36.6 °C)] 97.9 °F (36.6 °C)  Pulse:  [106-125] 110  Resp:  [16-18] 18  SpO2:  [86 %-97 %] 86 %  BP: (112-122)/(66-71) 112/66     Weight: 74 kg (163 lb 2.3 oz)  Height: 5' 6" (167.6 cm)  Body mass index is 26.33 kg/m².      Intake/Output Summary (Last 24 hours) at 05/08/18 0903  Last data filed at 05/07/18 1328   Gross per 24 hour   Intake              240 ml   Output             1000 ml   Net             -760 ml       Ortho/SPM Exam    AAOx4  NAD  Regular rhythm, tachycardic    BLE:  L thigh dressings with stable, dry drainage  R thigh dressing minimal drainage on dressings  SILT and motor intact T/SP/DP  WWP extremities  FCDs in place and functioning    Significant Labs:   CBC:     Recent Labs  Lab 05/07/18  0428 05/08/18  0508   WBC 11.70 13.03*   HGB 8.2* 8.4*   HCT 26.0* 27.7*    193     CMP:     Recent Labs  Lab 05/07/18  0428 05/08/18  0507    140   K 3.6 3.7    108   CO2 23 22*   GLU 71 68*   BUN 14 13   CREATININE 0.8 0.7   CALCIUM 8.0* 7.9*   PROT 5.0* 5.1*   ALBUMIN 1.4* 1.5*   BILITOT 0.5 0.5   ALKPHOS 265* 280*   AST 52* 58*   ALT 7* 6*   ANIONGAP 8 10   EGFRNONAA >60.0 >60.0     All pertinent labs within the past 24 hours have been reviewed.    Significant Imaging: I have reviewed all pertinent imaging results/findings.    Assessment/Plan:     Bony metastasis    58 y.o. female POD13 s/p L CMN, POD8 s/p R CMN    Pain control: per primary and heme onc  PT/OT: WBAT  DVT PPx: lovenox, FCDs at all times when not ambulating  Abx: postop Ancef completed  Drain: none    Dispo: per primary, likely hospice versus home with hospice              Aayush Gifford MD  Orthopedics  [facility]  "

## 2018-05-08 NOTE — PROGRESS NOTES
Hospital Medicine  Progress note    I personally performed the history, physical exam and medical decision making: and confirmed the accuracy of the information in the transcribed note.    [place] [facility] [place]. Shaikh  Admit Date: 4/20/2018  VIGNESH 5/9/2018  Code status: Full Code    Principal Problem:  Metastatic breast cancer     Interval hx: Patient awaiting placement. Denied [facility], referral sent to [facility]      ROS   Not able to stand, RUE weakness  Pain Scale:  8/10 neck pain  Respiratory: no cough or shortness of breath  Cardiovascular: no chest pain or palpitations  Gastrointestinal: no nausea or vomiting, no abdominal pain or change in bowel habits  Behavioral/Psych: no depression or anxiety      PEx  Temp:  [97.5 °F (36.4 °C)-97.9 °F (36.6 °C)]   Pulse:  [106-125]   Resp:  [16-18]   BP: (112-128)/(66-74)   SpO2:  [86 %-97 %]     Intake/Output Summary (Last 24 hours) at 05/08/18 1533  Last data filed at 05/08/18 1235   Gross per 24 hour   Intake              120 ml   Output                0 ml   Net              120 ml       General Appearance: no acute distress   Heart: regular rate and rhythm  Respiratory: Normal respiratory effort, no crackles   Abdomen: Soft, non-tender; bowel sounds active  Skin: intact. IV sites ok  Neurologic:  No focal numbness. Right arm weakness: 2/5.    Mental status: Alert, oriented x 2, affect appropriate   Extremities: Cannot stand without assistance 2/2 UE weakness    Recent Labs  Lab 05/06/18  0401 05/07/18  0428 05/08/18  0508   WBC 13.22* 11.70 13.03*   HGB 8.7* 8.2* 8.4*   HCT 27.3* 26.0* 27.7*    182 193       Recent Labs  Lab 05/06/18  0401 05/07/18  0428 05/08/18  0507    138 140   K 3.5 3.6 3.7    107 108   CO2 22* 23 22*   BUN 15 14 13   CREATININE 0.7 0.8 0.7   GLU 68* 71 68*   CALCIUM 8.0* 8.0* 7.9*   MG 1.5* 1.7 1.5*   PHOS 3.3 3.7 3.6       Recent Labs  Lab 05/06/18  0401 05/07/18  0428 05/08/18  0507   ALKPHOS 279* 265* 280*   ALT 6* 7*  6*   AST 54* 52* 58*   ALBUMIN 1.3* 1.4* 1.5*   PROT 5.0* 5.0* 5.1*   BILITOT 0.6 0.5 0.5        Recent Labs  Lab 05/03/18  1736 05/04/18  0145 05/04/18  1312 05/04/18  2356 05/05/18  0633 05/05/18  1753   POCTGLUCOSE 96 99 125* 102 92 88       Scheduled Meds:   acetaminophen  1,000 mg Oral Q6H    enoxaparin  40 mg Subcutaneous Daily    lidocaine  1 patch Transdermal Q24H    magnesium oxide  800 mg Oral Daily    polyethylene glycol  17 g Oral Daily    pregabalin  150 mg Oral QHS    ramelteon  8 mg Oral QHS    senna-docusate 8.6-50 mg  2 tablet Oral Daily    sodium bicarbonate  1 tablet Oral BID     Continuous Infusions:    As Needed:  sodium chloride, bisacodyl, dextrose 50%, dextrose 50%, glucagon (human recombinant), insulin aspart U-100, morphine, omnipaque, ondansetron, oxyCODONE, promethazine (PHENERGAN) IVPB, promethazine, ramelteon, sodium chloride 0.9%, sodium chloride 0.9%, sodium chloride 0.9%, sodium chloride 0.9%, tiZANidine, white petrolatum    Active Hospital Problems    Diagnosis  POA    *Metastatic breast cancer [C50.919]  Yes    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, 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]  Yes    Weakness of right upper extremity [R29.898]  Yes    Leukocytosis [D72.829]  Yes    Malnutrition of moderate degree [E44.0]  Yes    Malignant neoplasm of central portion of right breast in female, estrogen receptor positive [C50.111, Z17.0]  Not Applicable    Cardiac rhythm disorder or disturbance or change [I49.9]  Yes    CHF (congestive heart failure) [I50.9]  Yes    Right heart failure [I50.810]  Yes    Acute hypoxemic respiratory failure [J96.01]  Yes    Tachycardia [R00.0]  Yes    Lactic acidemia [E87.2]  Yes    Severe malnutrition [E43]  Yes    Cancer of left femur [C40.22]  Yes    Cancer of right femur [C40.21]  Yes    Palliative care encounter [Z51.5]  Not Applicable    Pain due to malignant neoplasm metastatic to bone [G89.3, C79.51]  Yes    Anemia [D64.9]  Yes    Thrombocytopenia [D69.6]   Yes    Hypomagnesemia [E83.42]  Yes    Bony metastasis [C79.51]  Yes     Xray metastatic survey shows lytic lesions of skull, lytic spine and rib lesions with pathologic fractures. Diffuse lytic lesions throughout entire central axial red marrow skeleton. Lytic lesions of proximal long bones and pathological fractures of ribs and right superior and inferior pubic ramus.      Pleural effusion [J90]  Yes      Resolved Hospital Problems    Diagnosis Date Resolved POA    Shock [R57.9] 04/26/2018 Yes    Encounter for weaning from ventilator [Z99.11] 04/30/2018 Not Applicable    Encephalopathy, metabolic [G93.41] 04/21/2018 Yes       Overview  Mrs. Grossman is a 57 yo female who [place] [facility] [place] for second opinion regarding recently diagnosed likely metastatic malignancy with unknown primary.      Assessment and Plan for Problems addressed today:     Metastatic breast cancer  Pain due to malignant neoplasm metastatic to bone    Meningeal carcinomatosis     Palliative Care Encounter  - oncology following, pt did not want to talk this afternoon after arriving to the floor from surgery, discussed with Dr. Fink (onc) he will see her tomorrow  - she may be a candidate for some oral therapy  - radiation oncology consult placed  - pain control (BP is sensitive to narcotics)  - palliative care saw her while she was in ICU, would continue discussions. Family wants to talk about options with Oncology and Radiation Oncology before finalizing any palliative care plans.   - 5/1 Oncology recs: outside path suggestive of breast primary with moderately differentiated adenocarcinoma ER/TX+, HER 2-. Will f/u on path to confirm. Consider MRI brain w/ w/o contrast once medically stable.   - s/p radiation oncology evaluation: radiation treatment of each of the mailed femurs is recommended. 10 fractions of 3Gy each as outpatient   - 5/2: Right upper extremity weakness-MRI Brain w w/o contrast ordered.         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 Diffuse osseous metastatic disease involving the calvarium, clivus, and visualized upper cervical spine, noting posterior bulging at the body of C2. Transitioned to oral pain medications: oxycodone 5mg PRN q4hrs.      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 Recommend focusing on quality of life and comfort measures. MRI brachial plexus tomorrow for RUE weakness.   - 5/5: Palliative care has seen patient to discuss comfort care plan with her and her family; they do not currently want to start this conversation and wish to revisit this later.  -5/6: MRA of brachial plexus deferred yesterday; May still complete today. Discussed advanced directives, namely DNR. Heme/onc discussed palliative vs. Hospice (inpatient vs. Home) with the patient, leaning towards hospice. Continue pain management with lidocaine patch and Acetominophen.     5/7: MRA brachial plexus patient not interested. Discussed DNR status but patient states she has other things on her mind. Family discussion leaning towards hospice.      Pathologic fracture of ribs pubic ramus  - s/p IM nailing femur x2     Hypoxemic respiratory failure  - pulmonary edema from IVF  - indeterminate diastolic function on 4/27 echo, PAP 53mmHg  - currently on 4L NC since arriving to floor from PACU  - 5/1: increased to 5L NC.   - 5/2: tapered to room air.      Hypotension -likely orthostasis   -5/1: Systolic BP 70s this AM, up to 113 with IVF bolus. Decreased prn oxycodone to 5mg q4hrs. NS bolus, Blood cultures, Lactic Acid, UA, CBC/CMP, Troponin ordered. Chest Xray: Small lung volumes and bibasal patchy subsegmental opacities  persist, similar to recent studies.  No new disease identified   -5/2: Blood cultures NGTD, lactate 2.2, troponin 0.015, UA 1+ occult blood and trace leukocytes with few yeast and occasional bacteria. WBC increased to 15.     Non-Anion Gap Metabolic Acidosis  -5/3: bicarb 16-replaced  -5/4: bicarb 22  -5/6: bicarb still at 22     Lactic acidosis  - 2.7 overnight, responded to IVF  - does not appear infected  - again elevated to 2.4 after procedure, will trend  - 4/30: 2.6, monitor     Urinary incontinence   - rocha in place, consider switch to purwick when pt can tolerate  - 5/3: removed rocha     Pleural effusion  - likely metastatic, s/p sampling at OSH  - associated compressive atelectasis bilaterally  - multiple pulmonary micronodules     Anemia of Chronic Disease, stable  - likely chronic disease with some blood loss during procedures  - iron studies pending: Iron 50 (wnl), TIBC 160 (low), Saturation 31 (wnl), Transferrin 108 (low)  - monitor with CBC daily  - 5/2: Hgb 9.7  -5/6: Hgb 9.6--> 8.7----> 8.2     Hypomagnesemia  -most likely 2/2 chronic disease  -4/22: 1.4-replaced  -5/1: 1.5-replaced  -5/2: 1.5-replaced  -5/4: 1.5-replaced  -5/6: 1.5-replaced     Hypophosphatemia, resolved   -4/22: 2.5 - replaced  - Most likely 2/2 chronic disease     Thrombocytopenia, resolved  -4/22: 122--> 165 resolved   -4/30: 115-->135 (5/2)  -Most likely 2/2 chronic disease  -5/5: 136-->153, resolved     Congestive Heart Failure  Right Heart Failure  -4/27 Echo: Right Ventricular Enlargement with normal systolic function  -5/1:      Malnutrition     -Prealbumin of 4 . Dietary consulted: Please order speech consult. Continue regular diet, texture per SLP. Continue Boost Plus TID.     -Speech Consult: Clinical evaluation of swallow complete. Recommend dental soft diet with thin liquids. Given pt with scattered dentition, she appears to have met max potential with SLP services at this time.     DVT PPx: Enoxaparin 40mg  daily     Discharge plan and follow up  Pending family discussion     Provider    I personally scribed for Ravin Shaikh MD on 05/08/2018 at 10:30 AM. Electronically signed by kera Lipscomb III on 05/08/2018 at 10:30 AM

## 2018-05-08 NOTE — PLAN OF CARE
JOEL spoke with pts dtg Leonarda. She asked that sw send out referrals to NH and she and her sister would continue to visit facilities. Leonarda did not have a preference for specific facilities, as long as they were not too far away.    SW requested [facility] send referrals to [facility], [facility], [place] and [facility].    Destiney De Jesus, [facility] [phone]

## 2018-05-08 NOTE — PLAN OF CARE
SW completed LOCET. PASRR faxed and uploaded to right [facility]. Awaiting 142.    142 received and uploaded to [facility].    Destiney De Jesus LCSW [phone]

## 2018-05-08 NOTE — MEDICAL/APP STUDENT
[facility] Medicine  Progress note    Team: [facility]P MED B Dr. Shaikh  Admit Date: 4/20/2018  VIGNESH 5/9/2018  Code status: Full Code    Principal Problem:  Metastatic breast cancer     Interval hx: Patient awaiting placement. Denied [facility], referral sent [facility]      ROS   Not able to stand, RUE weakness  Pain Scale:  8/10 neck pain  Respiratory: no cough or shortness of breath  Cardiovascular: no chest pain or palpitations  Gastrointestinal: no nausea or vomiting, no abdominal pain or change in bowel habits  Behavioral/Psych: no depression or anxiety      PEx  Temp:  [97.7 °F (36.5 °C)-97.9 °F (36.6 °C)]   Pulse:  [106-125]   Resp:  [16-18]   BP: (112-122)/(66-71)   SpO2:  [86 %-97 %]     Intake/Output Summary (Last 24 hours) at 05/08/18 1019  Last data filed at 05/07/18 1328   Gross per 24 hour   Intake              240 ml   Output             1000 ml   Net             -760 ml       General Appearance: no acute distress   Heart: regular rate and rhythm  Respiratory: Normal respiratory effort, no crackles   Abdomen: Soft, non-tender; bowel sounds active  Skin: intact. IV sites ok  Neurologic:  No focal numbness. Right arm weakness: 2/5.    Mental status: Alert, oriented x 2, affect appropriate   Extremities: Cannot stand without assistance 2/2 UE weakness    Recent Labs  Lab 05/06/18  0401 05/07/18  0428 05/08/18  0508   WBC 13.22* 11.70 13.03*   HGB 8.7* 8.2* 8.4*   HCT 27.3* 26.0* 27.7*    182 193       Recent Labs  Lab 05/06/18  0401 05/07/18  0428 05/08/18  0507    138 140   K 3.5 3.6 3.7    107 108   CO2 22* 23 22*   BUN 15 14 13   CREATININE 0.7 0.8 0.7   GLU 68* 71 68*   CALCIUM 8.0* 8.0* 7.9*   MG 1.5* 1.7 1.5*   PHOS 3.3 3.7 3.6       Recent Labs  Lab 05/06/18  0401 05/07/18  0428 05/08/18  0507   ALKPHOS 279* 265* 280*   ALT 6* 7* 6*   AST 54* 52* 58*   ALBUMIN 1.3* 1.4* 1.5*   PROT 5.0* 5.0* 5.1*   BILITOT 0.6 0.5 0.5        Recent Labs  Lab 05/03/18  1736 05/04/18  0145  05/04/18  1312 05/04/18  2356 05/05/18  0633 05/05/18  1753   POCTGLUCOSE 96 99 125* 102 92 88       Scheduled Meds:   acetaminophen  1,000 mg Oral Q6H    enoxaparin  40 mg Subcutaneous Daily    lidocaine  1 patch Transdermal Q24H    magnesium oxide  800 mg Oral Daily    polyethylene glycol  17 g Oral Daily    pregabalin  150 mg Oral QHS    ramelteon  8 mg Oral QHS    senna-docusate 8.6-50 mg  2 tablet Oral Daily    sodium bicarbonate  1 tablet Oral BID     Continuous Infusions:    As Needed:  sodium chloride, bisacodyl, dextrose 50%, dextrose 50%, glucagon (human recombinant), insulin aspart U-100, morphine, omnipaque, ondansetron, oxyCODONE, promethazine (PHENERGAN) IVPB, promethazine, ramelteon, sodium chloride 0.9%, sodium chloride 0.9%, sodium chloride 0.9%, sodium chloride 0.9%, tiZANidine, white petrolatum    Active Hospital Problems    Diagnosis  POA    *Metastatic breast cancer [C50.919]  Yes    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, 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]  Yes    Weakness of right upper extremity [R29.898]  Yes    Leukocytosis [D72.829]  Yes    Malnutrition of moderate degree [E44.0]  Yes    Malignant neoplasm of central portion of right breast in female, estrogen receptor positive [C50.111, Z17.0]  Not Applicable    Cardiac rhythm disorder or disturbance or change [I49.9]  Yes    CHF (congestive heart failure) [I50.9]  Yes    Right heart failure [I50.810]  Yes    Acute hypoxemic respiratory failure [J96.01]  Yes    Tachycardia [R00.0]  Yes    Lactic acidemia [E87.2]  Yes    Severe malnutrition [E43]  Yes    Cancer of left femur [C40.22]  Yes    Cancer of right femur [C40.21]  Yes    Palliative care encounter [Z51.5]  Not Applicable    Pain due to malignant neoplasm metastatic to bone [G89.3, C79.51]  Yes    Anemia [D64.9]  Yes    Thrombocytopenia [D69.6]  Yes    Hypomagnesemia [E83.42]  Yes    Bony metastasis [C79.51]  Yes     Xray metastatic survey shows lytic lesions of skull, lytic spine and  rib lesions with pathologic fractures. Diffuse lytic lesions throughout entire central axial red marrow skeleton. Lytic lesions of proximal long bones and pathological fractures of ribs and right superior and inferior pubic ramus.      Pleural effusion [J90]  Yes      Resolved Hospital Problems    Diagnosis Date Resolved POA    Shock [R57.9] 04/26/2018 Yes    Encounter for weaning from ventilator [Z99.11] 04/30/2018 Not Applicable    Encephalopathy, metabolic [G93.41] 04/21/2018 Yes       Overview  Mrs. Grossman is a 59 yo female who was brought to [facility] ED on 4/20/2018 for second opinion regarding recently diagnosed likely metastatic malignancy with unknown primary.      Assessment and Plan for Problems addressed today:     Metastatic breast cancer  Pain due to malignant neoplasm metastatic to bone    Meningeal carcinomatosis     Palliative Care Encounter  - oncology following, pt did not want to talk this afternoon after arriving to the floor from surgery, discussed with Dr. Fink (onc) he will see her tomorrow  - she may be a candidate for some oral therapy  - radiation oncology consult placed  - pain control (BP is sensitive to narcotics)  - palliative care saw her while she was in ICU, would continue discussions. Family wants to talk about options with Oncology and Radiation Oncology before finalizing any palliative care plans.   - 5/1 Oncology recs: outside path suggestive of breast primary with moderately differentiated adenocarcinoma ER/HI+, HER 2-. Will f/u on path to confirm. Consider MRI brain w/ w/o contrast once medically stable.   - s/p radiation oncology evaluation: radiation treatment of each of the mailed femurs is recommended. 10 fractions of 3Gy each as outpatient   - 5/2: Right upper extremity weakness-MRI Brain w w/o contrast ordered.         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 Diffuse osseous metastatic disease involving the calvarium, clivus, and visualized upper cervical spine, noting posterior bulging at the body of C2. Transitioned to oral pain medications: oxycodone 5mg PRN q4hrs.      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 Recommend focusing on quality of life and comfort measures. MRI brachial plexus tomorrow for RUE weakness.   - 5/5: Palliative care has seen patient to discuss comfort care plan with her and her family; they do not currently want to start this conversation and wish to revisit this later.  -5/6: MRA of brachial plexus deferred yesterday; May still complete today. Discussed advanced directives, namely DNR. Heme/onc discussed palliative vs. Hospice (inpatient vs. Home) with the patient, leaning towards hospice. Continue pain management with lidocaine patch and Acetominophen.     5/7: MRA brachial plexus patient not interested. Discussed DNR status but patient states she has other things on her mind. Family discussion leaning towards hospice.      Pathologic fracture of ribs pubic ramus  - s/p IM nailing femur x2     Hypoxemic respiratory failure  - pulmonary edema from IVF  - indeterminate diastolic function on 4/27 echo, PAP 53mmHg  - currently on 4L NC since arriving to floor from PACU  - 5/1: increased to 5L NC.   - 5/2: tapered to room air.      Hypotension -likely orthostasis   -5/1: Systolic BP 70s this AM, up to 113 with IVF bolus. Decreased prn oxycodone to 5mg q4hrs. NS bolus, Blood cultures, Lactic Acid, UA, CBC/CMP, Troponin ordered. Chest Xray: Small lung volumes and bibasal patchy subsegmental opacities persist, similar to recent studies.  No new disease identified   -5/2: Blood cultures NGTD, lactate 2.2, troponin 0.015, UA 1+ occult blood and  trace leukocytes with few yeast and occasional bacteria. WBC increased to 15.     Non-Anion Gap Metabolic Acidosis  -5/3: bicarb 16-replaced  -5/4: bicarb 22  -5/6: bicarb still at 22     Lactic acidosis  - 2.7 overnight, responded to IVF  - does not appear infected  - again elevated to 2.4 after procedure, will trend  - 4/30: 2.6, monitor     Urinary incontinence   - rocha in place, consider switch to purwick when pt can tolerate  - 5/3: removed rocha     Pleural effusion  - likely metastatic, s/p sampling at OSH  - associated compressive atelectasis bilaterally  - multiple pulmonary micronodules     Anemia of Chronic Disease, stable  - likely chronic disease with some blood loss during procedures  - iron studies pending: Iron 50 (wnl), TIBC 160 (low), Saturation 31 (wnl), Transferrin 108 (low)  - monitor with CBC daily  - 5/2: Hgb 9.7  -5/6: Hgb 9.6--> 8.7----> 8.2     Hypomagnesemia  -most likely 2/2 chronic disease  -4/22: 1.4-replaced  -5/1: 1.5-replaced  -5/2: 1.5-replaced  -5/4: 1.5-replaced  -5/6: 1.5-replaced     Hypophosphatemia, resolved   -4/22: 2.5 - replaced  - Most likely 2/2 chronic disease     Thrombocytopenia, resolved  -4/22: 122--> 165 resolved   -4/30: 115-->135 (5/2)  -Most likely 2/2 chronic disease  -5/5: 136-->153, resolved     Congestive Heart Failure  Right Heart Failure  -4/27 Echo: Right Ventricular Enlargement with normal systolic function  -5/1:      Malnutrition     -Prealbumin of 4 . Dietary consulted: Please order speech consult. Continue regular diet, texture per SLP. Continue Boost Plus TID.     -Speech Consult: Clinical evaluation of swallow complete. Recommend dental soft diet with thin liquids. Given pt with scattered dentition, she appears to have met max potential with SLP services at this time.     DVT PPx: Enoxaparin 40mg daily     Discharge plan and follow up  Pending family discussion     Provider    I personally scribed for Ravin Shaikh MD on 05/08/2018 at  10:30 AM. Electronically signed by kera Lipscomb III on 05/08/2018 at 10:30 AM

## 2018-05-08 NOTE — PLAN OF CARE
Problem: Patient Care Overview  Goal: Plan of Care Review  Outcome: Ongoing (interventions implemented as appropriate)  Patient resting in bed comfortably. IV intact with no signs of irritation. Patient refusing most interventions throughout the day despite education. MD aware. Fall precautions maintained with no falls noted. Call light in reach bed locked and in lowest position. Non-skid socks on while out of bed. Patient instructed to call for assistance. Patient refusing frequent position changes.  C/o pain managed with prn meds. No other complaints or concerns. Progressing towards goals. Will continue to monitor and follow plan of care.

## 2018-05-08 NOTE — PROGRESS NOTES
"Pt refusing to allow this nurse to assess her or take vital signs at this time. Pt stating, "don't bother me" and "every time I start doing something I enjoy, y'all interrupt me." Will continue to monitor.  "

## 2018-05-08 NOTE — ASSESSMENT & PLAN NOTE
58 y.o. female POD13 s/p L CMN, POD8 s/p R CMN    Pain control: per primary and heme onc  PT/OT: WBAT  DVT PPx: lovenox, FCDs at all times when not ambulating  Abx: postop Ancef completed  Drain: none    Dispo: per primary, likely hospice versus home with hospice

## 2018-05-08 NOTE — PLAN OF CARE
Jocelyne from [facility] called-pt clinically accepted. SW informed pts dtg at the bedside (Guerda) and provided her with a list to give Leonarda which highlights the 5 NH that referrals were sent to. SW explained that [facility] clinically accepted pt and sw provided name and contact number for Jocelyne in admissions.    SW informed pts oldest dtg Leonarda of acceptance and that list of referrals sent is at the bedside.     Destiney De Jesus, [facility] [id number]

## 2018-05-08 NOTE — NURSING
Patient refusing medications, accu checks and repositioning. Education provided to patient and family. Dr. Shaikh paged and notified. Will monitor.

## 2018-05-08 NOTE — PLAN OF CARE
ANTONIO informed sw that pt will need NH placement with hospice. Referral has already been sent to [facility]. Pt denied by [facility].    SW called pts dtg, Leonarda, discussed denial by [facility]. Leonarda asked for reason for denial and sw agreed to contact admissions to discuss. SW asked that Leonarda review the NH list that she was given and provide additional choices, preferably 2-3, as pt can be denied acceptance. Leonarda said she would review list, but will need to visit facilities. SW will f/u with [facility] and [facility]a.    Destiney De Jesus, \[facility] [place] [facility]\""W [id number]

## 2018-05-08 NOTE — SUBJECTIVE & OBJECTIVE
Principal Problem:Metastatic breast cancer    Principal Orthopedic Problem: B femur impending fractures s/p B CMN    Interval History: Patient seen and examined at bedside.  No acute events overnight. Pain stable, but complains of neck pain and leg pain. Educated patient on proper use of pain medications. Has declined PT/OT for past 4 attempts, therefore PT/OT discontinued.    Review of patient's allergies indicates:  No Known Allergies    Current Facility-Administered Medications   Medication    0.9%  NaCl infusion (for blood administration)    acetaminophen tablet 1,000 mg    bisacodyl suppository 10 mg    dextrose 50% injection 12.5 g    dextrose 50% injection 25 g    enoxaparin injection 40 mg    glucagon (human recombinant) injection 1 mg    insulin aspart U-100 pen 1-10 Units    lidocaine 5 % patch 1 patch    magnesium oxide tablet 800 mg    morphine injection 2 mg    omnipaque oral solution 15 mL    ondansetron disintegrating tablet 8 mg    oxyCODONE immediate release tablet 5 mg    polyethylene glycol packet 17 g    pregabalin capsule 150 mg    promethazine (PHENERGAN) 6.25 mg in dextrose 5 % 50 mL IVPB    promethazine tablet 25 mg    ramelteon tablet 8 mg    ramelteon tablet 8 mg    senna-docusate 8.6-50 mg per tablet 2 tablet    sodium bicarbonate tablet 650 mg    sodium chloride 0.9% flush 3 mL    sodium chloride 0.9% flush 3 mL    sodium chloride 0.9% flush 3 mL    sodium chloride 0.9% flush 5 mL    tiZANidine tablet 4 mg    white petrolatum 41 % ointment     Objective:     Vital Signs (Most Recent):  Temp: 97.9 °F (36.6 °C) (05/08/18 0737)  Pulse: 110 (05/08/18 0737)  Resp: 18 (05/08/18 0737)  BP: 112/66 (05/08/18 0737)  SpO2: (!) 86 % (refusing oxygen) (05/08/18 0737) Vital Signs (24h Range):  Temp:  [97.7 °F (36.5 °C)-97.9 °F (36.6 °C)] 97.9 °F (36.6 °C)  Pulse:  [106-125] 110  Resp:  [16-18] 18  SpO2:  [86 %-97 %] 86 %  BP: (112-122)/(66-71) 112/66     Weight: 74 kg (163  "lb 2.3 oz)  Height: 5' 6" (167.6 cm)  Body mass index is 26.33 kg/m².      Intake/Output Summary (Last 24 hours) at 05/08/18 0903  Last data filed at 05/07/18 1328   Gross per 24 hour   Intake              240 ml   Output             1000 ml   Net             -760 ml       Ortho/SPM Exam    AAOx4  NAD  Regular rhythm, tachycardic    BLE:  L thigh dressings with stable, dry drainage  R thigh dressing minimal drainage on dressings  SILT and motor intact T/SP/DP  WWP extremities  FCDs in place and functioning    Significant Labs:   CBC:     Recent Labs  Lab 05/07/18  0428 05/08/18  0508   WBC 11.70 13.03*   HGB 8.2* 8.4*   HCT 26.0* 27.7*    193     CMP:     Recent Labs  Lab 05/07/18  0428 05/08/18  0507    140   K 3.6 3.7    108   CO2 23 22*   GLU 71 68*   BUN 14 13   CREATININE 0.8 0.7   CALCIUM 8.0* 7.9*   PROT 5.0* 5.1*   ALBUMIN 1.4* 1.5*   BILITOT 0.5 0.5   ALKPHOS 265* 280*   AST 52* 58*   ALT 7* 6*   ANIONGAP 8 10   EGFRNONAA >60.0 >60.0     All pertinent labs within the past 24 hours have been reviewed.    Significant Imaging: I have reviewed all pertinent imaging results/findings.  "

## 2018-05-09 LAB
ALBUMIN SERPL BCP-MCNC: 1.5 G/DL
ALP SERPL-CCNC: 300 U/L
ALT SERPL W/O P-5'-P-CCNC: 6 U/L
ANION GAP SERPL CALC-SCNC: 11 MMOL/L
ANISOCYTOSIS BLD QL SMEAR: SLIGHT
AST SERPL-CCNC: 58 U/L
BASOPHILS # BLD AUTO: ABNORMAL K/UL
BASOPHILS NFR BLD: 0 %
BILIRUB SERPL-MCNC: 0.6 MG/DL
BUN SERPL-MCNC: 12 MG/DL
CALCIUM SERPL-MCNC: 8.1 MG/DL
CHLORIDE SERPL-SCNC: 109 MMOL/L
CO2 SERPL-SCNC: 21 MMOL/L
CREAT SERPL-MCNC: 0.7 MG/DL
DIFFERENTIAL METHOD: ABNORMAL
EOSINOPHIL # BLD AUTO: ABNORMAL K/UL
EOSINOPHIL NFR BLD: 0 %
ERYTHROCYTE [DISTWIDTH] IN BLOOD BY AUTOMATED COUNT: 19.2 %
EST. GFR  (AFRICAN AMERICAN): >60 ML/MIN/1.73 M^2
EST. GFR  (NON AFRICAN AMERICAN): >60 ML/MIN/1.73 M^2
GLUCOSE SERPL-MCNC: 62 MG/DL
HCT VFR BLD AUTO: 29.4 %
HGB BLD-MCNC: 9.1 G/DL
HYPOCHROMIA BLD QL SMEAR: ABNORMAL
IMM GRANULOCYTES # BLD AUTO: ABNORMAL K/UL
IMM GRANULOCYTES NFR BLD AUTO: ABNORMAL %
LYMPHOCYTES # BLD AUTO: ABNORMAL K/UL
LYMPHOCYTES NFR BLD: 30 %
MAGNESIUM SERPL-MCNC: 1.4 MG/DL
MCH RBC QN AUTO: 29.2 PG
MCHC RBC AUTO-ENTMCNC: 31 G/DL
MCV RBC AUTO: 94 FL
MONOCYTES # BLD AUTO: ABNORMAL K/UL
MONOCYTES NFR BLD: 6 %
MYELOCYTES NFR BLD MANUAL: 2 %
NEUTROPHILS NFR BLD: 58 %
NEUTS BAND NFR BLD MANUAL: 4 %
NRBC BLD-RTO: 3 /100 WBC
OVALOCYTES BLD QL SMEAR: ABNORMAL
PHOSPHATE SERPL-MCNC: 3.6 MG/DL
PLATELET # BLD AUTO: 199 K/UL
PMV BLD AUTO: 11.9 FL
POIKILOCYTOSIS BLD QL SMEAR: SLIGHT
POLYCHROMASIA BLD QL SMEAR: ABNORMAL
POTASSIUM SERPL-SCNC: 3.6 MMOL/L
PROT SERPL-MCNC: 5.3 G/DL
RBC # BLD AUTO: 3.12 M/UL
SODIUM SERPL-SCNC: 141 MMOL/L
WBC # BLD AUTO: 13.44 K/UL

## 2018-05-09 PROCEDURE — 25000003 PHARM REV CODE 250: Performed by: HOSPITALIST

## 2018-05-09 PROCEDURE — 99232 SBSQ HOSP IP/OBS MODERATE 35: CPT | Mod: ,,, | Performed by: INTERNAL MEDICINE

## 2018-05-09 PROCEDURE — 25000003 PHARM REV CODE 250: Performed by: STUDENT IN AN ORGANIZED HEALTH CARE EDUCATION/TRAINING PROGRAM

## 2018-05-09 PROCEDURE — 25000003 PHARM REV CODE 250: Performed by: ANESTHESIOLOGY

## 2018-05-09 PROCEDURE — 83735 ASSAY OF MAGNESIUM: CPT

## 2018-05-09 PROCEDURE — 80053 COMPREHEN METABOLIC PANEL: CPT

## 2018-05-09 PROCEDURE — 84100 ASSAY OF PHOSPHORUS: CPT

## 2018-05-09 PROCEDURE — 85027 COMPLETE CBC AUTOMATED: CPT

## 2018-05-09 PROCEDURE — 85007 BL SMEAR W/DIFF WBC COUNT: CPT

## 2018-05-09 PROCEDURE — 11000001 HC ACUTE MED/SURG PRIVATE ROOM

## 2018-05-09 PROCEDURE — 36415 COLL VENOUS BLD VENIPUNCTURE: CPT

## 2018-05-09 RX ADMIN — STANDARDIZED SENNA CONCENTRATE AND DOCUSATE SODIUM 2 TABLET: 8.6; 5 TABLET, FILM COATED ORAL at 09:05

## 2018-05-09 RX ADMIN — OXYCODONE HYDROCHLORIDE 5 MG: 5 TABLET ORAL at 09:05

## 2018-05-09 RX ADMIN — RAMELTEON 8 MG: 8 TABLET, FILM COATED ORAL at 09:05

## 2018-05-09 RX ADMIN — ACETAMINOPHEN 1000 MG: 500 TABLET, FILM COATED ORAL at 11:05

## 2018-05-09 RX ADMIN — SODIUM BICARBONATE 650 MG TABLET 650 MG: at 09:05

## 2018-05-09 RX ADMIN — OXYCODONE HYDROCHLORIDE 5 MG: 5 TABLET ORAL at 04:05

## 2018-05-09 RX ADMIN — PREGABALIN 150 MG: 50 CAPSULE ORAL at 09:05

## 2018-05-09 RX ADMIN — POLYETHYLENE GLYCOL 3350 17 G: 17 POWDER, FOR SOLUTION ORAL at 09:05

## 2018-05-09 RX ADMIN — ACETAMINOPHEN 1000 MG: 500 TABLET, FILM COATED ORAL at 06:05

## 2018-05-09 RX ADMIN — MAGNESIUM OXIDE TAB 400 MG (241.3 MG ELEMENTAL MG) 800 MG: 400 (241.3 MG) TAB at 09:05

## 2018-05-09 NOTE — PLAN OF CARE
Pt declined by [facility]. JOEL notified pts dtg Leonarda of this denial. She expressed disappointment in this denial as she had met with admissions and felt pt would be accepted. JOEL reiterated that pt has been accepted by [facility]  and referral is under review by [facility]. JOEL advised Leonarda to review NH list again, talk with pt and family and decide if they would like any other referrals to be sent out. Leonarda will discuss with family tonight.    Destiney De Jesus, [facility] [phone]

## 2018-05-09 NOTE — MEDICAL/APP STUDENT
[facility] Medicine  Progress note    Team: [facility] HOSP MED B Dr. Shaikh  Admit Date: 4/20/2018  VIGNESH 5/10/2018  Code status: Full Code    Principal Problem:  Metastatic breast cancer     Interval hx: Patient accepted by maria del carmen BARRETO, daughter doing a site visit.       ROS   Not able to stand, RUE weakness  Pain Scale:  8/10 neck pain  Respiratory: no cough or shortness of breath  Cardiovascular: no chest pain or palpitations  Gastrointestinal: no nausea or vomiting, no abdominal pain or change in bowel habits  Behavioral/Psych: no depression or anxiety      PEx  Temp:  [96.3 °F (35.7 °C)-97.5 °F (36.4 °C)]   Pulse:  [104-114]   Resp:  [18-20]   BP: (128-139)/(74-84)   SpO2:  [92 %-100 %]     Intake/Output Summary (Last 24 hours) at 05/09/18 0949  Last data filed at 05/09/18 0932   Gross per 24 hour   Intake              360 ml   Output                0 ml   Net              360 ml       General Appearance: no acute distress   Heart: regular rate and rhythm  Respiratory: Normal respiratory effort, no crackles   Abdomen: Soft, non-tender; bowel sounds active  Skin: intact. IV sites ok  Neurologic:  No focal numbness. Right arm weakness: 2/5.    Mental status: Alert, oriented x 2, affect appropriate   Extremities: Cannot stand without assistance 2/2 UE weakness    Recent Labs  Lab 05/07/18 0428 05/08/18  0508 05/09/18  0405   WBC 11.70 13.03* 13.44*   HGB 8.2* 8.4* 9.1*   HCT 26.0* 27.7* 29.4*    193 199       Recent Labs  Lab 05/07/18 0428 05/08/18  0507 05/09/18  0404    140 141   K 3.6 3.7 3.6    108 109   CO2 23 22* 21*   BUN 14 13 12   CREATININE 0.8 0.7 0.7   GLU 71 68* 62*   CALCIUM 8.0* 7.9* 8.1*   MG 1.7 1.5* 1.4*   PHOS 3.7 3.6 3.6       Recent Labs  Lab 05/07/18 0428 05/08/18  0507 05/09/18  0404   ALKPHOS 265* 280* 300*   ALT 7* 6* 6*   AST 52* 58* 58*   ALBUMIN 1.4* 1.5* 1.5*   PROT 5.0* 5.1* 5.3*   BILITOT 0.5 0.5 0.6        Recent Labs  Lab 05/03/18  1736 05/04/18  0145 05/04/18  1312  05/04/18  2356 05/05/18  0633 05/05/18  1753   POCTGLUCOSE 96 99 125* 102 92 88       Scheduled Meds:   acetaminophen  1,000 mg Oral Q6H    enoxaparin  40 mg Subcutaneous Daily    lidocaine  1 patch Transdermal Q24H    magnesium oxide  800 mg Oral Daily    polyethylene glycol  17 g Oral Daily    pregabalin  150 mg Oral QHS    ramelteon  8 mg Oral QHS    senna-docusate 8.6-50 mg  2 tablet Oral Daily    sodium bicarbonate  1 tablet Oral BID     Continuous Infusions:    As Needed:  sodium chloride, bisacodyl, dextrose 50%, dextrose 50%, glucagon (human recombinant), insulin aspart U-100, morphine, omnipaque, ondansetron, oxyCODONE, promethazine (PHENERGAN) IVPB, promethazine, ramelteon, sodium chloride 0.9%, sodium chloride 0.9%, sodium chloride 0.9%, sodium chloride 0.9%, tiZANidine, white petrolatum    Active Hospital Problems    Diagnosis  POA    *Metastatic breast cancer [C50.919]  Yes    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, 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]  Yes    Weakness of right upper extremity [R29.898]  Yes    Leukocytosis [D72.829]  Yes    Malnutrition of moderate degree [E44.0]  Yes    Malignant neoplasm of central portion of right breast in female, estrogen receptor positive [C50.111, Z17.0]  Not Applicable    Cardiac rhythm disorder or disturbance or change [I49.9]  Yes    CHF (congestive heart failure) [I50.9]  Yes    Right heart failure [I50.810]  Yes    Acute hypoxemic respiratory failure [J96.01]  Yes    Tachycardia [R00.0]  Yes    Lactic acidemia [E87.2]  Yes    Severe malnutrition [E43]  Yes    Cancer of left femur [C40.22]  Yes    Cancer of right femur [C40.21]  Yes    Palliative care encounter [Z51.5]  Not Applicable    Pain due to malignant neoplasm metastatic to bone [G89.3, C79.51]  Yes    Anemia [D64.9]  Yes    Thrombocytopenia [D69.6]  Yes    Hypomagnesemia [E83.42]  Yes    Bony metastasis [C79.51]  Yes     Xray metastatic survey shows lytic lesions of skull, lytic spine and rib lesions with  pathologic fractures. Diffuse lytic lesions throughout entire central axial red marrow skeleton. Lytic lesions of proximal long bones and pathological fractures of ribs and right superior and inferior pubic ramus.      Pleural effusion [J90]  Yes      Resolved Hospital Problems    Diagnosis Date Resolved POA    Shock [R57.9] 04/26/2018 Yes    Encounter for weaning from ventilator [Z99.11] 04/30/2018 Not Applicable    Encephalopathy, metabolic [G93.41] 04/21/2018 Yes       Overview  Mrs. Grossman is a 59 yo female who [place] [facility] [place] for second opinion regarding recently diagnosed likely metastatic malignancy with unknown primary.      Assessment and Plan for Problems addressed today:     Metastatic breast cancer  Pain due to malignant neoplasm metastatic to bone    Meningeal carcinomatosis     Palliative Care Encounter  - oncology following, pt did not want to talk this afternoon after arriving to the floor from surgery, discussed with Dr. Fink (onc) he will see her tomorrow  - she may be a candidate for some oral therapy  - radiation oncology consult placed  - pain control (BP is sensitive to narcotics)  - palliative care saw her while she was in ICU, would continue discussions. Family wants to talk about options with Oncology and Radiation Oncology before finalizing any palliative care plans.   - 5/1 Oncology recs: outside path suggestive of breast primary with moderately differentiated adenocarcinoma ER/NE+, HER 2-. Will f/u on path to confirm. Consider MRI brain w/ w/o contrast once medically stable.   - s/p radiation oncology evaluation: radiation treatment of each of the mailed femurs is recommended. 10 fractions of 3Gy each as outpatient   - 5/2: Right upper extremity weakness-MRI Brain w w/o contrast ordered.         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 Diffuse osseous metastatic disease involving the calvarium, clivus, and visualized upper cervical spine, noting posterior bulging at the body of C2. Transitioned to oral pain medications: oxycodone 5mg PRN q4hrs.      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 Recommend focusing on quality of life and comfort measures. MRI brachial plexus tomorrow for RUE weakness.   - 5/5: Palliative care has seen patient to discuss comfort care plan with her and her family; they do not currently want to start this conversation and wish to revisit this later.  -5/6: MRA of brachial plexus deferred yesterday; May still complete today. Discussed advanced directives, namely DNR. Heme/onc discussed palliative vs. Hospice (inpatient vs. Home) with the patient, leaning towards hospice. Continue pain management with lidocaine patch and Acetominophen.     5/7: MRA brachial plexus patient not interested. Discussed DNR status but patient states she has other things on her mind. Family discussion leaning towards hospice.      Pathologic fracture of ribs pubic ramus  - s/p IM nailing femur x2     Hypoxemic respiratory failure  - pulmonary edema from IVF  - indeterminate diastolic function on 4/27 echo, PAP 53mmHg  - currently on 4L NC since arriving to floor from PACU  - 5/1: increased to 5L NC.   - 5/2: tapered to room air.      Hypotension -likely orthostasis   -5/1: Systolic BP 70s this AM, up to 113 with IVF bolus. Decreased prn oxycodone to 5mg q4hrs. NS bolus, Blood cultures, Lactic Acid, UA, CBC/CMP, Troponin ordered. Chest Xray: Small lung volumes and bibasal patchy subsegmental opacities persist, similar to recent studies.  No new disease identified   -5/2: Blood cultures NGTD, lactate 2.2, troponin 0.015, UA 1+ occult blood and trace leukocytes  with few yeast and occasional bacteria. WBC increased to 15.     Non-Anion Gap Metabolic Acidosis  -5/3: bicarb 16-replaced  -5/4: bicarb 22  -5/6: bicarb still at 22     Lactic acidosis  - 2.7 overnight, responded to IVF  - does not appear infected  - again elevated to 2.4 after procedure, will trend  - 4/30: 2.6, monitor     Urinary incontinence   - rocha in place, consider switch to purwick when pt can tolerate  - 5/3: removed rocha     Pleural effusion  - likely metastatic, s/p sampling at OSH  - associated compressive atelectasis bilaterally  - multiple pulmonary micronodules     Anemia of Chronic Disease, stable  - likely chronic disease with some blood loss during procedures  - iron studies pending: Iron 50 (wnl), TIBC 160 (low), Saturation 31 (wnl), Transferrin 108 (low)  - monitor with CBC daily  - 5/2: Hgb 9.7  -5/6: Hgb 9.6--> 8.7----> 8.2     Hypomagnesemia  -most likely 2/2 chronic disease  -4/22: 1.4-replaced  -5/1: 1.5-replaced  -5/2: 1.5-replaced  -5/4: 1.5-replaced  -5/6: 1.5-replaced     Hypophosphatemia, resolved   -4/22: 2.5 - replaced  - Most likely 2/2 chronic disease     Thrombocytopenia, resolved  -4/22: 122--> 165 resolved   -4/30: 115-->135 (5/2)  -Most likely 2/2 chronic disease  -5/5: 136-->153, resolved     Congestive Heart Failure  Right Heart Failure  -4/27 Echo: Right Ventricular Enlargement with normal systolic function  -5/1:      Malnutrition     -Prealbumin of 4 . Dietary consulted: Please order speech consult. Continue regular diet, texture per SLP. Continue Boost Plus TID.     -Speech Consult: Clinical evaluation of swallow complete. Recommend dental soft diet with thin liquids. Given pt with scattered dentition, she appears to have met max potential with SLP services at this time.     DVT PPx: Enoxaparin 40mg daily     Discharge plan and follow up  Pending family discussion     Provider    I personally scribed for Ravin Shaikh MD on 05/09/2018 at 9:50 AM.  Electronically signed by kera Lispcomb III on 05/09/2018 at 9:50 AM

## 2018-05-09 NOTE — PLAN OF CARE
Problem: Patient Care Overview  Goal: Plan of Care Review  Outcome: Ongoing (interventions implemented as appropriate)  Patient resting in bed comfortably. IV intact with no signs of irritation. Fall precautions maintained with no falls noted. Call light in reach bed locked and in lowest position. Non-skid socks on while out of bed. Patient instructed to call for assistance. Patient refusing repositioning. C/o pain managed with prn meds. No other complaints or concerns. Progressing towards goals. Will continue to monitor and follow plan of care.

## 2018-05-09 NOTE — MEDICAL/APP STUDENT
[facility] Medicine  Progress note    [place]: [facility] [place] Dr. Shaikh  Admit Date: 4/20/2018  VIGNESH 5/10/2018  Code status: Full Code    Principal Problem:  Metastatic breast cancer     Interval hx: Patient accepted by maria del carmen BARRETO, daughter doing a site visit.       ROS   Not able to stand, RUE weakness  Pain Scale:  8/10 neck pain  Respiratory: no cough or shortness of breath  Cardiovascular: no chest pain or palpitations  Gastrointestinal: no nausea or vomiting, no abdominal pain or change in bowel habits  Behavioral/Psych: no depression or anxiety      PEx  Temp:  [96.3 °F (35.7 °C)-98.1 °F (36.7 °C)]   Pulse:  [104-114]   Resp:  [16-20]   BP: (128-139)/(74-86)   SpO2:  [92 %-100 %]     Intake/Output Summary (Last 24 hours) at 05/09/18 1116  Last data filed at 05/09/18 0932   Gross per 24 hour   Intake              360 ml   Output                0 ml   Net              360 ml       General Appearance: no acute distress   Heart: regular rate and rhythm  Respiratory: Normal respiratory effort, no crackles   Abdomen: Soft, non-tender; bowel sounds active  Skin: intact. IV sites ok  Neurologic:  No focal numbness. Right arm weakness: 2/5.    Mental status: Alert, oriented x 2, affect appropriate   Extremities: Cannot stand without assistance 2/2 UE weakness    Recent Labs  Lab 05/07/18 0428 05/08/18  0508 05/09/18  0405   WBC 11.70 13.03* 13.44*   HGB 8.2* 8.4* 9.1*   HCT 26.0* 27.7* 29.4*    193 199       Recent Labs  Lab 05/07/18 0428 05/08/18  0507 05/09/18  0404    140 141   K 3.6 3.7 3.6    108 109   CO2 23 22* 21*   BUN 14 13 12   CREATININE 0.8 0.7 0.7   GLU 71 68* 62*   CALCIUM 8.0* 7.9* 8.1*   MG 1.7 1.5* 1.4*   PHOS 3.7 3.6 3.6       Recent Labs  Lab 05/07/18 0428 05/08/18  0507 05/09/18  0404   ALKPHOS 265* 280* 300*   ALT 7* 6* 6*   AST 52* 58* 58*   ALBUMIN 1.4* 1.5* 1.5*   PROT 5.0* 5.1* 5.3*   BILITOT 0.5 0.5 0.6        Recent Labs  Lab 05/03/18  1736 05/04/18  0145 05/04/18  1312  05/04/18  2356 05/05/18  0633 05/05/18  1753   POCTGLUCOSE 96 99 125* 102 92 88       Scheduled Meds:   acetaminophen  1,000 mg Oral Q6H    enoxaparin  40 mg Subcutaneous Daily    lidocaine  1 patch Transdermal Q24H    magnesium oxide  800 mg Oral Daily    polyethylene glycol  17 g Oral Daily    pregabalin  150 mg Oral QHS    ramelteon  8 mg Oral QHS    senna-docusate 8.6-50 mg  2 tablet Oral Daily    sodium bicarbonate  1 tablet Oral BID     Continuous Infusions:    As Needed:  sodium chloride, bisacodyl, dextrose 50%, dextrose 50%, glucagon (human recombinant), insulin aspart U-100, morphine, omnipaque, ondansetron, oxyCODONE, promethazine (PHENERGAN) IVPB, promethazine, ramelteon, sodium chloride 0.9%, sodium chloride 0.9%, sodium chloride 0.9%, sodium chloride 0.9%, tiZANidine, white petrolatum    Active Hospital Problems    Diagnosis  POA    *Metastatic breast cancer [C50.919]  Yes    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, 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]  Yes    Weakness of right upper extremity [R29.898]  Yes    Leukocytosis [D72.829]  Yes    Malnutrition of moderate degree [E44.0]  Yes    Malignant neoplasm of central portion of right breast in female, estrogen receptor positive [C50.111, Z17.0]  Not Applicable    Cardiac rhythm disorder or disturbance or change [I49.9]  Yes    CHF (congestive heart failure) [I50.9]  Yes    Right heart failure [I50.810]  Yes    Acute hypoxemic respiratory failure [J96.01]  Yes    Tachycardia [R00.0]  Yes    Lactic acidemia [E87.2]  Yes    Severe malnutrition [E43]  Yes    Cancer of left femur [C40.22]  Yes    Cancer of right femur [C40.21]  Yes    Palliative care encounter [Z51.5]  Not Applicable    Pain due to malignant neoplasm metastatic to bone [G89.3, C79.51]  Yes    Anemia [D64.9]  Yes    Thrombocytopenia [D69.6]  Yes    Hypomagnesemia [E83.42]  Yes    Bony metastasis [C79.51]  Yes     Xray metastatic survey shows lytic lesions of skull, lytic spine and rib lesions with  pathologic fractures. Diffuse lytic lesions throughout entire central axial red marrow skeleton. Lytic lesions of proximal long bones and pathological fractures of ribs and right superior and inferior pubic ramus.      Pleural effusion [J90]  Yes      Resolved Hospital Problems    Diagnosis Date Resolved POA    Shock [R57.9] 04/26/2018 Yes    Encounter for weaning from ventilator [Z99.11] 04/30/2018 Not Applicable    Encephalopathy, metabolic [G93.41] 04/21/2018 Yes       Overview  Mrs. Grossman is a 59 yo female who was brought to [facility] [place] on 4/20/2018 for second opinion regarding recently diagnosed likely metastatic malignancy with unknown primary.      Assessment and Plan for Problems addressed today:     Metastatic breast cancer  Pain due to malignant neoplasm metastatic to bone    Meningeal carcinomatosis     Palliative Care Encounter  - oncology following, pt did not want to talk this afternoon after arriving to the floor from surgery, discussed with Dr. Fink (onc) he will see her tomorrow  - she may be a candidate for some oral therapy  - radiation oncology consult placed  - pain control (BP is sensitive to narcotics)  - palliative care saw her while she was in ICU, would continue discussions. Family wants to talk about options with Oncology and Radiation Oncology before finalizing any palliative care plans.   - 5/1 Oncology recs: outside path suggestive of breast primary with moderately differentiated adenocarcinoma ER/NH+, HER 2-. Will f/u on path to confirm. Consider MRI brain w/ w/o contrast once medically stable.   - s/p radiation oncology evaluation: radiation treatment of each of the mailed femurs is recommended. 10 fractions of 3Gy each as outpatient   - 5/2: Right upper extremity weakness-MRI Brain w w/o contrast ordered.         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 Diffuse osseous metastatic disease involving the calvarium, clivus, and visualized upper cervical spine, noting posterior bulging at the body of C2. Transitioned to oral pain medications: oxycodone 5mg PRN q4hrs.      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 Recommend focusing on quality of life and comfort measures. MRI brachial plexus tomorrow for RUE weakness.   - 5/5: Palliative care has seen patient to discuss comfort care plan with her and her family; they do not currently want to start this conversation and wish to revisit this later.  -5/6: MRA of brachial plexus deferred yesterday; May still complete today. Discussed advanced directives, namely DNR. Heme/onc discussed palliative vs. Hospice (inpatient vs. Home) with the patient, leaning towards hospice. Continue pain management with lidocaine patch and Acetominophen.     5/7: MRA brachial plexus patient not interested. Discussed DNR status but patient states she has other things on her mind. Family discussion leaning towards hospice.      Pathologic fracture of ribs pubic ramus  - s/p IM nailing femur x2     Hypoxemic respiratory failure  - pulmonary edema from IVF  - indeterminate diastolic function on 4/27 echo, PAP 53mmHg  - currently on 4L NC since arriving to floor from PACU  - 5/1: increased to 5L NC.   - 5/2: tapered to room air.      Hypotension -likely orthostasis   -5/1: Systolic BP 70s this AM, up to 113 with IVF bolus. Decreased prn oxycodone to 5mg q4hrs. NS bolus, Blood cultures, Lactic Acid, UA, CBC/CMP, Troponin ordered. Chest Xray: Small lung volumes and bibasal patchy subsegmental opacities persist, similar to recent studies.  No new disease identified   -5/2: Blood cultures NGTD, lactate 2.2, troponin 0.015, UA 1+ occult blood and trace leukocytes  with few yeast and occasional bacteria. WBC increased to 15.     Non-Anion Gap Metabolic Acidosis  -5/3: bicarb 16-replaced  -5/4: bicarb 22  -5/6: bicarb still at 22     Lactic acidosis  - 2.7 overnight, responded to IVF  - does not appear infected  - again elevated to 2.4 after procedure, will trend  - 4/30: 2.6, monitor     Urinary incontinence   - rocha in place, consider switch to purwick when pt can tolerate  - 5/3: removed rocha     Pleural effusion  - likely metastatic, s/p sampling at OSH  - associated compressive atelectasis bilaterally  - multiple pulmonary micronodules     Anemia of Chronic Disease, stable  - likely chronic disease with some blood loss during procedures  - iron studies pending: Iron 50 (wnl), TIBC 160 (low), Saturation 31 (wnl), Transferrin 108 (low)  - monitor with CBC daily  - 5/2: Hgb 9.7  -5/6: Hgb 9.6--> 8.7----> 8.2     Hypomagnesemia  -most likely 2/2 chronic disease  -4/22: 1.4-replaced  -5/1: 1.5-replaced  -5/2: 1.5-replaced  -5/4: 1.5-replaced  -5/6: 1.5-replaced     Hypophosphatemia, resolved   -4/22: 2.5 - replaced  - Most likely 2/2 chronic disease     Thrombocytopenia, resolved  -4/22: 122--> 165 resolved   -4/30: 115-->135 (5/2)  -Most likely 2/2 chronic disease  -5/5: 136-->153, resolved     Congestive Heart Failure  Right Heart Failure  -4/27 Echo: Right Ventricular Enlargement with normal systolic function  -5/1:      Malnutrition     -Prealbumin of 4 . Dietary consulted: Please order speech consult. Continue regular diet, texture per SLP. Continue Boost Plus TID.     -Speech Consult: Clinical evaluation of swallow complete. Recommend dental soft diet with thin liquids. Given pt with scattered dentition, she appears to have met max potential with SLP services at this time.     DVT PPx: Enoxaparin 40mg daily     Discharge plan and follow up  Pending family discussion     Provider    I personally scribed for Ravin Shaikh MD on 05/09/2018 at 9:50 AM.  Electronically signed by kera Lipscomb III on 05/09/2018 at 9:50 AM

## 2018-05-09 NOTE — PLAN OF CARE
JOEL f/u with pts dtg Leonarda. She is visiting facilities today, has already been to [facility] but would like to visit several more. She asked for JOEL to send referral to [facility]. JOEL sent email request to [facility] to send referral to [facility].    Destiney De Jesus, \[facility] [place] [facility]\""W [id number]

## 2018-05-09 NOTE — PLAN OF CARE
Pt declined by [facility] (no Medicaid bed available) and Cindi BEAVER (insurance). Referrals under review at [facility]. Pt accepted by [facility].     SW will f/u with family today.    Destiney De Jesus, [facility] [id number]

## 2018-05-09 NOTE — PROGRESS NOTES
[facility] Medicine  Progress note    Team: [facility] HOSP MED B Dr. Shaikh  Admit Date: 4/20/2018  VIGNESH 5/10/2018  Code status: Full Code    Principal Problem:  Metastatic breast cancer     Interval hx: Patient accepted by maria del carmen [facility], daughter doing a site visit.       ROS   Not able to stand, RUE weakness  Pain Scale:  8/10 neck pain  Respiratory: no cough or shortness of breath  Cardiovascular: no chest pain or palpitations  Gastrointestinal: no nausea or vomiting, no abdominal pain or change in bowel habits  Behavioral/Psych: no depression or anxiety      PEx  Temp:  [96.3 °F (35.7 °C)-98.1 °F (36.7 °C)]   Pulse:  [104-114]   Resp:  [16-20]   BP: (128-139)/(74-86)   SpO2:  [92 %-100 %]     Intake/Output Summary (Last 24 hours) at 05/09/18 1117  Last data filed at 05/09/18 0932   Gross per 24 hour   Intake              360 ml   Output                0 ml   Net              360 ml       General Appearance: no acute distress   Heart: regular rate and rhythm  Respiratory: Normal respiratory effort, no crackles   Abdomen: Soft, non-tender; bowel sounds active  Skin: intact. IV sites ok  Neurologic:  No focal numbness. Right arm weakness: 2/5.    Mental status: Alert, oriented x 2, affect appropriate   Extremities: Cannot stand without assistance 2/2 UE weakness    Recent Labs  Lab 05/07/18 0428 05/08/18  0508 05/09/18  0405   WBC 11.70 13.03* 13.44*   HGB 8.2* 8.4* 9.1*   HCT 26.0* 27.7* 29.4*    193 199       Recent Labs  Lab 05/07/18 0428 05/08/18  0507 05/09/18  0404    140 141   K 3.6 3.7 3.6    108 109   CO2 23 22* 21*   BUN 14 13 12   CREATININE 0.8 0.7 0.7   GLU 71 68* 62*   CALCIUM 8.0* 7.9* 8.1*   MG 1.7 1.5* 1.4*   PHOS 3.7 3.6 3.6       Recent Labs  Lab 05/07/18 0428 05/08/18  0507 05/09/18  0404   ALKPHOS 265* 280* 300*   ALT 7* 6* 6*   AST 52* 58* 58*   ALBUMIN 1.4* 1.5* 1.5*   PROT 5.0* 5.1* 5.3*   BILITOT 0.5 0.5 0.6        Recent Labs  Lab 05/03/18  1736 05/04/18  0145 05/04/18  1312  05/04/18  2356 05/05/18  0633 05/05/18  1753   POCTGLUCOSE 96 99 125* 102 92 88       Scheduled Meds:   acetaminophen  1,000 mg Oral Q6H    enoxaparin  40 mg Subcutaneous Daily    lidocaine  1 patch Transdermal Q24H    magnesium oxide  800 mg Oral Daily    polyethylene glycol  17 g Oral Daily    pregabalin  150 mg Oral QHS    ramelteon  8 mg Oral QHS    senna-docusate 8.6-50 mg  2 tablet Oral Daily    sodium bicarbonate  1 tablet Oral BID     Continuous Infusions:    As Needed:  sodium chloride, bisacodyl, dextrose 50%, dextrose 50%, glucagon (human recombinant), insulin aspart U-100, morphine, omnipaque, ondansetron, oxyCODONE, promethazine (PHENERGAN) IVPB, promethazine, ramelteon, sodium chloride 0.9%, sodium chloride 0.9%, sodium chloride 0.9%, sodium chloride 0.9%, tiZANidine, white petrolatum    Active Hospital Problems    Diagnosis  POA    *Metastatic breast cancer [C50.919]  Yes    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, 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]  Yes    Weakness of right upper extremity [R29.898]  Yes    Leukocytosis [D72.829]  Yes    Malnutrition of moderate degree [E44.0]  Yes    Malignant neoplasm of central portion of right breast in female, estrogen receptor positive [C50.111, Z17.0]  Not Applicable    Cardiac rhythm disorder or disturbance or change [I49.9]  Yes    CHF (congestive heart failure) [I50.9]  Yes    Right heart failure [I50.810]  Yes    Acute hypoxemic respiratory failure [J96.01]  Yes    Tachycardia [R00.0]  Yes    Lactic acidemia [E87.2]  Yes    Severe malnutrition [E43]  Yes    Cancer of left femur [C40.22]  Yes    Cancer of right femur [C40.21]  Yes    Palliative care encounter [Z51.5]  Not Applicable    Pain due to malignant neoplasm metastatic to bone [G89.3, C79.51]  Yes    Anemia [D64.9]  Yes    Thrombocytopenia [D69.6]  Yes    Hypomagnesemia [E83.42]  Yes    Bony metastasis [C79.51]  Yes     Xray metastatic survey shows lytic lesions of skull, lytic spine and rib lesions with  pathologic fractures. Diffuse lytic lesions throughout entire central axial red marrow skeleton. Lytic lesions of proximal long bones and pathological fractures of ribs and right superior and inferior pubic ramus.      Pleural effusion [J90]  Yes      Resolved Hospital Problems    Diagnosis Date Resolved POA    Shock [R57.9] 04/26/2018 Yes    Encounter for weaning from ventilator [Z99.11] 04/30/2018 Not Applicable    Encephalopathy, metabolic [G93.41] 04/21/2018 Yes       Overview  Mrs. Grossman is a 57 yo female who was brought to [facility] ED on 4/20/2018 for second opinion regarding recently diagnosed likely metastatic malignancy with unknown primary.      Assessment and Plan for Problems addressed today:     Metastatic breast cancer  Pain due to malignant neoplasm metastatic to bone    Meningeal carcinomatosis     Palliative Care Encounter  - oncology following, pt did not want to talk this afternoon after arriving to the floor from surgery, discussed with Dr. Fink (onc) he will see her tomorrow  - she may be a candidate for some oral therapy  - radiation oncology consult placed  - pain control (BP is sensitive to narcotics)  - palliative care saw her while she was in ICU, would continue discussions. Family wants to talk about options with Oncology and Radiation Oncology before finalizing any palliative care plans.   - 5/1 Oncology recs: outside path suggestive of breast primary with moderately differentiated adenocarcinoma ER/TN+, HER 2-. Will f/u on path to confirm. Consider MRI brain w/ w/o contrast once medically stable.   - s/p radiation oncology evaluation: radiation treatment of each of the mailed femurs is recommended. 10 fractions of 3Gy each as outpatient   - 5/2: Right upper extremity weakness-MRI Brain w w/o contrast ordered.         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 Diffuse osseous metastatic disease involving the calvarium, clivus, and visualized upper cervical spine, noting posterior bulging at the body of C2. Transitioned to oral pain medications: oxycodone 5mg PRN q4hrs.      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 Recommend focusing on quality of life and comfort measures. MRI brachial plexus tomorrow for RUE weakness.   - 5/5: Palliative care has seen patient to discuss comfort care plan with her and her family; they do not currently want to start this conversation and wish to revisit this later.  -5/6: MRA of brachial plexus deferred yesterday; May still complete today. Discussed advanced directives, namely DNR. Heme/onc discussed palliative vs. Hospice (inpatient vs. Home) with the patient, leaning towards hospice. Continue pain management with lidocaine patch and Acetominophen.     5/7: MRA brachial plexus patient not interested. Discussed DNR status but patient states she has other things on her mind. Family discussion leaning towards hospice.      Pathologic fracture of ribs pubic ramus  - s/p IM nailing femur x2     Hypoxemic respiratory failure  - pulmonary edema from IVF  - indeterminate diastolic function on 4/27 echo, PAP 53mmHg  - currently on 4L NC since arriving to floor from PACU  - 5/1: increased to 5L NC.   - 5/2: tapered to room air.      Hypotension -likely orthostasis   -5/1: Systolic BP 70s this AM, up to 113 with IVF bolus. Decreased prn oxycodone to 5mg q4hrs. NS bolus, Blood cultures, Lactic Acid, UA, CBC/CMP, Troponin ordered. Chest Xray: Small lung volumes and bibasal patchy subsegmental opacities persist, similar to recent studies.  No new disease identified   -5/2: Blood cultures NGTD, lactate 2.2, troponin 0.015, UA 1+ occult blood and trace leukocytes  with few yeast and occasional bacteria. WBC increased to 15.     Non-Anion Gap Metabolic Acidosis  -5/3: bicarb 16-replaced  -5/4: bicarb 22  -5/6: bicarb still at 22     Lactic acidosis  - 2.7 overnight, responded to IVF  - does not appear infected  - again elevated to 2.4 after procedure, will trend  - 4/30: 2.6, monitor     Urinary incontinence   - rocha in place, consider switch to purwick when pt can tolerate  - 5/3: removed rocha     Pleural effusion  - likely metastatic, s/p sampling at OSH  - associated compressive atelectasis bilaterally  - multiple pulmonary micronodules     Anemia of Chronic Disease, stable  - likely chronic disease with some blood loss during procedures  - iron studies pending: Iron 50 (wnl), TIBC 160 (low), Saturation 31 (wnl), Transferrin 108 (low)  - monitor with CBC daily  - 5/2: Hgb 9.7  -5/6: Hgb 9.6--> 8.7----> 8.2     Hypomagnesemia  -most likely 2/2 chronic disease  -4/22: 1.4-replaced  -5/1: 1.5-replaced  -5/2: 1.5-replaced  -5/4: 1.5-replaced  -5/6: 1.5-replaced     Hypophosphatemia, resolved   -4/22: 2.5 - replaced  - Most likely 2/2 chronic disease     Thrombocytopenia, resolved  -4/22: 122--> 165 resolved   -4/30: 115-->135 (5/2)  -Most likely 2/2 chronic disease  -5/5: 136-->153, resolved     Congestive Heart Failure  Right Heart Failure  -4/27 Echo: Right Ventricular Enlargement with normal systolic function  -5/1:      Malnutrition     -Prealbumin of 4 . Dietary consulted: Please order speech consult. Continue regular diet, texture per SLP. Continue Boost Plus TID.     -Speech Consult: Clinical evaluation of swallow complete. Recommend dental soft diet with thin liquids. Given pt with scattered dentition, she appears to have met max potential with SLP services at this time.     DVT PPx: Enoxaparin 40mg daily     Discharge plan and follow up  Pending family discussion     Provider    I personally scribed for Ravin Shaikh MD on 05/09/2018 at 9:50 AM.  Electronically signed by kera Lipscomb III on 05/09/2018 at 9:50 AM

## 2018-05-10 LAB
ALBUMIN SERPL BCP-MCNC: 1.7 G/DL
ALP SERPL-CCNC: 296 U/L
ALT SERPL W/O P-5'-P-CCNC: 7 U/L
ANION GAP SERPL CALC-SCNC: 10 MMOL/L
ANISOCYTOSIS BLD QL SMEAR: SLIGHT
AST SERPL-CCNC: 53 U/L
BASOPHILS # BLD AUTO: ABNORMAL K/UL
BASOPHILS NFR BLD: 0 %
BILIRUB SERPL-MCNC: 0.5 MG/DL
BUN SERPL-MCNC: 12 MG/DL
CALCIUM SERPL-MCNC: 8.3 MG/DL
CHLORIDE SERPL-SCNC: 107 MMOL/L
CO2 SERPL-SCNC: 23 MMOL/L
CREAT SERPL-MCNC: 0.7 MG/DL
DIFFERENTIAL METHOD: ABNORMAL
EOSINOPHIL # BLD AUTO: ABNORMAL K/UL
EOSINOPHIL NFR BLD: 3 %
ERYTHROCYTE [DISTWIDTH] IN BLOOD BY AUTOMATED COUNT: 19.6 %
EST. GFR  (AFRICAN AMERICAN): >60 ML/MIN/1.73 M^2
EST. GFR  (NON AFRICAN AMERICAN): >60 ML/MIN/1.73 M^2
GLUCOSE SERPL-MCNC: 81 MG/DL
HCT VFR BLD AUTO: 29.3 %
HGB BLD-MCNC: 9.1 G/DL
HYPOCHROMIA BLD QL SMEAR: ABNORMAL
IMM GRANULOCYTES # BLD AUTO: ABNORMAL K/UL
IMM GRANULOCYTES NFR BLD AUTO: ABNORMAL %
LYMPHOCYTES # BLD AUTO: ABNORMAL K/UL
LYMPHOCYTES NFR BLD: 29 %
MAGNESIUM SERPL-MCNC: 1.7 MG/DL
MCH RBC QN AUTO: 29.4 PG
MCHC RBC AUTO-ENTMCNC: 31.1 G/DL
MCV RBC AUTO: 95 FL
MONOCYTES # BLD AUTO: ABNORMAL K/UL
MONOCYTES NFR BLD: 5 %
MYELOCYTES NFR BLD MANUAL: 1 %
NEUTROPHILS NFR BLD: 57 %
NEUTS BAND NFR BLD MANUAL: 5 %
NRBC BLD-RTO: 3 /100 WBC
OVALOCYTES BLD QL SMEAR: ABNORMAL
PHOSPHATE SERPL-MCNC: 3.8 MG/DL
PLATELET # BLD AUTO: 205 K/UL
PMV BLD AUTO: 12 FL
POCT GLUCOSE: 104 MG/DL (ref 70–110)
POIKILOCYTOSIS BLD QL SMEAR: SLIGHT
POLYCHROMASIA BLD QL SMEAR: ABNORMAL
POTASSIUM SERPL-SCNC: 3.7 MMOL/L
PROT SERPL-MCNC: 5.5 G/DL
RBC # BLD AUTO: 3.1 M/UL
SODIUM SERPL-SCNC: 140 MMOL/L
TB INDURATION 48 - 72 HR READ: 0 MM
WBC # BLD AUTO: 12.59 K/UL

## 2018-05-10 PROCEDURE — 11000001 HC ACUTE MED/SURG PRIVATE ROOM

## 2018-05-10 PROCEDURE — 63600175 PHARM REV CODE 636 W HCPCS: Performed by: STUDENT IN AN ORGANIZED HEALTH CARE EDUCATION/TRAINING PROGRAM

## 2018-05-10 PROCEDURE — 94761 N-INVAS EAR/PLS OXIMETRY MLT: CPT

## 2018-05-10 PROCEDURE — 25000003 PHARM REV CODE 250: Performed by: STUDENT IN AN ORGANIZED HEALTH CARE EDUCATION/TRAINING PROGRAM

## 2018-05-10 PROCEDURE — 99232 SBSQ HOSP IP/OBS MODERATE 35: CPT | Mod: ,,, | Performed by: INTERNAL MEDICINE

## 2018-05-10 PROCEDURE — 25000003 PHARM REV CODE 250: Performed by: HOSPITALIST

## 2018-05-10 PROCEDURE — 80053 COMPREHEN METABOLIC PANEL: CPT

## 2018-05-10 PROCEDURE — 85027 COMPLETE CBC AUTOMATED: CPT

## 2018-05-10 PROCEDURE — 83735 ASSAY OF MAGNESIUM: CPT

## 2018-05-10 PROCEDURE — 27000221 HC OXYGEN, UP TO 24 HOURS

## 2018-05-10 PROCEDURE — 36415 COLL VENOUS BLD VENIPUNCTURE: CPT

## 2018-05-10 PROCEDURE — 25000003 PHARM REV CODE 250: Performed by: ANESTHESIOLOGY

## 2018-05-10 PROCEDURE — 94799 UNLISTED PULMONARY SVC/PX: CPT

## 2018-05-10 PROCEDURE — 84100 ASSAY OF PHOSPHORUS: CPT

## 2018-05-10 PROCEDURE — 85007 BL SMEAR W/DIFF WBC COUNT: CPT

## 2018-05-10 RX ADMIN — MAGNESIUM OXIDE TAB 400 MG (241.3 MG ELEMENTAL MG) 800 MG: 400 (241.3 MG) TAB at 10:05

## 2018-05-10 RX ADMIN — OXYCODONE HYDROCHLORIDE 5 MG: 5 TABLET ORAL at 10:05

## 2018-05-10 RX ADMIN — SODIUM BICARBONATE 650 MG TABLET 650 MG: at 10:05

## 2018-05-10 RX ADMIN — ENOXAPARIN SODIUM 40 MG: 100 INJECTION SUBCUTANEOUS at 07:05

## 2018-05-10 RX ADMIN — PREGABALIN 150 MG: 50 CAPSULE ORAL at 09:05

## 2018-05-10 RX ADMIN — LIDOCAINE 1 PATCH: 50 PATCH CUTANEOUS at 07:05

## 2018-05-10 RX ADMIN — SODIUM BICARBONATE 650 MG TABLET 650 MG: at 09:05

## 2018-05-10 RX ADMIN — ACETAMINOPHEN 1000 MG: 500 TABLET, FILM COATED ORAL at 07:05

## 2018-05-10 RX ADMIN — RAMELTEON 8 MG: 8 TABLET, FILM COATED ORAL at 09:05

## 2018-05-10 RX ADMIN — STANDARDIZED SENNA CONCENTRATE AND DOCUSATE SODIUM 2 TABLET: 8.6; 5 TABLET, FILM COATED ORAL at 10:05

## 2018-05-10 NOTE — PROGRESS NOTES
[facility]  Orthopedics  Progress Note    Patient Name: Kerline Grossman  MRN: [id number]  Admission Date: 4/20/2018  Hospital Length of Stay: 19 days  Attending Provider: Ravin Shaikh MD  Primary Care Provider: Primary Doctor No  Follow-up For: Procedure(s) (LRB):  IM NAILING OF FEMUR - hana table - large c arm door side (Right)    Post-Operative Day: 10 Days Post-Op  Subjective:     Principal Problem:Metastatic breast cancer    Principal Orthopedic Problem: B femur impending fractures s/p B CMN    Interval History: Patient seen and examined at bedside.  No acute events overnight. Has been declining turns and medications per nursing notes. Primary still evaluating dispo options.    Review of patient's allergies indicates:  No Known Allergies    Current Facility-Administered Medications   Medication    0.9%  NaCl infusion (for blood administration)    acetaminophen tablet 1,000 mg    bisacodyl suppository 10 mg    dextrose 50% injection 12.5 g    dextrose 50% injection 25 g    enoxaparin injection 40 mg    glucagon (human recombinant) injection 1 mg    insulin aspart U-100 pen 1-10 Units    lidocaine 5 % patch 1 patch    magnesium oxide tablet 800 mg    morphine injection 2 mg    omnipaque oral solution 15 mL    ondansetron disintegrating tablet 8 mg    oxyCODONE immediate release tablet 5 mg    polyethylene glycol packet 17 g    pregabalin capsule 150 mg    promethazine (PHENERGAN) 6.25 mg in dextrose 5 % 50 mL IVPB    promethazine tablet 25 mg    ramelteon tablet 8 mg    ramelteon tablet 8 mg    senna-docusate 8.6-50 mg per tablet 2 tablet    sodium bicarbonate tablet 650 mg    sodium chloride 0.9% flush 3 mL    sodium chloride 0.9% flush 3 mL    sodium chloride 0.9% flush 3 mL    sodium chloride 0.9% flush 5 mL    tiZANidine tablet 4 mg    white petrolatum 41 % ointment     Objective:     Vital Signs (Most Recent):  Temp: 98.2 °F (36.8 °C) (05/10/18 0957)  Pulse: 107  "(05/10/18 0957)  Resp: (!) 21 (05/10/18 0957)  BP: 119/66 (05/10/18 0957)  SpO2: (!) 94 % (05/10/18 0957) Vital Signs (24h Range):  Temp:  [97.5 °F (36.4 °C)-99.7 °F (37.6 °C)] 98.2 °F (36.8 °C)  Pulse:  [100-118] 107  Resp:  [16-21] 21  SpO2:  [92 %-96 %] 94 %  BP: (109-134)/(64-86) 119/66     Weight: 74 kg (163 lb 2.3 oz)  Height: 5' 6" (167.6 cm)  Body mass index is 26.33 kg/m².    No intake or output data in the 24 hours ending 05/10/18 1057    Ortho/SPM Exam    AAOx4  NAD  Regular rhythm, tachycardic    BLE:  L thigh dressings with stable, dry drainage  R thigh dressing minimal drainage on dressings  SILT and motor intact T/SP/DP  WWP extremities  FCDs in place and functioning    Significant Labs:   CBC:     Recent Labs  Lab 05/09/18  0405 05/10/18  0412   WBC 13.44* 12.59   HGB 9.1* 9.1*   HCT 29.4* 29.3*    205     CMP:     Recent Labs  Lab 05/09/18  0404 05/10/18  0412    140   K 3.6 3.7    107   CO2 21* 23   GLU 62* 81   BUN 12 12   CREATININE 0.7 0.7   CALCIUM 8.1* 8.3*   PROT 5.3* 5.5*   ALBUMIN 1.5* 1.7*   BILITOT 0.6 0.5   ALKPHOS 300* 296*   AST 58* 53*   ALT 6* 7*   ANIONGAP 11 10   EGFRNONAA >60.0 >60.0     All pertinent labs within the past 24 hours have been reviewed.    Significant Imaging: I have reviewed all pertinent imaging results/findings.    Assessment/Plan:     Bony metastasis    58 y.o. female POD15 s/p L CMN, POD10 s/p R CMN    Pain control: per primary and heme onc  PT/OT: WBAT  DVT PPx: lovenox, FCDs at all times when not ambulating  Abx: postop Ancef completed  Drain: none     Dispo: per primary, likely hospice versus home with hospice              Aayush Gifford MD  Orthopedics  [facility]  "

## 2018-05-10 NOTE — PLAN OF CARE
"1:14 PM  SW received called from Tatianna with [employer] inquiring if family had made decision. Met with pt and family member at bedside. Pt stated "they" are going to make decision. Asked pt who is "they", pt would not give answer. Called daughter, Leonarda and did not get answer, left voicemail. Called Frank, significant other who stated son or pt was to make decision. Called son and left voicemail. Called Tatianna with [employer] and informed her of progress. Will f/u as needed.    Soco Garces, GUILLE   [facility]  Ext [phone]    "

## 2018-05-10 NOTE — PLAN OF CARE
Problem: Patient Care Overview  Goal: Plan of Care Review  Outcome: Ongoing (interventions implemented as appropriate)  Pt in no acute distress. Pain well-controlled with Oxycodone. Pt refused accuchecks and wedge repositioning this shift. Pt frequently removes oxygen; educated regarding importance. Tele monitoring continues. Family remains at bedside. Call light within reach. Q2H rounding, fall precautions in place. Will continue to monitor.

## 2018-05-10 NOTE — ASSESSMENT & PLAN NOTE
58 y.o. female POD15 s/p L CMN, POD10 s/p R CMN    Pain control: per primary and heme onc  PT/OT: WBAT  DVT PPx: lovenox, FCDs at all times when not ambulating  Abx: postop Ancef completed  Drain: none     Dispo: per primary, likely hospice versus home with hospice

## 2018-05-10 NOTE — SUBJECTIVE & OBJECTIVE
"Principal Problem:Metastatic breast cancer    Principal Orthopedic Problem: B femur impending fractures s/p B CMN    Interval History: Patient seen and examined at bedside.  No acute events overnight. Has been declining turns and medications per nursing notes. Primary still evaluating dispo options.    Review of patient's allergies indicates:  No Known Allergies    Current Facility-Administered Medications   Medication    0.9%  NaCl infusion (for blood administration)    acetaminophen tablet 1,000 mg    bisacodyl suppository 10 mg    dextrose 50% injection 12.5 g    dextrose 50% injection 25 g    enoxaparin injection 40 mg    glucagon (human recombinant) injection 1 mg    insulin aspart U-100 pen 1-10 Units    lidocaine 5 % patch 1 patch    magnesium oxide tablet 800 mg    morphine injection 2 mg    omnipaque oral solution 15 mL    ondansetron disintegrating tablet 8 mg    oxyCODONE immediate release tablet 5 mg    polyethylene glycol packet 17 g    pregabalin capsule 150 mg    promethazine (PHENERGAN) 6.25 mg in dextrose 5 % 50 mL IVPB    promethazine tablet 25 mg    ramelteon tablet 8 mg    ramelteon tablet 8 mg    senna-docusate 8.6-50 mg per tablet 2 tablet    sodium bicarbonate tablet 650 mg    sodium chloride 0.9% flush 3 mL    sodium chloride 0.9% flush 3 mL    sodium chloride 0.9% flush 3 mL    sodium chloride 0.9% flush 5 mL    tiZANidine tablet 4 mg    white petrolatum 41 % ointment     Objective:     Vital Signs (Most Recent):  Temp: 98.2 °F (36.8 °C) (05/10/18 0957)  Pulse: 107 (05/10/18 0957)  Resp: (!) 21 (05/10/18 0957)  BP: 119/66 (05/10/18 0957)  SpO2: (!) 94 % (05/10/18 0957) Vital Signs (24h Range):  Temp:  [97.5 °F (36.4 °C)-99.7 °F (37.6 °C)] 98.2 °F (36.8 °C)  Pulse:  [100-118] 107  Resp:  [16-21] 21  SpO2:  [92 %-96 %] 94 %  BP: (109-134)/(64-86) 119/66     Weight: 74 kg (163 lb 2.3 oz)  Height: 5' 6" (167.6 cm)  Body mass index is 26.33 kg/m².    No intake or " output data in the 24 hours ending 05/10/18 1057    Ortho/SPM Exam    AAOx4  NAD  Regular rhythm, tachycardic    BLE:  L thigh dressings with stable, dry drainage  R thigh dressing minimal drainage on dressings  SILT and motor intact T/SP/DP  WWP extremities  FCDs in place and functioning    Significant Labs:   CBC:     Recent Labs  Lab 05/09/18  0405 05/10/18  0412   WBC 13.44* 12.59   HGB 9.1* 9.1*   HCT 29.4* 29.3*    205     CMP:     Recent Labs  Lab 05/09/18  0404 05/10/18  0412    140   K 3.6 3.7    107   CO2 21* 23   GLU 62* 81   BUN 12 12   CREATININE 0.7 0.7   CALCIUM 8.1* 8.3*   PROT 5.3* 5.5*   ALBUMIN 1.5* 1.7*   BILITOT 0.6 0.5   ALKPHOS 300* 296*   AST 58* 53*   ALT 6* 7*   ANIONGAP 11 10   EGFRNONAA >60.0 >60.0     All pertinent labs within the past 24 hours have been reviewed.    Significant Imaging: I have reviewed all pertinent imaging results/findings.

## 2018-05-10 NOTE — PROGRESS NOTES
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] 05/04/2018 Yes    Weakness of right upper extremity [R29.898] 05/03/2018 Yes    Leukocytosis [D72.829] 05/03/2018 Yes    Malnutrition of moderate degree [E44.0] 05/01/2018 Yes    Malignant neoplasm of central portion of right breast in female, estrogen receptor positive [C50.111, Z17.0]  Not Applicable    Cardiac rhythm disorder or disturbance or change [I49.9]  Yes    CHF (congestive heart failure) [I50.9]  Yes    Right heart failure [I50.810] 04/26/2018 Yes    Acute hypoxemic respiratory failure [J96.01] 04/26/2018 Yes    Tachycardia [R00.0]  Yes    Lactic acidemia [E87.2]  Yes    Severe malnutrition [E43] 04/25/2018 Yes    Cancer of left femur [C40.22] 04/24/2018 Yes    Cancer of right femur [C40.21] 04/24/2018 Yes    Palliative care encounter [Z51.5] 04/23/2018 Not Applicable    Pain due to malignant neoplasm metastatic to bone [G89.3, C79.51] 04/23/2018 Yes    Anemia [D64.9] 04/22/2018 Yes    Thrombocytopenia [D69.6] 04/22/2018 Yes    Hypomagnesemia [E83.42] 04/22/2018 Yes    Bony metastasis [C79.51] 04/21/2018 Yes    Pleural effusion [J90] 04/21/2018 Yes      Problems Resolved During this Admission:    Diagnosis Date Noted Date Resolved POA    Shock [R57.9] 05/01/2018 04/26/2018 Yes    Encounter for weaning from ventilator [Z99.11]  04/30/2018 Not Applicable    Encephalopathy, metabolic [G93.41] 04/21/2018 04/21/2018 Yes  "      5/10/18  - placement challenges noted.   - no new events, pt. Resting quietly   - will not answer any questions today. Son and grandson at the bedside.   - spoke with Leonarda over the phone. She is looking into other NH options  - questions answered.     5/9/18  - pain appears adequately controlled with Oxycodone 5 mg IR and local therapy, continue the same  - placement underway, likely NH with hospice as per prior discussion  - pt. Not interested in any further discussion regarding DNR at this point. Remains full code, will need addressed prior to discharge to hospice.   - she has been refusing medications  - will re-visit with daughter leonarda to answer questions and provide support  - Requests for visitation of family members (one in group home) noted and managed by Dr. Shaikh  - will follow    5/8/18  - met with daughter Leonarda Grossman at the bedside.   - goals of care discussed.   - Pt. Asked why she was not getting more physical therapy and why she was told her surgery would help her walk but now she is just laying in bed.   - PT/Ot note reviewed. appears pt. Not participating in therapy.   - wishes further defined and it seems that despite the pt. Anger about her diagnosis and lack of strength she realizes that there is no good therapeutic option for the extent of her disease. Daughter confirms her understanding of this and educates the pt. She would be "too weak for treatment". Pt. Understands.   - Ms. Grossman expressed to me that it would be important for her not to suffer and to be comfortable.   - we have discussed options of home vs. Facility based hospice and the pt. And family are interested.   - it seems that home hospice is not a good option due to lack of family support to provide 24 hrs coverage.   - daughter agrees to tour facilities today to determine of NH with hospice would be a viable option ( pt. Only has 3 days of inpatient hospice benefits via Medicaid).   - will revisit and fruther define wishes. "   - POA discussed as prior form lacking witnessed signature. Pt. Confirms 1st POA Son Dion and 2nd POA daughter Leonarda.   - We have not discussed code status as pt. Was not willing to talk about it currently. Will re-visit.      I will follow along with you. Please call [phone] with questions.     Subjective:     HPI: Kerline Grossman is a 57 yo woman with medical history significant for chronic mood disorder who presented to OSH with ZACH, hypercalcemia, and lytic lesions concerning for MM.  Workup for MM negative thus far.  Her family wanted to transfer her to [facility], but it was denied, so the family had her discharged and transported her to [facility] themselves.  She arrived hypotensive, which may have been 2/2 opiates (received morphine at OSH).  Hypotension resolved with IVF and only required brief stay under ICU care before being transferred to the floor.     Interval History: awake in NAD this AM. Young daughter at the bedside. Has used heating pads for her headache with good relieve over night.   Currently without new complaints.   Accepted at [facility] per SW.       Medications:  Continuous Infusions:  Scheduled Meds:   acetaminophen  1,000 mg Oral Q6H    enoxaparin  40 mg Subcutaneous Daily    lidocaine  1 patch Transdermal Q24H    magnesium oxide  800 mg Oral Daily    polyethylene glycol  17 g Oral Daily    pregabalin  150 mg Oral QHS    ramelteon  8 mg Oral QHS    senna-docusate 8.6-50 mg  2 tablet Oral Daily    sodium bicarbonate  1 tablet Oral BID     PRN Meds:sodium chloride, bisacodyl, dextrose 50%, dextrose 50%, glucagon (human recombinant), insulin aspart U-100, morphine, omnipaque, ondansetron, oxyCODONE, promethazine (PHENERGAN) IVPB, promethazine, ramelteon, sodium chloride 0.9%, sodium chloride 0.9%, sodium chloride 0.9%, sodium chloride 0.9%, tiZANidine, white petrolatum        Review of Systems   HENT:        Headache.    Respiratory: Negative for cough and shortness of breath.     Cardiovascular: Negative for chest pain.   Gastrointestinal: Negative for abdominal pain and vomiting.   Genitourinary: Negative for dysuria.   Musculoskeletal: Negative for myalgias.     Objective:     Vital Signs (Most Recent):  Temp: 98.1 °F (36.7 °C) (05/10/18 1125)  Pulse: (!) 113 (05/10/18 1127)  Resp: 16 (05/10/18 1125)  BP: 110/69 (05/10/18 1125)  SpO2: 96 % (05/10/18 1125) Vital Signs (24h Range):  Temp:  [97.5 °F (36.4 °C)-99.7 °F (37.6 °C)] 98.1 °F (36.7 °C)  Pulse:  [100-118] 113  Resp:  [16-21] 16  SpO2:  [92 %-96 %] 96 %  BP: (109-129)/(64-78) 110/69     Physical Exam  No exam.     Laboratory:   CBC:     Recent Labs  Lab 05/10/18  0412   WBC 12.59   RBC 3.10*   HGB 9.1*   HCT 29.3*      MCV 95   MCH 29.4   MCHC 31.1*       CMP:     Recent Labs  Lab 05/10/18  0412   GLU 81   CALCIUM 8.3*   ALBUMIN 1.7*   PROT 5.5*      K 3.7   CO2 23      BUN 12   CREATININE 0.7   ALKPHOS 296*   ALT 7*   AST 53*   BILITOT 0.5       No results found for: POCTGLUCOSE      Significant Imaging:     > 50% of 35 min visit spent in chart review, face to face discussion of goals of care,  symptom assessment, coordination of care and emotional support.    Steve Bunch MD  Palliative Medicine  [facility]

## 2018-05-11 LAB
ALBUMIN SERPL BCP-MCNC: 1.6 G/DL
ALP SERPL-CCNC: 304 U/L
ALT SERPL W/O P-5'-P-CCNC: 10 U/L
ANION GAP SERPL CALC-SCNC: 11 MMOL/L
ANISOCYTOSIS BLD QL SMEAR: SLIGHT
AST SERPL-CCNC: 56 U/L
BASOPHILS NFR BLD: 0 %
BILIRUB SERPL-MCNC: 0.4 MG/DL
BUN SERPL-MCNC: 10 MG/DL
BURR CELLS BLD QL SMEAR: ABNORMAL
CALCIUM SERPL-MCNC: 8.7 MG/DL
CHLORIDE SERPL-SCNC: 108 MMOL/L
CO2 SERPL-SCNC: 24 MMOL/L
CREAT SERPL-MCNC: 0.7 MG/DL
DIFFERENTIAL METHOD: ABNORMAL
EOSINOPHIL NFR BLD: 3 %
ERYTHROCYTE [DISTWIDTH] IN BLOOD BY AUTOMATED COUNT: 19.6 %
EST. GFR  (AFRICAN AMERICAN): >60 ML/MIN/1.73 M^2
EST. GFR  (NON AFRICAN AMERICAN): >60 ML/MIN/1.73 M^2
GLUCOSE SERPL-MCNC: 80 MG/DL
HCT VFR BLD AUTO: 28.9 %
HGB BLD-MCNC: 8.5 G/DL
HYPOCHROMIA BLD QL SMEAR: ABNORMAL
IMM GRANULOCYTES # BLD AUTO: ABNORMAL K/UL
IMM GRANULOCYTES NFR BLD AUTO: ABNORMAL %
LYMPHOCYTES NFR BLD: 17 %
MAGNESIUM SERPL-MCNC: 1.6 MG/DL
MCH RBC QN AUTO: 28.5 PG
MCHC RBC AUTO-ENTMCNC: 29.4 G/DL
MCV RBC AUTO: 97 FL
MONOCYTES NFR BLD: 4 %
NEUTROPHILS NFR BLD: 72 %
NEUTS BAND NFR BLD MANUAL: 4 %
NRBC BLD-RTO: 4 /100 WBC
OVALOCYTES BLD QL SMEAR: ABNORMAL
PHOSPHATE SERPL-MCNC: 4.2 MG/DL
PLATELET # BLD AUTO: 210 K/UL
PLATELET BLD QL SMEAR: ABNORMAL
PMV BLD AUTO: 11.8 FL
POIKILOCYTOSIS BLD QL SMEAR: SLIGHT
POLYCHROMASIA BLD QL SMEAR: ABNORMAL
POTASSIUM SERPL-SCNC: 3.4 MMOL/L
PROT SERPL-MCNC: 5.4 G/DL
RBC # BLD AUTO: 2.98 M/UL
SCHISTOCYTES BLD QL SMEAR: PRESENT
SODIUM SERPL-SCNC: 143 MMOL/L
WBC # BLD AUTO: 10.64 K/UL

## 2018-05-11 PROCEDURE — 25000003 PHARM REV CODE 250: Performed by: HOSPITALIST

## 2018-05-11 PROCEDURE — 11000001 HC ACUTE MED/SURG PRIVATE ROOM

## 2018-05-11 PROCEDURE — 36415 COLL VENOUS BLD VENIPUNCTURE: CPT

## 2018-05-11 PROCEDURE — 83735 ASSAY OF MAGNESIUM: CPT

## 2018-05-11 PROCEDURE — 80053 COMPREHEN METABOLIC PANEL: CPT

## 2018-05-11 PROCEDURE — 25000003 PHARM REV CODE 250: Performed by: STUDENT IN AN ORGANIZED HEALTH CARE EDUCATION/TRAINING PROGRAM

## 2018-05-11 PROCEDURE — 25000003 PHARM REV CODE 250: Performed by: ANESTHESIOLOGY

## 2018-05-11 PROCEDURE — 63600175 PHARM REV CODE 636 W HCPCS: Performed by: STUDENT IN AN ORGANIZED HEALTH CARE EDUCATION/TRAINING PROGRAM

## 2018-05-11 PROCEDURE — 84100 ASSAY OF PHOSPHORUS: CPT

## 2018-05-11 PROCEDURE — 99232 SBSQ HOSP IP/OBS MODERATE 35: CPT | Mod: ,,, | Performed by: INTERNAL MEDICINE

## 2018-05-11 RX ADMIN — LIDOCAINE 1 PATCH: 50 PATCH CUTANEOUS at 06:05

## 2018-05-11 RX ADMIN — ACETAMINOPHEN 1000 MG: 500 TABLET, FILM COATED ORAL at 12:05

## 2018-05-11 RX ADMIN — SODIUM BICARBONATE 650 MG TABLET 650 MG: at 12:05

## 2018-05-11 RX ADMIN — ENOXAPARIN SODIUM 40 MG: 100 INJECTION SUBCUTANEOUS at 06:05

## 2018-05-11 RX ADMIN — STANDARDIZED SENNA CONCENTRATE AND DOCUSATE SODIUM 2 TABLET: 8.6; 5 TABLET, FILM COATED ORAL at 12:05

## 2018-05-11 RX ADMIN — BISACODYL 10 MG: 10 SUPPOSITORY RECTAL at 06:05

## 2018-05-11 RX ADMIN — ACETAMINOPHEN 1000 MG: 500 TABLET, FILM COATED ORAL at 03:05

## 2018-05-11 RX ADMIN — ACETAMINOPHEN 1000 MG: 500 TABLET, FILM COATED ORAL at 06:05

## 2018-05-11 RX ADMIN — MAGNESIUM OXIDE TAB 400 MG (241.3 MG ELEMENTAL MG) 800 MG: 400 (241.3 MG) TAB at 12:05

## 2018-05-11 RX ADMIN — POLYETHYLENE GLYCOL 3350 17 G: 17 POWDER, FOR SOLUTION ORAL at 12:05

## 2018-05-11 NOTE — PROGRESS NOTES
[facility] Medicine  Progress note    Team: [facility] MED B Dr. Shaikh  Admit Date: 4/20/2018  VIGNESH 5/11/2018  Code status: Full Code    Principal Problem:  Metastatic breast cancer     Interval hx: Patient accepted by [facility], daughter doing a site visit.       ROS   Not able to stand, RUE weakness  Pain Scale:  8/10 neck pain  Respiratory: no cough or shortness of breath  Cardiovascular: no chest pain or palpitations  Gastrointestinal: no nausea or vomiting, no abdominal pain or change in bowel habits  Behavioral/Psych: no depression or anxiety      PEx  Temp:  [97.3 °F (36.3 °C)-98.4 °F (36.9 °C)]   Pulse:  []   Resp:  [14-20]   BP: (116-129)/(68-78)   SpO2:  [96 %-98 %]     Intake/Output Summary (Last 24 hours) at 05/11/18 1721  Last data filed at 05/11/18 1529   Gross per 24 hour   Intake              480 ml   Output             1300 ml   Net             -820 ml       General Appearance: no acute distress   Heart: regular rate and rhythm  Respiratory: Normal respiratory effort, no crackles   Abdomen: Soft, non-tender; bowel sounds active  Skin: intact. IV sites ok  Neurologic:  No focal numbness. Right arm weakness: 2/5.    Mental status: Alert, oriented x 2, affect appropriate   Extremities: Cannot stand without assistance 2/2 UE weakness    Recent Labs  Lab 05/09/18  0405 05/10/18  0412 05/11/18  0554   WBC 13.44* 12.59 10.64   HGB 9.1* 9.1* 8.5*   HCT 29.4* 29.3* 28.9*    205 210       Recent Labs  Lab 05/09/18  0404 05/10/18  0412 05/11/18  0554    140 143   K 3.6 3.7 3.4*    107 108   CO2 21* 23 24   BUN 12 12 10   CREATININE 0.7 0.7 0.7   GLU 62* 81 80   CALCIUM 8.1* 8.3* 8.7   MG 1.4* 1.7 1.6   PHOS 3.6 3.8 4.2       Recent Labs  Lab 05/09/18  0404 05/10/18  0412 05/11/18  0554   ALKPHOS 300* 296* 304*   ALT 6* 7* 10   AST 58* 53* 56*   ALBUMIN 1.5* 1.7* 1.6*   PROT 5.3* 5.5* 5.4*   BILITOT 0.6 0.5 0.4        Recent Labs  Lab 05/04/18  2356 05/05/18  0633 05/05/18  1754  05/10/18  1946   POCTGLUCOSE 102 92 88 104       Scheduled Meds:   acetaminophen  1,000 mg Oral Q6H    enoxaparin  40 mg Subcutaneous Daily    lidocaine  1 patch Transdermal Q24H    magnesium oxide  800 mg Oral Daily    polyethylene glycol  17 g Oral Daily    pregabalin  150 mg Oral QHS    ramelteon  8 mg Oral QHS    senna-docusate 8.6-50 mg  2 tablet Oral Daily    sodium bicarbonate  1 tablet Oral BID     Continuous Infusions:    As Needed:  sodium chloride, bisacodyl, dextrose 50%, dextrose 50%, glucagon (human recombinant), insulin aspart U-100, morphine, omnipaque, ondansetron, oxyCODONE, promethazine (PHENERGAN) IVPB, promethazine, ramelteon, sodium chloride 0.9%, sodium chloride 0.9%, sodium chloride 0.9%, sodium chloride 0.9%, tiZANidine, white petrolatum    Active Hospital Problems    Diagnosis  POA    *Metastatic breast cancer [C50.919]  Yes    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, 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]  Yes    Weakness of right upper extremity [R29.898]  Yes    Leukocytosis [D72.829]  Yes    Malnutrition of moderate degree [E44.0]  Yes    Malignant neoplasm of central portion of right breast in female, estrogen receptor positive [C50.111, Z17.0]  Not Applicable    Cardiac rhythm disorder or disturbance or change [I49.9]  Yes    CHF (congestive heart failure) [I50.9]  Yes    Right heart failure [I50.810]  Yes    Acute hypoxemic respiratory failure [J96.01]  Yes    Tachycardia [R00.0]  Yes    Lactic acidemia [E87.2]  Yes    Severe malnutrition [E43]  Yes    Cancer of left femur [C40.22]  Yes    Cancer of right femur [C40.21]  Yes    Palliative care encounter [Z51.5]  Not Applicable    Pain due to malignant neoplasm metastatic to bone [G89.3, C79.51]  Yes    Anemia [D64.9]  Yes    Thrombocytopenia [D69.6]  Yes    Hypomagnesemia [E83.42]  Yes    Bony metastasis [C79.51]  Yes     Xray metastatic survey shows lytic lesions of skull, lytic spine and rib lesions with pathologic fractures. Diffuse lytic  lesions throughout entire central axial red marrow skeleton. Lytic lesions of proximal long bones and pathological fractures of ribs and right superior and inferior pubic ramus.      Pleural effusion [J90]  Yes      Resolved Hospital Problems    Diagnosis Date Resolved POA    Shock [R57.9] 04/26/2018 Yes    Encounter for weaning from ventilator [Z99.11] 04/30/2018 Not Applicable    Encephalopathy, metabolic [G93.41] 04/21/2018 Yes       Overview  Mrs. Grossman is a 57 yo female who was brought to [facility] ED on 4/20/2018 for second opinion regarding recently diagnosed likely metastatic malignancy with unknown primary.      Assessment and Plan for Problems addressed today:     Metastatic breast cancer  Pain due to malignant neoplasm metastatic to bone    Meningeal carcinomatosis     Palliative Care Encounter  - oncology following, pt did not want to talk this afternoon after arriving to the floor from surgery, discussed with Dr. Fink (onc) he will see her tomorrow  - she may be a candidate for some oral therapy  - radiation oncology consult placed  - pain control (BP is sensitive to narcotics)  - palliative care saw her while she was in ICU, would continue discussions. Family wants to talk about options with Oncology and Radiation Oncology before finalizing any palliative care plans.   - 5/1 Oncology recs: outside path suggestive of breast primary with moderately differentiated adenocarcinoma ER/CT+, HER 2-. Will f/u on path to confirm. Consider MRI brain w/ w/o contrast once medically stable.   - s/p radiation oncology evaluation: radiation treatment of each of the mailed femurs is recommended. 10 fractions of 3Gy each as outpatient   - 5/2: Right upper extremity weakness-MRI Brain w w/o contrast ordered.         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  Diffuse osseous metastatic disease involving the calvarium, clivus, and visualized upper cervical spine, noting posterior bulging at the body of C2. Transitioned to oral pain medications: oxycodone 5mg PRN q4hrs.      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 Recommend focusing on quality of life and comfort measures. MRI brachial plexus tomorrow for RUE weakness.   - 5/5: Palliative care has seen patient to discuss comfort care plan with her and her family; they do not currently want to start this conversation and wish to revisit this later.  -5/6: MRA of brachial plexus deferred yesterday; May still complete today. Discussed advanced directives, namely DNR. Heme/onc discussed palliative vs. Hospice (inpatient vs. Home) with the patient, leaning towards hospice. Continue pain management with lidocaine patch and Acetominophen.     5/7: MRA brachial plexus patient not interested. Discussed DNR status but patient states she has other things on her mind. Family discussion leaning towards hospice.      Pathologic fracture of ribs pubic ramus  - s/p IM nailing femur x2     Hypoxemic respiratory failure  - pulmonary edema from IVF  - indeterminate diastolic function on 4/27 echo, PAP 53mmHg  - currently on 4L NC since arriving to floor from PACU  - 5/1: increased to 5L NC.   - 5/2: tapered to room air.      Hypotension -likely orthostasis   -5/1: Systolic BP 70s this AM, up to 113 with IVF bolus. Decreased prn oxycodone to 5mg q4hrs. NS bolus, Blood cultures, Lactic Acid, UA, CBC/CMP, Troponin ordered. Chest Xray: Small lung volumes and bibasal patchy subsegmental opacities persist, similar to recent studies.  No new disease identified   -5/2: Blood cultures NGTD, lactate 2.2, troponin 0.015, UA 1+ occult blood and trace leukocytes with few yeast and occasional  bacteria. WBC increased to 15.     Non-Anion Gap Metabolic Acidosis  -5/3: bicarb 16-replaced  -5/4: bicarb 22  -5/6: bicarb still at 22     Lactic acidosis  - 2.7 overnight, responded to IVF  - does not appear infected  - again elevated to 2.4 after procedure, will trend  - 4/30: 2.6, monitor     Urinary incontinence   - rocha in place, consider switch to purwick when pt can tolerate  - 5/3: removed rocha     Pleural effusion  - likely metastatic, s/p sampling at OSH  - associated compressive atelectasis bilaterally  - multiple pulmonary micronodules     Anemia of Chronic Disease, stable  - likely chronic disease with some blood loss during procedures  - iron studies pending: Iron 50 (wnl), TIBC 160 (low), Saturation 31 (wnl), Transferrin 108 (low)  - monitor with CBC daily  - 5/2: Hgb 9.7  -5/6: Hgb 9.6--> 8.7----> 8.2     Hypomagnesemia  -most likely 2/2 chronic disease  -4/22: 1.4-replaced  -5/1: 1.5-replaced  -5/2: 1.5-replaced  -5/4: 1.5-replaced  -5/6: 1.5-replaced     Hypophosphatemia, resolved   -4/22: 2.5 - replaced  - Most likely 2/2 chronic disease     Thrombocytopenia, resolved  -4/22: 122--> 165 resolved   -4/30: 115-->135 (5/2)  -Most likely 2/2 chronic disease  -5/5: 136-->153, resolved     Congestive Heart Failure  Right Heart Failure  -4/27 Echo: Right Ventricular Enlargement with normal systolic function  -5/1:      Malnutrition     -Prealbumin of 4 . Dietary consulted: Please order speech consult. Continue regular diet, texture per SLP. Continue Boost Plus TID.     -Speech Consult: Clinical evaluation of swallow complete. Recommend dental soft diet with thin liquids. Given pt with scattered dentition, she appears to have met max potential with SLP services at this time.     DVT PPx: Enoxaparin 40mg daily     Discharge plan and follow up  Pending family discussion     Provider    I personally scribed for Ravin Shaikh MD on 05/11/2018 at 9:50 AM. Electronically signed by kera Gill  [place] III on 05/11/2018 at 9:50 AM

## 2018-05-11 NOTE — PLAN OF CARE
CM called Leonarda Grossman (oldest daughter)  [phone] to discussed discharge disposition.  No answer, left message.    12:30 PM  CM received phone call from Leonarda Grossman concerning patient discharge disposition.  Leonarda says she spoke to [facility] and she wants her transferred to [facility] on Monday when her children can be here with her when she transfers.  ANTONIO explained to Leonarda that we don't hold patient's for transfer so the family can be there, that we need to send patient's when they are medically ready to go.  CM informed Leonarda that her mother is medically ready to transfer to the next level of care.  ANTONIO explained we cannot treat the hospital like a hotel and keep patients here until they or the family is ready to transfer.  Leonarda states she has to get with her brother and will call back.

## 2018-05-11 NOTE — PLAN OF CARE
SW met w/pt and family members: Dayami Edu, tr cell# [phone], Donna Grossman, son, POA-cell# [phone], and Fay Car, tr cell# [phone], to discuss n/h disposition.  Family reports that they are still in the process of trying to find a n/h that is suitable to pt's needs.  SW explained to the family that pt is medically stable and will need to move to a lower level of care as pt is no longer requiring acute hospital care.  SW provided family w/the list of facilities that referrals were send to along w/the status of each referral:    [facility]  [facility] - declined  [facility] - accepted  [facility] - declined  [facility] - Accepted  [facility] - declined  [facility] - declined    Alverto Pina, [facility]  Ext. [phone]

## 2018-05-11 NOTE — PROGRESS NOTES
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] 05/04/2018 Yes    Weakness of right upper extremity [R29.898] 05/03/2018 Yes    Leukocytosis [D72.829] 05/03/2018 Yes    Malnutrition of moderate degree [E44.0] 05/01/2018 Yes    Malignant neoplasm of central portion of right breast in female, estrogen receptor positive [C50.111, Z17.0]  Not Applicable    Cardiac rhythm disorder or disturbance or change [I49.9]  Yes    CHF (congestive heart failure) [I50.9]  Yes    Right heart failure [I50.810] 04/26/2018 Yes    Acute hypoxemic respiratory failure [J96.01] 04/26/2018 Yes    Tachycardia [R00.0]  Yes    Lactic acidemia [E87.2]  Yes    Severe malnutrition [E43] 04/25/2018 Yes    Cancer of left femur [C40.22] 04/24/2018 Yes    Cancer of right femur [C40.21] 04/24/2018 Yes    Palliative care encounter [Z51.5] 04/23/2018 Not Applicable    Pain due to malignant neoplasm metastatic to bone [G89.3, C79.51] 04/23/2018 Yes    Anemia [D64.9] 04/22/2018 Yes    Thrombocytopenia [D69.6] 04/22/2018 Yes    Hypomagnesemia [E83.42] 04/22/2018 Yes    Bony metastasis [C79.51] 04/21/2018 Yes    Pleural effusion [J90] 04/21/2018 Yes      Problems Resolved During this Admission:    Diagnosis Date Noted Date Resolved POA    Shock [R57.9] 05/01/2018 04/26/2018 Yes    Encounter for weaning from ventilator [Z99.11]  04/30/2018 Not Applicable    Encephalopathy, metabolic [G93.41] 04/21/2018 04/21/2018 Yes  "    5/11/18  - pt. Resting quietly.   - son and grandson at bedside  - no new sx. C/o mild HA that is relieved with Tylenol.   - eating little (30%).   - spoke with Hector over the phone. They are touring more facilities ([place]) this afternoon.   - SW involved in discharge planning.   - little else to offer at this point.   - Will sign off. Please call [phone] with any questions. Thanks for the consult.     5/10/18  - placement challenges noted.   - no new events, pt. Resting quietly   - will not answer any questions today. Son and grandson at the bedside.   - spoke with Leonarda over the phone. She is looking into other NH options  - questions answered.     5/9/18  - pain appears adequately controlled with Oxycodone 5 mg IR and local therapy, continue the same  - placement underway, likely NH with hospice as per prior discussion  - pt. Not interested in any further discussion regarding DNR at this point. Remains full code, will need addressed prior to discharge to hospice.   - she has been refusing medications  - will re-visit with daughter leonarda to answer questions and provide support  - Requests for visitation of family members (one in senior care) noted and managed by Dr. Shaikh  - will follow    5/8/18  - met with daughter Leonarda Grossman at the bedside.   - goals of care discussed.   - Pt. Asked why she was not getting more physical therapy and why she was told her surgery would help her walk but now she is just laying in bed.   - PT/Ot note reviewed. appears pt. Not participating in therapy.   - wishes further defined and it seems that despite the pt. Anger about her diagnosis and lack of strength she realizes that there is no good therapeutic option for the extent of her disease. Daughter confirms her understanding of this and educates the pt. She would be "too weak for treatment". Pt. Understands.   - Ms. Grossman expressed to me that it would be important for her not to suffer and to be comfortable.   - we have " discussed options of home vs. Facility based hospice and the pt. And family are interested.   - it seems that home hospice is not a good option due to lack of family support to provide 24 hrs coverage.   - daughter agrees to tour facilities today to determine of NH with hospice would be a viable option ( pt. Only has 3 days of inpatient hospice benefits via Medicaid).   - will revisit and fruther define wishes.   - POA discussed as prior form lacking witnessed signature. Pt. Confirms 1st POA Son Dion and 2nd POA daughter Leonarda.   - We have not discussed code status as pt. Was not willing to talk about it currently. Will re-visit.      I will follow along with you. Please call [phone] with questions.     Subjective:     HPI: Kerline Grossman is a 59 yo woman with medical history significant for chronic mood disorder who presented to OSH with ZACH, hypercalcemia, and lytic lesions concerning for MM.  Workup for MM negative thus far.  Her family wanted to transfer her to [facility], but it was denied, so the family had her discharged and transported her to [facility] themselves.  She arrived hypotensive, which may have been 2/2 opiates (received morphine at OSH).  Hypotension resolved with IVF and only required brief stay under ICU care before being transferred to the floor.     Interval History: awake in NAD this AM. Young daughter at the bedside. Has used heating pads for her headache with good relieve over night.   Currently without new complaints.   Accepted at [facility] per JOEL.       Medications:  Continuous Infusions:  Scheduled Meds:   acetaminophen  1,000 mg Oral Q6H    enoxaparin  40 mg Subcutaneous Daily    lidocaine  1 patch Transdermal Q24H    magnesium oxide  800 mg Oral Daily    polyethylene glycol  17 g Oral Daily    pregabalin  150 mg Oral QHS    ramelteon  8 mg Oral QHS    senna-docusate 8.6-50 mg  2 tablet Oral Daily    sodium bicarbonate  1 tablet Oral BID     PRN Meds:sodium chloride, bisacodyl,  dextrose 50%, dextrose 50%, glucagon (human recombinant), insulin aspart U-100, morphine, omnipaque, ondansetron, oxyCODONE, promethazine (PHENERGAN) IVPB, promethazine, ramelteon, sodium chloride 0.9%, sodium chloride 0.9%, sodium chloride 0.9%, sodium chloride 0.9%, tiZANidine, white petrolatum        Review of Systems   HENT:        Headache.    Respiratory: Negative for cough and shortness of breath.    Cardiovascular: Negative for chest pain.   Gastrointestinal: Negative for abdominal pain and vomiting.   Genitourinary: Negative for dysuria.   Musculoskeletal: Negative for myalgias.     Objective:     Vital Signs (Most Recent):  Temp: 97.8 °F (36.6 °C) (05/11/18 0851)  Pulse: 103 (05/11/18 0851)  Resp: 14 (05/11/18 0851)  BP: 126/78 (05/11/18 0851)  SpO2: 97 % (05/11/18 0851) Vital Signs (24h Range):  Temp:  [97.8 °F (36.6 °C)-98.2 °F (36.8 °C)] 97.8 °F (36.6 °C)  Pulse:  [] 103  Resp:  [14-20] 14  SpO2:  [95 %-98 %] 97 %  BP: (110-126)/(68-78) 126/78     Physical Exam  No exam.     Laboratory:   CBC:     Recent Labs  Lab 05/11/18  0554   WBC 10.64   RBC 2.98*   HGB 8.5*   HCT 28.9*      MCV 97   MCH 28.5   MCHC 29.4*       CMP:     Recent Labs  Lab 05/11/18  0554   GLU 80   CALCIUM 8.7   ALBUMIN 1.6*   PROT 5.4*      K 3.4*   CO2 24      BUN 10   CREATININE 0.7   ALKPHOS 304*   ALT 10   AST 56*   BILITOT 0.4       POCT Glucose   Date Value Ref Range Status   05/10/2018 104 70 - 110 mg/dL Final         Significant Imaging:     > 50% of 35 min visit spent in chart review, face to face discussion of goals of care,  symptom assessment, coordination of care and emotional support.    Steve Bunch MD  Palliative Medicine  [facility]

## 2018-05-12 LAB
ALBUMIN SERPL BCP-MCNC: 1.8 G/DL
ALP SERPL-CCNC: 316 U/L
ALT SERPL W/O P-5'-P-CCNC: 8 U/L
ANION GAP SERPL CALC-SCNC: 13 MMOL/L
ANISOCYTOSIS BLD QL SMEAR: SLIGHT
AST SERPL-CCNC: 53 U/L
BASOPHILS # BLD AUTO: ABNORMAL K/UL
BASOPHILS NFR BLD: 0 %
BILIRUB SERPL-MCNC: 0.5 MG/DL
BUN SERPL-MCNC: 9 MG/DL
CALCIUM SERPL-MCNC: 8.7 MG/DL
CHLORIDE SERPL-SCNC: 108 MMOL/L
CO2 SERPL-SCNC: 23 MMOL/L
CREAT SERPL-MCNC: 0.6 MG/DL
DIFFERENTIAL METHOD: ABNORMAL
EOSINOPHIL # BLD AUTO: ABNORMAL K/UL
EOSINOPHIL NFR BLD: 1 %
ERYTHROCYTE [DISTWIDTH] IN BLOOD BY AUTOMATED COUNT: 19.6 %
EST. GFR  (AFRICAN AMERICAN): >60 ML/MIN/1.73 M^2
EST. GFR  (NON AFRICAN AMERICAN): >60 ML/MIN/1.73 M^2
GLUCOSE SERPL-MCNC: 76 MG/DL
HCT VFR BLD AUTO: 28.7 %
HGB BLD-MCNC: 8.9 G/DL
HYPOCHROMIA BLD QL SMEAR: ABNORMAL
IMM GRANULOCYTES # BLD AUTO: ABNORMAL K/UL
IMM GRANULOCYTES NFR BLD AUTO: ABNORMAL %
LYMPHOCYTES # BLD AUTO: ABNORMAL K/UL
LYMPHOCYTES NFR BLD: 33 %
MAGNESIUM SERPL-MCNC: 1.4 MG/DL
MCH RBC QN AUTO: 29.3 PG
MCHC RBC AUTO-ENTMCNC: 31 G/DL
MCV RBC AUTO: 94 FL
METAMYELOCYTES NFR BLD MANUAL: 4 %
MONOCYTES # BLD AUTO: ABNORMAL K/UL
MONOCYTES NFR BLD: 10 %
MYELOCYTES NFR BLD MANUAL: 2 %
NEUTROPHILS NFR BLD: 48 %
NEUTS BAND NFR BLD MANUAL: 2 %
NRBC BLD-RTO: 5 /100 WBC
OVALOCYTES BLD QL SMEAR: ABNORMAL
PHOSPHATE SERPL-MCNC: 4.1 MG/DL
PLATELET # BLD AUTO: 217 K/UL
PMV BLD AUTO: 11.9 FL
POCT GLUCOSE: 51 MG/DL (ref 70–110)
POCT GLUCOSE: 55 MG/DL (ref 70–110)
POCT GLUCOSE: 84 MG/DL (ref 70–110)
POIKILOCYTOSIS BLD QL SMEAR: SLIGHT
POLYCHROMASIA BLD QL SMEAR: ABNORMAL
POTASSIUM SERPL-SCNC: 3.4 MMOL/L
PROT SERPL-MCNC: 5.6 G/DL
RBC # BLD AUTO: 3.04 M/UL
SMUDGE CELLS BLD QL SMEAR: PRESENT
SODIUM SERPL-SCNC: 144 MMOL/L
WBC # BLD AUTO: 12.73 K/UL

## 2018-05-12 PROCEDURE — 25000003 PHARM REV CODE 250: Performed by: ANESTHESIOLOGY

## 2018-05-12 PROCEDURE — 83735 ASSAY OF MAGNESIUM: CPT

## 2018-05-12 PROCEDURE — 99232 SBSQ HOSP IP/OBS MODERATE 35: CPT | Mod: ,,, | Performed by: INTERNAL MEDICINE

## 2018-05-12 PROCEDURE — 36415 COLL VENOUS BLD VENIPUNCTURE: CPT

## 2018-05-12 PROCEDURE — 25000003 PHARM REV CODE 250: Performed by: HOSPITALIST

## 2018-05-12 PROCEDURE — 25000003 PHARM REV CODE 250: Performed by: STUDENT IN AN ORGANIZED HEALTH CARE EDUCATION/TRAINING PROGRAM

## 2018-05-12 PROCEDURE — 85027 COMPLETE CBC AUTOMATED: CPT

## 2018-05-12 PROCEDURE — 11000001 HC ACUTE MED/SURG PRIVATE ROOM

## 2018-05-12 PROCEDURE — 80053 COMPREHEN METABOLIC PANEL: CPT

## 2018-05-12 PROCEDURE — 84100 ASSAY OF PHOSPHORUS: CPT

## 2018-05-12 PROCEDURE — 63600175 PHARM REV CODE 636 W HCPCS: Performed by: STUDENT IN AN ORGANIZED HEALTH CARE EDUCATION/TRAINING PROGRAM

## 2018-05-12 PROCEDURE — 25000003 PHARM REV CODE 250: Performed by: PHYSICIAN ASSISTANT

## 2018-05-12 PROCEDURE — 85007 BL SMEAR W/DIFF WBC COUNT: CPT

## 2018-05-12 RX ORDER — IBUPROFEN 200 MG
16 TABLET ORAL
Status: DISCONTINUED | OUTPATIENT
Start: 2018-05-12 | End: 2018-05-14 | Stop reason: HOSPADM

## 2018-05-12 RX ADMIN — OXYCODONE HYDROCHLORIDE 5 MG: 5 TABLET ORAL at 09:05

## 2018-05-12 RX ADMIN — SODIUM BICARBONATE 650 MG TABLET 650 MG: at 09:05

## 2018-05-12 RX ADMIN — POLYETHYLENE GLYCOL 3350 17 G: 17 POWDER, FOR SOLUTION ORAL at 09:05

## 2018-05-12 RX ADMIN — ACETAMINOPHEN 1000 MG: 500 TABLET, FILM COATED ORAL at 05:05

## 2018-05-12 RX ADMIN — LIDOCAINE 1 PATCH: 50 PATCH CUTANEOUS at 05:05

## 2018-05-12 RX ADMIN — DEXTROSE MONOHYDRATE 12.5 G: 25 INJECTION, SOLUTION INTRAVENOUS at 06:05

## 2018-05-12 RX ADMIN — ENOXAPARIN SODIUM 40 MG: 100 INJECTION SUBCUTANEOUS at 05:05

## 2018-05-12 RX ADMIN — MAGNESIUM OXIDE TAB 400 MG (241.3 MG ELEMENTAL MG) 800 MG: 400 (241.3 MG) TAB at 09:05

## 2018-05-12 RX ADMIN — Medication 16 G: at 08:05

## 2018-05-12 RX ADMIN — STANDARDIZED SENNA CONCENTRATE AND DOCUSATE SODIUM 2 TABLET: 8.6; 5 TABLET, FILM COATED ORAL at 09:05

## 2018-05-12 NOTE — PLAN OF CARE
Problem: Patient Care Overview  Goal: Plan of Care Review  Outcome: Ongoing (interventions implemented as appropriate)  Patient resting in bed comfortably. IV intact and free of infection and irration,fall precautions maintained no falls noted. Call light in reach bed locked and in lowest position. Non skid socks on while out of bed. Patient instructed to call for assistance. Skin integrity maintained as patient is independent with frequent positioning and assisted when needed, C/o pain managed with PRN meds, pt refuses some treatment tylenol and accuchecks, Will continue to monitor and follow careplan of care.

## 2018-05-12 NOTE — PROGRESS NOTES
Hospital Medicine  Progress note    I personally performed the history, physical exam and medical decision making: and confirmed the accuracy of the information in the transcribed note.    Team: [facility] Ravin Shaikh MD  Admit Date: 4/20/2018  VIGNEHS 5/11/2018  Code status: Full Code    Principal Problem:  Metastatic breast cancer    Interval hx:  No new events. Will be discharged to hospice at [facility] on 5/14.     ROS     Not able to stand. RUE weakness  Pain Scale: 8/10  Respiratory: no cough or shortness of breath  Cardiovascular: no chest pain or palpitations  Gastrointestinal: no nausea or vomiting, no abdominal pain or change in bowel habits  Behavioral/Psych: no depression or anxiety      PEx  Temp:  [97.3 °F (36.3 °C)-98.6 °F (37 °C)]   Pulse:  []   Resp:  [17-18]   BP: (115-130)/(68-73)   SpO2:  [95 %-98 %]     Intake/Output Summary (Last 24 hours) at 05/12/18 1752  Last data filed at 05/12/18 1200   Gross per 24 hour   Intake              420 ml   Output              900 ml   Net             -480 ml       General Appearance: no acute distress   Heart: regular rate and rhythm  Respiratory: Normal respiratory effort, no crackles   Abdomen: Soft, non-tender; bowel sounds active  Skin: intact. IV sites ok  Neurologic:  No focal numbness. Right arm weakness 2/5. Cannot stand without assistance 2/2 UE weakness.   Mental status: Alert, oriented x 4, affect appropriate       Recent Labs  Lab 05/10/18  0412 05/11/18  0554 05/12/18  0436   WBC 12.59 10.64 12.73*   HGB 9.1* 8.5* 8.9*   HCT 29.3* 28.9* 28.7*    210 217       Recent Labs  Lab 05/10/18  0412 05/11/18  0554 05/12/18  0436    143 144   K 3.7 3.4* 3.4*    108 108   CO2 23 24 23   BUN 12 10 9   CREATININE 0.7 0.7 0.6   GLU 81 80 76   CALCIUM 8.3* 8.7 8.7   MG 1.7 1.6 1.4*   PHOS 3.8 4.2 4.1       Recent Labs  Lab 05/10/18  0412 05/11/18  0554 05/12/18  0436   ALKPHOS 296* 304* 316*   ALT 7* 10 8*   AST 53* 56* 53*   ALBUMIN  1.7* 1.6* 1.8*   PROT 5.5* 5.4* 5.6*   BILITOT 0.5 0.4 0.5        Recent Labs  Lab 05/05/18  1753 05/10/18  1946   POCTGLUCOSE 88 104     No results for input(s): CPK, CPKMB, MB, TROPONINI in the last 72 hours.    Scheduled Meds:   acetaminophen  1,000 mg Oral Q6H    enoxaparin  40 mg Subcutaneous Daily    lidocaine  1 patch Transdermal Q24H    magnesium oxide  800 mg Oral Daily    polyethylene glycol  17 g Oral Daily    pregabalin  150 mg Oral QHS    ramelteon  8 mg Oral QHS    senna-docusate 8.6-50 mg  2 tablet Oral Daily    sodium bicarbonate  1 tablet Oral BID     Continuous Infusions:  As Needed:  sodium chloride, bisacodyl, dextrose 50%, dextrose 50%, glucagon (human recombinant), insulin aspart U-100, morphine, omnipaque, ondansetron, oxyCODONE, promethazine (PHENERGAN) IVPB, promethazine, ramelteon, sodium chloride 0.9%, sodium chloride 0.9%, sodium chloride 0.9%, sodium chloride 0.9%, tiZANidine, white petrolatum    Active Hospital Problems    Diagnosis  POA    *Metastatic breast cancer [C50.919]  Yes    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, 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]  Yes    Weakness of right upper extremity [R29.898]  Yes    Leukocytosis [D72.829]  Yes    Malnutrition of moderate degree [E44.0]  Yes    Malignant neoplasm of central portion of right breast in female, estrogen receptor positive [C50.111, Z17.0]  Not Applicable    Cardiac rhythm disorder or disturbance or change [I49.9]  Yes    CHF (congestive heart failure) [I50.9]  Yes    Right heart failure [I50.810]  Yes    Acute hypoxemic respiratory failure [J96.01]  Yes    Tachycardia [R00.0]  Yes    Lactic acidemia [E87.2]  Yes    Severe malnutrition [E43]  Yes    Cancer of left femur [C40.22]  Yes    Cancer of right femur [C40.21]  Yes    Palliative care encounter [Z51.5]  Not Applicable    Pain due to malignant neoplasm metastatic to bone [G89.3, C79.51]  Yes    Anemia [D64.9]  Yes    Thrombocytopenia [D69.6]  Yes    Hypomagnesemia [E83.42]   Yes    Bony metastasis [C79.51]  Yes     Xray metastatic survey shows lytic lesions of skull, lytic spine and rib lesions with pathologic fractures. Diffuse lytic lesions throughout entire central axial red marrow skeleton. Lytic lesions of proximal long bones and pathological fractures of ribs and right superior and inferior pubic ramus.      Pleural effusion [J90]  Yes      Resolved Hospital Problems    Diagnosis Date Resolved POA    Shock [R57.9] 04/26/2018 Yes    Encounter for weaning from ventilator [Z99.11] 04/30/2018 Not Applicable    Encephalopathy, metabolic [G93.41] 04/21/2018 Yes       Overview  Mrs. Grossman is a 59 yo female who was [place] [facility] [place]2018 for second opinion regarding recently diagnosed likely metastatic malignancy with unknown primary.     Hospital Course: Mrs. Grossman is a 59 yo female who was brought to [facility] on 4/20/2018 for second opinion regarding recently diagnosed likely metastatic malignancy with unknown primary. She had been worked up at [facility] for the malignancy, and it was recommended she go on hospice, but children wanted to know definitive type of cancer before making any decisions. She was initially admitted to Critical Care for SBPs in the 80s, which responded well to boluses and may have been due to either dehydration or the morphine gtt she received at the OSH. She was transferred to hospital medicine on 4/21/2018, and Oncology, Radiation Oncology, and Palliative Care were consulted. Orthopedics was also consulted for evaluation of multiple lytic lesions found on MRI. Due to bilateral femoral lytic lesions, she underwent nailing of the left on 4/25/2018, and nailing of the right on 4/30/2018, in an attempt to increase ambulation. Biopsies were taken during the surgery. Radiation Oncology recommends radiation of nailed femurs as outpatient. Oncology followed-up on OSH biopsies, which showed ER/WV+, HER 2- moderately differentiated adenocarcinoma, which is  suggestive of breast primary; bone biopsy from left femur nailing was consistent with this finding. MRI of brain w and w/o contrast was also recommended by oncology, which showed meningeal carcinomatosis. Given this finding, prognosis is felt to be poor (2.5 months) and that intrathecal or systemic hormonal therapy would not prolong survival, therefore quality of life and comfort measures should be pursued.     During this hospitalization, patient has also experienced hypoxemic respiratory failure, likely from IVF, which was supported with oxygen therapy until able to resume room air, and pleural effusion likely related to metastatic disease. She had an additional episode of hypotension, whose work-up showed no new disease and was likely related to orthostasis. She had non-anion gap metabolic acidosis that resolved with bicarb replacement, lactic acidosis that resolved with IVF, hypomagnesemia that resolved with replacement, hypophosphotemia that resolved with replacement, and thrombocytopenia that resolved over her hospital stay. She was treated for urinary incontinence with a rocha that was removed on of 5/3/2018. She was found to be malnourished on admission and was seen by Dietary and Speech, who recommended a soft diet with thin liquids while in the hospital. She additionally has anemia of chronic disease and congestive heart failure due to right heart failure that were monitored during her hospitalization.       Assessment and Plan for Problems addressed today:     Metastatic breast cancer  Pain due to malignant neoplasm metastatic to bone    Meningeal carcinomatosis     Palliative Care Encounter  · pain control (BP is sensitive to narcotics)    · oral pain medications: oxycodone 5mg PRN q4hrs. .     · DNR status conversation still ongoing. Will be discharged to hospice at [facility] on 5/14.     Hypomagnesemia  · most likely 2/2 chronic disease  · 1.4-replaced (4/22)>1.5-replaced (5/1)>1.5-replaced  (5/2)>1.5-replaced (5/4)>1.5-replaced (5/6)> 1.4-replaced (5/9)> 1.7-replaced (5/10)> 1.6-replaced (5/11)> 1.4-replaced (5/12)    Pathologic fracture of ribs pubic ramus  · - s/p IM nailing femur x2     Hypoxemic respiratory failure  · pulmonary edema from IVF  · indeterminate diastolic function on 4/27 echo, PAP 53mmHg   · 5/2: tapered to room air.      Hypotension -likely orthostasis   · 5/1: Systolic BP 70s this AM, up to 113 with IVF bolus. Decreased prn oxycodone to 5mg q4hrs. NS bolus, CBC/CMP ordered. Chest Xray: Small lung volumes and bibasal patchy subsegmental opacities persist, similar to recent studies. Blood cultures negative, lactate 2.2, troponin 0.015, UA 1+ occult blood and trace leukocytes with few yeast and occasional bacteria. WBC increased to 15. No new disease identified.     Non-Anion Gap Metabolic Acidosis  · 5/3: bicarb 16-replaced>22 (5/4)>22(5/6)     Lactic acidosis  · 2.8 (4/20), responded to IVF>2.0(4/21)  · 11.5 (4/25) after procedure>2.8 (4/26)>1.9 (4/27)>1.6 (4/28)>2.7 (4/29)>2.6 (4/30)>2.2 (5/1)     Urinary incontinence   · rocha in place, consider switch to purwick when pt can tolerate  · 5/3: removed rocha     Pleural effusion  · likely metastatic, s/p sampling at OSH  · associated compressive atelectasis bilaterally  · multiple pulmonary micronodules     Anemia of Chronic Disease, stable  · likely chronic disease with some blood loss during procedures  · iron studies: Iron 50 (wnl), TIBC 160 (low), Saturation 31 (wnl), Transferrin 108 (low)  · monitor with CBC daily     Hypophosphatemia, resolved   · 4/22: 2.5 - replaced  · Most likely 2/2 chronic disease     Thrombocytopenia, resolved  · Most likely 2/2 chronic disease  · 122 (4/22)> 165 (4/23)>115 (4/30)>122 (5/1)>135 (5/2)>116(5/3)> 136 (5/4)> 153 (5/5)     Congestive Heart Failure  Right Heart Failure  · 4/27 Echo: Right Ventricular Enlargement with normal systolic function  · 5/1:      Malnutrition   · Prealbumin of 4  . Dietary consulted: Please order speech consult. Continue regular diet, texture per SLP. Continue Boost Plus TID.   · Speech Consult: Clinical evaluation of swallow complete. Recommend dental soft diet with thin liquids. Given pt with scattered dentition, she appears to have met max potential with SLP services at this time.      DVT PPx:   Anticoagulants     Ordered     Route Frequency Start Stop    04/30/18 1053  enoxaparin      SubQ Daily 04/30/18 1700 --            Discharge plan and follow up  [facility].       Provider    I personally scribed for Ravin Shaikh MD on 05/12/2018 at 11:01 AM. Electronically signed by kera Lemus on 05/12/2018 at 11:01 AM.

## 2018-05-12 NOTE — MEDICAL/APP STUDENT
Hospital Medicine  Progress note    Team: [facility] B Ravin Shaikh MD  Admit Date: 4/20/2018  VIGNESH 5/11/2018  Code status: Full Code    Principal Problem:  Metastatic breast cancer    Interval hx:  No new events. Will be discharged to hospice at [facility] on 5/14.     ROS     Not able to stand. RUE weakness  Pain Scale: 8/10  Respiratory: no cough or shortness of breath  Cardiovascular: no chest pain or palpitations  Gastrointestinal: no nausea or vomiting, no abdominal pain or change in bowel habits  Behavioral/Psych: no depression or anxiety      PEx  Temp:  [97.3 °F (36.3 °C)-98.6 °F (37 °C)]   Pulse:  []   Resp:  [16-18]   BP: (115-129)/(68-73)   SpO2:  [95 %-98 %]     Intake/Output Summary (Last 24 hours) at 05/12/18 1032  Last data filed at 05/12/18 1000   Gross per 24 hour   Intake              660 ml   Output             1000 ml   Net             -340 ml       General Appearance: no acute distress   Heart: regular rate and rhythm  Respiratory: Normal respiratory effort, no crackles   Abdomen: Soft, non-tender; bowel sounds active  Skin: intact. IV sites ok  Neurologic:  No focal numbness. Right arm weakness 2/5. Cannot stand without assistance 2/2 UE weakness.   Mental status: Alert, oriented x 4, affect appropriate       Recent Labs  Lab 05/10/18  0412 05/11/18  0554 05/12/18  0436   WBC 12.59 10.64 12.73*   HGB 9.1* 8.5* 8.9*   HCT 29.3* 28.9* 28.7*    210 217       Recent Labs  Lab 05/10/18  0412 05/11/18  0554 05/12/18  0436    143 144   K 3.7 3.4* 3.4*    108 108   CO2 23 24 23   BUN 12 10 9   CREATININE 0.7 0.7 0.6   GLU 81 80 76   CALCIUM 8.3* 8.7 8.7   MG 1.7 1.6 1.4*   PHOS 3.8 4.2 4.1       Recent Labs  Lab 05/10/18  0412 05/11/18  0554 05/12/18  0436   ALKPHOS 296* 304* 316*   ALT 7* 10 8*   AST 53* 56* 53*   ALBUMIN 1.7* 1.6* 1.8*   PROT 5.5* 5.4* 5.6*   BILITOT 0.5 0.4 0.5        Recent Labs  Lab 05/05/18  1753 05/10/18  1946   POCTGLUCOSE 88 104     No results for  input(s): CPK, CPKMB, MB, TROPONINI in the last 72 hours.    Scheduled Meds:   acetaminophen  1,000 mg Oral Q6H    enoxaparin  40 mg Subcutaneous Daily    lidocaine  1 patch Transdermal Q24H    magnesium oxide  800 mg Oral Daily    polyethylene glycol  17 g Oral Daily    pregabalin  150 mg Oral QHS    ramelteon  8 mg Oral QHS    senna-docusate 8.6-50 mg  2 tablet Oral Daily    sodium bicarbonate  1 tablet Oral BID     Continuous Infusions:  As Needed:  sodium chloride, bisacodyl, dextrose 50%, dextrose 50%, glucagon (human recombinant), insulin aspart U-100, morphine, omnipaque, ondansetron, oxyCODONE, promethazine (PHENERGAN) IVPB, promethazine, ramelteon, sodium chloride 0.9%, sodium chloride 0.9%, sodium chloride 0.9%, sodium chloride 0.9%, tiZANidine, white petrolatum    Active Hospital Problems    Diagnosis  POA    *Metastatic breast cancer [C50.919]  Yes    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, 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]  Yes    Weakness of right upper extremity [R29.898]  Yes    Leukocytosis [D72.829]  Yes    Malnutrition of moderate degree [E44.0]  Yes    Malignant neoplasm of central portion of right breast in female, estrogen receptor positive [C50.111, Z17.0]  Not Applicable    Cardiac rhythm disorder or disturbance or change [I49.9]  Yes    CHF (congestive heart failure) [I50.9]  Yes    Right heart failure [I50.810]  Yes    Acute hypoxemic respiratory failure [J96.01]  Yes    Tachycardia [R00.0]  Yes    Lactic acidemia [E87.2]  Yes    Severe malnutrition [E43]  Yes    Cancer of left femur [C40.22]  Yes    Cancer of right femur [C40.21]  Yes    Palliative care encounter [Z51.5]  Not Applicable    Pain due to malignant neoplasm metastatic to bone [G89.3, C79.51]  Yes    Anemia [D64.9]  Yes    Thrombocytopenia [D69.6]  Yes    Hypomagnesemia [E83.42]  Yes    Bony metastasis [C79.51]  Yes     Xray metastatic survey shows lytic lesions of skull, lytic spine and rib lesions with pathologic fractures.  Diffuse lytic lesions throughout entire central axial red marrow skeleton. Lytic lesions of proximal long bones and pathological fractures of ribs and right superior and inferior pubic ramus.      Pleural effusion [J90]  Yes      Resolved Hospital Problems    Diagnosis Date Resolved POA    Shock [R57.9] 04/26/2018 Yes    Encounter for weaning from ventilator [Z99.11] 04/30/2018 Not Applicable    Encephalopathy, metabolic [G93.41] 04/21/2018 Yes       Overview  Mrs. Grossman is a 59 yo female who was brought to [facility] [place] [facility]/20/2018 for second opinion regarding recently diagnosed likely metastatic malignancy with unknown primary.     Hospital Course: Mrs. Grossman is a 59 yo female who was brought to [facility] on 4/20/2018 for second opinion regarding recently diagnosed likely metastatic malignancy with unknown primary. She had been worked up at [facility] for the malignancy, and it was recommended she go on hospice, but children wanted to know definitive type of cancer before making any decisions. She was initially admitted to Critical Care for SBPs in the 80s, which responded well to boluses and may have been due to either dehydration or the morphine gtt she received at the OSH. She was transferred to hospital medicine on 4/21/2018, and Oncology, Radiation Oncology, and Palliative Care were consulted. Orthopedics was also consulted for evaluation of multiple lytic lesions found on MRI. Due to bilateral femoral lytic lesions, she underwent nailing of the left on 4/25/2018, and nailing of the right on 4/30/2018, in an attempt to increase ambulation. Biopsies were taken during the surgery. Radiation Oncology recommends radiation of nailed femurs as outpatient. Oncology followed-up on OSH biopsies, which showed ER/CT+, HER 2- moderately differentiated adenocarcinoma, which is suggestive of breast primary; bone biopsy from left femur nailing was consistent with this finding. MRI of brain w and w/o contrast was also  recommended by oncology, which showed meningeal carcinomatosis. Given this finding, prognosis is felt to be poor (2.5 months) and that intrathecal or systemic hormonal therapy would not prolong survival, therefore quality of life and comfort measures should be pursued.     During this hospitalization, patient has also experienced hypoxemic respiratory failure, likely from IVF, which was supported with oxygen therapy until able to resume room air, and pleural effusion likely related to metastatic disease. She had an additional episode of hypotension, whose work-up showed no new disease and was likely related to orthostasis. She had non-anion gap metabolic acidosis that resolved with bicarb replacement, lactic acidosis that resolved with IVF, hypomagnesemia that resolved with replacement, hypophosphotemia that resolved with replacement, and thrombocytopenia that resolved over her hospital stay. She was treated for urinary incontinence with a rocha that was removed on of 5/3/2018. She was found to be malnourished on admission and was seen by Dietary and Speech, who recommended a soft diet with thin liquids while in the hospital. She additionally has anemia of chronic disease and congestive heart failure due to right heart failure that were monitored during her hospitalization.       Assessment and Plan for Problems addressed today:     Metastatic breast cancer  Pain due to malignant neoplasm metastatic to bone    Meningeal carcinomatosis     Palliative Care Encounter  · pain control (BP is sensitive to narcotics)    · oral pain medications: oxycodone 5mg PRN q4hrs. .     · DNR status conversation still ongoing. Will be discharged to hospice at [facility] on 5/14.     Hypomagnesemia  · most likely 2/2 chronic disease  · 1.4-replaced (4/22)>1.5-replaced (5/1)>1.5-replaced (5/2)>1.5-replaced (5/4)>1.5-replaced (5/6)> 1.4-replaced (5/9)> 1.7-replaced (5/10)> 1.6-replaced (5/11)> 1.4-replaced (5/12)    Pathologic fracture  of ribs pubic ramus  · - s/p IM nailing femur x2     Hypoxemic respiratory failure  · pulmonary edema from IVF  · indeterminate diastolic function on 4/27 echo, PAP 53mmHg   · 5/2: tapered to room air.      Hypotension -likely orthostasis   · 5/1: Systolic BP 70s this AM, up to 113 with IVF bolus. Decreased prn oxycodone to 5mg q4hrs. NS bolus, CBC/CMP ordered. Chest Xray: Small lung volumes and bibasal patchy subsegmental opacities persist, similar to recent studies. Blood cultures negative, lactate 2.2, troponin 0.015, UA 1+ occult blood and trace leukocytes with few yeast and occasional bacteria. WBC increased to 15. No new disease identified.     Non-Anion Gap Metabolic Acidosis  · 5/3: bicarb 16-replaced>22 (5/4)>22(5/6)     Lactic acidosis  · 2.8 (4/20), responded to IVF>2.0(4/21)  · 11.5 (4/25) after procedure>2.8 (4/26)>1.9 (4/27)>1.6 (4/28)>2.7 (4/29)>2.6 (4/30)>2.2 (5/1)     Urinary incontinence   · rocha in place, consider switch to purwick when pt can tolerate  · 5/3: removed rocha     Pleural effusion  · likely metastatic, s/p sampling at OSH  · associated compressive atelectasis bilaterally  · multiple pulmonary micronodules     Anemia of Chronic Disease, stable  · likely chronic disease with some blood loss during procedures  · iron studies: Iron 50 (wnl), TIBC 160 (low), Saturation 31 (wnl), Transferrin 108 (low)  · monitor with CBC daily     Hypophosphatemia, resolved   · 4/22: 2.5 - replaced  · Most likely 2/2 chronic disease     Thrombocytopenia, resolved  · Most likely 2/2 chronic disease  · 122 (4/22)> 165 (4/23)>115 (4/30)>122 (5/1)>135 (5/2)>116(5/3)> 136 (5/4)> 153 (5/5)     Congestive Heart Failure  Right Heart Failure  · 4/27 Echo: Right Ventricular Enlargement with normal systolic function  · 5/1:      Malnutrition   · Prealbumin of 4 . Dietary consulted: Please order speech consult. Continue regular diet, texture per SLP. Continue Boost Plus TID.   · Speech Consult: Clinical  evaluation of swallow complete. Recommend dental soft diet with thin liquids. Given pt with scattered dentition, she appears to have met max potential with SLP services at this time.      DVT PPx:   Anticoagulants     Ordered     Route Frequency Start Stop    04/30/18 1053  enoxaparin      SubQ Daily 04/30/18 1700 --            Discharge plan and follow up  [facility] at [facility].       Provider    I personally scribed for Ravin Shaikh MD on 05/12/2018 at 11:01 AM. Electronically signed by kera Lemus on 05/12/2018 at 11:01 AM.

## 2018-05-13 LAB
ALBUMIN SERPL BCP-MCNC: 1.9 G/DL
ALP SERPL-CCNC: 341 U/L
ALT SERPL W/O P-5'-P-CCNC: 8 U/L
ANION GAP SERPL CALC-SCNC: 9 MMOL/L
ANISOCYTOSIS BLD QL SMEAR: SLIGHT
AST SERPL-CCNC: 57 U/L
BASOPHILS # BLD AUTO: ABNORMAL K/UL
BASOPHILS NFR BLD: 0 %
BILIRUB SERPL-MCNC: 0.5 MG/DL
BUN SERPL-MCNC: 10 MG/DL
CALCIUM SERPL-MCNC: 8.7 MG/DL
CHLORIDE SERPL-SCNC: 108 MMOL/L
CO2 SERPL-SCNC: 23 MMOL/L
CREAT SERPL-MCNC: 0.7 MG/DL
DACRYOCYTES BLD QL SMEAR: ABNORMAL
DIFFERENTIAL METHOD: ABNORMAL
EOSINOPHIL # BLD AUTO: ABNORMAL K/UL
EOSINOPHIL NFR BLD: 1 %
ERYTHROCYTE [DISTWIDTH] IN BLOOD BY AUTOMATED COUNT: 19.5 %
EST. GFR  (AFRICAN AMERICAN): >60 ML/MIN/1.73 M^2
EST. GFR  (NON AFRICAN AMERICAN): >60 ML/MIN/1.73 M^2
GLUCOSE SERPL-MCNC: 73 MG/DL
HCT VFR BLD AUTO: 30 %
HGB BLD-MCNC: 9.1 G/DL
HYPOCHROMIA BLD QL SMEAR: ABNORMAL
IMM GRANULOCYTES # BLD AUTO: ABNORMAL K/UL
IMM GRANULOCYTES NFR BLD AUTO: ABNORMAL %
LYMPHOCYTES # BLD AUTO: ABNORMAL K/UL
LYMPHOCYTES NFR BLD: 33 %
MAGNESIUM SERPL-MCNC: 1.8 MG/DL
MCH RBC QN AUTO: 28.6 PG
MCHC RBC AUTO-ENTMCNC: 30.3 G/DL
MCV RBC AUTO: 94 FL
MONOCYTES # BLD AUTO: ABNORMAL K/UL
MONOCYTES NFR BLD: 9 %
MYELOCYTES NFR BLD MANUAL: 1 %
NEUTROPHILS NFR BLD: 54 %
NEUTS BAND NFR BLD MANUAL: 2 %
NRBC BLD-RTO: 5 /100 WBC
OVALOCYTES BLD QL SMEAR: ABNORMAL
PHOSPHATE SERPL-MCNC: 4.4 MG/DL
PLATELET # BLD AUTO: 217 K/UL
PMV BLD AUTO: 11.4 FL
POCT GLUCOSE: 60 MG/DL (ref 70–110)
POCT GLUCOSE: 62 MG/DL (ref 70–110)
POIKILOCYTOSIS BLD QL SMEAR: SLIGHT
POLYCHROMASIA BLD QL SMEAR: ABNORMAL
POTASSIUM SERPL-SCNC: 3.8 MMOL/L
PROT SERPL-MCNC: 5.7 G/DL
RBC # BLD AUTO: 3.18 M/UL
SODIUM SERPL-SCNC: 140 MMOL/L
WBC # BLD AUTO: 11.95 K/UL

## 2018-05-13 PROCEDURE — 85007 BL SMEAR W/DIFF WBC COUNT: CPT

## 2018-05-13 PROCEDURE — 84100 ASSAY OF PHOSPHORUS: CPT

## 2018-05-13 PROCEDURE — 25000003 PHARM REV CODE 250: Performed by: HOSPITALIST

## 2018-05-13 PROCEDURE — 63600175 PHARM REV CODE 636 W HCPCS: Performed by: STUDENT IN AN ORGANIZED HEALTH CARE EDUCATION/TRAINING PROGRAM

## 2018-05-13 PROCEDURE — 85027 COMPLETE CBC AUTOMATED: CPT

## 2018-05-13 PROCEDURE — 36415 COLL VENOUS BLD VENIPUNCTURE: CPT

## 2018-05-13 PROCEDURE — 25000003 PHARM REV CODE 250: Performed by: ANESTHESIOLOGY

## 2018-05-13 PROCEDURE — 83735 ASSAY OF MAGNESIUM: CPT

## 2018-05-13 PROCEDURE — 99232 SBSQ HOSP IP/OBS MODERATE 35: CPT | Mod: ,,, | Performed by: INTERNAL MEDICINE

## 2018-05-13 PROCEDURE — 80053 COMPREHEN METABOLIC PANEL: CPT

## 2018-05-13 PROCEDURE — 11000001 HC ACUTE MED/SURG PRIVATE ROOM

## 2018-05-13 PROCEDURE — 25000003 PHARM REV CODE 250: Performed by: STUDENT IN AN ORGANIZED HEALTH CARE EDUCATION/TRAINING PROGRAM

## 2018-05-13 RX ADMIN — SODIUM BICARBONATE 650 MG TABLET 650 MG: at 08:05

## 2018-05-13 RX ADMIN — ACETAMINOPHEN 1000 MG: 500 TABLET, FILM COATED ORAL at 05:05

## 2018-05-13 RX ADMIN — OXYCODONE HYDROCHLORIDE 5 MG: 5 TABLET ORAL at 04:05

## 2018-05-13 RX ADMIN — SODIUM BICARBONATE 650 MG TABLET 650 MG: at 07:05

## 2018-05-13 RX ADMIN — LIDOCAINE 1 PATCH: 50 PATCH CUTANEOUS at 05:05

## 2018-05-13 RX ADMIN — OXYCODONE HYDROCHLORIDE 5 MG: 5 TABLET ORAL at 10:05

## 2018-05-13 RX ADMIN — ENOXAPARIN SODIUM 40 MG: 100 INJECTION SUBCUTANEOUS at 05:05

## 2018-05-13 RX ADMIN — RAMELTEON 8 MG: 8 TABLET, FILM COATED ORAL at 07:05

## 2018-05-13 RX ADMIN — MAGNESIUM OXIDE TAB 400 MG (241.3 MG ELEMENTAL MG) 800 MG: 400 (241.3 MG) TAB at 08:05

## 2018-05-13 RX ADMIN — STANDARDIZED SENNA CONCENTRATE AND DOCUSATE SODIUM 2 TABLET: 8.6; 5 TABLET, FILM COATED ORAL at 08:05

## 2018-05-13 RX ADMIN — TIZANIDINE 4 MG: 4 TABLET ORAL at 07:05

## 2018-05-13 NOTE — PROGRESS NOTES
Hospital Medicine  Progress note    I personally performed the history, physical exam and medical decision making: and confirmed the accuracy of the information in the transcribed note.    Team: [facility] MED B Ravin Shaikh MD  Admit Date: 4/20/2018  VIGNESH 5/14/2018  Code status: Full Code    Principal Problem:  Metastatic breast cancer    Interval hx:  No new events. Awaiting discharge to hospice at [facility] on 5/14.     ROS     Not able to stand. RUE weakness  Pain Scale: 8/10  Respiratory: no cough or shortness of breath  Cardiovascular: no chest pain or palpitations  Gastrointestinal: no nausea or vomiting, no abdominal pain or change in bowel habits  Behavioral/Psych: no depression or anxiety      PEx  Temp:  [96.1 °F (35.6 °C)-98.2 °F (36.8 °C)]   Pulse:  [100-114]   Resp:  [16-19]   BP: (110-140)/(63-75)   SpO2:  [87 %-95 %]     Intake/Output Summary (Last 24 hours) at 05/13/18 1730  Last data filed at 05/13/18 1340   Gross per 24 hour   Intake              620 ml   Output             1100 ml   Net             -480 ml       General Appearance: no acute distress   Heart: regular rate and rhythm  Respiratory: Normal respiratory effort, no crackles   Abdomen: Soft, non-tender; bowel sounds active  Skin: intact. IV sites ok  Neurologic:  No focal numbness. Right arm weakness 2/5. Cannot stand without assistance 2/2 UE weakness.   Mental status: Alert, oriented x 4, affect appropriate       Recent Labs  Lab 05/11/18  0554 05/12/18  0436 05/13/18  0657   WBC 10.64 12.73* 11.95   HGB 8.5* 8.9* 9.1*   HCT 28.9* 28.7* 30.0*    217 217       Recent Labs  Lab 05/11/18  0554 05/12/18  0436 05/13/18  0657    144 140   K 3.4* 3.4* 3.8    108 108   CO2 24 23 23   BUN 10 9 10   CREATININE 0.7 0.6 0.7   GLU 80 76 73   CALCIUM 8.7 8.7 8.7   MG 1.6 1.4* 1.8   PHOS 4.2 4.1 4.4       Recent Labs  Lab 05/11/18  0554 05/12/18  0436 05/13/18  0657   ALKPHOS 304* 316* 341*   ALT 10 8* 8*   AST 56* 53* 57*    ALBUMIN 1.6* 1.8* 1.9*   PROT 5.4* 5.6* 5.7*   BILITOT 0.4 0.5 0.5        Recent Labs  Lab 05/10/18  1946 05/12/18 1753 05/12/18 2004 05/12/18 2204   POCTGLUCOSE 104 51* 55* 84     No results for input(s): CPK, CPKMB, MB, TROPONINI in the last 72 hours.    Scheduled Meds:   acetaminophen  1,000 mg Oral Q6H    enoxaparin  40 mg Subcutaneous Daily    lidocaine  1 patch Transdermal Q24H    magnesium oxide  800 mg Oral Daily    polyethylene glycol  17 g Oral Daily    pregabalin  150 mg Oral QHS    ramelteon  8 mg Oral QHS    senna-docusate 8.6-50 mg  2 tablet Oral Daily    sodium bicarbonate  1 tablet Oral BID     Continuous Infusions:  As Needed:  sodium chloride, bisacodyl, dextrose 50%, dextrose 50%, glucagon (human recombinant), glucose, insulin aspart U-100, morphine, omnipaque, ondansetron, oxyCODONE, promethazine (PHENERGAN) IVPB, promethazine, ramelteon, sodium chloride 0.9%, sodium chloride 0.9%, sodium chloride 0.9%, sodium chloride 0.9%, tiZANidine, white petrolatum    Active Hospital Problems    Diagnosis  POA    *Metastatic breast cancer [C50.919]  Yes    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, 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]  Yes    Weakness of right upper extremity [R29.898]  Yes    Leukocytosis [D72.829]  Yes    Malnutrition of moderate degree [E44.0]  Yes    Malignant neoplasm of central portion of right breast in female, estrogen receptor positive [C50.111, Z17.0]  Not Applicable    Cardiac rhythm disorder or disturbance or change [I49.9]  Yes    CHF (congestive heart failure) [I50.9]  Yes    Right heart failure [I50.810]  Yes    Acute hypoxemic respiratory failure [J96.01]  Yes    Tachycardia [R00.0]  Yes    Lactic acidemia [E87.2]  Yes    Severe malnutrition [E43]  Yes    Cancer of left femur [C40.22]  Yes    Cancer of right femur [C40.21]  Yes    Palliative care encounter [Z51.5]  Not Applicable    Pain due to malignant neoplasm metastatic to bone [G89.3, C79.51]  Yes    Anemia [D64.9]  Yes     Thrombocytopenia [D69.6]  Yes    Hypomagnesemia [E83.42]  Yes    Bony metastasis [C79.51]  Yes     Xray metastatic survey shows lytic lesions of skull, lytic spine and rib lesions with pathologic fractures. Diffuse lytic lesions throughout entire central axial red marrow skeleton. Lytic lesions of proximal long bones and pathological fractures of ribs and right superior and inferior pubic ramus.      Pleural effusion [J90]  Yes      Resolved Hospital Problems    Diagnosis Date Resolved POA    Shock [R57.9] 04/26/2018 Yes    Encounter for weaning from ventilator [Z99.11] 04/30/2018 Not Applicable    Encephalopathy, metabolic [G93.41] 04/21/2018 Yes       Overview  Mrs. Grossman is a 59 yo female who was brought to [facility] on 4/20/2018 for second opinion regarding recently diagnosed likely metastatic malignancy with unknown primary.     Hospital Course: Mrs. Grossman is a 59 yo female who was brought to [facility] on 4/20/2018 for second opinion regarding recently diagnosed likely metastatic malignancy with unknown primary. She had been worked up at [facility] for the malignancy, and it was recommended she go on hospice, but children wanted to know definitive type of cancer before making any decisions. She was initially admitted to Critical Care for SBPs in the 80s, which responded well to boluses and may have been due to either dehydration or the morphine gtt she received at the OSH. She was transferred to hospital medicine on 4/21/2018, and Oncology, Radiation Oncology, and Palliative Care were consulted. Orthopedics was also consulted for evaluation of multiple lytic lesions found on MRI. Due to bilateral femoral lytic lesions, she underwent nailing of the left on 4/25/2018, and nailing of the right on 4/30/2018, in an attempt to increase ambulation. Biopsies were taken during the surgery. Radiation Oncology recommends radiation of nailed femurs as outpatient. Oncology followed-up on OSH biopsies, which showed ER/MD+,  HER 2- moderately differentiated adenocarcinoma, which is suggestive of breast primary; bone biopsy from left femur nailing was consistent with this finding. MRI of brain w and w/o contrast was also recommended by oncology, which showed meningeal carcinomatosis. Given this finding, prognosis is felt to be poor (2.5 months) and that intrathecal or systemic hormonal therapy would not prolong survival, therefore quality of life and comfort measures should be pursued.     During this hospitalization, patient has also experienced hypoxemic respiratory failure, likely from IVF, which was supported with oxygen therapy until able to resume room air, and pleural effusion likely related to metastatic disease. She had an additional episode of hypotension, whose work-up showed no new disease and was likely related to orthostasis. She had non-anion gap metabolic acidosis that resolved with bicarb replacement, lactic acidosis that resolved with IVF, hypomagnesemia that resolved with replacement, hypophosphotemia that resolved with replacement, and thrombocytopenia that resolved over her hospital stay. She was treated for urinary incontinence with a rocha that was removed on of 5/3/2018. She was found to be malnourished on admission and was seen by Dietary and Speech, who recommended a soft diet with thin liquids while in the hospital. She additionally has anemia of chronic disease and congestive heart failure due to right heart failure that were monitored during her hospitalization.       Assessment and Plan for Problems addressed today:     Metastatic breast cancer  Pain due to malignant neoplasm metastatic to bone    Meningeal carcinomatosis     Palliative Care Encounter  · pain control (BP is sensitive to narcotics)    · oral pain medications: oxycodone 5mg PRN q4hrs. .     · DNR status conversation still ongoing. Will be discharged to hospice at [facility] on 5/14.     Hypomagnesemia  · most likely 2/2 chronic  disease  · 1.4-replaced (4/22)>1.5-replaced (5/1)>1.5-replaced (5/2)>1.5-replaced (5/4)>1.5-replaced (5/6)> 1.4-replaced (5/9)> 1.7-replaced (5/10)> 1.6-replaced (5/11)> 1.4-replaced (5/12)    Pathologic fracture of ribs pubic ramus  · - s/p IM nailing femur x2     Hypoxemic respiratory failure  · pulmonary edema from IVF  · indeterminate diastolic function on 4/27 echo, PAP 53mmHg   · 5/2: tapered to room air.      Hypotension -likely orthostasis   · 5/1: Systolic BP 70s this AM, up to 113 with IVF bolus. Decreased prn oxycodone to 5mg q4hrs. NS bolus, CBC/CMP ordered. Chest Xray: Small lung volumes and bibasal patchy subsegmental opacities persist, similar to recent studies. Blood cultures negative, lactate 2.2, troponin 0.015, UA 1+ occult blood and trace leukocytes with few yeast and occasional bacteria. WBC increased to 15. No new disease identified.     Non-Anion Gap Metabolic Acidosis  · 5/3: bicarb 16-replaced>22 (5/4)>22(5/6)     Lactic acidosis  · 2.8 (4/20), responded to IVF>2.0(4/21)  · 11.5 (4/25) after procedure>2.8 (4/26)>1.9 (4/27)>1.6 (4/28)>2.7 (4/29)>2.6 (4/30)>2.2 (5/1)     Urinary incontinence   · rocha in place, consider switch to purwick when pt can tolerate  · 5/3: removed rocha     Pleural effusion  · likely metastatic, s/p sampling at OSH  · associated compressive atelectasis bilaterally  · multiple pulmonary micronodules     Anemia of Chronic Disease, stable  · likely chronic disease with some blood loss during procedures  · iron studies: Iron 50 (wnl), TIBC 160 (low), Saturation 31 (wnl), Transferrin 108 (low)  · monitor with CBC daily     Hypophosphatemia, resolved   · 4/22: 2.5 - replaced  · Most likely 2/2 chronic disease     Thrombocytopenia, resolved  · Most likely 2/2 chronic disease  · 122 (4/22)> 165 (4/23)>115 (4/30)>122 (5/1)>135 (5/2)>116(5/3)> 136 (5/4)> 153 (5/5)     Congestive Heart Failure  Right Heart Failure  · 4/27 Echo: Right Ventricular Enlargement with normal systolic  function  · 5/1:      Malnutrition   · Prealbumin of 4 . Dietary consulted: Please order speech consult. Continue regular diet, texture per SLP. Continue Boost Plus TID.   · Speech Consult: Clinical evaluation of swallow complete. Recommend dental soft diet with thin liquids. Given pt with scattered dentition, she appears to have met max potential with SLP services at this time.      DVT PPx:   Anticoagulants     Ordered     Route Frequency Start Stop    04/30/18 1053  enoxaparin      SubQ Daily 04/30/18 1700 --            Discharge plan and follow up  [facility] at [facility]       Provider  I personally scribed for Ravin Shaikh MD on 05/13/2018 at 1:19 PM. Electronically signed by kera Lemus on 05/13/2018 at 1:19 PM.

## 2018-05-13 NOTE — PLAN OF CARE
Problem: Patient Care Overview  Goal: Plan of Care Review  Outcome: Ongoing (interventions implemented as appropriate)  Patient resting in bed comfortably.IV intact and free of infection and irration, fall precautions maintained no falls noted. Call light in reach bed locked and in lowest position.  Patient instructed to call for assistance. Skin integrity maintained as weight shifting assistance is provided pt does not like to turn on sides,  C/o pain managed with PRN meds, No other complaints or concerns. Will continue to monitor and follow careplan of care.

## 2018-05-13 NOTE — ASSESSMENT & PLAN NOTE
58 y.o. female POD18 s/p L CMN, POD13 s/p R CMN    Pain control: per primary and heme onc  PT/OT: WBAT  DVT PPx: lovenox, FCDs at all times when not ambulating  Abx: postop Ancef completed  Drain: none     Dispo: per primary, likely hospice versus home with hospice

## 2018-05-13 NOTE — MEDICAL/APP STUDENT
Hospital Medicine  Progress note    I personally performed the history, physical exam and medical decision making: and confirmed the accuracy of the information in the transcribed note.    Team: [facility] MED B Ravin Shaikh MD  Admit Date: 4/20/2018  VIGNESH 5/14/2018  Code status: Full Code    Principal Problem:  Metastatic breast cancer    Interval hx:  No new events. Awaiting discharge to hospice at [facility] on 5/14.     ROS     Not able to stand. RUE weakness  Pain Scale: 8/10  Respiratory: no cough or shortness of breath  Cardiovascular: no chest pain or palpitations  Gastrointestinal: no nausea or vomiting, no abdominal pain or change in bowel habits  Behavioral/Psych: no depression or anxiety      PEx  Temp:  [96.1 °F (35.6 °C)-98.2 °F (36.8 °C)]   Pulse:  [100-114]   Resp:  [16-19]   BP: (110-140)/(63-75)   SpO2:  [87 %-95 %]     Intake/Output Summary (Last 24 hours) at 05/13/18 1729  Last data filed at 05/13/18 1340   Gross per 24 hour   Intake              620 ml   Output             1100 ml   Net             -480 ml       General Appearance: no acute distress   Heart: regular rate and rhythm  Respiratory: Normal respiratory effort, no crackles   Abdomen: Soft, non-tender; bowel sounds active  Skin: intact. IV sites ok  Neurologic:  No focal numbness. Right arm weakness 2/5. Cannot stand without assistance 2/2 UE weakness.   Mental status: Alert, oriented x 4, affect appropriate       Recent Labs  Lab 05/11/18  0554 05/12/18  0436 05/13/18  0657   WBC 10.64 12.73* 11.95   HGB 8.5* 8.9* 9.1*   HCT 28.9* 28.7* 30.0*    217 217       Recent Labs  Lab 05/11/18  0554 05/12/18  0436 05/13/18  0657    144 140   K 3.4* 3.4* 3.8    108 108   CO2 24 23 23   BUN 10 9 10   CREATININE 0.7 0.6 0.7   GLU 80 76 73   CALCIUM 8.7 8.7 8.7   MG 1.6 1.4* 1.8   PHOS 4.2 4.1 4.4       Recent Labs  Lab 05/11/18  0554 05/12/18  0436 05/13/18  0657   ALKPHOS 304* 316* 341*   ALT 10 8* 8*   AST 56* 53* 57*    ALBUMIN 1.6* 1.8* 1.9*   PROT 5.4* 5.6* 5.7*   BILITOT 0.4 0.5 0.5        Recent Labs  Lab 05/10/18  1946 05/12/18 1753 05/12/18 2004 05/12/18 2204   POCTGLUCOSE 104 51* 55* 84     No results for input(s): CPK, CPKMB, MB, TROPONINI in the last 72 hours.    Scheduled Meds:   acetaminophen  1,000 mg Oral Q6H    enoxaparin  40 mg Subcutaneous Daily    lidocaine  1 patch Transdermal Q24H    magnesium oxide  800 mg Oral Daily    polyethylene glycol  17 g Oral Daily    pregabalin  150 mg Oral QHS    ramelteon  8 mg Oral QHS    senna-docusate 8.6-50 mg  2 tablet Oral Daily    sodium bicarbonate  1 tablet Oral BID     Continuous Infusions:  As Needed:  sodium chloride, bisacodyl, dextrose 50%, dextrose 50%, glucagon (human recombinant), glucose, insulin aspart U-100, morphine, omnipaque, ondansetron, oxyCODONE, promethazine (PHENERGAN) IVPB, promethazine, ramelteon, sodium chloride 0.9%, sodium chloride 0.9%, sodium chloride 0.9%, sodium chloride 0.9%, tiZANidine, white petrolatum    Active Hospital Problems    Diagnosis  POA    *Metastatic breast cancer [C50.919]  Yes    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, 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]  Yes    Weakness of right upper extremity [R29.898]  Yes    Leukocytosis [D72.829]  Yes    Malnutrition of moderate degree [E44.0]  Yes    Malignant neoplasm of central portion of right breast in female, estrogen receptor positive [C50.111, Z17.0]  Not Applicable    Cardiac rhythm disorder or disturbance or change [I49.9]  Yes    CHF (congestive heart failure) [I50.9]  Yes    Right heart failure [I50.810]  Yes    Acute hypoxemic respiratory failure [J96.01]  Yes    Tachycardia [R00.0]  Yes    Lactic acidemia [E87.2]  Yes    Severe malnutrition [E43]  Yes    Cancer of left femur [C40.22]  Yes    Cancer of right femur [C40.21]  Yes    Palliative care encounter [Z51.5]  Not Applicable    Pain due to malignant neoplasm metastatic to bone [G89.3, C79.51]  Yes    Anemia [D64.9]  Yes     Thrombocytopenia [D69.6]  Yes    Hypomagnesemia [E83.42]  Yes    Bony metastasis [C79.51]  Yes     Xray metastatic survey shows lytic lesions of skull, lytic spine and rib lesions with pathologic fractures. Diffuse lytic lesions throughout entire central axial red marrow skeleton. Lytic lesions of proximal long bones and pathological fractures of ribs and right superior and inferior pubic ramus.      Pleural effusion [J90]  Yes      Resolved Hospital Problems    Diagnosis Date Resolved POA    Shock [R57.9] 04/26/2018 Yes    Encounter for weaning from ventilator [Z99.11] 04/30/2018 Not Applicable    Encephalopathy, metabolic [G93.41] 04/21/2018 Yes       Overview  Mrs. Grossman is a 57 yo female who was brought to [facility] on 4/20/2018 for second opinion regarding recently diagnosed likely metastatic malignancy with unknown primary.     Hospital Course: Mrs. Grossman is a 57 yo female who was brought to [facility] on 4/20/2018 for second opinion regarding recently diagnosed likely metastatic malignancy with unknown primary. She had been worked up at [facility] for the malignancy, and it was recommended she go on hospice, but children wanted to know definitive type of cancer before making any decisions. She was initially admitted to Critical Care for SBPs in the 80s, which responded well to boluses and may have been due to either dehydration or the morphine gtt she received at the OSH. She was transferred to hospital medicine on 4/21/2018, and Oncology, Radiation Oncology, and Palliative Care were consulted. Orthopedics was also consulted for evaluation of multiple lytic lesions found on MRI. Due to bilateral femoral lytic lesions, she underwent nailing of the left on 4/25/2018, and nailing of the right on 4/30/2018, in an attempt to increase ambulation. Biopsies were taken during the surgery. Radiation Oncology recommends radiation of nailed femurs as outpatient. Oncology followed-up on OSH biopsies, which showed ER/CA+,  HER 2- moderately differentiated adenocarcinoma, which is suggestive of breast primary; bone biopsy from left femur nailing was consistent with this finding. MRI of brain w and w/o contrast was also recommended by oncology, which showed meningeal carcinomatosis. Given this finding, prognosis is felt to be poor (2.5 months) and that intrathecal or systemic hormonal therapy would not prolong survival, therefore quality of life and comfort measures should be pursued.     During this hospitalization, patient has also experienced hypoxemic respiratory failure, likely from IVF, which was supported with oxygen therapy until able to resume room air, and pleural effusion likely related to metastatic disease. She had an additional episode of hypotension, whose work-up showed no new disease and was likely related to orthostasis. She had non-anion gap metabolic acidosis that resolved with bicarb replacement, lactic acidosis that resolved with IVF, hypomagnesemia that resolved with replacement, hypophosphotemia that resolved with replacement, and thrombocytopenia that resolved over her hospital stay. She was treated for urinary incontinence with a rocha that was removed on of 5/3/2018. She was found to be malnourished on admission and was seen by Dietary and Speech, who recommended a soft diet with thin liquids while in the hospital. She additionally has anemia of chronic disease and congestive heart failure due to right heart failure that were monitored during her hospitalization.       Assessment and Plan for Problems addressed today:     Metastatic breast cancer  Pain due to malignant neoplasm metastatic to bone    Meningeal carcinomatosis     Palliative Care Encounter  · pain control (BP is sensitive to narcotics)    · oral pain medications: oxycodone 5mg PRN q4hrs. .     · DNR status conversation still ongoing. Will be discharged to hospice at [facility] on 5/14.     Hypomagnesemia  · most likely 2/2 chronic  disease  · 1.4-replaced (4/22)>1.5-replaced (5/1)>1.5-replaced (5/2)>1.5-replaced (5/4)>1.5-replaced (5/6)> 1.4-replaced (5/9)> 1.7-replaced (5/10)> 1.6-replaced (5/11)> 1.4-replaced (5/12)    Pathologic fracture of ribs pubic ramus  · - s/p IM nailing femur x2     Hypoxemic respiratory failure  · pulmonary edema from IVF  · indeterminate diastolic function on 4/27 echo, PAP 53mmHg   · 5/2: tapered to room air.      Hypotension -likely orthostasis   · 5/1: Systolic BP 70s this AM, up to 113 with IVF bolus. Decreased prn oxycodone to 5mg q4hrs. NS bolus, CBC/CMP ordered. Chest Xray: Small lung volumes and bibasal patchy subsegmental opacities persist, similar to recent studies. Blood cultures negative, lactate 2.2, troponin 0.015, UA 1+ occult blood and trace leukocytes with few yeast and occasional bacteria. WBC increased to 15. No new disease identified.     Non-Anion Gap Metabolic Acidosis  · 5/3: bicarb 16-replaced>22 (5/4)>22(5/6)     Lactic acidosis  · 2.8 (4/20), responded to IVF>2.0(4/21)  · 11.5 (4/25) after procedure>2.8 (4/26)>1.9 (4/27)>1.6 (4/28)>2.7 (4/29)>2.6 (4/30)>2.2 (5/1)     Urinary incontinence   · rocha in place, consider switch to purwick when pt can tolerate  · 5/3: removed rocha     Pleural effusion  · likely metastatic, s/p sampling at OSH  · associated compressive atelectasis bilaterally  · multiple pulmonary micronodules     Anemia of Chronic Disease, stable  · likely chronic disease with some blood loss during procedures  · iron studies: Iron 50 (wnl), TIBC 160 (low), Saturation 31 (wnl), Transferrin 108 (low)  · monitor with CBC daily     Hypophosphatemia, resolved   · 4/22: 2.5 - replaced  · Most likely 2/2 chronic disease     Thrombocytopenia, resolved  · Most likely 2/2 chronic disease  · 122 (4/22)> 165 (4/23)>115 (4/30)>122 (5/1)>135 (5/2)>116(5/3)> 136 (5/4)> 153 (5/5)     Congestive Heart Failure  Right Heart Failure  · 4/27 Echo: Right Ventricular Enlargement with normal systolic  function  · 5/1:      Malnutrition   · Prealbumin of 4 . Dietary consulted: Please order speech consult. Continue regular diet, texture per SLP. Continue Boost Plus TID.   · Speech Consult: Clinical evaluation of swallow complete. Recommend dental soft diet with thin liquids. Given pt with scattered dentition, she appears to have met max potential with SLP services at this time.      DVT PPx:   Anticoagulants     Ordered     Route Frequency Start Stop    04/30/18 1053  enoxaparin      SubQ Daily 04/30/18 1700 --            Discharge plan and follow up  Nursing Home Hospice at [facility]       Provider  I personally scribed for Ravin Shaikh MD on 05/13/2018 at 1:19 PM. Electronically signed by kera Lemus on 05/13/2018 at 1:19 PM.

## 2018-05-13 NOTE — PROGRESS NOTES
Blood sugar readin. Refused IV glucose. Was able to get pt to drink some apple juice. With the help of pts family members was able to recheck blood sugar. Reading was 86. Family at bedside. MD aware. Will continue to monitor.

## 2018-05-13 NOTE — PROGRESS NOTES
[facility]  Orthopedics  Progress Note    Patient Name: Kerline Grossman  MRN: [id number]  Admission Date: 4/20/2018  Hospital Length of Stay: 22 days  Attending Provider: Ravin Shaikh MD  Primary Care Provider: Primary Doctor No  Follow-up For: Procedure(s) (LRB):  IM NAILING OF FEMUR - hana table - large c arm door side (Right)    Post-Operative Day: 13 Days Post-Op  Subjective:     Principal Problem:Metastatic breast cancer    Principal Orthopedic Problem: B femur impending fractures s/p B CMN    Interval History: Patient seen and examined at bedside.  No acute events overnight. Per son, pt has continued to be argumentative with nurses, not agreeable to PT/OT.     Review of patient's allergies indicates:  No Known Allergies    Current Facility-Administered Medications   Medication    0.9%  NaCl infusion (for blood administration)    acetaminophen tablet 1,000 mg    bisacodyl suppository 10 mg    dextrose 50% injection 12.5 g    dextrose 50% injection 25 g    enoxaparin injection 40 mg    glucagon (human recombinant) injection 1 mg    glucose chewable tablet 16 g    insulin aspart U-100 pen 1-10 Units    lidocaine 5 % patch 1 patch    magnesium oxide tablet 800 mg    morphine injection 2 mg    omnipaque oral solution 15 mL    ondansetron disintegrating tablet 8 mg    oxyCODONE immediate release tablet 5 mg    polyethylene glycol packet 17 g    pregabalin capsule 150 mg    promethazine (PHENERGAN) 6.25 mg in dextrose 5 % 50 mL IVPB    promethazine tablet 25 mg    ramelteon tablet 8 mg    ramelteon tablet 8 mg    senna-docusate 8.6-50 mg per tablet 2 tablet    sodium bicarbonate tablet 650 mg    sodium chloride 0.9% flush 3 mL    sodium chloride 0.9% flush 3 mL    sodium chloride 0.9% flush 3 mL    sodium chloride 0.9% flush 5 mL    tiZANidine tablet 4 mg    white petrolatum 41 % ointment     Objective:     Vital Signs (Most Recent):  Temp: 96.1 °F (35.6 °C) (05/12/18  "2000)  Pulse: 100 (05/13/18 0500)  Resp: 18 (05/12/18 2000)  BP: 110/65 (05/12/18 2000)  SpO2: 95 % (05/12/18 2000) Vital Signs (24h Range):  Temp:  [96.1 °F (35.6 °C)-98.2 °F (36.8 °C)] 96.1 °F (35.6 °C)  Pulse:  [100-111] 100  Resp:  [17-18] 18  SpO2:  [95 %-98 %] 95 %  BP: (110-130)/(65-73) 110/65     Weight: 74 kg (163 lb 2.3 oz)  Height: 5' 6" (167.6 cm)  Body mass index is 26.33 kg/m².      Intake/Output Summary (Last 24 hours) at 05/13/18 0657  Last data filed at 05/13/18 0500   Gross per 24 hour   Intake              620 ml   Output             1400 ml   Net             -780 ml       Ortho/SPM Exam    AAOx4  NAD  Regular rhythm, tachycardic    BLE:  L thigh dressings with stable, dry drainage  R thigh dressing minimal drainage on dressings  SILT and motor intact T/SP/DP  WWP extremities  FCDs in place and functioning    Significant Labs:   CBC:     Recent Labs  Lab 05/12/18  0436   WBC 12.73*   HGB 8.9*   HCT 28.7*        CMP:     Recent Labs  Lab 05/12/18  0436      K 3.4*      CO2 23   GLU 76   BUN 9   CREATININE 0.6   CALCIUM 8.7   PROT 5.6*   ALBUMIN 1.8*   BILITOT 0.5   ALKPHOS 316*   AST 53*   ALT 8*   ANIONGAP 13   EGFRNONAA >60.0     All pertinent labs within the past 24 hours have been reviewed.    Significant Imaging: I have reviewed all pertinent imaging results/findings.    Assessment/Plan:     Bony metastasis    58 y.o. female POD18 s/p L CMN, POD13 s/p R CMN    Pain control: per primary and heme onc  PT/OT: WBAT  DVT PPx: lovenox, FCDs at all times when not ambulating  Abx: postop Ancef completed  Drain: none     Dispo: per primary, likely hospice versus home with hospice              Jagruti Jack MD  Orthopedics  [facility]  "

## 2018-05-13 NOTE — MEDICAL/APP STUDENT
[facility] Medicine  Progress note    Team: [facility] B Ravin Shaikh MD  Admit Date: 4/20/2018  VIGNESH 5/14/2018  Code status: Full Code    Principal Problem:  Metastatic breast cancer    Interval hx:  No new events. Awaiting discharge to hospice at [facility] on 5/14.     ROS     Not able to stand. RUE weakness  Pain Scale: 8/10  Respiratory: no cough or shortness of breath  Cardiovascular: no chest pain or palpitations  Gastrointestinal: no nausea or vomiting, no abdominal pain or change in bowel habits  Behavioral/Psych: no depression or anxiety      PEx  Temp:  [96.1 °F (35.6 °C)-98.2 °F (36.8 °C)]   Pulse:  [100-111]   Resp:  [16-19]   BP: (110-140)/(63-75)   SpO2:  [87 %-95 %]     Intake/Output Summary (Last 24 hours) at 05/13/18 1318  Last data filed at 05/13/18 1200   Gross per 24 hour   Intake              380 ml   Output             1100 ml   Net             -720 ml       General Appearance: no acute distress   Heart: regular rate and rhythm  Respiratory: Normal respiratory effort, no crackles   Abdomen: Soft, non-tender; bowel sounds active  Skin: intact. IV sites ok  Neurologic:  No focal numbness. Right arm weakness 2/5. Cannot stand without assistance 2/2 UE weakness.   Mental status: Alert, oriented x 4, affect appropriate       Recent Labs  Lab 05/11/18  0554 05/12/18  0436 05/13/18  0657   WBC 10.64 12.73* 11.95   HGB 8.5* 8.9* 9.1*   HCT 28.9* 28.7* 30.0*    217 217       Recent Labs  Lab 05/11/18  0554 05/12/18  0436 05/13/18  0657    144 140   K 3.4* 3.4* 3.8    108 108   CO2 24 23 23   BUN 10 9 10   CREATININE 0.7 0.6 0.7   GLU 80 76 73   CALCIUM 8.7 8.7 8.7   MG 1.6 1.4* 1.8   PHOS 4.2 4.1 4.4       Recent Labs  Lab 05/11/18  0554 05/12/18  0436 05/13/18  0657   ALKPHOS 304* 316* 341*   ALT 10 8* 8*   AST 56* 53* 57*   ALBUMIN 1.6* 1.8* 1.9*   PROT 5.4* 5.6* 5.7*   BILITOT 0.4 0.5 0.5        Recent Labs  Lab 05/10/18  1946 05/12/18  1753 05/12/18 2004 05/12/18  2209    POCTGLUCOSE 104 51* 55* 84     No results for input(s): CPK, CPKMB, MB, TROPONINI in the last 72 hours.    Scheduled Meds:   acetaminophen  1,000 mg Oral Q6H    enoxaparin  40 mg Subcutaneous Daily    lidocaine  1 patch Transdermal Q24H    magnesium oxide  800 mg Oral Daily    polyethylene glycol  17 g Oral Daily    pregabalin  150 mg Oral QHS    ramelteon  8 mg Oral QHS    senna-docusate 8.6-50 mg  2 tablet Oral Daily    sodium bicarbonate  1 tablet Oral BID     Continuous Infusions:  As Needed:  sodium chloride, bisacodyl, dextrose 50%, dextrose 50%, glucagon (human recombinant), glucose, insulin aspart U-100, morphine, omnipaque, ondansetron, oxyCODONE, promethazine (PHENERGAN) IVPB, promethazine, ramelteon, sodium chloride 0.9%, sodium chloride 0.9%, sodium chloride 0.9%, sodium chloride 0.9%, tiZANidine, white petrolatum    Active Hospital Problems    Diagnosis  POA    *Metastatic breast cancer [C50.919]  Yes    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, 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]  Yes    Weakness of right upper extremity [R29.898]  Yes    Leukocytosis [D72.829]  Yes    Malnutrition of moderate degree [E44.0]  Yes    Malignant neoplasm of central portion of right breast in female, estrogen receptor positive [C50.111, Z17.0]  Not Applicable    Cardiac rhythm disorder or disturbance or change [I49.9]  Yes    CHF (congestive heart failure) [I50.9]  Yes    Right heart failure [I50.810]  Yes    Acute hypoxemic respiratory failure [J96.01]  Yes    Tachycardia [R00.0]  Yes    Lactic acidemia [E87.2]  Yes    Severe malnutrition [E43]  Yes    Cancer of left femur [C40.22]  Yes    Cancer of right femur [C40.21]  Yes    Palliative care encounter [Z51.5]  Not Applicable    Pain due to malignant neoplasm metastatic to bone [G89.3, C79.51]  Yes    Anemia [D64.9]  Yes    Thrombocytopenia [D69.6]  Yes    Hypomagnesemia [E83.42]  Yes    Bony metastasis [C79.51]  Yes     Xray metastatic survey shows lytic lesions of skull,  lytic spine and rib lesions with pathologic fractures. Diffuse lytic lesions throughout entire central axial red marrow skeleton. Lytic lesions of proximal long bones and pathological fractures of ribs and right superior and inferior pubic ramus.      Pleural effusion [J90]  Yes      Resolved Hospital Problems    Diagnosis Date Resolved POA    Shock [R57.9] 04/26/2018 Yes    Encounter for weaning from ventilator [Z99.11] 04/30/2018 Not Applicable    Encephalopathy, metabolic [G93.41] 04/21/2018 Yes       Overview  Mrs. Grossman is a 59 yo female who was brought [place] [facility] [place]2018 for second opinion regarding recently diagnosed likely metastatic malignancy with unknown primary.     Hospital Course: Mrs. Grossman is a 59 yo female who was brought to [facility] on 4/20/2018 for second opinion regarding recently diagnosed likely metastatic malignancy with unknown primary. She had been worked up at [facility] for the malignancy, and it was recommended she go on hospice, but children wanted to know definitive type of cancer before making any decisions. She was initially admitted to Critical Care for SBPs in the 80s, which responded well to boluses and may have been due to either dehydration or the morphine gtt she received at the OSH. She was transferred to hospital medicine on 4/21/2018, and Oncology, Radiation Oncology, and Palliative Care were consulted. Orthopedics was also consulted for evaluation of multiple lytic lesions found on MRI. Due to bilateral femoral lytic lesions, she underwent nailing of the left on 4/25/2018, and nailing of the right on 4/30/2018, in an attempt to increase ambulation. Biopsies were taken during the surgery. Radiation Oncology recommends radiation of nailed femurs as outpatient. Oncology followed-up on OSH biopsies, which showed ER/PA+, HER 2- moderately differentiated adenocarcinoma, which is suggestive of breast primary; bone biopsy from left femur nailing was consistent with this  finding. MRI of brain w and w/o contrast was also recommended by oncology, which showed meningeal carcinomatosis. Given this finding, prognosis is felt to be poor (2.5 months) and that intrathecal or systemic hormonal therapy would not prolong survival, therefore quality of life and comfort measures should be pursued.     During this hospitalization, patient has also experienced hypoxemic respiratory failure, likely from IVF, which was supported with oxygen therapy until able to resume room air, and pleural effusion likely related to metastatic disease. She had an additional episode of hypotension, whose work-up showed no new disease and was likely related to orthostasis. She had non-anion gap metabolic acidosis that resolved with bicarb replacement, lactic acidosis that resolved with IVF, hypomagnesemia that resolved with replacement, hypophosphotemia that resolved with replacement, and thrombocytopenia that resolved over her hospital stay. She was treated for urinary incontinence with a rocha that was removed on of 5/3/2018. She was found to be malnourished on admission and was seen by Dietary and Speech, who recommended a soft diet with thin liquids while in the hospital. She additionally has anemia of chronic disease and congestive heart failure due to right heart failure that were monitored during her hospitalization.       Assessment and Plan for Problems addressed today:     Metastatic breast cancer  Pain due to malignant neoplasm metastatic to bone    Meningeal carcinomatosis     Palliative Care Encounter  · pain control (BP is sensitive to narcotics)    · oral pain medications: oxycodone 5mg PRN q4hrs. .     · DNR status conversation still ongoing. Will be discharged to hospice at [facility] on 5/14.     Hypomagnesemia  · most likely 2/2 chronic disease  · 1.4-replaced (4/22)>1.5-replaced (5/1)>1.5-replaced (5/2)>1.5-replaced (5/4)>1.5-replaced (5/6)> 1.4-replaced (5/9)> 1.7-replaced (5/10)> 1.6-replaced  (5/11)> 1.4-replaced (5/12)    Pathologic fracture of ribs pubic ramus  · - s/p IM nailing femur x2     Hypoxemic respiratory failure  · pulmonary edema from IVF  · indeterminate diastolic function on 4/27 echo, PAP 53mmHg   · 5/2: tapered to room air.      Hypotension -likely orthostasis   · 5/1: Systolic BP 70s this AM, up to 113 with IVF bolus. Decreased prn oxycodone to 5mg q4hrs. NS bolus, CBC/CMP ordered. Chest Xray: Small lung volumes and bibasal patchy subsegmental opacities persist, similar to recent studies. Blood cultures negative, lactate 2.2, troponin 0.015, UA 1+ occult blood and trace leukocytes with few yeast and occasional bacteria. WBC increased to 15. No new disease identified.     Non-Anion Gap Metabolic Acidosis  · 5/3: bicarb 16-replaced>22 (5/4)>22(5/6)     Lactic acidosis  · 2.8 (4/20), responded to IVF>2.0(4/21)  · 11.5 (4/25) after procedure>2.8 (4/26)>1.9 (4/27)>1.6 (4/28)>2.7 (4/29)>2.6 (4/30)>2.2 (5/1)     Urinary incontinence   · rocha in place, consider switch to purwick when pt can tolerate  · 5/3: removed rocha     Pleural effusion  · likely metastatic, s/p sampling at OSH  · associated compressive atelectasis bilaterally  · multiple pulmonary micronodules     Anemia of Chronic Disease, stable  · likely chronic disease with some blood loss during procedures  · iron studies: Iron 50 (wnl), TIBC 160 (low), Saturation 31 (wnl), Transferrin 108 (low)  · monitor with CBC daily     Hypophosphatemia, resolved   · 4/22: 2.5 - replaced  · Most likely 2/2 chronic disease     Thrombocytopenia, resolved  · Most likely 2/2 chronic disease  · 122 (4/22)> 165 (4/23)>115 (4/30)>122 (5/1)>135 (5/2)>116(5/3)> 136 (5/4)> 153 (5/5)     Congestive Heart Failure  Right Heart Failure  · 4/27 Echo: Right Ventricular Enlargement with normal systolic function  · 5/1:      Malnutrition   · Prealbumin of 4 . Dietary consulted: Please order speech consult. Continue regular diet, texture per SLP. Continue  Boost Plus TID.   · Speech Consult: Clinical evaluation of swallow complete. Recommend dental soft diet with thin liquids. Given pt with scattered dentition, she appears to have met max potential with SLP services at this time.      DVT PPx:   Anticoagulants     Ordered     Route Frequency Start Stop    04/30/18 1053  enoxaparin      SubQ Daily 04/30/18 1700 --            Discharge plan and follow up  Nursing Home Hospice at [facility]       Provider  I personally scribed for Ravin Shaikh MD on 05/13/2018 at 1:19 PM. Electronically signed by kera Lemus on 05/13/2018 at 1:19 PM.

## 2018-05-13 NOTE — SUBJECTIVE & OBJECTIVE
"Principal Problem:Metastatic breast cancer    Principal Orthopedic Problem: B femur impending fractures s/p B CMN    Interval History: Patient seen and examined at bedside.  No acute events overnight. Per son, pt has continued to be argumentative with nurses, not agreeable to PT/OT.     Review of patient's allergies indicates:  No Known Allergies    Current Facility-Administered Medications   Medication    0.9%  NaCl infusion (for blood administration)    acetaminophen tablet 1,000 mg    bisacodyl suppository 10 mg    dextrose 50% injection 12.5 g    dextrose 50% injection 25 g    enoxaparin injection 40 mg    glucagon (human recombinant) injection 1 mg    glucose chewable tablet 16 g    insulin aspart U-100 pen 1-10 Units    lidocaine 5 % patch 1 patch    magnesium oxide tablet 800 mg    morphine injection 2 mg    omnipaque oral solution 15 mL    ondansetron disintegrating tablet 8 mg    oxyCODONE immediate release tablet 5 mg    polyethylene glycol packet 17 g    pregabalin capsule 150 mg    promethazine (PHENERGAN) 6.25 mg in dextrose 5 % 50 mL IVPB    promethazine tablet 25 mg    ramelteon tablet 8 mg    ramelteon tablet 8 mg    senna-docusate 8.6-50 mg per tablet 2 tablet    sodium bicarbonate tablet 650 mg    sodium chloride 0.9% flush 3 mL    sodium chloride 0.9% flush 3 mL    sodium chloride 0.9% flush 3 mL    sodium chloride 0.9% flush 5 mL    tiZANidine tablet 4 mg    white petrolatum 41 % ointment     Objective:     Vital Signs (Most Recent):  Temp: 96.1 °F (35.6 °C) (05/12/18 2000)  Pulse: 100 (05/13/18 0500)  Resp: 18 (05/12/18 2000)  BP: 110/65 (05/12/18 2000)  SpO2: 95 % (05/12/18 2000) Vital Signs (24h Range):  Temp:  [96.1 °F (35.6 °C)-98.2 °F (36.8 °C)] 96.1 °F (35.6 °C)  Pulse:  [100-111] 100  Resp:  [17-18] 18  SpO2:  [95 %-98 %] 95 %  BP: (110-130)/(65-73) 110/65     Weight: 74 kg (163 lb 2.3 oz)  Height: 5' 6" (167.6 cm)  Body mass index is 26.33 " kg/m².      Intake/Output Summary (Last 24 hours) at 05/13/18 0657  Last data filed at 05/13/18 0500   Gross per 24 hour   Intake              620 ml   Output             1400 ml   Net             -780 ml       Ortho/SPM Exam    AAOx4  NAD  Regular rhythm, tachycardic    BLE:  L thigh dressings with stable, dry drainage  R thigh dressing minimal drainage on dressings  SILT and motor intact T/SP/DP  WWP extremities  FCDs in place and functioning    Significant Labs:   CBC:     Recent Labs  Lab 05/12/18  0436   WBC 12.73*   HGB 8.9*   HCT 28.7*        CMP:     Recent Labs  Lab 05/12/18  0436      K 3.4*      CO2 23   GLU 76   BUN 9   CREATININE 0.6   CALCIUM 8.7   PROT 5.6*   ALBUMIN 1.8*   BILITOT 0.5   ALKPHOS 316*   AST 53*   ALT 8*   ANIONGAP 13   EGFRNONAA >60.0     All pertinent labs within the past 24 hours have been reviewed.    Significant Imaging: I have reviewed all pertinent imaging results/findings.

## 2018-05-14 VITALS
SYSTOLIC BLOOD PRESSURE: 123 MMHG | BODY MASS INDEX: 26.22 KG/M2 | RESPIRATION RATE: 16 BRPM | DIASTOLIC BLOOD PRESSURE: 72 MMHG | HEIGHT: 66 IN | OXYGEN SATURATION: 97 % | TEMPERATURE: 96 F | WEIGHT: 163.13 LBS | HEART RATE: 115 BPM

## 2018-05-14 LAB
ALBUMIN SERPL BCP-MCNC: 2.1 G/DL
ALP SERPL-CCNC: 365 U/L
ALT SERPL W/O P-5'-P-CCNC: 7 U/L
ANION GAP SERPL CALC-SCNC: 13 MMOL/L
ANISOCYTOSIS BLD QL SMEAR: SLIGHT
AST SERPL-CCNC: 57 U/L
BASOPHILS # BLD AUTO: ABNORMAL K/UL
BASOPHILS NFR BLD: 1 %
BILIRUB SERPL-MCNC: 0.6 MG/DL
BUN SERPL-MCNC: 13 MG/DL
CALCIUM SERPL-MCNC: 9.1 MG/DL
CHLORIDE SERPL-SCNC: 106 MMOL/L
CO2 SERPL-SCNC: 21 MMOL/L
CREAT SERPL-MCNC: 0.7 MG/DL
DIFFERENTIAL METHOD: ABNORMAL
EOSINOPHIL # BLD AUTO: ABNORMAL K/UL
EOSINOPHIL NFR BLD: 0 %
ERYTHROCYTE [DISTWIDTH] IN BLOOD BY AUTOMATED COUNT: 20.1 %
EST. GFR  (AFRICAN AMERICAN): >60 ML/MIN/1.73 M^2
EST. GFR  (NON AFRICAN AMERICAN): >60 ML/MIN/1.73 M^2
GLUCOSE SERPL-MCNC: 60 MG/DL
HCT VFR BLD AUTO: 29.6 %
HGB BLD-MCNC: 8.9 G/DL
IMM GRANULOCYTES # BLD AUTO: ABNORMAL K/UL
IMM GRANULOCYTES NFR BLD AUTO: ABNORMAL %
LYMPHOCYTES # BLD AUTO: ABNORMAL K/UL
LYMPHOCYTES NFR BLD: 23 %
MAGNESIUM SERPL-MCNC: 1.8 MG/DL
MCH RBC QN AUTO: 28.7 PG
MCHC RBC AUTO-ENTMCNC: 30.1 G/DL
MCV RBC AUTO: 96 FL
METAMYELOCYTES NFR BLD MANUAL: 2 %
MONOCYTES # BLD AUTO: ABNORMAL K/UL
MONOCYTES NFR BLD: 9 %
MYELOCYTES NFR BLD MANUAL: 1 %
NEUTROPHILS NFR BLD: 61 %
NEUTS BAND NFR BLD MANUAL: 3 %
NRBC BLD-RTO: 4 /100 WBC
PHOSPHATE SERPL-MCNC: 4.5 MG/DL
PLATELET # BLD AUTO: 195 K/UL
PLATELET BLD QL SMEAR: ABNORMAL
PMV BLD AUTO: 11.6 FL
POLYCHROMASIA BLD QL SMEAR: ABNORMAL
POTASSIUM SERPL-SCNC: 3.6 MMOL/L
PROT SERPL-MCNC: 6.1 G/DL
RBC # BLD AUTO: 3.1 M/UL
SODIUM SERPL-SCNC: 140 MMOL/L
WBC # BLD AUTO: 10.88 K/UL

## 2018-05-14 PROCEDURE — 80053 COMPREHEN METABOLIC PANEL: CPT

## 2018-05-14 PROCEDURE — 99238 HOSP IP/OBS DSCHRG MGMT 30/<: CPT | Mod: ,,, | Performed by: INTERNAL MEDICINE

## 2018-05-14 PROCEDURE — 83735 ASSAY OF MAGNESIUM: CPT

## 2018-05-14 PROCEDURE — 25000003 PHARM REV CODE 250: Performed by: STUDENT IN AN ORGANIZED HEALTH CARE EDUCATION/TRAINING PROGRAM

## 2018-05-14 PROCEDURE — 25000003 PHARM REV CODE 250: Performed by: HOSPITALIST

## 2018-05-14 PROCEDURE — 84100 ASSAY OF PHOSPHORUS: CPT

## 2018-05-14 PROCEDURE — 85007 BL SMEAR W/DIFF WBC COUNT: CPT

## 2018-05-14 PROCEDURE — 36415 COLL VENOUS BLD VENIPUNCTURE: CPT

## 2018-05-14 PROCEDURE — 25000003 PHARM REV CODE 250: Performed by: ANESTHESIOLOGY

## 2018-05-14 PROCEDURE — 85027 COMPLETE CBC AUTOMATED: CPT

## 2018-05-14 RX ORDER — MORPHINE SULFATE 15 MG/1
15 TABLET ORAL EVERY 4 HOURS PRN
Qty: 30 TABLET | Refills: 0 | Status: SHIPPED | OUTPATIENT
Start: 2018-05-14

## 2018-05-14 RX ORDER — LIDOCAINE 50 MG/G
1 PATCH TOPICAL DAILY
Qty: 10 PATCH | Refills: 0 | Status: SHIPPED | OUTPATIENT
Start: 2018-05-14

## 2018-05-14 RX ORDER — MORPHINE SULFATE 15 MG/1
15 TABLET, FILM COATED, EXTENDED RELEASE ORAL 2 TIMES DAILY
Qty: 30 TABLET | Refills: 0 | Status: SHIPPED | OUTPATIENT
Start: 2018-05-14

## 2018-05-14 RX ORDER — POLYETHYLENE GLYCOL 3350 17 G/17G
17 POWDER, FOR SOLUTION ORAL DAILY
Qty: 30 PACKET | Refills: 0 | Status: SHIPPED | OUTPATIENT
Start: 2018-05-15

## 2018-05-14 RX ADMIN — POLYETHYLENE GLYCOL 3350 17 G: 17 POWDER, FOR SOLUTION ORAL at 09:05

## 2018-05-14 RX ADMIN — STANDARDIZED SENNA CONCENTRATE AND DOCUSATE SODIUM 2 TABLET: 8.6; 5 TABLET, FILM COATED ORAL at 09:05

## 2018-05-14 RX ADMIN — ACETAMINOPHEN 1000 MG: 500 TABLET, FILM COATED ORAL at 12:05

## 2018-05-14 RX ADMIN — SODIUM BICARBONATE 650 MG TABLET 650 MG: at 09:05

## 2018-05-14 RX ADMIN — MAGNESIUM OXIDE TAB 400 MG (241.3 MG ELEMENTAL MG) 800 MG: 400 (241.3 MG) TAB at 09:05

## 2018-05-14 NOTE — PT/OT/SLP PROGRESS
Physical Therapy      Patient Name:  Kerline Grossman   MRN:  [id number]    Patient not seen today secondary to pt refusing PT eval at this time and consistently redirecting conversation when asked to perform mobility. Son present and aware of pt refusing.     Guerda Arreaga, PT   5/14/18

## 2018-05-14 NOTE — PROGRESS NOTES
Visualized pt. Sleeping comfortably. Pt has requested to not be awakened after 11pm. Family at pts bedside.

## 2018-05-14 NOTE — PLAN OF CARE
[facility]     Department of Hospital Medicine     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]  Yes    Weakness of right upper extremity [R29.898]  Yes    Leukocytosis [D72.829]  Yes    Malnutrition of moderate degree [E44.0]  Yes    Malignant neoplasm of central portion of right breast in female, estrogen receptor positive [C50.111, Z17.0]  Not Applicable    Cardiac rhythm disorder or disturbance or change [I49.9]  Yes    CHF (congestive heart failure) [I50.9]  Yes    Right heart failure [I50.810]  Yes    Acute hypoxemic respiratory failure [J96.01]  Yes    Tachycardia [R00.0]  Yes    Lactic acidemia [E87.2]  Yes    Severe malnutrition [E43]  Yes    Cancer of left femur [C40.22]  Yes    Cancer of right femur [C40.21]  Yes    Palliative care encounter [Z51.5]  Not Applicable    Pain due to malignant neoplasm metastatic to bone [G89.3, C79.51]  Yes    Anemia [D64.9]  Yes    Thrombocytopenia [D69.6]  Yes    Hypomagnesemia [E83.42]  Yes    Bony metastasis [C79.51]  Yes     Xray metastatic survey shows lytic lesions of skull, lytic spine and rib lesions with pathologic fractures. Diffuse lytic lesions throughout entire central axial red marrow skeleton. Lytic lesions of proximal long bones and pathological fractures of ribs and right superior and inferior pubic ramus.      Pleural effusion [J90]  Yes      Resolved Hospital Problems    Diagnosis Date Resolved POA    Shock [R57.9] 04/26/2018 Yes    Encounter for weaning from ventilator [Z99.11] 04/30/2018 Not Applicable    Encephalopathy, metabolic [G93.41] 04/21/2018 Yes        Patient is homebound due to:  Metastatic breast cancer    Allergies:Review of patient's allergies indicates:  No Known Allergies    Vitals:      Routine, once monthly       Diet: regular diet                 Acitivities:   LABS:  Per facility protocol  Nursing Precautions:              Kerline Grossman   Home Medication Instructions SAÚL:[id number]    Printed on:05/14/18 1206   Medication Information                      lidocaine (LIDODERM) 5 %  Place 1 patch onto the skin once daily. Remove & Discard patch within 12 hours or as directed by MD  Apply to right rib cage           morphine (MS CONTIN) 15 MG 12 hr tablet  Take 1 tablet (15 mg total) by mouth 2 (two) times daily.             morphine (MSIR) 15 MG tablet  Take 1 tablet (15 mg total) by mouth every 4 (four) hours as needed for Pain.             ondansetron (ZOFRAN) 4 MG tablet  Take 8 mg by mouth 2 (two) times daily. PRN nausea             polyethylene glycol (GLYCOLAX) 17 gram PwPk  Take 17 g by mouth once daily.             quetiapine (SEROQUEL XR) 400 MG Tb24  Take by mouth once daily. Given for Bipolar                       _________________________________  Ravin Shaikh MD  05/14/2018

## 2018-05-14 NOTE — NURSING
Pt awake, took and tolerated po meds whole.  Family at the bedisde. Safety measures in place. incont of bowel purewick in place.

## 2018-05-14 NOTE — PLAN OF CARE
PLAN IS TO TRANSFER PT TO [facility]     05/14/18 1043   Discharge Reassessment   Assessment Type Discharge Planning Reassessment   Provided patient/caregiver education on the expected discharge date and the discharge plan No

## 2018-05-14 NOTE — MEDICAL/APP STUDENT
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]  Yes    Weakness of right upper extremity [R29.898]  Yes    Leukocytosis [D72.829]  Yes    Malnutrition of moderate degree [E44.0]  Yes    Malignant neoplasm of central portion of right breast in female, estrogen receptor positive [C50.111, Z17.0]  Not Applicable    Cardiac rhythm disorder or disturbance or change [I49.9]  Yes    CHF (congestive heart failure) [I50.9]  Yes    Right heart failure [I50.810]  Yes    Acute hypoxemic respiratory failure [J96.01]  Yes    Tachycardia [R00.0]  Yes    Lactic acidemia [E87.2]  Yes    Severe malnutrition [E43]  Yes    Cancer of left femur [C40.22]  Yes    Cancer of right femur [C40.21]  Yes    Palliative care encounter [Z51.5]  Not Applicable    Pain due to malignant neoplasm metastatic to bone [G89.3, C79.51]  Yes    Anemia [D64.9]  Yes    Thrombocytopenia [D69.6]  Yes    Hypomagnesemia [E83.42]  Yes    Bony metastasis [C79.51]  Yes     Xray metastatic survey shows lytic lesions of skull, lytic spine and rib lesions with pathologic fractures. Diffuse lytic lesions throughout entire central axial red marrow skeleton. Lytic lesions of proximal long bones and pathological fractures of ribs and right superior and inferior pubic ramus.      Pleural effusion [J90]  Yes      Resolved Hospital Problems    Diagnosis Date Resolved POA    Shock [R57.9] 04/26/2018 Yes    Encounter for weaning from ventilator [Z99.11] 04/30/2018 Not Applicable    Encephalopathy, metabolic [G93.41] 04/21/2018 Yes        HPI  Mrs. Grossman is a 57 yo female admitted to MICU on 4/20/18 for second opinion regarding recently diagnosed likely metastatic  malignancy with unknown primary. Per patient's daughter about a month ago her mother started to gradually decline secondary to diffuse pain. She tehn became progressively more altered and eventually not responsive. She could no longer walk and was urinating on herself. Her family brought her to ED at [facility]. They brought some records from her stay there which show that work up revealed a corrected calcium of 13 and ZACH. She had CT brain, chest and abdomen which showed diffuse lytic lesions and some pathological fractures in her skull, ribs, and hips but no lesion that pointed to primary tumor. Her calcium eventually trended down. SPEP and UPEP were negative. Her daughter states she had 2 BM biopsies which were unrevealing. She had CT guided biopsy of a lesion in her hip on 4/20 as well as diagnostic thoracentesis which removed only 50 ccs from her pleural effusion. Her daughter states the physicians at [facility] were recommending inpatient hospice for her mother as it seemed she most likely had widely metastatic disease with unknown primary tumor however the patient's children wished to have a definitive diagnoses before making a decision.   She has metastatic lesions to the bilateral femurs with concern for impending fracture. On 4/25/18, she was taken to the OR by Ortho for nailing of the left femur. Approximately 1 hour into the case, she became hypotensive requirement increasing amounts of vasopressor support. JAMILA was done in the OR by anesthesia and reportedly showed signs of right heart strain. Decision was made to leave her intubated and obtain stat CTA to rule out PE. Patient was seen in the PACU intubated, sedated, and on 0.2 of epi.     Hospital Course   Mrs. Grossman is a 59 yo female who [place] [facility] [place] for second opinion regarding recently diagnosed likely metastatic malignancy with unknown primary. 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 .     · DNR status conversation still ongoing. Will be discharged to hospice at [facility] on 5/14.      Hypomagnesemia  · most likely 2/2 chronic disease  · 1.4-replaced (4/22)>1.5-replaced (5/1)>1.5-replaced (5/2)>1.5-replaced (5/4)>1.5-replaced (5/6)> 1.4-replaced (5/9)> 1.7-replaced (5/10)> 1.6-replaced (5/11)> 1.4-replaced (5/12)     Pathologic fracture of ribs pubic ramus  · - s/p IM nailing femur x2     Hypoxemic respiratory failure  · pulmonary edema from IVF  · indeterminate diastolic function on 4/27 echo, PAP 53mmHg   · 5/2: tapered to room air.      Hypotension -likely orthostasis   · 5/1: Systolic BP 70s this AM, up to 113 with IVF bolus. Decreased prn oxycodone to 5mg q4hrs. NS bolus, CBC/CMP ordered. Chest Xray: Small lung volumes and bibasal patchy subsegmental opacities persist, similar to recent studies. Blood cultures negative, lactate 2.2, troponin 0.015, UA 1+ occult blood and trace leukocytes with few yeast and occasional bacteria. WBC increased to 15. No new disease identified.     Non-Anion Gap Metabolic Acidosis  · 5/3: bicarb 16-replaced>22 (5/4)>22(5/6)     Lactic acidosis  · 2.8 (4/20), responded to IVF>2.0(4/21)  · 11.5 (4/25) after procedure>2.8 (4/26)>1.9 (4/27)>1.6 (4/28)>2.7 (4/29)>2.6 (4/30)>2.2 (5/1)     Urinary incontinence   · rocha in place, consider switch to purwick when pt can tolerate  · 5/3: removed rocha     Pleural effusion  · likely metastatic, s/p sampling at OSH  · associated compressive atelectasis bilaterally  · multiple pulmonary micronodules     Anemia of Chronic Disease, stable  · likely chronic disease with some blood loss during procedures  · iron studies: Iron 50 (wnl), TIBC 160 (low), Saturation 31  (wnl), Transferrin 108 (low)  · monitor with CBC daily     Hypophosphatemia, resolved   · 4/22: 2.5 - replaced  · Most likely 2/2 chronic disease     Thrombocytopenia, resolved  · Most likely 2/2 chronic disease  · 122 (4/22)> 165 (4/23)>115 (4/30)>122 (5/1)>135 (5/2)>116(5/3)> 136 (5/4)> 153 (5/5)     Congestive Heart Failure  Right Heart Failure  · 4/27 Echo: Right Ventricular Enlargement with normal systolic function  · 5/1:      Malnutrition   · Prealbumin of 4 . Dietary consulted: Please order speech consult. Continue regular diet, texture per SLP. Continue Boost Plus TID.   · Speech Consult: Clinical evaluation of swallow complete. Recommend dental soft diet with thin liquids. Given pt with scattered dentition, she appears to have met max potential with SLP services at this time.    Recent Labs  Lab 05/12/18 0436 05/13/18  0657 05/14/18  0518   WBC 12.73* 11.95 10.88   HGB 8.9* 9.1* 8.9*   HCT 28.7* 30.0* 29.6*    217 195       Recent Labs  Lab 05/12/18 0436 05/13/18  0657 05/14/18  0518    140 140   K 3.4* 3.8 3.6    108 106   CO2 23 23 21*   BUN 9 10 13   CREATININE 0.6 0.7 0.7   GLU 76 73 60*   CALCIUM 8.7 8.7 9.1   MG 1.4* 1.8 1.8   PHOS 4.1 4.4 4.5       Recent Labs  Lab 05/12/18 0436 05/13/18  0657 05/14/18  0518   ALKPHOS 316* 341* 365*   ALT 8* 8* 7*   AST 53* 57* 57*   ALBUMIN 1.8* 1.9* 2.1*   PROT 5.6* 5.7* 6.1   BILITOT 0.5 0.5 0.6        Recent Labs  Lab 05/10/18  1946 05/12/18  1753 05/12/18  2004 05/12/18  2204 05/13/18 1732 05/13/18 1946   POCTGLUCOSE 104 51* 55* 84 62* 60*     No results for input(s): CPK, CPKMB, MB, TROPONINI in the last 72 hours.    No results for input(s): LACTATE in the last 72 hours.     Procedures: IM nailing of femur, Central line    Consultants: Orthopedics: post-surgery    Current Discharge Medication List      CONTINUE these medications which have NOT CHANGED    Details   escitalopram oxalate (LEXAPRO) 5 MG Tab Take 5 mg by mouth once  daily.      morphine (MS CONTIN) 15 MG 12 hr tablet Take 15 mg by mouth 2 (two) times daily.      morphine (MSIR) 15 MG tablet Take 5 mg by mouth every 4 (four) hours as needed for Pain.      ondansetron (ZOFRAN) 4 MG tablet Take 8 mg by mouth 2 (two) times daily.      LURASIDONE HCL (LATUDA ORAL) Take by mouth.      quetiapine (SEROQUEL XR) 400 MG Tb24 Take by mouth once daily.             Discharge Diet:Mechanical soft with Normal Fluid intake of 1500 - 2000 mL per day    Activity: activity as tolerated    Discharge Condition: Good    Disposition: Nursing Facility    Tests pending at the time of discharge: none      Time spent  on the discharge of the patient including review of hospital course with the patient. reviewing discharge medications and arranging follow-up care     Discharge examination Patient was seen and examined on the date of discharge and determined to be suitable for discharge.    Discharge plan and follow up:    Patient to be discharged to NH.     Future Appointments  Date Time Provider Department Center   5/14/2018 2:00 PM Aundrea Rojas PA-C [facility]       Provider      I personally scribed for Ravin Shaikh MD on 05/14/2018 at 9:29 AM. Electronically signed by krea Lipscomb III on 05/14/2018 at 9:29 AM

## 2018-05-14 NOTE — PLAN OF CARE
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]  Yes    Weakness of right upper extremity [R29.898]  Yes    Leukocytosis [D72.829]  Yes    Malnutrition of moderate degree [E44.0]  Yes    Malignant neoplasm of central portion of right breast in female, estrogen receptor positive [C50.111, Z17.0]  Not Applicable    Cardiac rhythm disorder or disturbance or change [I49.9]  Yes    CHF (congestive heart failure) [I50.9]  Yes    Right heart failure [I50.810]  Yes    Acute hypoxemic respiratory failure [J96.01]  Yes    Tachycardia [R00.0]  Yes    Lactic acidemia [E87.2]  Yes    Severe malnutrition [E43]  Yes    Cancer of left femur [C40.22]  Yes    Cancer of right femur [C40.21]  Yes    Palliative care encounter [Z51.5]  Not Applicable    Pain due to malignant neoplasm metastatic to bone [G89.3, C79.51]  Yes    Anemia [D64.9]  Yes    Thrombocytopenia [D69.6]  Yes    Hypomagnesemia [E83.42]  Yes    Bony metastasis [C79.51]  Yes     Xray metastatic survey shows lytic lesions of skull, lytic spine and rib lesions with pathologic fractures. Diffuse lytic lesions throughout entire central axial red marrow skeleton. Lytic lesions of proximal long bones and pathological fractures of ribs and right superior and inferior pubic ramus.      Pleural effusion [J90]  Yes      Resolved Hospital Problems    Diagnosis Date Resolved POA    Shock [R57.9] 04/26/2018 Yes    Encounter for weaning from ventilator [Z99.11] 04/30/2018 Not Applicable    Encephalopathy,  metabolic [G93.41] 04/21/2018 Yes       Patient is homebound due to:  Metastatic breast cancer    Allergies:Review of patient's allergies indicates:  No Known Allergies    Vitals:     Routine, once monthly     Once weekly     Every shift (Skilled Nursing patients)    Diet: regular diet                   Acitivities:   - Up in a chair each morning as tolerated       LABS:  Per facility protocol  (        CONSULTS:     Physical Therapy to evaluate and treat     Occupational Therapy to evaluate and treat                Kerline Grossman   Home Medication Instructions SAÚL:[id number]    Printed on:05/14/18 1007   Medication Information                      escitalopram oxalate (LEXAPRO) 5 MG Tab  Take 5 mg by mouth once daily.             LURASIDONE HCL (LATUDA ORAL)  Take by mouth.             morphine (MS CONTIN) 15 MG 12 hr tablet  Take 1 tablet (15 mg total) by mouth 2 (two) times daily.             morphine (MSIR) 15 MG tablet  Take 1 tablet (15 mg total) by mouth every 4 (four) hours as needed for Pain.             ondansetron (ZOFRAN) 4 MG tablet  Take 8 mg by mouth 2 (two) times daily.             polyethylene glycol (GLYCOLAX) 17 gram PwPk  Take 17 g by mouth once daily.             quetiapine (SEROQUEL XR) 400 MG Tb24  Take by mouth once daily.                       _________________________________  Ravin Shaikh MD  05/14/2018

## 2018-05-14 NOTE — NURSING
Report called to [facility], gave report to staff nurse Dang PETERSON . Gave report to staff. Pt will be transferring via ambulance service . Family member at the bedside .

## 2018-05-14 NOTE — PLAN OF CARE
Pt ready for transfer to [facility]. Nurse to call report to [phone] to nurse for room 251B. Ambulance ride arranged through [facility] for a  between 4-5pm. SW notified pts nurse.    Destiney De Jesus, LCSW [phone]

## 2018-05-14 NOTE — PLAN OF CARE
SW attempted to meet with pt and her son-pt and son would not provide any info to sw including name of pt's son that was present.    SW received message from Bell at [facility]. Pt has been accepted, family is coming to sign paperwork today. Family has declined hospice care at this time. JOEL requested Dr Shaikh complete NH orders.    Destiney De Jesus, [facility] [phone]

## 2018-05-14 NOTE — PROGRESS NOTES
pts blood sugar is 60. She refused medication but agreed to drink some apple juice. She refused a recheck at this time. Will try again later. Family at bedside.

## 2018-05-14 NOTE — PLAN OF CARE
Problem: Patient Care Overview  Goal: Plan of Care Review  Family verbalizes understanding of mothers care .

## 2018-05-14 NOTE — PLAN OF CARE
ELIZABETH spoke with Felicitas from [facility]; paperwork completed, she asked for clarification/changes to orders, which elizabeth informed  of. Hard script faxed to felicitas. ELIZABETH informed pt's nurse of transfer today.    Destiney De Jesus, [facility] [id number]

## 2018-05-15 ENCOUNTER — TELEPHONE (OUTPATIENT)
Dept: ORTHOPEDICS | Facility: CLINIC | Age: 58
End: 2018-05-15

## 2018-05-15 NOTE — TELEPHONE ENCOUNTER
----- Message from Aayush Courtney MD sent at 5/14/2018  8:25 PM CDT -----  Please schedule patient follow-up 2 weeks from now for bilateral femoral   prophylactic nailings with Aundrea. Patient already has sutures out and does not need follow-up in 2 days. Thank you.

## 2018-05-15 NOTE — TELEPHONE ENCOUNTER
Notified pt nurse [place] with [address] [address]   Phone: [phone].  Pt updated appointment 06/01/18 at 8 am. Patient nurse Sia states verbal understanding and has no further questions.

## 2018-05-15 NOTE — DISCHARGE SUMMARY
Discharge Summary  Hospital Medicine    I personally performed the history, physical exam and medical decision making: and confirmed the accuracy of the information in the transcribed note.    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]  Yes    Weakness of right upper extremity [R29.898]  Yes    Leukocytosis [D72.829]  Yes    Malnutrition of moderate degree [E44.0]  Yes    Malignant neoplasm of central portion of right breast in female, estrogen receptor positive [C50.111, Z17.0]  Not Applicable    Cardiac rhythm disorder or disturbance or change [I49.9]  Yes    CHF (congestive heart failure) [I50.9]  Yes    Right heart failure [I50.810]  Yes    Acute hypoxemic respiratory failure [J96.01]  Yes    Tachycardia [R00.0]  Yes    Lactic acidemia [E87.2]  Yes    Severe malnutrition [E43]  Yes    Cancer of left femur [C40.22]  Yes    Cancer of right femur [C40.21]  Yes    Palliative care encounter [Z51.5]  Not Applicable    Pain due to malignant neoplasm metastatic to bone [G89.3, C79.51]  Yes    Anemia [D64.9]  Yes    Thrombocytopenia [D69.6]  Yes    Hypomagnesemia [E83.42]  Yes    Bony metastasis [C79.51]  Yes     Xray metastatic survey shows lytic lesions of skull, lytic spine and rib lesions with pathologic fractures. Diffuse lytic lesions throughout entire central axial red marrow skeleton. Lytic lesions of proximal long bones and pathological fractures of ribs and right superior and inferior pubic ramus.      Pleural effusion [J90]  Yes      Resolved Hospital Problems    Diagnosis Date Resolved POA    Shock [R57.9] 04/26/2018 Yes    Encounter for weaning from ventilator [Z99.11] 04/30/2018 Not Applicable    Encephalopathy, metabolic  [G93.41] 04/21/2018 Yes        HPI  Mrs. Grossman is a 57 yo female admitted to MICU on 4/20/18 for second opinion regarding recently diagnosed likely metastatic malignancy with unknown primary. Per patient's daughter about a month ago her mother started to gradually decline secondary to diffuse pain. She tehn became progressively more altered and eventually not responsive. She could no longer walk and was urinating on herself. Her family brought her to ED at [facility]. They brought some records from her stay there which show that work up revealed a corrected calcium of 13 and ZACH. She had CT brain, chest and abdomen which showed diffuse lytic lesions and some pathological fractures in her skull, ribs, and hips but no lesion that pointed to primary tumor. Her calcium eventually trended down. SPEP and UPEP were negative. Her daughter states she had 2 BM biopsies which were unrevealing. She had CT guided biopsy of a lesion in her hip on 4/20 as well as diagnostic thoracentesis which removed only 50 ccs from her pleural effusion. Her daughter states the physicians at [facility] were recommending inpatient hospice for her mother as it seemed she most likely had widely metastatic disease with unknown primary tumor however the patient's children wished to have a definitive diagnoses before making a decision.   She has metastatic lesions to the bilateral femurs with concern for impending fracture. On 4/25/18, she was taken to the OR by Ortho for nailing of the left femur. Approximately 1 hour into the case, she became hypotensive requirement increasing amounts of vasopressor support. JAMILA was done in the OR by anesthesia and reportedly showed signs of right heart strain. Decision was made to leave her intubated and obtain stat CTA to rule out PE. Patient was seen in the PACU intubated, sedated, and on 0.2 of epi.     Hospital Course   Mrs. Grossman is a 57 yo female who was brought to [facility] ED on 4/20/2018 for second opinion regarding  recently diagnosed likely metastatic malignancy with unknown primary. 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 .     · DNR status conversation still ongoing. Will be discharged to hospice at [facility] on 5/14.      Hypomagnesemia  · most likely 2/2 chronic disease  · 1.4-replaced (4/22)>1.5-replaced (5/1)>1.5-replaced (5/2)>1.5-replaced (5/4)>1.5-replaced (5/6)> 1.4-replaced (5/9)> 1.7-replaced (5/10)> 1.6-replaced (5/11)> 1.4-replaced (5/12)     Pathologic fracture of ribs pubic ramus  · - s/p IM nailing femur x2     Hypoxemic respiratory failure  · pulmonary edema from IVF  · indeterminate diastolic function on 4/27 echo, PAP 53mmHg   · 5/2: tapered to room air.      Hypotension -likely orthostasis   · 5/1: Systolic BP 70s this AM, up to 113 with IVF bolus. Decreased prn oxycodone to 5mg q4hrs. NS bolus, CBC/CMP ordered. Chest Xray: Small lung volumes and bibasal patchy subsegmental opacities persist, similar to recent studies. Blood cultures negative, lactate 2.2, troponin 0.015, UA 1+ occult blood and trace leukocytes with few yeast and occasional bacteria. WBC increased to 15. No new disease identified.     Non-Anion Gap Metabolic Acidosis  · 5/3: bicarb 16-replaced>22 (5/4)>22(5/6)     Lactic acidosis  · 2.8 (4/20), responded to IVF>2.0(4/21)  · 11.5 (4/25) after procedure>2.8 (4/26)>1.9 (4/27)>1.6 (4/28)>2.7 (4/29)>2.6 (4/30)>2.2 (5/1)     Urinary incontinence   · rocha in place, consider switch to purwick when pt can tolerate  · 5/3: removed rocha     Pleural effusion  · likely metastatic, s/p sampling at OSH  · associated compressive atelectasis bilaterally  · multiple pulmonary  micronodules     Anemia of Chronic Disease, stable  · likely chronic disease with some blood loss during procedures  · iron studies: Iron 50 (wnl), TIBC 160 (low), Saturation 31 (wnl), Transferrin 108 (low)  · monitor with CBC daily     Hypophosphatemia, resolved   · 4/22: 2.5 - replaced  · Most likely 2/2 chronic disease     Thrombocytopenia, resolved  · Most likely 2/2 chronic disease  · 122 (4/22)> 165 (4/23)>115 (4/30)>122 (5/1)>135 (5/2)>116(5/3)> 136 (5/4)> 153 (5/5)     Congestive Heart Failure  Right Heart Failure  · 4/27 Echo: Right Ventricular Enlargement with normal systolic function  · 5/1:      Malnutrition   · Prealbumin of 4 . Dietary consulted: Please order speech consult. Continue regular diet, texture per SLP. Continue Boost Plus TID.   · Speech Consult: Clinical evaluation of swallow complete. Recommend dental soft diet with thin liquids. Given pt with scattered dentition, she appears to have met max potential with SLP services at this time.    Recent Labs  Lab 05/12/18 0436 05/13/18  0657 05/14/18  0518   WBC 12.73* 11.95 10.88   HGB 8.9* 9.1* 8.9*   HCT 28.7* 30.0* 29.6*    217 195       Recent Labs  Lab 05/12/18 0436 05/13/18  0657 05/14/18  0518    140 140   K 3.4* 3.8 3.6    108 106   CO2 23 23 21*   BUN 9 10 13   CREATININE 0.6 0.7 0.7   GLU 76 73 60*   CALCIUM 8.7 8.7 9.1   MG 1.4* 1.8 1.8   PHOS 4.1 4.4 4.5       Recent Labs  Lab 05/12/18 0436 05/13/18  0657 05/14/18  0518   ALKPHOS 316* 341* 365*   ALT 8* 8* 7*   AST 53* 57* 57*   ALBUMIN 1.8* 1.9* 2.1*   PROT 5.6* 5.7* 6.1   BILITOT 0.5 0.5 0.6        Recent Labs  Lab 05/10/18  1946 05/12/18  1753 05/12/18  2004 05/12/18  2204 05/13/18  1732 05/13/18  1946   POCTGLUCOSE 104 51* 55* 84 62* 60*     No results for input(s): CPK, CPKMB, MB, TROPONINI in the last 72 hours.    No results for input(s): LACTATE in the last 72 hours.     Procedures: IM nailing of femur, Central line    Consultants: Orthopedics:  post-surgery    Discharge Medication List as of 5/14/2018  5:18 PM      START taking these medications    Details   lidocaine (LIDODERM) 5 % Place 1 patch onto the skin once daily. Remove & Discard patch within 12 hours or as directed by MD, Starting Mon 5/14/2018, Print      polyethylene glycol (GLYCOLAX) 17 gram PwPk Take 17 g by mouth once daily., Starting Tue 5/15/2018, Print         CONTINUE these medications which have CHANGED    Details   morphine (MS CONTIN) 15 MG 12 hr tablet Take 1 tablet (15 mg total) by mouth 2 (two) times daily., Starting Mon 5/14/2018, Print      morphine (MSIR) 15 MG tablet Take 1 tablet (15 mg total) by mouth every 4 (four) hours as needed for Pain., Starting Mon 5/14/2018, Print         CONTINUE these medications which have NOT CHANGED    Details   ondansetron (ZOFRAN) 4 MG tablet Take 8 mg by mouth 2 (two) times daily., Historical Med      quetiapine (SEROQUEL XR) 400 MG Tb24 Take by mouth once daily., Until Discontinued, Historical Med         STOP taking these medications       escitalopram oxalate (LEXAPRO) 5 MG Tab Comments:   Reason for Stopping:         LURASIDONE HCL (LATUDA ORAL) Comments:   Reason for Stopping:               Discharge Diet:Mechanical soft with Normal Fluid intake of 1500 - 2000 mL per day    Activity: activity as tolerated    Discharge Condition: Good    Disposition: Nursing Facility    Tests pending at the time of discharge: none      Time spent  on the discharge of the patient including review of hospital course with the patient. reviewing discharge medications and arranging follow-up care     Discharge examination Patient was seen and examined on the date of discharge and determined to be suitable for discharge.    Discharge plan and follow up:    Patient to be discharged to NH.     Future Appointments  Date Time Provider Department Center   5/16/2018 11:00 AM Aundrea Rojas PA-C [facility] ORTHO [address]       Provider      I personally scribed for Ravin FISHER  MD Neel on 05/14/2018 at 9:29 AM. Electronically signed by kera Lipscomb III on 05/14/2018 at 9:29 AM

## 2018-05-15 NOTE — PT/OT/SLP PROGRESS
Physical Therapy   Discharge    Kerline Grossman   MRN: [id number]     Hospital discharge with PT re-eval only completed on 5/1/2018; therefore, no goals met. After PT re-eval, pt. with multiple refusals leading up to hospital discharge. Pt. discharged to [facility].    Jarret Scott, PT  5/15/2018

## 2018-05-19 ENCOUNTER — HOSPITAL ENCOUNTER (EMERGENCY)
Facility: HOSPITAL | Age: 58
Discharge: NURSING, REHAB OR LONG-TERM CARE | End: 2018-05-20
Attending: EMERGENCY MEDICINE
Payer: MEDICAID

## 2018-05-19 DIAGNOSIS — R00.0 TACHYCARDIA: ICD-10-CM

## 2018-05-19 DIAGNOSIS — R53.83 FATIGUE: Primary | ICD-10-CM

## 2018-05-19 DIAGNOSIS — E86.0 DEHYDRATION: ICD-10-CM

## 2018-05-19 DIAGNOSIS — C50.919 METASTATIC BREAST CANCER: ICD-10-CM

## 2018-05-19 DIAGNOSIS — Z51.5 PALLIATIVE CARE ENCOUNTER: ICD-10-CM

## 2018-05-19 DIAGNOSIS — E83.52 HYPERCALCEMIA: ICD-10-CM

## 2018-05-19 LAB
ALBUMIN SERPL BCP-MCNC: 2.2 G/DL
ALP SERPL-CCNC: 365 U/L
ALT SERPL W/O P-5'-P-CCNC: 9 U/L
ANION GAP SERPL CALC-SCNC: 13 MMOL/L
ANISOCYTOSIS BLD QL SMEAR: SLIGHT
AST SERPL-CCNC: 53 U/L
BASOPHILS # BLD AUTO: ABNORMAL K/UL
BASOPHILS NFR BLD: 1 %
BILIRUB SERPL-MCNC: 0.6 MG/DL
BUN SERPL-MCNC: 16 MG/DL
CALCIUM SERPL-MCNC: 12.8 MG/DL
CHLORIDE SERPL-SCNC: 102 MMOL/L
CO2 SERPL-SCNC: 22 MMOL/L
CREAT SERPL-MCNC: 0.8 MG/DL
DIFFERENTIAL METHOD: ABNORMAL
EOSINOPHIL # BLD AUTO: ABNORMAL K/UL
EOSINOPHIL NFR BLD: 1 %
ERYTHROCYTE [DISTWIDTH] IN BLOOD BY AUTOMATED COUNT: 20.3 %
EST. GFR  (AFRICAN AMERICAN): >60 ML/MIN/1.73 M^2
EST. GFR  (NON AFRICAN AMERICAN): >60 ML/MIN/1.73 M^2
GLUCOSE SERPL-MCNC: 74 MG/DL
HCT VFR BLD AUTO: 29.3 %
HGB BLD-MCNC: 8.8 G/DL
HYPOCHROMIA BLD QL SMEAR: ABNORMAL
IMM GRANULOCYTES # BLD AUTO: ABNORMAL K/UL
IMM GRANULOCYTES NFR BLD AUTO: ABNORMAL %
LACTATE SERPL-SCNC: 1.8 MMOL/L
LIPASE SERPL-CCNC: 13 U/L
LYMPHOCYTES # BLD AUTO: ABNORMAL K/UL
LYMPHOCYTES NFR BLD: 25 %
MAGNESIUM SERPL-MCNC: 1.6 MG/DL
MCH RBC QN AUTO: 29.7 PG
MCHC RBC AUTO-ENTMCNC: 30 G/DL
MCV RBC AUTO: 99 FL
METAMYELOCYTES NFR BLD MANUAL: 5 %
MONOCYTES # BLD AUTO: ABNORMAL K/UL
MONOCYTES NFR BLD: 8 %
MYELOCYTES NFR BLD MANUAL: 3 %
NEUTROPHILS NFR BLD: 57 %
NRBC BLD-RTO: 5 /100 WBC
OVALOCYTES BLD QL SMEAR: ABNORMAL
PHOSPHATE SERPL-MCNC: 3.7 MG/DL
PLATELET # BLD AUTO: 174 K/UL
PLATELET BLD QL SMEAR: ABNORMAL
PMV BLD AUTO: 12.3 FL
POIKILOCYTOSIS BLD QL SMEAR: SLIGHT
POLYCHROMASIA BLD QL SMEAR: ABNORMAL
POTASSIUM SERPL-SCNC: 3.8 MMOL/L
PROT SERPL-MCNC: 6.5 G/DL
RBC # BLD AUTO: 2.96 M/UL
SCHISTOCYTES BLD QL SMEAR: ABNORMAL
SODIUM SERPL-SCNC: 137 MMOL/L
TROPONIN I SERPL DL<=0.01 NG/ML-MCNC: 0.01 NG/ML
TSH SERPL DL<=0.005 MIU/L-ACNC: 1.41 UIU/ML
WBC # BLD AUTO: 8.6 K/UL

## 2018-05-19 PROCEDURE — 83735 ASSAY OF MAGNESIUM: CPT

## 2018-05-19 PROCEDURE — 85027 COMPLETE CBC AUTOMATED: CPT

## 2018-05-19 PROCEDURE — 93005 ELECTROCARDIOGRAM TRACING: CPT

## 2018-05-19 PROCEDURE — 80053 COMPREHEN METABOLIC PANEL: CPT

## 2018-05-19 PROCEDURE — 99285 EMERGENCY DEPT VISIT HI MDM: CPT | Mod: ,,, | Performed by: EMERGENCY MEDICINE

## 2018-05-19 PROCEDURE — 96361 HYDRATE IV INFUSION ADD-ON: CPT

## 2018-05-19 PROCEDURE — 96360 HYDRATION IV INFUSION INIT: CPT

## 2018-05-19 PROCEDURE — 85007 BL SMEAR W/DIFF WBC COUNT: CPT

## 2018-05-19 PROCEDURE — 93010 ELECTROCARDIOGRAM REPORT: CPT | Mod: ,,, | Performed by: INTERNAL MEDICINE

## 2018-05-19 PROCEDURE — 83880 ASSAY OF NATRIURETIC PEPTIDE: CPT

## 2018-05-19 PROCEDURE — 83605 ASSAY OF LACTIC ACID: CPT

## 2018-05-19 PROCEDURE — 25000003 PHARM REV CODE 250: Performed by: EMERGENCY MEDICINE

## 2018-05-19 PROCEDURE — 99285 EMERGENCY DEPT VISIT HI MDM: CPT | Mod: 25

## 2018-05-19 PROCEDURE — 84484 ASSAY OF TROPONIN QUANT: CPT

## 2018-05-19 PROCEDURE — 84100 ASSAY OF PHOSPHORUS: CPT

## 2018-05-19 PROCEDURE — 81001 URINALYSIS AUTO W/SCOPE: CPT

## 2018-05-19 PROCEDURE — 84443 ASSAY THYROID STIM HORMONE: CPT

## 2018-05-19 PROCEDURE — 83690 ASSAY OF LIPASE: CPT

## 2018-05-19 PROCEDURE — 87040 BLOOD CULTURE FOR BACTERIA: CPT | Mod: 59

## 2018-05-19 RX ORDER — SODIUM CHLORIDE 9 MG/ML
1000 INJECTION, SOLUTION INTRAVENOUS
Status: COMPLETED | OUTPATIENT
Start: 2018-05-19 | End: 2018-05-19

## 2018-05-19 RX ORDER — SODIUM CHLORIDE 9 MG/ML
1000 INJECTION, SOLUTION INTRAVENOUS
Status: DISCONTINUED | OUTPATIENT
Start: 2018-05-19 | End: 2018-05-19

## 2018-05-19 RX ORDER — SODIUM CHLORIDE 9 MG/ML
1000 INJECTION, SOLUTION INTRAVENOUS
Status: COMPLETED | OUTPATIENT
Start: 2018-05-19 | End: 2018-05-20

## 2018-05-19 RX ADMIN — SODIUM CHLORIDE 1000 ML: 0.9 INJECTION, SOLUTION INTRAVENOUS at 10:05

## 2018-05-20 VITALS
SYSTOLIC BLOOD PRESSURE: 108 MMHG | OXYGEN SATURATION: 100 % | RESPIRATION RATE: 18 BRPM | WEIGHT: 154 LBS | HEIGHT: 65 IN | BODY MASS INDEX: 25.66 KG/M2 | TEMPERATURE: 98 F | HEART RATE: 114 BPM | DIASTOLIC BLOOD PRESSURE: 57 MMHG

## 2018-05-20 PROBLEM — E86.0 DEHYDRATION: Status: ACTIVE | Noted: 2018-05-20

## 2018-05-20 LAB
AMORPH CRY UR QL COMP ASSIST: NORMAL
BILIRUB UR QL STRIP: NEGATIVE
BNP SERPL-MCNC: 70 PG/ML
CLARITY UR REFRACT.AUTO: ABNORMAL
COLOR UR AUTO: YELLOW
GLUCOSE UR QL STRIP: NEGATIVE
HGB UR QL STRIP: NEGATIVE
KETONES UR QL STRIP: ABNORMAL
LEUKOCYTE ESTERASE UR QL STRIP: NEGATIVE
MICROSCOPIC COMMENT: NORMAL
NITRITE UR QL STRIP: NEGATIVE
PH UR STRIP: 6 [PH] (ref 5–8)
PROT UR QL STRIP: NEGATIVE
SP GR UR STRIP: 1.01 (ref 1–1.03)
SQUAMOUS #/AREA URNS AUTO: 0 /HPF
URN SPEC COLLECT METH UR: ABNORMAL
UROBILINOGEN UR STRIP-ACNC: NEGATIVE EU/DL

## 2018-05-20 PROCEDURE — 25000003 PHARM REV CODE 250: Performed by: EMERGENCY MEDICINE

## 2018-05-20 PROCEDURE — 99233 SBSQ HOSP IP/OBS HIGH 50: CPT | Mod: ,,, | Performed by: HOSPITALIST

## 2018-05-20 RX ADMIN — SODIUM CHLORIDE 1000 ML: 0.9 INJECTION, SOLUTION INTRAVENOUS at 12:05

## 2018-05-20 NOTE — ED NOTES
Patient identifiers for Kerline Grossman 58 y.o. female checked and correct.  Chief Complaint   Patient presents with    Fatigue     per EMS, family reports patient has been more lethargic over the past few days, tachycardic 127.      Past Medical History:   Diagnosis Date    Bony metastasis 4/21/2018    Xray metastatic survey shows lytic lesions of skull, lytic spine and rib lesions with pathologic fractures. Diffuse lytic lesions throughout entire central axial red marrow skeleton. Lytic lesions of proximal long bones and pathological fractures of ribs and right superior and inferior pubic ramus.    Cancer     Insomnia     Right heart failure 4/26/2018     Allergies reported: Review of patient's allergies indicates:  No Known Allergies    LOC: Patient is awake, alert, and aware of environment with an appropriate affect. Patient is oriented x 3 and speaking appropriately.  Patient reports lethargy, patient currently does not present that way.   APPEARANCE: Patient resting comfortably and in no acute distress. Patient is clean and well groomed, patient's clothing is properly fastened.  HEENT: No abnormalities noted  SKIN: The skin is warm and dry. Patient has normal skin turgor and moist mucus membranes. Skin is intact; no bruising or breakdown noted.  MUSKULOSKELETAL: Patient is moving all extremities well, no obvious deformities noted. Pulses intact.   RESPIRATORY: Airway is open and patent. Respirations are spontaneous and non-labored with normal effort and rate, BBS=clear  CARDIAC: Patient has a normal rate and rhythm. Normal sinus on cardiac monitor,No peripheral edema noted.   ABDOMEN: No distention noted. Bowel sounds active in all 4 quadrants. Soft and non-tender upon palpation.

## 2018-05-20 NOTE — CONSULTS
"[facility] Hospital Medicine  Consult Note    Patient Name: Kerline Grossman  MRN: [id number]  Admission Date: 5/19/2018  Hospital Length of Stay: 0 days  Attending Physician: Maria L att. providers found   Primary Care Provider: Primary Doctor [facility] Medicine Team: Networked reference to record PCT  Antonio Corea DO      Patient information was obtained from patient, Son ( Tomás Grossman ) and ER records.     Consults  Subjective:     Principal Problem:<principal problem not specified>    Chief Complaint:   Chief Complaint   Patient presents with    Fatigue     per EMS, family reports patient has been more lethargic over the past few days, tachycardic 127.         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 I have reviewed records from recent admission. Per oncology note "patient had terminal illness of metastatic breast cancer and lack of efficacious therapies given her poor functional status and rapid decline. She is amenable to hospice". Patient has terminal illness with poor prognosis with life expectancy matter of weeks. She was discharged home on 05/14 to [facility] ( unclear if discharged to hospice unit ) with comfort care per son at bedside.     Son states patient has been receiving morphine at the nursing home for her pain and she has been somnolent with occasional moaning, facial grimacing due to pain. She has not been eating anything. Family is concerned about her increased lethargy from pain medicine and brought patient to ED. Son states family is to have discussion with nursing home doctor on Monday. In ED patient is found to have tachycardic with HR ~120 and elevated calcium of 12.8. Patient received 2 liters of NS and CT head, CTA chest, CT abdomen were ordered in ED to investigate lethargy, tachycardia and abdominal pain. "     At this point hospital medicine was consulted to admit the patient.  During my interview son Tomás was present at bedside. Patient is poorly responsive, open eyes slowly with upon calling her name, but falls back to sleep quickly. She also moans and grimace her face with minimal movement. Does not appear to be in acute distress. She appears chronically ill with cachexia.     Past Medical History:   Diagnosis Date    Bony metastasis 2018    Xray metastatic survey shows lytic lesions of skull, lytic spine and rib lesions with pathologic fractures. Diffuse lytic lesions throughout entire central axial red marrow skeleton. Lytic lesions of proximal long bones and pathological fractures of ribs and right superior and inferior pubic ramus.    Cancer     Insomnia     Right heart failure 2018       Past Surgical History:   Procedure Laterality Date     SECTION      HYSTERECTOMY         Review of patient's allergies indicates:  No Known Allergies    No current facility-administered medications on file prior to encounter.      Current Outpatient Prescriptions on File Prior to Encounter   Medication Sig    lidocaine (LIDODERM) 5 % Place 1 patch onto the skin once daily. Remove & Discard patch within 12 hours or as directed by MD    morphine (MS CONTIN) 15 MG 12 hr tablet Take 1 tablet (15 mg total) by mouth 2 (two) times daily.    morphine (MSIR) 15 MG tablet Take 1 tablet (15 mg total) by mouth every 4 (four) hours as needed for Pain.    ondansetron (ZOFRAN) 4 MG tablet Take 8 mg by mouth 2 (two) times daily.    polyethylene glycol (GLYCOLAX) 17 gram PwPk Take 17 g by mouth once daily.    quetiapine (SEROQUEL XR) 400 MG Tb24 Take by mouth once daily.     Family History     None        Social History Main Topics    Smoking status: Former Smoker    Smokeless tobacco: Never Used    Alcohol use No    Drug use: No    Sexual activity: Not on file     Review of Systems   Reason unable to perform  ROS: patient is poorly responsive.   Constitutional: Negative for activity change, appetite change, chills, diaphoresis, fatigue and fever.   HENT: Negative for congestion, dental problem, drooling, ear discharge, ear pain, facial swelling, hearing loss, mouth sores, nosebleeds, postnasal drip, rhinorrhea, sinus pressure, sneezing, sore throat, tinnitus, trouble swallowing and voice change.    Eyes: Negative for photophobia, pain, discharge, redness, itching and visual disturbance.   Respiratory: Negative for apnea, cough, choking, chest tightness, shortness of breath, wheezing and stridor.    Cardiovascular: Negative for chest pain, palpitations and leg swelling.   Gastrointestinal: Negative for abdominal distention, abdominal pain, anal bleeding, blood in stool, constipation, diarrhea, nausea, rectal pain and vomiting.   Endocrine: Negative for cold intolerance, heat intolerance, polydipsia, polyphagia and polyuria.   Genitourinary: Negative for decreased urine volume, difficulty urinating, dyspareunia, dysuria, enuresis, flank pain, frequency, genital sores, hematuria, menstrual problem, pelvic pain, urgency, vaginal bleeding, vaginal discharge and vaginal pain.   Musculoskeletal: Negative for arthralgias, back pain, gait problem, joint swelling, myalgias, neck pain and neck stiffness.   Skin: Negative for color change, pallor, rash and wound.   Allergic/Immunologic: Negative for environmental allergies, food allergies and immunocompromised state.   Neurological: Negative for dizziness, tremors, seizures, syncope, facial asymmetry, speech difficulty, weakness, light-headedness, numbness and headaches.   Hematological: Negative for adenopathy. Does not bruise/bleed easily.   Psychiatric/Behavioral: Negative for agitation, behavioral problems, confusion, decreased concentration, dysphoric mood, hallucinations, self-injury, sleep disturbance and suicidal ideas. The patient is not nervous/anxious and is not  hyperactive.      Objective:     Vital Signs (Most Recent):  Temp: 98.3 °F (36.8 °C) (05/19/18 2058)  Pulse: (!) 119 (05/20/18 0001)  Resp: 19 (05/20/18 0001)  BP: 108/60 (05/20/18 0001)  SpO2: (!) 94 % (05/20/18 0001) Vital Signs (24h Range):  Temp:  [98.3 °F (36.8 °C)] 98.3 °F (36.8 °C)  Pulse:  [116-127] 119  Resp:  [18-20] 19  SpO2:  [94 %-100 %] 94 %  BP: ()/(56-70) 108/60     Weight: 69.9 kg (154 lb)  Body mass index is 25.63 kg/m².    Physical Exam   Constitutional: No distress.   Chronically ill appearing and cachectic. Sunken eyes.   Fatigued, listless.     HENT:   Head: Normocephalic and atraumatic.   Right Ear: External ear normal.   Left Ear: External ear normal.   Nose: Nose normal.   Mouth/Throat: No oropharyngeal exudate.   Eyes: Conjunctivae are normal. No scleral icterus.   Neck: Neck supple. No JVD present. No tracheal deviation present. No thyromegaly present.   Cardiovascular: Regular rhythm and normal heart sounds.  Exam reveals no gallop.    No murmur heard.  Tachycardic   Pulmonary/Chest: Breath sounds normal. No respiratory distress. She has no wheezes. She has no rales. She exhibits no tenderness.   Unlabored shallow respiration.    Abdominal: Soft. Bowel sounds are normal. She exhibits no mass. There is tenderness (mild diffuse TTP ). There is no rebound and no guarding.   Genitourinary: No vaginal discharge found.   Musculoskeletal: She exhibits tenderness (Diffuse TTP with facial grimacing to light touch ). She exhibits no edema.   Lymphadenopathy:     She has no cervical adenopathy.   Neurological: She has normal reflexes. She displays normal reflexes. No cranial nerve deficit. She exhibits normal muscle tone. Coordination normal.   Poorly responsive, non verbal. Very limited neuro exam due to patient's condition.    Skin: Skin is warm and dry. No rash noted. She is not diaphoretic. No erythema. No pallor.       Significant Labs:   Recent Results (from the past 24 hour(s))   CBC  auto differential    Collection Time: 05/19/18  9:33 PM   Result Value Ref Range    WBC 8.60 3.90 - 12.70 K/uL    RBC 2.96 (L) 4.00 - 5.40 M/uL    Hemoglobin 8.8 (L) 12.0 - 16.0 g/dL    Hematocrit 29.3 (L) 37.0 - 48.5 %    MCV 99 (H) 82 - 98 fL    MCH 29.7 27.0 - 31.0 pg    MCHC 30.0 (L) 32.0 - 36.0 g/dL    RDW 20.3 (H) 11.5 - 14.5 %    Platelets 174 150 - 350 K/uL    MPV 12.3 9.2 - 12.9 fL    Immature Granulocytes CANCELED 0.0 - 0.5 %    Immature Grans (Abs) CANCELED 0.00 - 0.04 K/uL    Lymph # CANCELED 1.0 - 4.8 K/uL    Mono # CANCELED 0.3 - 1.0 K/uL    Eos # CANCELED 0.0 - 0.5 K/uL    Baso # CANCELED 0.00 - 0.20 K/uL    nRBC 5 (A) 0 /100 WBC    Gran% 57.0 38.0 - 73.0 %    Lymph% 25.0 18.0 - 48.0 %    Mono% 8.0 4.0 - 15.0 %    Eosinophil% 1.0 0.0 - 8.0 %    Basophil% 1.0 0.0 - 1.9 %    Metamyelocytes 5.0 %    Myelocytes 3.0 %    Platelet Estimate Appears normal     Aniso Slight     Poik Slight     Poly Occasional     Hypo Occasional     Ovalocytes Occasional     Fragmented Cells Occasional     Differential Method Manual    Comprehensive metabolic panel    Collection Time: 05/19/18  9:33 PM   Result Value Ref Range    Sodium 137 136 - 145 mmol/L    Potassium 3.8 3.5 - 5.1 mmol/L    Chloride 102 95 - 110 mmol/L    CO2 22 (L) 23 - 29 mmol/L    Glucose 74 70 - 110 mg/dL    BUN, Bld 16 6 - 20 mg/dL    Creatinine 0.8 0.5 - 1.4 mg/dL    Calcium 12.8 (HH) 8.7 - 10.5 mg/dL    Total Protein 6.5 6.0 - 8.4 g/dL    Albumin 2.2 (L) 3.5 - 5.2 g/dL    Total Bilirubin 0.6 0.1 - 1.0 mg/dL    Alkaline Phosphatase 365 (H) 55 - 135 U/L    AST 53 (H) 10 - 40 U/L    ALT 9 (L) 10 - 44 U/L    Anion Gap 13 8 - 16 mmol/L    eGFR if African American >60.0 >60 mL/min/1.73 m^2    eGFR if non African American >60.0 >60 mL/min/1.73 m^2   Brain natriuretic peptide    Collection Time: 05/19/18  9:33 PM   Result Value Ref Range    BNP 70 0 - 99 pg/mL   Troponin I    Collection Time: 05/19/18  9:33 PM   Result Value Ref Range    Troponin I 0.010  0.000 - 0.026 ng/mL   Magnesium    Collection Time: 05/19/18  9:33 PM   Result Value Ref Range    Magnesium 1.6 1.6 - 2.6 mg/dL   Phosphorus    Collection Time: 05/19/18  9:33 PM   Result Value Ref Range    Phosphorus 3.7 2.7 - 4.5 mg/dL   TSH    Collection Time: 05/19/18  9:33 PM   Result Value Ref Range    TSH 1.414 0.400 - 4.000 uIU/mL   Lipase    Collection Time: 05/19/18  9:33 PM   Result Value Ref Range    Lipase 13 4 - 60 U/L   Lactic acid, plasma    Collection Time: 05/19/18 10:28 PM   Result Value Ref Range    Lactate (Lactic Acid) 1.8 0.5 - 2.2 mmol/L         Significant Imaging: I have reviewed and interpreted all pertinent imaging results/findings within the past 24 hours.     CT Head :    No CT evidence of acute intracranial abnormality.    Diffuse metastatic disease involving the calvarium as described on recent MRI.    Stable complete opacification of the bilateral mastoid air cells.    Assessment/Plan:     There are no hospital problems to display for this patient.    VTE Risk Mitigation     None        Ms. Churchill unfortunately has terminal diagnosis of breast cancer with letpomeningeal carcinomatosis, brain mets and diffuse bony mets. She was evaluated by palliative care team during recent admission and recommended hospice. She was discharged to [facility] ( unclear if under hospice unit ). Son Tomás Grossman who is present at bedside understands his mother's terminal illness and not going to live long. I had discussion with son outside the room in presence of ED physician Dr. JIN about goal of care. There is no utility for further diagnostic imaging at this stage and aggressive measures will be futile. Son wishes comfort care and pain controlled for his mother. I offered emotional support to him. Patient would benefit under hospice care for her terminal illness and end of life care.  Recommend discharge back to [facility] with following :     1 ) Roxanol 10 mg SL q  2hrs prn for pain or                               terminal dyspnea.   2) Titrate roxanol upward as needed for pain adequately controlled   3) liquid ativan 1 mg SL q 4 hrs prn anxiety/restlessness   4) zofran 8 mg ODT prn nausea   5) apply compazine gel to wrist prn intractable nausea   4) dulocolax suppository prn constipation     I have discussed the above with ED team involved with patient's care      Thank you for your consult. I will sign off. Please contact us if you have any additional questions.    Antonio Corea,    Staff physician   Department of Hospital Medicine   [facility]

## 2018-05-20 NOTE — ED NOTES
Patient is resting comfortably. 4L nasal cannula intact. Awaiting [facility] service.

## 2018-05-20 NOTE — ED TRIAGE NOTES
Pt is a bone cancer patient, was hospitalized up until the last week, recently sent over to [facility].  Last two days patient has been lethargic, altered mental status, loss of appetite and not drinking. Staff was also concerned about her having a UTI.

## 2018-05-20 NOTE — ED PROVIDER NOTES
Encounter Date: 2018    SCRIBE #1 NOTE: I, Marco Killian, am scribing for, and in the presence of, Dr. Roach.       History     Chief Complaint   Patient presents with    Fatigue     per EMS, family reports patient has been more lethargic over the past few days, tachycardic 127.      Patient is a 58-year-old female with a past medical history of breast cancer, meningeal carcinomatosis, with metastasis who was recently admitted to  see from - and discharge 5 days ago to [facility] who presents the ED with her family for increased lethargy, abnormal speech, and weakness. Family notes that patient has been declining over the past 5 days.  They note that patient has had right upper extremity weakness while admitted, but feels like it is worse today.  Patient use to complete sentences, but family states that she is not speaking as usual.  They note generalized weakness. They have noted diaphoresis, but has not been told about any fevers at the nursing home.          Review of patient's allergies indicates:  No Known Allergies  Past Medical History:   Diagnosis Date    Bony metastasis 2018    Xray metastatic survey shows lytic lesions of skull, lytic spine and rib lesions with pathologic fractures. Diffuse lytic lesions throughout entire central axial red marrow skeleton. Lytic lesions of proximal long bones and pathological fractures of ribs and right superior and inferior pubic ramus.    Cancer     Insomnia     Right heart failure 2018     Past Surgical History:   Procedure Laterality Date     SECTION      HYSTERECTOMY       History reviewed. No pertinent family history.  Social History   Substance Use Topics    Smoking status: Former Smoker    Smokeless tobacco: Never Used    Alcohol use No     Review of Systems   Unable to perform ROS: Acuity of condition       Physical Exam     Initial Vitals [18]   BP Pulse Resp Temp SpO2   108/63 (!) 127 20 98.3 °F  (36.8 °C) 96 %      MAP       78         Physical Exam    Vitals reviewed.  Constitutional: She appears lethargic. She appears ill. Nasal cannula in place.   HENT:   Head: Normocephalic and atraumatic.   Nose: Nose normal.   Mouth/Throat: Mucous membranes are dry.   Eyes: Right conjunctiva is not injected. Left conjunctiva is not injected.   Neck: Normal range of motion.   Cardiovascular: Normal rate, regular rhythm and normal heart sounds. Exam reveals no friction rub.    No murmur heard.  Trace pitting edema.    Pulmonary/Chest: Breath sounds normal. No respiratory distress. She has no wheezes. She has no rales.   Abdominal: Soft. Bowel sounds are normal. She exhibits no distension. There is tenderness in the right lower quadrant. There is no rebound.   Musculoskeletal: Normal range of motion.   Generalized tenderness to trunk, abdomen, and bilateral lower extremities.   Neurological: She appears lethargic.   Flaccid RUE. Responsive to verbal stimuli, however not speaking clearly at times.    Skin: Skin is warm and dry. No erythema. No pallor.   Psychiatric: She has a normal mood and affect. Her behavior is normal. Judgment and thought content normal.         ED Course   Procedures  Labs Reviewed   CBC W/ AUTO DIFFERENTIAL - Abnormal; Notable for the following:        Result Value    RBC 2.96 (*)     Hemoglobin 8.8 (*)     Hematocrit 29.3 (*)     MCV 99 (*)     MCHC 30.0 (*)     RDW 20.3 (*)     nRBC 5 (*)     All other components within normal limits    Narrative:     ADD ON LIPASE PER DIANE MOTT/ORDER# [id number] @ 22:52 5/19/18   COMPREHENSIVE METABOLIC PANEL - Abnormal; Notable for the following:     CO2 22 (*)     Calcium 12.8 (*)     Albumin 2.2 (*)     Alkaline Phosphatase 365 (*)     AST 53 (*)     ALT 9 (*)     All other components within normal limits    Narrative:     ADD ON LIPASE PER DIANE TIERA/ORDER# [id number] @ 22:52 5/19/18   URINALYSIS, REFLEX TO URINE CULTURE - Abnormal; Notable for the  following:     Appearance, UA Cloudy (*)     Ketones, UA Trace (*)     All other components within normal limits    Narrative:     REC;D 1 CUP  Preferred Collection Type->Urine, Clean Catch   CULTURE, BLOOD   CULTURE, BLOOD   B-TYPE NATRIURETIC PEPTIDE    Narrative:     ADD ON LIPASE PER DIANE TIERA/ORDER# [id number] @ 22:52 5/19/18   TROPONIN I    Narrative:     ADD ON LIPASE PER DIANE TIERA/ORDER# [id number] @ 22:52 5/19/18   MAGNESIUM    Narrative:     ADD ON LIPASE PER DIANE TIERA/ORDER# [id number] @ 22:52 5/19/18   PHOSPHORUS    Narrative:     ADD ON LIPASE PER DIANE TIERA/ORDER# [id number] @ 22:52 5/19/18   TSH    Narrative:     ADD ON LIPASE PER DIANE TIERA/ORDER# [id number] @ 22:52 5/19/18   LACTIC ACID, PLASMA   LIPASE   LIPASE    Narrative:     ADD ON LIPASE PER DIANE TIERA/ORDER# [id number] @ 22:52 5/19/18   URINALYSIS MICROSCOPIC    Narrative:     REC;D 1 CUP  Preferred Collection Type->Urine, Clean Catch     EKG Readings: (Independently Interpreted)   Initial Reading: No STEMI. Rhythm: Sinus Tachycardia. Heart Rate: 122. Ectopy: No Ectopy. Conduction: Normal. Axis: Normal. Clinical Impression: Sinus Tachycardia          Medical Decision Making:   History:   Old Medical Records: I decided to obtain old medical records.  Clinical Tests:   Lab Tests: Ordered and Reviewed  Radiological Study: Ordered and Reviewed  Medical Tests: Ordered and Reviewed    Imaging Results          X-Ray Chest 1 View (Final result)  Result time 05/20/18 00:07:45    Final result by Konstantin Lainez MD (05/20/18 00:07:45)                 Impression:      Abnormal exam, without detrimental change when compared with 05/01/2018.      Electronically signed by: Konstantin Lainez MD  Date:    05/20/2018  Time:    00:07             Narrative:    EXAMINATION:  XR CHEST 1 VIEW    CLINICAL HISTORY:  Other fatigue    TECHNIQUE:  One view of the chest.    COMPARISON:  05/01/2018.    FINDINGS:  Cardiac wires overlie the chest.  Cardiac  silhouette is mildly enlarged but stable in size.  Relatively low lung volumes and bibasilar subsegmental atelectasis.  Suspect small bilateral pleural effusions.  No detrimental change in lung aeration when compared with the prior exam.  Multiple lytic bone lesions are again noted, with multiple old pathologic rib fractures, similar in appearance to the prior exam.  No pneumothorax.                               CT Head Without Contrast (Final result)  Result time 05/19/18 22:53:04    Final result by Konstantin Lainez MD (05/19/18 22:53:04)                 Impression:      No CT evidence of acute intracranial abnormality.    Diffuse metastatic disease involving the calvarium as described on recent MRI.    Stable complete opacification of the bilateral mastoid air cells.      Electronically signed by: Konstantin Lainez MD  Date:    05/19/2018  Time:    22:53             Narrative:    EXAMINATION:  CT HEAD WITHOUT CONTRAST    CLINICAL HISTORY:  fatigue, worsening RUE weakness;    TECHNIQUE:  Low dose axial images were obtained through the head.  Coronal and sagittal reformations were also performed. Contrast was not administered.    COMPARISON:  Brain MRI, 05/03/2018.    FINDINGS:  No evidence of acute territorial infarct, hemorrhage, mass effect, or midline shift.    Ventricles are normal in size and configuration.    Multiple aggressive osseous lesions are identified throughout the calvarium.  Dural abnormalities are much better characterized on recent MRI.    There is complete opacification of the bilateral mastoid air cells, similar to the prior MRI.  The visualized paranasal sinuses are essentially clear.                                   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 Patient recently admitted for 4 weeks, and patient is not  receiving any treatment for her cancer due to the severity at this time. She was transferred to [facility] (family denies hospice care) for comfort care.     On exam, VSS. Patient is cachetic and lethargic appearing. Very dry mucus membrane. Responsive to verbal stimuli, however patient does not speak clearly at times. Generalized tenderness throughout. Abd with tenderness in the right quadrant. Trace peripheral edema.     DDx includes but is not limited to progression of cancer, CVA, electrolyte abn, kidney injury, UTI, anemia, heart failure, ACS, thyroid dysfunction, pneumonia. Will initial work up, give fluids and pain medicine, and continue to monitor.     UA with no signs of infection.   CBC with no leukocytosis.  Microcytic anemia at baseline with H/H at 8.8/29.3.  CMP with hypercalcemia at 12.8.  Alk-phos elevated to 365. Patient with bony mets.  Lipase wnl at 13.   BNP wnl at 70.  Negative troponin.  Lactic acid wnl at 1.8.  TSH wnl at 1.4.  CXR without detrimental change when compared to 5/01/18.  CT head without acute abnormalities.     2:48 AM  Hospital medicine physician, Dr. Corea, came down and had a discussion with patient's son, Tomás, about further care for his mother with terminal illness.  Due to patient's conditions, discussions about comfort care rather than any other intervention were held and patient's son understands the status of her condition. He is comfortable with patient going back to [facility] for continued comfort care. Any further diagnostic evaluation will have low utility at this point given patient's condition. Patient was given 2 bolus of NS for dehydration while here in ED. Labs reveal hypercalcemia which is consistent with bony mets. She was given pain medication for comfort. Please refer to Dr. Corea note for further detail. All of family's questions were answered. Patient will be discharged by to [facility]. I have review patient's chart and  discuss this case with my supervising MD.          Attending Attestation:     Physician Attestation Statement for NP/PA:   I have conducted a face to face encounter with this patient in addition to the NP/PA, due to Medical Complexity                     Clinical Impression:   The primary encounter diagnosis was Fatigue. Diagnoses of Tachycardia, Hypercalcemia, Palliative care encounter, Dehydration, and Metastatic breast cancer were also pertinent to this visit.    Disposition:   Disposition: Discharged                        Lorna Rubalcava PA-C  05/20/18 [id number]

## 2018-05-22 LAB
BACTERIA BLD CULT: NORMAL

## 2018-05-25 LAB — BACTERIA BLD CULT: NORMAL

## 2018-06-01 ENCOUNTER — TELEPHONE (OUTPATIENT)
Dept: ORTHOPEDICS | Facility: CLINIC | Age: 58
End: 2018-06-01

## 2018-06-01 NOTE — TELEPHONE ENCOUNTER
Notified pt son Hector. Regarding miss appointment on 06/01/18. Pt son Hector states that his mother is on hospice.

## 2018-06-09 ENCOUNTER — HOSPITAL ENCOUNTER (EMERGENCY)
Facility: HOSPITAL | Age: 58
Discharge: HOME | End: 2018-06-09
Attending: EMERGENCY MEDICINE
Payer: MEDICAID

## 2018-06-09 VITALS
DIASTOLIC BLOOD PRESSURE: 50 MMHG | HEIGHT: 67 IN | SYSTOLIC BLOOD PRESSURE: 78 MMHG | RESPIRATION RATE: 18 BRPM | WEIGHT: 154 LBS | BODY MASS INDEX: 24.17 KG/M2 | HEART RATE: 111 BPM | OXYGEN SATURATION: 100 % | TEMPERATURE: 100 F

## 2018-06-09 DIAGNOSIS — N19 RENAL FAILURE, UNSPECIFIED CHRONICITY: ICD-10-CM

## 2018-06-09 DIAGNOSIS — E87.5 HYPERKALEMIA: ICD-10-CM

## 2018-06-09 DIAGNOSIS — C50.919 METASTATIC BREAST CANCER: Primary | ICD-10-CM

## 2018-06-09 DIAGNOSIS — E83.52 HYPERCALCEMIA: ICD-10-CM

## 2018-06-09 DIAGNOSIS — E87.0 HYPERNATREMIA: ICD-10-CM

## 2018-06-09 DIAGNOSIS — I95.9 HYPOTENSION: ICD-10-CM

## 2018-06-09 DIAGNOSIS — Z66 DNR (DO NOT RESUSCITATE): ICD-10-CM

## 2018-06-09 DIAGNOSIS — E87.20 LACTIC ACIDOSIS: ICD-10-CM

## 2018-06-09 LAB
ALBUMIN SERPL BCP-MCNC: 2.3 G/DL
ALP SERPL-CCNC: 427 U/L
ALT SERPL W/O P-5'-P-CCNC: 11 U/L
ANION GAP SERPL CALC-SCNC: 29 MMOL/L
ANISOCYTOSIS BLD QL SMEAR: ABNORMAL
AST SERPL-CCNC: 68 U/L
BASO STIPL BLD QL SMEAR: ABNORMAL
BASOPHILS NFR BLD: 0 %
BILIRUB SERPL-MCNC: 0.8 MG/DL
BNP SERPL-MCNC: 63 PG/ML
BUN SERPL-MCNC: 115 MG/DL
BURR CELLS BLD QL SMEAR: ABNORMAL
CALCIUM SERPL-MCNC: 18.3 MG/DL
CHLORIDE SERPL-SCNC: 115 MMOL/L
CO2 SERPL-SCNC: 13 MMOL/L
CREAT SERPL-MCNC: 8.1 MG/DL
DACRYOCYTES BLD QL SMEAR: ABNORMAL
DIFFERENTIAL METHOD: ABNORMAL
EOSINOPHIL NFR BLD: 0 %
ERYTHROCYTE [DISTWIDTH] IN BLOOD BY AUTOMATED COUNT: 21.1 %
EST. GFR  (AFRICAN AMERICAN): 5.8 ML/MIN/1.73 M^2
EST. GFR  (NON AFRICAN AMERICAN): 5 ML/MIN/1.73 M^2
GIANT PLATELETS BLD QL SMEAR: PRESENT
GLUCOSE SERPL-MCNC: 133 MG/DL
HCT VFR BLD AUTO: 35.4 %
HGB BLD-MCNC: 9.9 G/DL
HYPOCHROMIA BLD QL SMEAR: ABNORMAL
IMM GRANULOCYTES # BLD AUTO: ABNORMAL K/UL
IMM GRANULOCYTES NFR BLD AUTO: ABNORMAL %
INR PPP: 1.4
LACTATE SERPL-SCNC: 10.8 MMOL/L
LIPASE SERPL-CCNC: 183 U/L
LYMPHOCYTES NFR BLD: 39 %
MCH RBC QN AUTO: 29.6 PG
MCHC RBC AUTO-ENTMCNC: 28 G/DL
MCV RBC AUTO: 106 FL
METAMYELOCYTES NFR BLD MANUAL: 3 %
MONOCYTES NFR BLD: 9 %
MYELOCYTES NFR BLD MANUAL: 3 %
NEUTROPHILS NFR BLD: 30 %
NEUTS BAND NFR BLD MANUAL: 16 %
NRBC BLD-RTO: 22 /100 WBC
OVALOCYTES BLD QL SMEAR: ABNORMAL
PAPPENHEIMER BOD BLD QL SMEAR: PRESENT
PLATELET # BLD AUTO: 135 K/UL
PLATELET BLD QL SMEAR: ABNORMAL
PMV BLD AUTO: 12.1 FL
POIKILOCYTOSIS BLD QL SMEAR: SLIGHT
POLYCHROMASIA BLD QL SMEAR: ABNORMAL
POTASSIUM SERPL-SCNC: 5.3 MMOL/L
PROT SERPL-MCNC: 7.1 G/DL
PROTHROMBIN TIME: 13.9 SEC
RBC # BLD AUTO: 3.34 M/UL
SCHISTOCYTES BLD QL SMEAR: ABNORMAL
SODIUM SERPL-SCNC: 157 MMOL/L
TROPONIN I SERPL DL<=0.01 NG/ML-MCNC: 0.07 NG/ML
WBC # BLD AUTO: 11.09 K/UL

## 2018-06-09 PROCEDURE — 83605 ASSAY OF LACTIC ACID: CPT

## 2018-06-09 PROCEDURE — 25000003 PHARM REV CODE 250: Performed by: EMERGENCY MEDICINE

## 2018-06-09 PROCEDURE — 84484 ASSAY OF TROPONIN QUANT: CPT

## 2018-06-09 PROCEDURE — 83690 ASSAY OF LIPASE: CPT

## 2018-06-09 PROCEDURE — 96365 THER/PROPH/DIAG IV INF INIT: CPT

## 2018-06-09 PROCEDURE — 87040 BLOOD CULTURE FOR BACTERIA: CPT | Mod: 59

## 2018-06-09 PROCEDURE — 85007 BL SMEAR W/DIFF WBC COUNT: CPT

## 2018-06-09 PROCEDURE — 25000003 PHARM REV CODE 250: Performed by: PHYSICIAN ASSISTANT

## 2018-06-09 PROCEDURE — 83880 ASSAY OF NATRIURETIC PEPTIDE: CPT

## 2018-06-09 PROCEDURE — 96367 TX/PROPH/DG ADDL SEQ IV INF: CPT

## 2018-06-09 PROCEDURE — 93010 ELECTROCARDIOGRAM REPORT: CPT | Mod: ,,, | Performed by: INTERNAL MEDICINE

## 2018-06-09 PROCEDURE — 96366 THER/PROPH/DIAG IV INF ADDON: CPT

## 2018-06-09 PROCEDURE — 94761 N-INVAS EAR/PLS OXIMETRY MLT: CPT

## 2018-06-09 PROCEDURE — 99285 EMERGENCY DEPT VISIT HI MDM: CPT | Mod: 25

## 2018-06-09 PROCEDURE — 85610 PROTHROMBIN TIME: CPT

## 2018-06-09 PROCEDURE — 96361 HYDRATE IV INFUSION ADD-ON: CPT

## 2018-06-09 PROCEDURE — 80053 COMPREHEN METABOLIC PANEL: CPT

## 2018-06-09 PROCEDURE — 85027 COMPLETE CBC AUTOMATED: CPT

## 2018-06-09 PROCEDURE — 63600175 PHARM REV CODE 636 W HCPCS: Performed by: EMERGENCY MEDICINE

## 2018-06-09 PROCEDURE — 99291 CRITICAL CARE FIRST HOUR: CPT | Mod: ,,, | Performed by: PHYSICIAN ASSISTANT

## 2018-06-09 RX ORDER — SODIUM CHLORIDE 9 MG/ML
1000 INJECTION, SOLUTION INTRAVENOUS
Status: COMPLETED | OUTPATIENT
Start: 2018-06-09 | End: 2018-06-09

## 2018-06-09 RX ORDER — VANCOMYCIN 2 GRAM/500 ML IN 0.9 % SODIUM CHLORIDE INTRAVENOUS
2000
Status: COMPLETED | OUTPATIENT
Start: 2018-06-09 | End: 2018-06-09

## 2018-06-09 RX ADMIN — SODIUM CHLORIDE 1000 ML: 0.9 INJECTION, SOLUTION INTRAVENOUS at 01:06

## 2018-06-09 RX ADMIN — PIPERACILLIN AND TAZOBACTAM 4.5 G: 4; .5 INJECTION, POWDER, LYOPHILIZED, FOR SOLUTION INTRAVENOUS; PARENTERAL at 02:06

## 2018-06-09 RX ADMIN — SODIUM CHLORIDE 1000 ML: 0.9 INJECTION, SOLUTION INTRAVENOUS at 02:06

## 2018-06-09 RX ADMIN — SODIUM CHLORIDE 1000 ML: 0.9 INJECTION, SOLUTION INTRAVENOUS at 03:06

## 2018-06-09 RX ADMIN — VANCOMYCIN HYDROCHLORIDE 2000 MG: 10 INJECTION, POWDER, LYOPHILIZED, FOR SOLUTION INTRAVENOUS at 02:06

## 2018-06-09 NOTE — ED PROVIDER NOTES
Encounter Date: 2018       History     Chief Complaint   Patient presents with    Unresponsive     Pt presents to ED via EMS from [facility]. Pt is unresponsive, hypotensive, and tachycardic. EMS bagging pt upon arrival. Pt placed on nonrebreather. DNR in paperwork.     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 Patient presents to the ED via EMS for unresponsiveness. According to EMS, patient found unresponsive, hypotensive, and tachycardic at [facility]. According to records, patient is DNR. Pt's glucose was 59 per EMS personnel. Pt was given no aggressive medical management from facility.           Review of patient's allergies indicates:  No Known Allergies  Past Medical History:   Diagnosis Date    Bony metastasis 2018    Xray metastatic survey shows lytic lesions of skull, lytic spine and rib lesions with pathologic fractures. Diffuse lytic lesions throughout entire central axial red marrow skeleton. Lytic lesions of proximal long bones and pathological fractures of ribs and right superior and inferior pubic ramus.    Cancer     Insomnia     Right heart failure 2018     Past Surgical History:   Procedure Laterality Date     SECTION      HYSTERECTOMY       No family history on file.  Social History   Substance Use Topics    Smoking status: Former Smoker    Smokeless tobacco: Never Used    Alcohol use No     Review of Systems   Unable to perform ROS: Acuity of condition       Physical Exam     Initial Vitals [18 0057]   BP Pulse Resp Temp SpO2   (!) 56/36 (!) 130 (!) 30 99.7 °F (37.6 °C) 99 %      MAP       42.67         Physical Exam    Vitals reviewed.  Constitutional: She appears well-developed. She is Obese . She appears ill. She appears distressed.   HENT:   Head: Normocephalic and  atraumatic.   Nose: Nose normal.   Eyes: Conjunctivae are normal.   Pupils fixed, non reactive.    Neck: Normal range of motion.   Cardiovascular: Regular rhythm. Tachycardia present.  Exam reveals no friction rub.    No murmur heard.  Pulmonary/Chest: Breath sounds normal. Tachypnea noted. She is in respiratory distress. She has no wheezes. She has no rales.   Abdominal: Soft. Bowel sounds are normal. She exhibits no distension. There is no tenderness. There is no rebound.   Neurological: She is alert. GCS eye subscore is 1. GCS verbal subscore is 1. GCS motor subscore is 1.   Patient unresponsive with fixed gaze with left preference. Patient unresponsive to painful stimulus.    Skin: Skin is warm and dry. No erythema.         ED Course   Critical Care  Date/Time: 6/9/2018 4:42 AM  Performed by: ALE MOREAU  Authorized by: VIRGEN MONTES   Direct patient critical care time: 20 minutes  Additional history critical care time: 10 minutes  Ordering / reviewing critical care time: 5 minutes  Documentation critical care time: 10 minutes  Consulting other physicians critical care time: 10 minutes  Consult with family critical care time: 10 minutes  Total critical care time (exclusive of procedural time) : 65 minutes  Critical care was necessary to treat or prevent imminent or life-threatening deterioration of the following conditions: cardiac failure, circulatory failure, dehydration, endocrine crisis, hepatic failure, metabolic crisis, renal failure, respiratory failure, sepsis and shock.  Critical care was time spent personally by me on the following activities: development of treatment plan with patient or surrogate, discussions with consultants, evaluation of patient's response to treatment, examination of patient, obtaining history from patient or surrogate, ordering and performing treatments and interventions, ordering and review of laboratory studies, ordering and review of radiographic studies, pulse  oximetry, re-evaluation of patient's condition and review of old charts.        Labs Reviewed   CBC W/ AUTO DIFFERENTIAL - Abnormal; Notable for the following:        Result Value    RBC 3.34 (*)     Hemoglobin 9.9 (*)     Hematocrit 35.4 (*)      (*)     MCHC 28.0 (*)     RDW 21.1 (*)     Platelets 135 (*)     nRBC 22 (*)     Gran% 30.0 (*)     Platelet Estimate Decreased (*)     All other components within normal limits   COMPREHENSIVE METABOLIC PANEL - Abnormal; Notable for the following:     Sodium 157 (*)     Potassium 5.3 (*)     Chloride 115 (*)     CO2 13 (*)     Glucose 133 (*)     BUN, Bld 115 (*)     Creatinine 8.1 (*)     Calcium 18.3 (*)     Albumin 2.3 (*)     Alkaline Phosphatase 427 (*)     AST 68 (*)     Anion Gap 29 (*)     eGFR if  5.8 (*)     eGFR if non  5.0 (*)     All other components within normal limits   LACTIC ACID, PLASMA - Abnormal; Notable for the following:     Lactate (Lactic Acid) 10.8 (*)     All other components within normal limits   PROTIME-INR - Abnormal; Notable for the following:     Prothrombin Time 13.9 (*)     INR 1.4 (*)     All other components within normal limits   LIPASE - Abnormal; Notable for the following:     Lipase 183 (*)     All other components within normal limits   TROPONIN I - Abnormal; Notable for the following:     Troponin I 0.065 (*)     All other components within normal limits   CULTURE, BLOOD   CULTURE, BLOOD   B-TYPE NATRIURETIC PEPTIDE          X-Ray Chest AP Portable   Final Result      No significant change.         Electronically signed by: Torsten Villarreal MD   Date:    06/09/2018   Time:    02:48           Medical Decision Making:   ED Management:  I spoke with the family in detail regarding their expectations regarding the mother's worsening illness.  We also discussed different treatment options, and the family is still in the unit and in agreement about keeping the patient DNR DNI.  Have specifically  agree to antibiotics and IV fluids, but at this point in time do not want medicines provided that would positively augment the patient's blood pressure, and do not want any invasive procedures performed including a central line.  The family verbalized understanding of the patient's significantly poor prognosis.    Dr. 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 Patient presents to the ED via EMS for unresponsive.    On arrival, EMS providing supplemental oxygenation with bag valve mask, patient breathing on her own. Patient placed on non rebreather mask with improvement in oxygen saturation. Patient oxygenation saturation of 100%. Patient hypotensive on arrival, pressures minimally improving with IVF. Goals of care discussed with family at bedside. Patient power of attorneys are Hector Grossman (son) and Mikala Grossman (daughter). Mikala Grossman present at bedside. Family at bedside, family reports patient is DNR DNI. Family agreed to patient receiving IVF and antibiotics only. Will order septic workup. Will continue to monitor.     Differential diagnoses include, but are not limited to: metastatic disease, organ dysfunction, sepsis, ZACH, or electrolyte imbalance.     No leukocytosis. H/H 9.9/35.4 improved from 8.8/29.3 from three weeks ago. Thrombocytopenia. Hypernatremia 157. Hyperkalemia 5.3. Elevated glucose 133. Elevated  and Cr 8.1. Hypercalcemia 18.3. Elevated ALK PHOS 427. Elevated AST 68. Elevated lactate 10.8. IV zosyn and vanc were ordered and initiated while in ED. Blood cultures pending. Elevated lipase 183. Elevated troponin 0.065. CXR found to have  Bilateral increased and airspace opacities are seen most prominent within the bilateral lower lung zones.  Findings may reflect combination of atelectasis  with pulmonary edema and/or multifocal pneumonia.     Laboratory findings consistent with organ failure. Patient with known terminal illness of metastatic breast cancer. According to oncologist, Dr. Hernandez patient overall prognosis is extremely poor now averaging about 2.5 months life expectancy. Case discussed with on call hospitalist, whom recommends discharge to hospice facility for comfort measures as intervention of fluids and antibiotics would likely not extend patient expectancy.     Patient reassessed, patient responsive to painful stimulus. Condition minimally improved with 3 L IVF, zosyn, and vanc IV. BP 90/50 . SaO2 100% on non re breather mask. Case discussed with Lesvia Ogden LPN at [facility] whom called EMS. Reports patient becoming hypotensive, tachypnic, and unresponsive. Lesvia Ogden LPN confirms patient is currently enrolled in hospice care at facility. Patient followed by Dr. Mejia Pretty and Viridiana Harris NP awaiting call back from either provider to discuss further management.     Informed by nursing home, Lesvia Ogden LPN after discussion with Dr. Mejia Pretty they will accept patient to return to facility for comfort care.     In light of the patient's advanced and terminal illness, we reviewed what the patient's preferences for care would be at the very end of life. Family verbalizes understanding of patient's disease progression and are comfortable with patient returning to [facility].     I have discussed and reviewed with my supervising physician.              Clinical Impression:   The primary encounter diagnosis was Metastatic breast cancer. Diagnoses of Hypotension, DNR (do not resuscitate), Hyperkalemia, Hypernatremia, Lactic acidosis, Renal failure, unspecified chronicity, and Hypercalcemia were also pertinent to this visit.                             Chantel Ramos PA-C  06/09/18 0653

## 2018-06-09 NOTE — ED NOTES
Dr. Gallagher aware of BP changes; pt will be monitored until further notice as they are on palliative care at nursing home

## 2018-06-09 NOTE — ED NOTES
"Pt to ED as transfer from [facility]; pt stated to be unresponsive w/ sats 88% on nonrebreather; tachy in 120-130s per EMS; Per EMS, nursing staff was "too new" to give any information on pt's baseline; DNR; lidocaine patch to right, lower abdominal quadrant upon arrival; unresponsive upon arrival   "

## 2018-06-14 LAB
BACTERIA BLD CULT: NORMAL
BACTERIA BLD CULT: NORMAL

## 2018-06-19 NOTE — PROGRESS NOTES
Pt   Refused   To take  Scheduled    Evening   meds   And pt   Refused  To  Have   bloodsugar   Taken..   Bill 38190 For Specimen Handling/Conveyance To Laboratory?: no

## 2019-08-30 NOTE — PLAN OF CARE
ICU Transfer of Care note    Date of Admit: 4/20/2018  Date of Transfer / Stepdown: 4/21/2018    Brief History of Present Illness:      Mrs. Grossman is a 59 yo female who was brought to [facility] ED 4/21 for second opinion regarding recently diagnosed likely metastatic malignancy with unknown primary. Per patient's daughter about a month ago her mother started to gradually decline secondary to diffuse pain. She then became progressively more altered and eventually not responsive. She could no longer walk and was urinating on herself. Her family brought her to ED at [facility]. They brought some records from her stay there which show that work up revealed a corrected calcium of 13 and ZACH. She had CT brain, chest and abdomen which showed diffuse lytic lesions and some pathological fractures in her skull, ribs, and hips but no lesion that pointed to primary tumor. Her calcium eventually trended down. SPEP and UPEP were negative. Her daughter states she had 2 BM biopsies which were unrevealing. She had CT guided biopsy of a lesion in her hip on 4/20 as well as diagnostic thoracentesis which removed only 50 ccs from her pleural effusion. Her daughter states the physicians at OSH were recommending inpatient hospice for her mother as it seemed she most likely had widely metastatic disease with unknown primary tumor however the patient's children wished to have a definitive diagnoses before making a decision. They initially attempted to transfer to [facility] but were denied and then decided to have her discharged from [facility] and brought her here themselves. They state she had become more interactive and alert int he last few days and was showing signs of improvement. Of note the daughter states patient was on a morphine gtt at outside hospital and took and MS contin before being discharge. She states she was having some hypotension at OSH which she states the physicians attributed to the morphine gtt.      Critical care  Yes medicine was consulted due to hypotension on arrival to ED. Patient had SBP ~ 80 which responded well to 1 L of NS and MAPs were then > 65 so decision initially was to admit to hospital medicine. However, blood pressure then dropped again to systolic of 80s and patient was accepted to critical care medicine.     Hospital Course By Problem with Pertinent Findings:     Metastatic cancer of unknown primary   Patient with diffuse lytic lesions and pathological fractures throughout her skull, chest and hips which is seen on CT scans from OSH. Workup has not shown primary malignancy.   Will need to follow up of pathology reports from OSH from biopsies of BM and hip as well as cytology from pleural fluid.   Patient's family still want to pursue workup and possible treatment despite the extent of patient;s disease. They also want her to remain full code until they find out more information.    - will consult heme onc for assistance with workup  - will also consult palliative care for assistance with goals of care discussion    Pleural effusion     Patient with right sided pleural effusion seen on CXR. Possibly malignant.   Will check echo to rule out transudative process secondary to CHF.    She had diagnostic thoracentesis at OSH with 50 ccs removed. Follow up with outside lab.      Hypotension     Patient found to have MAPS between 60 and 70 upon arrival to ED. She received 1 L of NS with improvements to MAPs > 65.   Likely secondary to combination of dehydration and morphine still wearing off. She may also have an infection although her WBC is normal and she is afebrile  BP now improved s/p 2 L of NS   Continue broad spectrum abx (Vanc Zosyn) until cultures return.        Consultants and Procedures:     Consultants:  Heme onc and palliative care    Procedures:    Biopsy at OSH 4/20    Transfer Information:     Diet:  regular    Physical Activity:  PT/OT pending    To Do / Pending Studies / Follow ups:  - f/u biopsy  results from 4/20  - f/u echo  - PT/OT recs  - will need to readdress goals of care for discharge planning    Rachael Weeks

## 2020-04-07 NOTE — CONSULTS
[facility]  Palliative Medicine  Consult Note    Patient Name: Kerline Grossman  MRN: [id number]  Admission Date: 4/20/2018  Hospital Length of Stay: 2 days  Code Status: Full Code   Attending Provider: David Cruz MD  Consulting Provider: Landon Larsen MD  Primary Care Physician: Primary Doctor No  Principal Problem:Hypotension    Patient information was obtained from patient, relative(s), past medical records and ER records.      Consults  Assessment/Plan:     Palliative care encounter    Pathology report pending with preliminary concerning for metastatic carcinoma.  Cytology from 4/20 pleural fluid negative.  Prognosis and treatment options pending official diagnosis.  Pain constantly 8/10.  Reports discomfort of posterior portion of upper back when she has to reposition herself.  Known subacute rib fractures on CXR and CT scan.  4 doses of 5-235mg hydrocodone in the past 24 hours.  Received x1 dose 10mg prn oxycodone and x1 dose morphine 3mg.  High risk for oversedation.  -Recommend oxycodone 5mg q4h prn pain for now as she does not appear to be in significant distress.  -Recommend standing ibuprofen 400mg TID and lidocaine patch 5% to rib areas that bother her the most  --Alternatively if pain medications only causing sedation instead of pain relief AND if pain is most bothersome to her in her chest, 2nd line therapy may consider pain management for intercostal block  -continue with senna-colace            Thank you for your consult. I will follow-up with patient. Please contact us if you have any additional questions.    Subjective:     HPI:   57 yo woman with medical history significant for chronic mood disorder who presented to OSH with ZACH, hypercalcemia, and lytic lesions concerning for MM.  Workup for MM negative thus far.  Her family wanted to transfer her to [facility], but it was denied, so the family had her discharged and transported her to [facility] themselves.  She arrived hypotensive, which  Pt states she is having sharp pain in your neck can not touch it or move it.  Please advise may have been 2/2 opiates (received morphine at OSH).  Hypotension resolved with IVF and only required brief stay under ICU care before being transferred to the floor.    Palliative care consulted for goals of care.  See clinical review 18 with Dr. Rosario regarding preliminary pathology findings.     She states she is in pain, 8/10, primarily in her back.  When she takes pain medications she says she falls asleep only for 15-20 minutes before waking up with pain 8/10.  Family at bedside confirms she does this throughout the day even without pain medications.  She reports pain level remains at an 8 despite taking meds.    Last filled prescriptions [facility] on [address] 3/2017  paliperidone ER 6mg tabs (2 tabs qAM)  oxcarbazepime 300mg 2 tabs bid  Levothyroxine 25mcg qday  Benztropine 1mg bid  Trazodone 100mg 2 tabs qhs    Hospital Course:  No notes on file    Interval History: see hpi    Past Medical History:   Diagnosis Date    Cancer     Insomnia        Past Surgical History:   Procedure Laterality Date     SECTION      HYSTERECTOMY         Review of patient's allergies indicates:  No Known Allergies    Medications:  Continuous Infusions:   lactated ringers 50 mL/hr at 18 1016     Scheduled Meds:   enoxaparin  40 mg Subcutaneous Daily    piperacillin-tazobactam (ZOSYN) IVPB  4.5 g Intravenous Q8H    senna-docusate 8.6-50 mg  2 tablet Oral Daily     PRN Meds:acetaminophen, dextrose 50%, omnipaque, ondansetron, oxyCODONE, oxyCODONE, sodium chloride 0.9%    Family History     None        Social History Main Topics    Smoking status: Former Smoker    Smokeless tobacco: Never Used    Alcohol use No    Drug use: No    Sexual activity: Not on file       Review of Systems   Constitutional: Positive for activity change, appetite change, chills and fatigue. Negative for fever.   HENT: Negative for nosebleeds.    Eyes: Negative for visual disturbance.   Respiratory:  Positive for cough. Negative for shortness of breath.    Cardiovascular: Positive for chest pain. Negative for leg swelling.   Gastrointestinal: Negative for abdominal distention, abdominal pain, blood in stool, constipation, diarrhea, nausea and vomiting.   Genitourinary: Negative for dysuria.   Musculoskeletal: Positive for gait problem.   Skin: Negative for color change and rash.   Neurological: Positive for weakness.   Hematological: Does not bruise/bleed easily.   Psychiatric/Behavioral: Positive for confusion.     Objective:     Vital Signs (Most Recent):  Temp: 98.3 °F (36.8 °C) (04/23/18 1130)  Pulse: 109 (04/23/18 1130)  Resp: 20 (04/23/18 1130)  BP: 130/83 (04/23/18 1130)  SpO2: 96 % (04/23/18 1130) Vital Signs (24h Range):  Temp:  [97.5 °F (36.4 °C)-99 °F (37.2 °C)] 98.3 °F (36.8 °C)  Pulse:  [100-122] 109  Resp:  [18-20] 20  SpO2:  [90 %-96 %] 96 %  BP: ()/(50-83) 130/83     Weight: 77.1 kg (170 lb)  Body mass index is 27.44 kg/m².    Review of Symptoms  Symptom Assessment (ESAS 0-10 scale)   ESAS 0 1 2 3 4 5 6 7 8 9 10   Pain              Dyspnea              Anxiety              Nausea              Depression               Anorexia              Fatigue              Insomnia              Restlessness               Agitation              CAM / Delirium __ --  ___+   Constipation     __ --  ___+   Diarrhea           __ --  ___+  Bowel Management Plan (BMP): Yes    Comments: senna-colace    Pain Assessment: 8/10    OME in 24 hours: 3x 5-325mg hydrocodone-acetaminophen    Performance Status: 50    ECOG Performance Status thGthrthathdtheth:th th5th Physical Exam   Constitutional: She appears well-developed.   HENT:   Head: Normocephalic.   Eyes: Pupils are equal, round, and reactive to light. No scleral icterus.   Neck: No tracheal deviation present.   Cardiovascular: Normal rate and regular rhythm.  Exam reveals no gallop and no friction rub.    No murmur heard.  Pulmonary/Chest: Effort normal. No respiratory  distress. She has no wheezes. She has no rales. Right breast exhibits no inverted nipple.   Diminished breath sounds most at RLL but low air movement throughout    LLL  to palpation   Abdominal: Soft. Bowel sounds are normal. She exhibits no distension and no mass. There is no tenderness. There is no guarding.   Musculoskeletal: She exhibits no edema.   Lymphadenopathy:     She has no cervical adenopathy.   Neurological: She is alert.   Skin: Skin is warm.   Psychiatric: She has a normal mood and affect.       Significant Labs:   CBC:   Recent Labs  Lab 04/22/18  0446 04/23/18 0357   WBC 8.08 8.25   HGB 9.9* 9.9*   HCT 30.5* 31.3*   * 165     CMP:   Recent Labs  Lab 04/22/18 0446 04/23/18 0357    137   K 4.9 4.2   * 107   CO2 19* 20*    96   BUN 19 17   CREATININE 1.0 1.1   CALCIUM 7.9* 7.7*   PROT 5.3* 5.4*   ALBUMIN 1.4* 1.4*   BILITOT 0.5 0.5   ALKPHOS 419* 418*   AST 56* 49*   ALT 17 17   ANIONGAP 9 10   EGFRNONAA >60.0 56.3*     CBC:     Recent Labs  Lab 04/23/18 0357   WBC 8.25   HGB 9.9*   HCT 31.3*   MCV 89        BMP:    Recent Labs  Lab 04/23/18 0357   GLU 96      K 4.2      CO2 20*   BUN 17   CREATININE 1.1   CALCIUM 7.7*   MG 1.8     LFT:  Lab Results   Component Value Date    AST 49 (H) 04/23/2018    ALKPHOS 418 (H) 04/23/2018    BILITOT 0.5 04/23/2018     Albumin:   Albumin   Date Value Ref Range Status   04/23/2018 1.4 (L) 3.5 - 5.2 g/dL Final     Protein:   Total Protein   Date Value Ref Range Status   04/23/2018 5.4 (L) 6.0 - 8.4 g/dL Final     Lactic acid:   Lab Results   Component Value Date    LACTATE 2.0 04/21/2018    LACTATE 2.8 (H) 04/20/2018       Significant Imaging: I have reviewed all pertinent imaging results/findings within the past 24 hours.    Advanced Directives::  Living Will: No  LaPOST: No  Do Not Resuscitate Status: Yes; Full code  Medical Power of : No    Decision-Making Capacity: Patient answered questions,  Family answered questions    Living Arrangements: Lives with fiance    Psychosocial/Cultural:  Patient's most important priorities:  N/A    Patient's biggest concerns/fears:  N/A  Previous death/end of life care history:  N/A    Patient's goals/hopes:  N/A    Spiritual:     F- Dora and Belief: N/A    I - Importance: N/A  .  C - Community: N/A    A - Address in Care: N/A      > 50% of 60 min visit spent in chart review, face to face discussion of goals of care,  symptom assessment, coordination of care and emotional support.    Landon Larsen MD  Palliative Medicine  [facility]

## 2020-09-23 NOTE — HPI
Mrs. Grossman is a 59 yo female admitted to MICU on 4/20/18 for second opinion regarding recently diagnosed likely metastatic malignancy with unknown primary. Per patient's daughter about a month ago her mother started to gradually decline secondary to diffuse pain. She tehn became progressively more altered and eventually not responsive. She could no longer walk and was urinating on herself. Her family brought her to ED at [facility]. They brought some records from her stay there which show that work up revealed a corrected calcium of 13 and ZACH. She had CT brain, chest and abdomen which showed diffuse lytic lesions and some pathological fractures in her skull, ribs, and hips but no lesion that pointed to primary tumor. Her calcium eventually trended down. SPEP and UPEP were negative. Her daughter states she had 2 BM biopsies which were unrevealing. She had CT guided biopsy of a lesion in her hip on 4/20 as well as diagnostic thoracentesis which removed only 50 ccs from her pleural effusion. Her daughter states the physicians at [facility] were recommending inpatient hospice for her mother as it seemed she most likely had widely metastatic disease with unknown primary tumor however the patient's children wished to have a definitive diagnoses before making a decision.   She has metastatic lesions to the bilateral femurs with concern for impending fracture. On 4/25/18, she was taken to the OR by Ortho for nailing of the left femur. Approximately 1 hour into the case, she became hypotensive requirement increasing amounts of vasopressor support. JAMILA was done in the OR by anesthesia and reportedly showed signs of right heart strain. Decision was made to leave her intubated and obtain stat CTA to rule out PE. Patient was seen in the PACU intubated, sedated, and on 0.2 of epi.    APER

## 2020-12-14 NOTE — ASSESSMENT & PLAN NOTE
58 y.o. female with diffuse bony mets of unknown origin    Pain control  PT/OT: NWB BLE for now  DVT PPx: lovenox, FCDs at all times when not ambulating  Abx: vanc, zosyn  Labs: Hb 9.9  Drain: none  Ponce: none    Dispo: Complete metastatic survey (ordered), MRI c/tspine w wo (ordered).  Will discuss results with spine and tumor staff.   90

## 2021-11-29 NOTE — PLAN OF CARE
Referral sent to   [facility], [facility],  [facility], and [facility] for [facility] placement with hospice.     Warm/Dry

## 2022-12-27 NOTE — PLAN OF CARE
Ok to send doxycycline 100mg by mouth twice daily x 7 days. If no improvement after this- or worsening despite treatment we need to evaluate in person.        Problem: Patient Care Overview  Goal: Plan of Care Review  Outcome: Ongoing (interventions implemented as appropriate)  Patient resting in bed comfortably. IV intact with no signs of irritation. Fall precautions maintained with no falls noted. Call light in reach bed locked and in lowest position. Non-skid socks on while out of bed. Patient instructed to call for assistance. Skin integrity maintained as patient is assisted with frequent positioning.Progressing towards goals. Will continue to monitor and follow plan of care.

## 2023-03-02 NOTE — PROGRESS NOTES
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] 05/04/2018 Yes    Weakness of right upper extremity [R29.898] 05/03/2018 Yes    Leukocytosis [D72.829] 05/03/2018 Yes    Malnutrition of moderate degree [E44.0] 05/01/2018 Yes    Malignant neoplasm of central portion of right breast in female, estrogen receptor positive [C50.111, Z17.0]  Not Applicable    Cardiac rhythm disorder or disturbance or change [I49.9]  Yes    CHF (congestive heart failure) [I50.9]  Yes    Right heart failure [I50.810] 04/26/2018 Yes    Acute hypoxemic respiratory failure [J96.01] 04/26/2018 Yes    Tachycardia [R00.0]  Yes    Lactic acidemia [E87.2]  Yes    Severe malnutrition [E43] 04/25/2018 Yes    Cancer of left femur [C40.22] 04/24/2018 Yes    Cancer of right femur [C40.21] 04/24/2018 Yes    Palliative care encounter [Z51.5] 04/23/2018 Not Applicable    Pain due to malignant neoplasm metastatic to bone [G89.3, C79.51] 04/23/2018 Yes    Anemia [D64.9] 04/22/2018 Yes    Thrombocytopenia [D69.6] 04/22/2018 Yes    Hypomagnesemia [E83.42] 04/22/2018 Yes    Bony metastasis [C79.51] 04/21/2018 Yes    Pleural effusion [J90] 04/21/2018 Yes      Problems Resolved During this Admission:    Diagnosis Date Noted Date Resolved POA    Shock [R57.9] 05/01/2018 04/26/2018 Yes    Encounter for weaning from ventilator [Z99.11]  04/30/2018 Not Applicable    Encephalopathy, metabolic [G93.41] 04/21/2018 04/21/2018 Yes   5/9/18  -  "pain appears adequately controlled with Oxycodone 5 mg IR and local therapy, continue the same  - placement underway, likely NH with hospice as per prior discussion  - pt. Not interested in any further discussion regarding DNR at this point. Remains full code, will need addressed prior to discharge to hospice.   - she has been refusing medications  - will re-visit with daughter leonarda to answer questions and provide support  - Requests for visitation of family members (one in long term) noted and managed by Dr. Shaikh  - will follow    5/8/18  - met with daughter Leonarda Grossman at the bedside.   - goals of care discussed.   - Pt. Asked why she was not getting more physical therapy and why she was told her surgery would help her walk but now she is just laying in bed.   - PT/Ot note reviewed. appears pt. Not participating in therapy.   - wishes further defined and it seems that despite the pt. Anger about her diagnosis and lack of strength she realizes that there is no good therapeutic option for the extent of her disease. Daughter confirms her understanding of this and educates the pt. She would be "too weak for treatment". Pt. Understands.   - Ms. Grossman expressed to me that it would be important for her not to suffer and to be comfortable.   - we have discussed options of home vs. Facility based hospice and the pt. And family are interested.   - it seems that home hospice is not a good option due to lack of family support to provide 24 hrs coverage.   - daughter agrees to tour facilities today to determine of NH with hospice would be a viable option ( pt. Only has 3 days of inpatient hospice benefits via Medicaid).   - will revisit and fruther define wishes.   - POA discussed as prior form lacking witnessed signature. Pt. Confirms 1st POA Son Dion and 2nd POA daughter Leonarda.   - We have not discussed code status as pt. Was not willing to talk about it currently. Will re-visit.      I will follow along with you. Please call " [phone] with questions.     Subjective:     HPI: Kerline Grossman is a 59 yo woman with medical history significant for chronic mood disorder who presented to OSH with ZACH, hypercalcemia, and lytic lesions concerning for MM.  Workup for MM negative thus far.  Her family wanted to transfer her to [facility], but it was denied, so the family had her discharged and transported her to [facility] themselves.  She arrived hypotensive, which may have been 2/2 opiates (received morphine at OSH).  Hypotension resolved with IVF and only required brief stay under ICU care before being transferred to the floor.     Interval History: awake in NAD this AM. Young daughter at the bedside. Has used heating pads for her headache with good relieve over night.   Currently without new complaints.   Accepted at [facility] per SW.       Medications:  Continuous Infusions:  Scheduled Meds:   acetaminophen  1,000 mg Oral Q6H    enoxaparin  40 mg Subcutaneous Daily    lidocaine  1 patch Transdermal Q24H    magnesium oxide  800 mg Oral Daily    polyethylene glycol  17 g Oral Daily    pregabalin  150 mg Oral QHS    ramelteon  8 mg Oral QHS    senna-docusate 8.6-50 mg  2 tablet Oral Daily    sodium bicarbonate  1 tablet Oral BID     PRN Meds:sodium chloride, bisacodyl, dextrose 50%, dextrose 50%, glucagon (human recombinant), insulin aspart U-100, morphine, omnipaque, ondansetron, oxyCODONE, promethazine (PHENERGAN) IVPB, promethazine, ramelteon, sodium chloride 0.9%, sodium chloride 0.9%, sodium chloride 0.9%, sodium chloride 0.9%, tiZANidine, white petrolatum        Review of Systems   HENT:        Headache.    Respiratory: Negative for cough and shortness of breath.    Cardiovascular: Negative for chest pain.   Gastrointestinal: Negative for abdominal pain and vomiting.   Genitourinary: Negative for dysuria.   Musculoskeletal: Negative for myalgias.     Objective:     Vital Signs (Most Recent):  Temp: 96.3 °F (35.7 °C) (05/09/18 0454)  Pulse:  104 (05/09/18 0933)  Resp: 18 (05/09/18 0454)  BP: 139/75 (05/09/18 0454)  SpO2: 100 % (05/09/18 0454) Vital Signs (24h Range):  Temp:  [96.3 °F (35.7 °C)-97.5 °F (36.4 °C)] 96.3 °F (35.7 °C)  Pulse:  [104-114] 104  Resp:  [18-20] 18  SpO2:  [92 %-100 %] 100 %  BP: (128-139)/(74-84) 139/75     Physical Exam  No exam.     Laboratory:   CBC:   Recent Labs  Lab 05/09/18  0405   WBC 13.44*   RBC 3.12*   HGB 9.1*   HCT 29.4*      MCV 94   MCH 29.2   MCHC 31.0*       CMP:   Recent Labs  Lab 05/09/18  0404   GLU 62*   CALCIUM 8.1*   ALBUMIN 1.5*   PROT 5.3*      K 3.6   CO2 21*      BUN 12   CREATININE 0.7   ALKPHOS 300*   ALT 6*   AST 58*   BILITOT 0.6       No results found for: POCTGLUCOSE      Significant Imaging:     > 50% of 35 min visit spent in chart review, face to face discussion of goals of care,  symptom assessment, coordination of care and emotional support.    Steve Bunch MD  Palliative Medicine  [facility]   Yes

## 2023-08-22 NOTE — MEDICAL/APP STUDENT
Hospital Medicine  Progress note    I personally performed the history, physical exam and medical decision making: and confirmed the accuracy of the information in the transcribed note.    Team: [facility] B Dr. Shaikh  Admit Date: 4/20/2018  VIGNESH 5/9/2018  Code status: Full Code    Principal Problem:  Metastatic breast cancer     Interval hx: Patient awaiting placement. Denied [facility], referral sent to [facility]      ROS   Not able to stand, RUE weakness  Pain Scale:  8/10 neck pain  Respiratory: no cough or shortness of breath  Cardiovascular: no chest pain or palpitations  Gastrointestinal: no nausea or vomiting, no abdominal pain or change in bowel habits  Behavioral/Psych: no depression or anxiety      PEx  Temp:  [97.5 °F (36.4 °C)-97.9 °F (36.6 °C)]   Pulse:  [106-125]   Resp:  [16-18]   BP: (112-128)/(66-74)   SpO2:  [86 %-97 %]     Intake/Output Summary (Last 24 hours) at 05/08/18 1522  Last data filed at 05/08/18 1235   Gross per 24 hour   Intake              120 ml   Output                0 ml   Net              120 ml       General Appearance: no acute distress   Heart: regular rate and rhythm  Respiratory: Normal respiratory effort, no crackles   Abdomen: Soft, non-tender; bowel sounds active  Skin: intact. IV sites ok  Neurologic:  No focal numbness. Right arm weakness: 2/5.    Mental status: Alert, oriented x 2, affect appropriate   Extremities: Cannot stand without assistance 2/2 UE weakness    Recent Labs  Lab 05/06/18  0401 05/07/18  0428 05/08/18  0508   WBC 13.22* 11.70 13.03*   HGB 8.7* 8.2* 8.4*   HCT 27.3* 26.0* 27.7*    182 193       Recent Labs  Lab 05/06/18  0401 05/07/18  0428 05/08/18  0507    138 140   K 3.5 3.6 3.7    107 108   CO2 22* 23 22*   BUN 15 14 13   CREATININE 0.7 0.8 0.7   GLU 68* 71 68*   CALCIUM 8.0* 8.0* 7.9*   MG 1.5* 1.7 1.5*   PHOS 3.3 3.7 3.6       Recent Labs  Lab 05/06/18  0401 05/07/18  0428 05/08/18  0507   ALKPHOS 279* 265* 280*   ALT 6* 7*  6*   AST 54* 52* 58*   ALBUMIN 1.3* 1.4* 1.5*   PROT 5.0* 5.0* 5.1*   BILITOT 0.6 0.5 0.5        Recent Labs  Lab 05/03/18  1736 05/04/18  0145 05/04/18  1312 05/04/18  2356 05/05/18  0633 05/05/18  1753   POCTGLUCOSE 96 99 125* 102 92 88       Scheduled Meds:   acetaminophen  1,000 mg Oral Q6H    enoxaparin  40 mg Subcutaneous Daily    lidocaine  1 patch Transdermal Q24H    magnesium oxide  800 mg Oral Daily    polyethylene glycol  17 g Oral Daily    pregabalin  150 mg Oral QHS    ramelteon  8 mg Oral QHS    senna-docusate 8.6-50 mg  2 tablet Oral Daily    sodium bicarbonate  1 tablet Oral BID     Continuous Infusions:    As Needed:  sodium chloride, bisacodyl, dextrose 50%, dextrose 50%, glucagon (human recombinant), insulin aspart U-100, morphine, omnipaque, ondansetron, oxyCODONE, promethazine (PHENERGAN) IVPB, promethazine, ramelteon, sodium chloride 0.9%, sodium chloride 0.9%, sodium chloride 0.9%, sodium chloride 0.9%, tiZANidine, white petrolatum    Active Hospital Problems    Diagnosis  POA    *Metastatic breast cancer [C50.919]  Yes    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, 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]  Yes    Weakness of right upper extremity [R29.898]  Yes    Leukocytosis [D72.829]  Yes    Malnutrition of moderate degree [E44.0]  Yes    Malignant neoplasm of central portion of right breast in female, estrogen receptor positive [C50.111, Z17.0]  Not Applicable    Cardiac rhythm disorder or disturbance or change [I49.9]  Yes    CHF (congestive heart failure) [I50.9]  Yes    Right heart failure [I50.810]  Yes    Acute hypoxemic respiratory failure [J96.01]  Yes    Tachycardia [R00.0]  Yes    Lactic acidemia [E87.2]  Yes    Severe malnutrition [E43]  Yes    Cancer of left femur [C40.22]  Yes    Cancer of right femur [C40.21]  Yes    Palliative care encounter [Z51.5]  Not Applicable    Pain due to malignant neoplasm metastatic to bone [G89.3, C79.51]  Yes    Anemia [D64.9]  Yes    Thrombocytopenia [D69.6]   Yes    Hypomagnesemia [E83.42]  Yes    Bony metastasis [C79.51]  Yes     Xray metastatic survey shows lytic lesions of skull, lytic spine and rib lesions with pathologic fractures. Diffuse lytic lesions throughout entire central axial red marrow skeleton. Lytic lesions of proximal long bones and pathological fractures of ribs and right superior and inferior pubic ramus.      Pleural effusion [J90]  Yes      Resolved Hospital Problems    Diagnosis Date Resolved POA    Shock [R57.9] 04/26/2018 Yes    Encounter for weaning from ventilator [Z99.11] 04/30/2018 Not Applicable    Encephalopathy, metabolic [G93.41] 04/21/2018 Yes       Overview  Mrs. Grossman is a 59 yo female who was [facility] [place] [facility] [place] [facility]/2018 for second opinion regarding recently diagnosed likely metastatic malignancy with unknown primary.      Assessment and Plan for Problems addressed today:     Metastatic breast cancer  Pain due to malignant neoplasm metastatic to bone    Meningeal carcinomatosis     Palliative Care Encounter  - oncology following, pt did not want to talk this afternoon after arriving to the floor from surgery, discussed with Dr. Fink (onc) he will see her tomorrow  - she may be a candidate for some oral therapy  - radiation oncology consult placed  - pain control (BP is sensitive to narcotics)  - palliative care saw her while she was in ICU, would continue discussions. Family wants to talk about options with Oncology and Radiation Oncology before finalizing any palliative care plans.   - 5/1 Oncology recs: outside path suggestive of breast primary with moderately differentiated adenocarcinoma ER/SD+, HER 2-. Will f/u on path to confirm. Consider MRI brain w/ w/o contrast once medically stable.   - s/p radiation oncology evaluation: radiation treatment of each of the mailed femurs is recommended. 10 fractions of 3Gy each as outpatient   - 5/2: Right upper extremity weakness-MRI Brain w w/o contrast ordered.         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 Diffuse osseous metastatic disease involving the calvarium, clivus, and visualized upper cervical spine, noting posterior bulging at the body of C2. Transitioned to oral pain medications: oxycodone 5mg PRN q4hrs.      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 Recommend focusing on quality of life and comfort measures. MRI brachial plexus tomorrow for RUE weakness.   - 5/5: Palliative care has seen patient to discuss comfort care plan with her and her family; they do not currently want to start this conversation and wish to revisit this later.  -5/6: MRA of brachial plexus deferred yesterday; May still complete today. Discussed advanced directives, namely DNR. Heme/onc discussed palliative vs. Hospice (inpatient vs. Home) with the patient, leaning towards hospice. Continue pain management with lidocaine patch and Acetominophen.     5/7: MRA brachial plexus patient not interested. Discussed DNR status but patient states she has other things on her mind. Family discussion leaning towards hospice.      Pathologic fracture of ribs pubic ramus  - s/p IM nailing femur x2     Hypoxemic respiratory failure  - pulmonary edema from IVF  - indeterminate diastolic function on 4/27 echo, PAP 53mmHg  - currently on 4L NC since arriving to floor from PACU  - 5/1: increased to 5L NC.   - 5/2: tapered to room air.      Hypotension -likely orthostasis   -5/1: Systolic BP 70s this AM, up to 113 with IVF bolus. Decreased prn oxycodone to 5mg q4hrs. NS bolus, Blood cultures, Lactic Acid, UA, CBC/CMP, Troponin ordered. Chest Xray: Small lung volumes and bibasal patchy subsegmental opacities  persist, similar to recent studies.  No new disease identified   -5/2: Blood cultures NGTD, lactate 2.2, troponin 0.015, UA 1+ occult blood and trace leukocytes with few yeast and occasional bacteria. WBC increased to 15.     Non-Anion Gap Metabolic Acidosis  -5/3: bicarb 16-replaced  -5/4: bicarb 22  -5/6: bicarb still at 22     Lactic acidosis  - 2.7 overnight, responded to IVF  - does not appear infected  - again elevated to 2.4 after procedure, will trend  - 4/30: 2.6, monitor     Urinary incontinence   - rocha in place, consider switch to purwick when pt can tolerate  - 5/3: removed rocha     Pleural effusion  - likely metastatic, s/p sampling at OSH  - associated compressive atelectasis bilaterally  - multiple pulmonary micronodules     Anemia of Chronic Disease, stable  - likely chronic disease with some blood loss during procedures  - iron studies pending: Iron 50 (wnl), TIBC 160 (low), Saturation 31 (wnl), Transferrin 108 (low)  - monitor with CBC daily  - 5/2: Hgb 9.7  -5/6: Hgb 9.6--> 8.7----> 8.2     Hypomagnesemia  -most likely 2/2 chronic disease  -4/22: 1.4-replaced  -5/1: 1.5-replaced  -5/2: 1.5-replaced  -5/4: 1.5-replaced  -5/6: 1.5-replaced     Hypophosphatemia, resolved   -4/22: 2.5 - replaced  - Most likely 2/2 chronic disease     Thrombocytopenia, resolved  -4/22: 122--> 165 resolved   -4/30: 115-->135 (5/2)  -Most likely 2/2 chronic disease  -5/5: 136-->153, resolved     Congestive Heart Failure  Right Heart Failure  -4/27 Echo: Right Ventricular Enlargement with normal systolic function  -5/1:      Malnutrition     -Prealbumin of 4 . Dietary consulted: Please order speech consult. Continue regular diet, texture per SLP. Continue Boost Plus TID.     -Speech Consult: Clinical evaluation of swallow complete. Recommend dental soft diet with thin liquids. Given pt with scattered dentition, she appears to have met max potential with SLP services at this time.     DVT PPx: Enoxaparin 40mg  daily     Discharge plan and follow up  Pending family discussion     Provider    I personally scribed for Ravin Shaikh MD on 05/08/2018 at 10:30 AM. Electronically signed by kera Lipscomb III on 05/08/2018 at 10:30 AM   Bcc  Morpheaform/Sclerosing Histology Text: There were aggregates of basaloid cells demonstrating a morpheaform/sclerosing pattern.

## 2025-07-22 NOTE — ED NOTES
"Pt's family confirms that pt's condition has been worsening over the "past week to week and a half" w/ no tx by nursing home; family states that pt has not eaten or drank in "over a week"; family confirms that pt was able to communicate, was reactive, and had minimal motor responses prior to beginning of this decline   " Follow-up in 3 months with labs prior at [facility].  Patient teaching provided regarding [facility] transition and patient notified paper lab requisition will be required for non-[facility] [place] labs.   Call back instructions reviewed.  Patient verbalized understanding.

## (undated) DEVICE — DRESSING COVER AQUACEL AG SURG

## (undated) DEVICE — DRESSING AQUACEL AG ADV 3.5X6

## (undated) DEVICE — APPLICATOR CHLORAPREP ORN 26ML

## (undated) DEVICE — SUT ETHILON 3/0 18IN PS-1

## (undated) DEVICE — SUT D SPECIAL VICRYL 2-0

## (undated) DEVICE — SCREW STRDRV REC T25 5X50 TTNM
Type: IMPLANTABLE DEVICE | Site: HIP | Status: NON-FUNCTIONAL
Removed: 2018-04-30

## (undated) DEVICE — DRAPE STERI U-SHAPED 47X51IN

## (undated) DEVICE — KIT PT CARE HANA PROFX SSXT

## (undated) DEVICE — SPONGE LAP 18X18 PREWASHED

## (undated) DEVICE — SUT 0 VICRYL / CT-1

## (undated) DEVICE — DRAPE C-ARMOR EQUIPMENT COVER

## (undated) DEVICE — SEE MEDLINE ITEM 157150

## (undated) DEVICE — Device

## (undated) DEVICE — WIRE GUIDE 3.2MM 400MM
Type: IMPLANTABLE DEVICE | Site: HIP | Status: NON-FUNCTIONAL
Removed: 2018-04-30

## (undated) DEVICE — GAUZE SPONGE 4X4 12PLY

## (undated) DEVICE — DRAPE IOBAN 2 STERI

## (undated) DEVICE — BIT DRILL QC 3FLUTED 4.2X145 S

## (undated) DEVICE — TRAY MINOR ORTHO

## (undated) DEVICE — DRAPE C ARM 42 X 120 10/BX

## (undated) DEVICE — TAPE SURG DURAPORE 2 X10YD

## (undated) DEVICE — BLADE SURG CARBON STEEL #10

## (undated) DEVICE — DRAPE PLASTIC U 60X72